# Patient Record
Sex: FEMALE | Race: ASIAN | NOT HISPANIC OR LATINO | Employment: OTHER | URBAN - METROPOLITAN AREA
[De-identification: names, ages, dates, MRNs, and addresses within clinical notes are randomized per-mention and may not be internally consistent; named-entity substitution may affect disease eponyms.]

---

## 2023-05-23 ENCOUNTER — REFERRAL (OUTPATIENT)
Dept: TRANSPLANT | Facility: CLINIC | Age: 53
End: 2023-05-23

## 2023-05-23 DIAGNOSIS — N18.4 CHRONIC KIDNEY DISEASE, STAGE 4, SEVERELY DECREASED GFR (H): ICD-10-CM

## 2023-05-23 DIAGNOSIS — E11.21: Primary | ICD-10-CM

## 2023-05-23 NOTE — LETTER
June 8, 2023    Wendy Kapoor  [address] [place] [address]          Dear Wendy,    Thank you for your interest in the Transplant Center at [facility]. We look forward to being a part of your care team and assisting you through the transplant process.    As we discussed, your transplant coordinator is Liv Borges (Kidney). You may call your coordinator at any time with questions or concerns. Your first scheduled call will be on 06/19/2023 between 1:00 pm and 3:00 pm. The tentative evaluation is scheduled on 08/02/2023. If you need to reschedule, please call [phone].    Please complete the following.    Fill out and return the enclosed forms  Authorization for Electronic Communication  Authorization to Discuss Protected Health Information  Authorization for Release of Protected Health Information    Sign up for:  [employer], access to your electronic medical record (see enclosed pamphlet)  [email or web address], a transplant education website    You can use these tools to learn more about your transplant, communicate with your care team, and track your medical details      Sincerely,      Solid Organ Transplant  [facility]    cc: Referring Physician PCP

## 2023-06-08 VITALS — WEIGHT: 138 LBS | HEIGHT: 60 IN | BODY MASS INDEX: 27.09 KG/M2

## 2023-06-08 NOTE — TELEPHONE ENCOUNTER
PCP: Hafsa Harrington MD  Referring Provider: Roberth Brand MD   Referring Diagnosis:CKD Stage 4 and Diabetic Nodular Glomerulosclerosis     GFR/Date: 16 (05/18/2023)    Is patient under the age of 65? Yes  Is patient diabetic? Yes  Is patient on insulin? Yes  Was patient offered a pancreas transplant referral? Yes    Is patient in a group home/assisted living? No  Does patient have a guardian? No    Referral intake process completed.  Patient is aware that after financial approval is received, medical records will be requested.   Patient confirmed for a callback from transplant coordinator on 06/19/2023. (within 2 weeks)  Tentative evaluation date 08/02/2023 slot #3 (within 4 weeks) if appointment is virtual, does patient have capabilities of setting this up? Did not ask    Confirmed coordinator will discuss evaluation process in more detail at the time of their call.   Patient is aware of the need to arrange age appropriate cancer screening, vaccinations, and dental care.     Reminded patient to complete questionnaire, complete medical records release, and review packet prior to evaluation visit.    Assessed patient for special needs (ie-wheelchair, assistance, guardian, and ): None  Patient instructed to call [phone] with questions.     Patient gave verbal consent during intake call to obtain medical records and documents outside of [facility]: Yes

## 2023-07-10 ENCOUNTER — TELEPHONE (OUTPATIENT)
Dept: TRANSPLANT | Facility: CLINIC | Age: 53
End: 2023-07-10
Payer: COMMERCIAL

## 2023-07-10 DIAGNOSIS — Z01.818 ENCOUNTER FOR PRE-TRANSPLANT EVALUATION FOR KIDNEY AND PANCREAS TRANSPLANT: ICD-10-CM

## 2023-07-10 DIAGNOSIS — I10 ESSENTIAL HYPERTENSION: ICD-10-CM

## 2023-07-10 DIAGNOSIS — N18.9 CHRONIC RENAL FAILURE: ICD-10-CM

## 2023-07-10 DIAGNOSIS — Z76.82 ORGAN TRANSPLANT CANDIDATE: ICD-10-CM

## 2023-07-10 DIAGNOSIS — E11.9 DIABETES MELLITUS, TYPE 2 (H): ICD-10-CM

## 2023-07-10 DIAGNOSIS — N18.5 CHRONIC KIDNEY DISEASE, STAGE V (H): ICD-10-CM

## 2023-07-10 NOTE — TELEPHONE ENCOUNTER
[facility] Call Center    Phone Message    May a detailed message be left on voicemail: yes     Reason for Call: Other: Spoke with patient and gave PKE date/times and helped activate Mychart.     Action Taken: Other: SOT    Travel Screening: Not Applicable

## 2023-07-10 NOTE — TELEPHONE ENCOUNTER
Wendy called to see what time her appointment is on 8/2/23 this writer didn't see appointment scheduled for patient. Wendy is wanting to follow up with her Coordinator that she is needing to schedule her Eval appointment for 8/2/23

## 2023-07-26 NOTE — TELEPHONE ENCOUNTER
I called Wendy HGunjan Kapoor and gave her a reminder call for next week evaluation. I/Zuly Marina Spoke directly with Wendy.    She reports understanding about the evaluation on 8/2/2023 from her discussion with Liv Borges.      I asked Wendy to watch the [facility] videos on UTube hyperlinks.  She asked me to send then to her. I will send to her email [email or web address].   [email or web address]     Pt will bring support person,  She knows to come to [address] [place] [address].   She can wear mask and or ask staff to wear mask in her presence if she wants further protection.       Zuly sent the following hyperlinks to Wendy to watch all of them prior to next week evaluation.   ANA Peck.     Domenico Nguyen,    Per our telephone conversation, on Wednesday 7/26/2023 would you please watch the videos prior to coming to the evaluation next week; August 2, 2023?     Thank you,  If you have questions about them you may call your kidney coordinator/ RN Liv Borges RN at [phone] option 5.    Wendy, each blue hyperlink below will give your information about the donor process; the kidney alone transplant and the combined Kidney /Pancreas and the medication training post transplant and the complications post kidney and kidney pancreas transplant.     Thank you so much for taking time to watch the videos.  Bring questions to the providers next week or ask Liv.    to view the MTP modules via [employer] here are the links in English    Living Donor (English) : [email or web address]://www.[employer]Pl[employer].com/playlist?list=[email or web address]     Pre-Kidney Transplant (I) (English): [email or web address]://www.[employer].[place]/playlist?list=[email or web address]-[email or web address]   Pre-Kidney Transplant (II) (English): https://www.[email or web address]/playlist?[email or web address]     Pre-Pancreas Transplant (I) (English): https://www.[email or web address].[place]/playlist?list=[email or web address]   Pre-Pancreas Transplant (II) (English):  https://www.[employer].com/playlist?list=[email or web address]_   Transplant Medication Training (English): https://www.[employer].com/playlist?list=[email or web address]-[email or web address]   Transplant Admission Training (English): https://www.[employer].com/playlist?list=[email or web address]  Post-Transplant Complications (All Organs) (English): https://www.[employer].com/playlist?list=[email or web address]    Sincerely, Zuly Marina RN BSN on behalf of the kidney program.

## 2023-07-28 NOTE — PROGRESS NOTES
TRANSPLANT NEPHROLOGY RECIPIENT EVALUATION NOTE    Assessment and Plan:  # Kidney/Pancreas Transplant Evaluation: Patient is a good candidate overall. Benefits of a living donor transplant were discussed.    # CKD from Biopsy Proven DM/HTN: biopsy Jan 2023 also with findings thought 2/2 Sjogren's. She's had a rather precipitous decline, not yet on dialysis, but eGFR 12-13 ml/min. Feeling well overall. She would likely benefit from a kidney transplant. ABO-B, cPRA pending, no potential living donor.    # Type 2 Diabetes: currently using 14 units insulin daily. Given evidence of end organ damage, she may benefit from a pancreas transplant. Hemoglobin A1c 6.6%.    # Cardiac Risk: history of pericardial effusion 2019 requiring admission but no surgical intervention. ECHO not since repeated. Will get updated ECHO now and will need risk assessment as well as coronary angiogram.    # PAD Screening: CT and iliac US.    # Sjogren's: new diagnosis. Has upcoming rheumatology consult.     # IgG Lambda MGUS: with monoclonal peak of 1.7 in Jan 2023, but normal serum free light chain ratio. Identified a few years ago. Repeat SPEP with monoclonal peak down to 0.3 and serum free light chain ratio was normal today. Will repeat labs in 3-6 months.     # Elevated LFTs: ALT 82 and AST 93. Platelets OK, no known liver disease or liver abnormalities on previous imaging. Repeat in a few weeks.    # Positive Lupus Anticoagulant: repeat in 12 weeks and add beta 2 glycoprotein IgM and IgM.    # Health Maintenance: Colonoscopy: due, Mammogram: Up to date, PAP: Up to date and Dental: edentulous, full dentures    Recommendations:  - Cardiac risk assessment and likely coronary angiogram given diabetes duration   - CT and iliac US  - Follow up with already scheduled rheumatology consult  - Repeat LFTs  - Update health maintenance as above    Discussed the risks and benefits of a transplant, including the risk of surgery and immunosuppression  medications.  Patient's overall evaluation will be discussed in the Transplant Program's regular meeting with a final recommendation on the patients suitability for transplant to be made at that time.    Evaluation:  Wendy Kapoor was seen in consultation at the request of Dr. Dayton Forman for evaluation as a potential kidney/pancreas transplant recipient.    Reason for Visit:  Wendy Kapoor is a 52 year old female with CKD from DM/HTN, who presents for kidney/pancreas transplant evaluation.    History of Present Illness:         Kidney Disease Hx: Referred to nephrology earlier this year for elevated serum creatinine and nephrotic-range proteinuria. Longstanding albuminuria dating back to at least 2013. Baseline creatinine had been 1.0-1.3 mg/dl, 1.3-1.6 mg/dl 2022, but increased to 2.9 mg/dl, hence the referral to nephrology. Native kidney biopsy Jan 2023 showing DM/HTN and interstitial nephritis thought 2/2 Sjogren's. Positive ARANZA with SSA and SSB antibodies. Has upcoming rheumatology visit.     FINAL DIAGNOSIS  Kidney, needle biopsy: 1.  Nodular diabetic  glomerulosclerosis. 2. Acute tubular injury, on a  background  of chronic tubulointerstitial nephritis. 3. Arteriolar  hyalinosis, severe.    COMMENT  Negative immunofluorescence studies argues against an  immune  complex mediated glomerular disease process.  The  predominant changes in this biopsy are those of nodular  diabetic glomerulosclerosis with focal global and secondary  segmental glomerulosclerosis.  Congo red stains for amyloid  are pending, and will be reported in an addendum.  The  biopsy also shows acute tubular injury, which could be  secondary to ischemic or toxic insult.  These changes are  superimposed on a background of chronic tubulointerstitial  nephritis with a plasma cell rich infiltrate.  Similar  changes are most commonly seen in the setting of an  allergic  or hypersensitivity reaction to medications to which the  patient has been  exposed.  Other causes include but are not  limited to chronic infections, underlying autoimmune  disease.  Of note this patient has tissue ARANZA effect, and  would therefore recommend an autoimmune disease workup.     Not on dialysis. eGFR 12-13 ml/min. Feeling well overall. Of note, mother with h/o ESKD thought 2/2 DM.         Primary Nephrologist: Dr. Brand       H/o Kidney Stones: No       H/o Recurrent/Frequent UTI: No         Diabetic Hx: Type 2        Diagnosis Date: ~25 years ago with first pregnancy       Medication History: oral agents for many years, on insulin for the last ~year. Managed by PCP. Lantus 14 units daily. Checks blood sugars twice daily, avgs 150-160       Diabetic Control: Controlled (HbA1c <7%)   Last HbA1c: 6.5%       Hypoglycemic Unawareness: No       End-Organ Damage due to DM: Nephropathy          Cardiac/Vascular Disease Risk Factors:        - History of pericardial effusion in 2019. No repeat ECHO since. EF was preserved. No recent stress or angio. No known PAD, valvular disease, or pulmonary HTN.         Viral Serology Status       CMV IgG Antibody: Positive       EBV IgG Antibody: Positive         Volume Status/Weight:        Volume status: Mildly hypervolemic       Weight:  Acceptable BMI       BMI: Body mass index is 27.92 kg/m .         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 No chest pain, SOB, or claudication.     Fatigue/Decreased Energy: [x] No [] Yes    Chest Pain or SOB with Exertion: [x] No [] Yes    Significant Weight Change: [x] No [] Yes    Nausea, Vomiting or Diarrhea: [x] No [] Yes    Fever, Sweats or Chills:  [x] No [] Yes    Leg Swelling [x] No [] Yes      Allergy Testing Questions:   Medication that caused a reaction None   Antibiotics used that didn't give an allergic reaction?  Patient doesn't know    COVID Vaccination Up To Date:  Not asked    Potential Living Kidney Donors: Not sure    Review of Systems:  A comprehensive review of systems was obtained and negative, except as noted in the HPI or PMH.    Past Medical History:   Medical record was reviewed and PMH was discussed with patient and noted below.  Past Medical History:   Diagnosis Date    Chronic kidney disease     Diabetes (H)     DM Type 2    History of blood transfusion 2019    Hypertension     MGUS (monoclonal gammopathy of unknown significance)     Pericardial effusion     Sjogren's syndrome (H)     Type 2 diabetes mellitus with diabetic nephropathy (H)        Past Social History:   Past Surgical History:   Procedure Laterality Date    BIOPSY       SECTION       Personal history of bleeding or anesthesia problems: No    Family History:  Family History   Problem Relation Age of Onset    Kidney Disease No family hx of        Personal History:   Social History     Socioeconomic History    Marital status:      Spouse name: Not on file    Number of children: Not on file    Years of education: Not on file    Highest education level: Not on file   Occupational History    Not on file   Tobacco Use    Smoking status: Never    Smokeless tobacco: Never   Substance and Sexual Activity    Alcohol use: Never    Drug use: Never    Sexual activity: Not on file   Other Topics Concern    Not on file   Social History Narrative    Not on file     Social Determinants of Health     Financial Resource Strain: Not on file   Food Insecurity: Not on file   Transportation Needs: Not on file   Physical Activity: Not on file   Stress: Not on file   Social Connections: Not on file   Intimate Partner Violence: Not on file   Housing Stability: Not on file       Allergies:  No Known Allergies    Medications:  Current Outpatient Medications   Medication Sig    allopurinol (ZYLOPRIM) 100 MG tablet Take 100 mg by mouth    blood glucose (FREESTYLE TEST STRIPS) test strip by Misc.(Non-Drug; Combo  "Route) route three times a day.    cholecalciferol 50 MCG (2000 UT) tablet Take 50 mcg by mouth    DARBEPOETIN DALTON IJ Inject 25 mcg Subcutaneous every 28 days    diltiazem ER (DILT-XR) 180 MG 24 hr capsule Take 180 mg by mouth    losartan (COZAAR) 50 MG tablet Take 1 tablet by mouth every morning    metoprolol succinate ER (TOPROL XL) 25 MG 24 hr tablet Take 1 tablet by mouth daily at 2 pm    pioglitazone (ACTOS) 30 MG tablet Take 30 mg by mouth    sodium bicarbonate 650 MG tablet 1,300 mg    torsemide (DEMADEX) 20 MG tablet Take 1 tablet by mouth every morning    glimepiride (AMARYL) 4 MG tablet TAKE 2 TABLETS (8 MG) BY MOUTH EVERY MORNING BEFORE BREAKFAST.    hydrALAZINE (APRESOLINE) 25 MG tablet Take 50 mg by mouth 3 times daily    Insulin Glargine w/ Trans Port 100 UNIT/ML SOPN Inject 14 Units Subcutaneous    LYCOPENE PO Take 1 tablet by mouth daily     No current facility-administered medications for this visit.       Vitals:  BP (!) 177/85   Pulse 71   Ht 1.511 m (4' 11.5\")   Wt 63.8 kg (140 lb 9.6 oz)   SpO2 100%   BMI 27.92 kg/m      Exam:  GENERAL APPEARANCE: alert and no distress  HENT: mouth without ulcers or lesions, good dentition  LYMPHATICS: no cervical or supraclavicular nodes  RESP: lungs clear to auscultation - no rales, rhonchi or wheezes  CV: regular rhythm, normal rate, no rub, no murmur  EDEMA: trace LE edema bilaterally  ABDOMEN: soft, nondistended, nontender, bowel sounds normal  MS: extremities normal - no gross deformities noted, no evidence of inflammation in joints, no muscle tenderness  SKIN: no rash    Results:   Recent Results (from the past 336 hour(s))   Hepatic function panel    Collection Time: 08/21/23  3:46 PM   Result Value Ref Range    Protein Total 8.0 6.4 - 8.3 g/dL    Albumin 3.5 3.5 - 5.2 g/dL    Bilirubin Total 0.2 <=1.2 mg/dL    Alkaline Phosphatase 109 (H) 35 - 104 U/L    AST 45 0 - 45 U/L    ALT 29 0 - 50 U/L    Bilirubin Direct <0.20 0.00 - 0.30 mg/dL         "

## 2023-08-01 ENCOUNTER — TELEPHONE (OUTPATIENT)
Dept: TRANSPLANT | Facility: CLINIC | Age: 53
End: 2023-08-01
Payer: COMMERCIAL

## 2023-08-01 LAB
ABO/RH(D): NORMAL
ANTIBODY SCREEN: NEGATIVE
SPECIMEN EXPIRATION DATE: NORMAL

## 2023-08-01 NOTE — TELEPHONE ENCOUNTER
Pt called to confirm her PKE visit tomorrow, we reviewed the clinic address, check in time 7:30 AM on the third floor.  I instructed her to take her medications as she normally would, no need to fast and that I will call her following the Selection Committee meeting next Weds. Pt had no further questions.

## 2023-08-01 NOTE — PROGRESS NOTES
[facility] Solid Organ Transplant  Outpatient MNT: Kidney Pancreas Transplant Evaluation    Current BMI: 27.9 (HT 59.5 in,  lbs/64 kg)  BMI guideline for kidney transplant up to a BMI of 40 / per surgeon discretion  BMI guideline for pancreas transplant up to a BMI of 35 / per surgeon discretion    Frailty Assessment -- Not Frail (1/5 points)- low activity level      Time Spent: 15 minutes  Visit Type: Initial   Referring Physician: Dickson   Pt accompanied by: her dtr & ex-     History of previous txp: none   Dialysis: no; plan for port placement soon/PD at some point     Nutrition Assessment  H/o DM II- checks BG 2x/day- before work & after work- mid 100s   Last A1c: 6.5 (June 29, 2023)  Lantus & 2 oral agents for DM management  Does not endorse any low BG    - Appetite: good/baseline   - Food allergies/intolerances: no  - Meal prep & grocery shopping: pt's    - Previous RD education: not asked   - Issues chewing or swallowing: no  - N/V/D/C: no  - Food access concerns: not asked     Vitamins, Supplements, Pertinent Meds: MVI, vit D   Herbal Medicines/Supplements: none     Edema: none     Weight hx: gained weight with med (unsure what one)- up to 153 lbs- now losing back and is near baseline ~135    Diet Recall (works nights)  Breakfast 8 am after work- rice, eggs, fish    Lunch    Dinner 10 pm before work- rice, w/ soup (sour soup w/ chicken or pork, veggies)   Snacks 3 am- at work- salad (veggies & ranch) or sandwich- lettuce, butter, ham (brings from home)   Beverages Water    Alcohol None    Dining out 1x/week or less      Physical Activity  Walks all night at work- sanitation  No planned activity outside of work      Labs  7/14 K 3.9   6/29 Phos 5.4     Nutrition Diagnosis  No nutrition diagnosis identified at this time.     Nutrition Intervention  Nutrition education provided:  Discussed sodium intake (low sodium foods and drinks, seasoning food without salt and tips for low sodium  diet).  Reviewed wnl K/Phos levels, which do not warrant further dietary modification at this time. Reviewed possibility of weight gain and BG changes with PD.     Reviewed post txp diet guidelines in brief (will review in further detail post txp):  (1) Review of proper food safety measures d/t immunosuppressant therapy post-op and increased risk for food-borne illness    (2) Avoid the following post txp d/t risk for rejection, unknown effects on the organs, and/or potential interactions with immunosuppressants:  - Herbal, Chinese, holistic, chiropractic, natural, alternative medicines and supplements  - Detoxes and cleanses  - Weight loss pills  - Protein powders or other products with extracts or herbs (ie green tea extract)    (3) Med regimen and possible side effects    Patient Understanding: Pt verbalized understanding of education provided.  Expected Engagement: Good  Follow-Up Plans: PRN     Nutrition Goals  No nutrition goals identified at this time     Irene Abbott, RD, LD, CCTD

## 2023-08-02 ENCOUNTER — ANCILLARY PROCEDURE (OUTPATIENT)
Dept: GENERAL RADIOLOGY | Facility: CLINIC | Age: 53
End: 2023-08-02
Attending: PHYSICIAN ASSISTANT
Payer: COMMERCIAL

## 2023-08-02 ENCOUNTER — OFFICE VISIT (OUTPATIENT)
Dept: TRANSPLANT | Facility: CLINIC | Age: 53
End: 2023-08-02
Attending: PHYSICIAN ASSISTANT
Payer: COMMERCIAL

## 2023-08-02 ENCOUNTER — EVALUATION (OUTPATIENT)
Dept: TRANSPLANT | Facility: CLINIC | Age: 53
End: 2023-08-02

## 2023-08-02 ENCOUNTER — TELEPHONE (OUTPATIENT)
Dept: TRANSPLANT | Facility: CLINIC | Age: 53
End: 2023-08-02

## 2023-08-02 ENCOUNTER — LAB (OUTPATIENT)
Dept: LAB | Facility: CLINIC | Age: 53
End: 2023-08-02
Attending: PHYSICIAN ASSISTANT
Payer: COMMERCIAL

## 2023-08-02 ENCOUNTER — ANCILLARY PROCEDURE (OUTPATIENT)
Dept: CARDIOLOGY | Facility: CLINIC | Age: 53
End: 2023-08-02
Attending: PHYSICIAN ASSISTANT
Payer: COMMERCIAL

## 2023-08-02 VITALS
BODY MASS INDEX: 27.61 KG/M2 | SYSTOLIC BLOOD PRESSURE: 177 MMHG | HEIGHT: 60 IN | DIASTOLIC BLOOD PRESSURE: 85 MMHG | WEIGHT: 140.6 LBS | HEART RATE: 71 BPM | OXYGEN SATURATION: 100 %

## 2023-08-02 DIAGNOSIS — E11.21 TYPE 2 DIABETES MELLITUS WITH DIABETIC NEPHROPATHY, WITH LONG-TERM CURRENT USE OF INSULIN (H): ICD-10-CM

## 2023-08-02 DIAGNOSIS — E11.9 DIABETES MELLITUS, TYPE 2 (H): ICD-10-CM

## 2023-08-02 DIAGNOSIS — Z01.818 ENCOUNTER FOR PRE-TRANSPLANT EVALUATION FOR KIDNEY AND PANCREAS TRANSPLANT: ICD-10-CM

## 2023-08-02 DIAGNOSIS — N18.5 CHRONIC KIDNEY DISEASE, STAGE V (H): ICD-10-CM

## 2023-08-02 DIAGNOSIS — N18.9 CHRONIC RENAL FAILURE: ICD-10-CM

## 2023-08-02 DIAGNOSIS — I10 ESSENTIAL HYPERTENSION: ICD-10-CM

## 2023-08-02 DIAGNOSIS — Z76.82 ORGAN TRANSPLANT CANDIDATE: ICD-10-CM

## 2023-08-02 DIAGNOSIS — D47.2 MGUS (MONOCLONAL GAMMOPATHY OF UNKNOWN SIGNIFICANCE): ICD-10-CM

## 2023-08-02 DIAGNOSIS — R80.9 PROTEINURIA, UNSPECIFIED TYPE: Primary | ICD-10-CM

## 2023-08-02 DIAGNOSIS — Z79.4 TYPE 2 DIABETES MELLITUS WITH DIABETIC NEPHROPATHY, WITH LONG-TERM CURRENT USE OF INSULIN (H): ICD-10-CM

## 2023-08-02 DIAGNOSIS — R80.9 PROTEINURIA, UNSPECIFIED TYPE: ICD-10-CM

## 2023-08-02 LAB
A1 AB TITR SERPL: 4 {TITER}
A1 AB TITR SERPL: 8 {TITER}
A2 AB TITR SERPL: 1 {TITER}
A2 AB TITR SERPL: <1 {TITER}
ABO/RH(D): NORMAL
ALBUMIN SERPL BCG-MCNC: 3.5 G/DL (ref 3.5–5.2)
ALBUMIN UR-MCNC: 300 MG/DL
ALP SERPL-CCNC: 128 U/L (ref 35–104)
ALT SERPL W P-5'-P-CCNC: 82 U/L (ref 0–50)
ANION GAP SERPL CALCULATED.3IONS-SCNC: 12 MMOL/L (ref 7–15)
ANTIBODY TITER IGM SCREEN: NEGATIVE
APPEARANCE UR: CLEAR
APTT PPP: 39 SECONDS (ref 22–38)
AST SERPL W P-5'-P-CCNC: 93 U/L (ref 0–45)
BASOPHILS # BLD AUTO: 0 10E3/UL (ref 0–0.2)
BASOPHILS NFR BLD AUTO: 0 %
BILIRUB SERPL-MCNC: 0.2 MG/DL
BILIRUB UR QL STRIP: NEGATIVE
BUN SERPL-MCNC: 67.8 MG/DL (ref 6–20)
CALCIUM SERPL-MCNC: 8.8 MG/DL (ref 8.6–10)
CHLORIDE SERPL-SCNC: 101 MMOL/L (ref 98–107)
COLOR UR AUTO: ABNORMAL
CREAT SERPL-MCNC: 4.25 MG/DL (ref 0.51–0.95)
DEPRECATED HCO3 PLAS-SCNC: 22 MMOL/L (ref 22–29)
EOSINOPHIL # BLD AUTO: 0.1 10E3/UL (ref 0–0.7)
EOSINOPHIL NFR BLD AUTO: 1 %
ERYTHROCYTE [DISTWIDTH] IN BLOOD BY AUTOMATED COUNT: 13.9 % (ref 10–15)
FACTOR 2 INTERPRETATION: NORMAL
FACTOR V INTERPRETATION: NORMAL
GFR SERPL CREATININE-BSD FRML MDRD: 12 ML/MIN/1.73M2
GLUCOSE SERPL-MCNC: 227 MG/DL (ref 70–99)
GLUCOSE UR STRIP-MCNC: 300 MG/DL
HBA1C MFR BLD: 6.6 %
HBV CORE AB SERPL QL IA: NONREACTIVE
HBV SURFACE AB SERPL IA-ACNC: 0.83 M[IU]/ML
HBV SURFACE AB SERPL IA-ACNC: NONREACTIVE M[IU]/ML
HBV SURFACE AG SERPL QL IA: NONREACTIVE
HCT VFR BLD AUTO: 22.6 % (ref 35–47)
HCV AB SERPL QL IA: NONREACTIVE
HGB BLD-MCNC: 7.3 G/DL (ref 11.7–15.7)
HGB UR QL STRIP: NEGATIVE
HIV 1+2 AB+HIV1 P24 AG SERPL QL IA: NONREACTIVE
IMM GRANULOCYTES # BLD: 0 10E3/UL
IMM GRANULOCYTES NFR BLD: 1 %
INR PPP: 0.98 (ref 0.85–1.15)
KETONES UR STRIP-MCNC: NEGATIVE MG/DL
LAB DIRECTOR COMMENTS: NORMAL
LAB DIRECTOR DISCLAIMER: NORMAL
LAB DIRECTOR INTERPRETATION: NORMAL
LAB DIRECTOR METHODOLOGY: NORMAL
LAB DIRECTOR RESULTS: NORMAL
LEUKOCYTE ESTERASE UR QL STRIP: NEGATIVE
LVEF ECHO: NORMAL
LYMPHOCYTES # BLD AUTO: 0.7 10E3/UL (ref 0.8–5.3)
LYMPHOCYTES NFR BLD AUTO: 13 %
MCH RBC QN AUTO: 29.1 PG (ref 26.5–33)
MCHC RBC AUTO-ENTMCNC: 32.3 G/DL (ref 31.5–36.5)
MCV RBC AUTO: 90 FL (ref 78–100)
MONOCYTES # BLD AUTO: 0.3 10E3/UL (ref 0–1.3)
MONOCYTES NFR BLD AUTO: 5 %
MUCOUS THREADS #/AREA URNS LPF: PRESENT /LPF
NEUTROPHILS # BLD AUTO: 4.4 10E3/UL (ref 1.6–8.3)
NEUTROPHILS NFR BLD AUTO: 80 %
NITRATE UR QL: NEGATIVE
NRBC # BLD AUTO: 0 10E3/UL
NRBC BLD AUTO-RTO: 0 /100
PH UR STRIP: 7 [PH] (ref 5–7)
PLAT MORPH BLD: NORMAL
PLATELET # BLD AUTO: 203 10E3/UL (ref 150–450)
POTASSIUM SERPL-SCNC: 3.7 MMOL/L (ref 3.4–5.3)
PROT SERPL-MCNC: 8.5 G/DL (ref 6.4–8.3)
RBC # BLD AUTO: 2.51 10E6/UL (ref 3.8–5.2)
RBC MORPH BLD: NORMAL
RBC URINE: <1 /HPF
SODIUM SERPL-SCNC: 135 MMOL/L (ref 136–145)
SP GR UR STRIP: 1.01 (ref 1–1.03)
SPECIMEN DESCRIPTION: NORMAL
SPECIMEN EXPIRATION DATE: NORMAL
SPECIMEN EXPIRATION DATE: NORMAL
SQUAMOUS EPITHELIAL: <1 /HPF
T PALLIDUM AB SER QL: NONREACTIVE
TOTAL PROTEIN SERUM FOR ELP: 8 G/DL (ref 6.4–8.3)
UROBILINOGEN UR STRIP-MCNC: NORMAL MG/DL
WBC # BLD AUTO: 5.5 10E3/UL (ref 4–11)
WBC URINE: 1 /HPF

## 2023-08-02 PROCEDURE — 85025 COMPLETE CBC W/AUTO DIFF WBC: CPT | Performed by: PATHOLOGY

## 2023-08-02 PROCEDURE — 84681 ASSAY OF C-PEPTIDE: CPT | Performed by: PHYSICIAN ASSISTANT

## 2023-08-02 PROCEDURE — 85613 RUSSELL VIPER VENOM DILUTED: CPT | Performed by: PHYSICIAN ASSISTANT

## 2023-08-02 PROCEDURE — 83036 HEMOGLOBIN GLYCOSYLATED A1C: CPT | Performed by: PHYSICIAN ASSISTANT

## 2023-08-02 PROCEDURE — 86481 TB AG RESPONSE T-CELL SUSP: CPT | Performed by: PHYSICIAN ASSISTANT

## 2023-08-02 PROCEDURE — 71046 X-RAY EXAM CHEST 2 VIEWS: CPT | Mod: GC | Performed by: RADIOLOGY

## 2023-08-02 PROCEDURE — 93306 TTE W/DOPPLER COMPLETE: CPT | Performed by: INTERNAL MEDICINE

## 2023-08-02 PROCEDURE — 85730 THROMBOPLASTIN TIME PARTIAL: CPT | Performed by: PATHOLOGY

## 2023-08-02 PROCEDURE — 84165 PROTEIN E-PHORESIS SERUM: CPT | Mod: TC | Performed by: PATHOLOGY

## 2023-08-02 PROCEDURE — 86704 HEP B CORE ANTIBODY TOTAL: CPT | Performed by: PHYSICIAN ASSISTANT

## 2023-08-02 PROCEDURE — 86833 HLA CLASS II HIGH DEFIN QUAL: CPT | Performed by: PHYSICIAN ASSISTANT

## 2023-08-02 PROCEDURE — 86850 RBC ANTIBODY SCREEN: CPT | Performed by: PHYSICIAN ASSISTANT

## 2023-08-02 PROCEDURE — 85670 THROMBIN TIME PLASMA: CPT | Performed by: PHYSICIAN ASSISTANT

## 2023-08-02 PROCEDURE — 87340 HEPATITIS B SURFACE AG IA: CPT | Performed by: PHYSICIAN ASSISTANT

## 2023-08-02 PROCEDURE — 86886 COOMBS TEST INDIRECT TITER: CPT | Performed by: PHYSICIAN ASSISTANT

## 2023-08-02 PROCEDURE — 99000 SPECIMEN HANDLING OFFICE-LAB: CPT | Performed by: PATHOLOGY

## 2023-08-02 PROCEDURE — 81001 URINALYSIS AUTO W/SCOPE: CPT | Performed by: PATHOLOGY

## 2023-08-02 PROCEDURE — 93000 ELECTROCARDIOGRAM COMPLETE: CPT | Performed by: INTERNAL MEDICINE

## 2023-08-02 PROCEDURE — 86803 HEPATITIS C AB TEST: CPT | Performed by: PHYSICIAN ASSISTANT

## 2023-08-02 PROCEDURE — 99215 OFFICE O/P EST HI 40 MIN: CPT

## 2023-08-02 PROCEDURE — 86644 CMV ANTIBODY: CPT | Performed by: PHYSICIAN ASSISTANT

## 2023-08-02 PROCEDURE — G0452 MOLECULAR PATHOLOGY INTERPR: HCPCS | Mod: 26 | Performed by: PATHOLOGY

## 2023-08-02 PROCEDURE — 86665 EPSTEIN-BARR CAPSID VCA: CPT | Performed by: PHYSICIAN ASSISTANT

## 2023-08-02 PROCEDURE — 80053 COMPREHEN METABOLIC PANEL: CPT | Performed by: PATHOLOGY

## 2023-08-02 PROCEDURE — 86706 HEP B SURFACE ANTIBODY: CPT | Performed by: PHYSICIAN ASSISTANT

## 2023-08-02 PROCEDURE — 99213 OFFICE O/P EST LOW 20 MIN: CPT

## 2023-08-02 PROCEDURE — 81378 HLA I & II TYPING HR: CPT | Performed by: PHYSICIAN ASSISTANT

## 2023-08-02 PROCEDURE — 86832 HLA CLASS I HIGH DEFIN QUAL: CPT | Performed by: PHYSICIAN ASSISTANT

## 2023-08-02 PROCEDURE — 85610 PROTHROMBIN TIME: CPT | Performed by: PATHOLOGY

## 2023-08-02 PROCEDURE — 83521 IG LIGHT CHAINS FREE EACH: CPT | Performed by: PHYSICIAN ASSISTANT

## 2023-08-02 PROCEDURE — 84155 ASSAY OF PROTEIN SERUM: CPT | Performed by: PHYSICIAN ASSISTANT

## 2023-08-02 PROCEDURE — 81240 F2 GENE: CPT | Performed by: PHYSICIAN ASSISTANT

## 2023-08-02 PROCEDURE — 36415 COLL VENOUS BLD VENIPUNCTURE: CPT | Performed by: PATHOLOGY

## 2023-08-02 PROCEDURE — 85390 FIBRINOLYSINS SCREEN I&R: CPT | Mod: 26 | Performed by: PATHOLOGY

## 2023-08-02 PROCEDURE — 86147 CARDIOLIPIN ANTIBODY EA IG: CPT | Performed by: PHYSICIAN ASSISTANT

## 2023-08-02 PROCEDURE — 86787 VARICELLA-ZOSTER ANTIBODY: CPT | Performed by: PHYSICIAN ASSISTANT

## 2023-08-02 PROCEDURE — 86780 TREPONEMA PALLIDUM: CPT | Performed by: PHYSICIAN ASSISTANT

## 2023-08-02 PROCEDURE — 86901 BLOOD TYPING SEROLOGIC RH(D): CPT | Performed by: PHYSICIAN ASSISTANT

## 2023-08-02 PROCEDURE — 84165 PROTEIN E-PHORESIS SERUM: CPT | Mod: 26

## 2023-08-02 PROCEDURE — 99203 OFFICE O/P NEW LOW 30 MIN: CPT | Performed by: TRANSPLANT SURGERY

## 2023-08-02 PROCEDURE — 85730 THROMBOPLASTIN TIME PARTIAL: CPT | Performed by: PHYSICIAN ASSISTANT

## 2023-08-02 RX ORDER — TORSEMIDE 20 MG/1
1 TABLET ORAL EVERY MORNING
COMMUNITY
Start: 2023-04-17 | End: 2024-04-24

## 2023-08-02 RX ORDER — HYDRALAZINE HYDROCHLORIDE 25 MG/1
50 TABLET, FILM COATED ORAL 3 TIMES DAILY
COMMUNITY

## 2023-08-02 RX ORDER — SODIUM BICARBONATE 650 MG/1
1300 TABLET ORAL
COMMUNITY
Start: 2023-04-17

## 2023-08-02 RX ORDER — DILTIAZEM HYDROCHLORIDE 180 MG/1
180 CAPSULE, EXTENDED RELEASE ORAL
COMMUNITY
Start: 2023-07-26 | End: 2024-04-24

## 2023-08-02 RX ORDER — ALLOPURINOL 100 MG/1
100 TABLET ORAL
COMMUNITY
Start: 2023-02-13 | End: 2024-02-13

## 2023-08-02 RX ORDER — GLIMEPIRIDE 4 MG/1
TABLET ORAL
COMMUNITY

## 2023-08-02 RX ORDER — METOPROLOL SUCCINATE 25 MG/1
1 TABLET, EXTENDED RELEASE ORAL
COMMUNITY
Start: 2022-12-27 | End: 2024-04-24

## 2023-08-02 RX ORDER — LOSARTAN POTASSIUM 50 MG/1
1 TABLET ORAL EVERY MORNING
COMMUNITY
Start: 2023-06-29

## 2023-08-02 RX ORDER — PIOGLITAZONEHYDROCHLORIDE 30 MG/1
30 TABLET ORAL
COMMUNITY
Start: 2023-02-27

## 2023-08-02 RX ORDER — BLOOD SUGAR DIAGNOSTIC
STRIP MISCELLANEOUS
COMMUNITY
Start: 2022-03-06

## 2023-08-02 NOTE — LETTER
8/2/2023         RE: Wendy Kaopor  [address]  [place] [address]        Dear Colleague,    Thank you for referring your patient, Wendy Kapoor, to the [facility] TRANSPL[facility]. Please see a copy of my visit note below.    Kidney, Pancreas Transplant Referral - 5/23/2023  Wendy Kapoor attended the pre-transplant patient EVALUATION today August 2, 2023 with Celia and Benny. The  Transplant Place website pre-transplant modules were NOT  viewed; Zuly to send Blue hyperlinks to Celia and to to Wendy email addresses   Content reviewed:  Living Donation and how to access that program; [email or web address]  Paired exchange  Kidney Donor Profile Index (KDPI)Pt signed NOT willing to accept offers >85% at this time.   Waiting list issues (right to decline without penalty, high PHS risk donors, what to expect when called with an offer)  Hospital experience,  length of stay , need to stay locally post-discharge (2-4 weeks)  Surgical options (with pictures)                           Post-surgery lifting and driving restrictions  Post-transplant routines, frequency of lab work and clinic visits  Need to stay locally post-discharge (2-4 weeks)  Role of Transplant Coordinator    Participants were informed of the benefits of transplant as well as potential risks such as infection, cancer, and death.  The need for total adherence with immunosuppression medications and following transplant regimens was stressed.  The overall evaluation/approval/listing process was reviewed.        The patient was provided with the following documents:  What You Need to Know About a Kidney Transplant  Adult Kidney Transplant - A Guide for Patients  SRTR Data Sheet - Kidney  Brochure - Kidney Allocation  What You Need to Know About a Pancreas Transplant  Adult Pancreas Transplant-A Guide for Patients  SRTR Data Sheet - Pancreas  Brochure - Pancreas  SRTR Data Sheet - Kidney/Pancreas  Brochure - SPK  Brochure -  "Multiple Listing and Waiting Time Transfer  What Every Patient Needs to Know (UNOS)  UNOS Facts and Figures  Finding a Donor  My Transplant Place - Quick Start Guide  KDPI Consent  Receipt of Information form    Wendy Kapoor signed the  Receipt of Information for Organ Transplant Recipient.\" She was provided [employer]'s business card and instructed to call with additional questions.      Summary  See previous note.     Transplant Surgery Consult Note     Medical record number: [id number]  YOB: 1970,   Consult requested by Dr. Brand for evaluation of kidney transplant candidacy.    Assessment and Recommendations:Ms. Kapoor appears to be a good candidate for kidney transplantation and has a good understanding of the risks and benefits of this approach to the management of renal failure. The following issues should be addressed prior to finalizing her transplant candidacy:     Ms. Kapoor has Chronic renal failure due to diabetes whose condition is not expected to resolve, is expected to progress, and is expected to continue to develop related comorbid conditions.    Dietician consult ordered: Yes  Social work consult ordered: Yes  Transplant listing labs ordered to include HLA, ABOx2, Cr, etc.  Cardiology consult for cardiac risk stratification to be ordered: No  Obtain past medical records  Up-to-date cancer screening    The majority of our visit was spent in counselling, discussing the medical and surgical risks of kidney transplantation.We talked about the pros and cons of transplantation vs. dialysis.  We discussed the fact that it was important to think about the pros and cons of each treatment option and make an active decision.  We also discussed the fact that the two were interconnected and that if the transplant failed, it is possible that dialysis might be necessary before another transplant.  We discussed operative risks. We discussed immunosuppression side effects.  In addition we " "discussed the critical need for adherence to the immunosuppressive regimen as well as to maintain schedule of blood draws and follow-up clinic visits.         We also discussed the fact that if choosing to have a transplant, a second important decision was a living versus a  donor transplant.  We talked about the pros and cons of each option - the advantage of a  donor transplant being not asking someone to go through the living donor operation, the disadvantage, 5-6 years waiting; the advantage of a living donor transplant, much shorter time to transplant and significantly better long-term outcomes, the disadvantage being the risk to the donor.  Although I didn't express an opinion regarding transplantation or dialysis, I suggested that if opting for a transplant, we would strongly recommend a living donor transplant.      We discussed the benefit of a preemptive transplant.     We discussed the fact that a living donor does not have to be a direct match and that if there was a donor who met the criteria but was not a match, there was an option of participating in the national paired exchange system.  I described how the system would work.    I also discussed the new ( donor) kidney (KDPI) scoring system. We discussed the advantages and disadvantages of accepting an \"expanded criteria\" donor kidney, and the latest data as to who potentially benefits - those >45 and/or having diabetes a cause for ESKD - by receiving an expanded criteria donor kidney versus waiting longer for a standard criteria donor. Based on her age, i recommended she say \"yes\" to a high KDPI kidney if she was interested in a kidney alone.    We also discussed kidney/pancreas transplant and the advantages and disadvantages compared with kidney alone vs combined kidney pancreas transplant.  We discussed the shorter waiting time for a combined kidney pancreas and that the  organs would not come from a high KDPI donor.    She " seemed overwhelmed about all of the information , and had given little thought to a pancreas transplant.  I suggested that she take her time thinking about this and then return to clinic for additional discussion and education about pancreas transplantation.  Ms. Kapoor asked good questions and her candidacy will be reviewed at our Multidisciplinary Selection Committee. Thank you for the opportunity to participate in Ms. Kapoor's care.    40 min spent on the date of the encounter in chart review, patient visit,  documentation and/or discussion with other providers about the issues documented above.          Dayton Forman MD  Surgical Professor, Kidney Transplantation                                                                                                      ---------------------------------------------------------------------------------------------------    Past medical history includes:  CKD, stage 5  Diabetes  Sjogrens syndrome  Hypertension  Gout  Pericardial effucion     Past Medical History:   Diagnosis Date    Diabetes (H)     DM Type 2    History of blood transfusion 2019    Hypertension      Past Surgical History:   Procedure Laterality Date    BIOPSY       SECTION       No family history on file.  Social History     Socioeconomic History    Marital status:      Spouse name: Not on file    Number of children: Not on file    Years of education: Not on file    Highest education level: Not on file   Occupational History    Not on file   Tobacco Use    Smoking status: Never    Smokeless tobacco: Never   Substance and Sexual Activity    Alcohol use: Never    Drug use: Never    Sexual activity: Not on file   Other Topics Concern    Not on file   Social History Narrative    Not on file     Social Determinants of Health     Financial Resource Strain: Not on file   Food Insecurity: Not on file   Transportation Needs: Not on file   Physical Activity: Not on file   Stress: Not on file    Social Connections: Not on file   Intimate Partner Violence: Not on file   Housing Stability: Not on file       ROS:   CONSTITUTIONAL:  No fevers or chills  EYES: negative for icterus  ENT:  negative for hearing loss, tinnitus and sore throat  RESPIRATORY:  negative for cough, sputum, dyspnea  CARDIOVASCULAR:  negative for chest pain  GASTROINTESTINAL:  negative for nausea, vomiting, diarrhea or constipation  GENITOURINARY:  negative for incontinence, dysuria, bladder emptying problems  HEME:  No easy bruising  INTEGUMENT:  negative for rash and pruritus  NEURO:  Negative for headache, seizure disorder  Allergies:   No Known Allergies  Medications:  Prescription Medications as of 8/7/2023         Rx Number Disp Refills Start End Last Dispensed Date Next Fill Date Owning Pharmacy    allopurinol (ZYLOPRIM) 100 MG tablet    2/13/2023 2/13/2024       Sig: Take 100 mg by mouth    Class: Historical    Route: Oral    blood glucose (FREESTYLE TEST STRIPS) test strip    3/6/2022        Sig: by Misc.(Non-Drug; Combo Route) route three times a day.    Class: Historical    Route: Other    cholecalciferol 50 MCG (2000 UT) tablet    5/18/2023        Sig: Take 50 mcg by mouth    Class: Historical    Route: Oral    DARBEPOETIN DALTON IJ    5/19/2023        Sig: Inject 25 mcg Subcutaneous every 28 days    Class: Historical    Route: Subcutaneous    diltiazem ER (DILT-XR) 180 MG 24 hr capsule    7/26/2023        Sig: Take 180 mg by mouth    Class: Historical    Route: Oral    glimepiride (AMARYL) 4 MG tablet            Sig: TAKE 2 TABLETS (8 MG) BY MOUTH EVERY MORNING BEFORE BREAKFAST.    Class: Historical    hydrALAZINE (APRESOLINE) 25 MG tablet            Sig: Take 50 mg by mouth    Class: Historical    Route: Oral    Insulin Glargine w/ Trans Port 100 UNIT/ML SOPN            Sig: Inject 14 Units Subcutaneous    Class: Historical    Route: Subcutaneous    losartan (COZAAR) 50 MG tablet    6/29/2023        Sig: Take 1 tablet by  mouth every morning    Class: Historical    Route: Oral    LYCOPENE PO            Sig: Take 1 tablet by mouth daily    Class: Historical    Route: Oral    metoprolol succinate ER (TOPROL XL) 25 MG 24 hr tablet    2022        Sig: Take 1 tablet by mouth daily at 2 pm    Class: Historical    Route: Oral    pioglitazone (ACTOS) 30 MG tablet    2023        Sig: Take 30 mg by mouth    Class: Historical    Route: Oral    sodium bicarbonate 650 MG tablet    2023        Si,300 mg    Class: Historical    torsemide (DEMADEX) 20 MG tablet    2023        Sig: Take 1 tablet by mouth every morning    Class: Historical    Route: Oral          Exam:   Constitutional - A&O in NAD.   Eyes - no redness or discharge.  Sclera anicteric  Respiratory - no cough, no labored breathing  Musculoskeletal - range of motion normal  Skin - no discoloration, no jaundice  Neurological - no tremors.  No facial droop or dysarthria  Psychiatric - normal mood and affect  The rest of a comprehensive physical examination is deferred due to PHE (public health emergency) video visit restrictions     Diagnostics:   Recent Results (from the past 672 hour(s))   EKG 12-lead, tracing only [EKG1]    Collection Time: 23  1:14 PM   Result Value Ref Range    Systolic Blood Pressure  mmHg    Diastolic Blood Pressure  mmHg    Ventricular Rate 70 BPM    Atrial Rate 70 BPM    CT Interval 164 ms    QRS Duration 82 ms     ms    QTc 479 ms    P Axis 19 degrees    R AXIS 43 degrees    T Axis 37 degrees    Interpretation ECG       Sinus rhythm  Normal ECG  No previous ECGs available  Confirmed by MD STACY, REILLY ([id number]) on 2023 6:42:56 AM     ABO and Rh    Collection Time: 23  1:46 PM   Result Value Ref Range    ABO/RH(D) B POS     SPECIMEN EXPIRATION DATE [id number]    UA with Microscopic reflex to Culture    Collection Time: 23  1:50 PM    Specimen: Urine, Midstream   Result Value Ref Range    Color Urine Straw  Colorless, Straw, Light Yellow, Yellow    Appearance Urine Clear Clear    Glucose Urine 300 (A) Negative mg/dL    Bilirubin Urine Negative Negative    Ketones Urine Negative Negative mg/dL    Specific Gravity Urine 1.012 1.003 - 1.035    Blood Urine Negative Negative    pH Urine 7.0 5.0 - 7.0    Protein Albumin Urine 300 (A) Negative mg/dL    Urobilinogen Urine Normal Normal, 2.0 mg/dL    Nitrite Urine Negative Negative    Leukocyte Esterase Urine Negative Negative    Mucus Urine Present (A) None Seen /LPF    RBC Urine <1 <=2 /HPF    WBC Urine 1 <=5 /HPF    Squamous Epithelials Urine <1 <=1 /HPF   Antibody titer red cell [CQI8525]    Collection Time: 08/02/23  1:52 PM   Result Value Ref Range    Anti-A1 IgG Titer 4     Anti-A1 IgM Titer 8     Anti-A2 IgG Titer <1     Anti-A2 IgM Titer 1     SPECIMEN EXPIRATION DATE [id number]     ANTIBODY TITER IGM SCREEN Negative    Comprehensive metabolic panel [LAB17]    Collection Time: 08/02/23  1:52 PM   Result Value Ref Range    Sodium 135 (L) 136 - 145 mmol/L    Potassium 3.7 3.4 - 5.3 mmol/L    Chloride 101 98 - 107 mmol/L    Carbon Dioxide (CO2) 22 22 - 29 mmol/L    Anion Gap 12 7 - 15 mmol/L    Urea Nitrogen 67.8 (H) 6.0 - 20.0 mg/dL    Creatinine 4.25 (H) 0.51 - 0.95 mg/dL    Calcium 8.8 8.6 - 10.0 mg/dL    Glucose 227 (H) 70 - 99 mg/dL    Alkaline Phosphatase 128 (H) 35 - 104 U/L    AST 93 (H) 0 - 45 U/L    ALT 82 (H) 0 - 50 U/L    Protein Total 8.5 (H) 6.4 - 8.3 g/dL    Albumin 3.5 3.5 - 5.2 g/dL    Bilirubin Total 0.2 <=1.2 mg/dL    GFR Estimate 12 (L) >60 mL/min/1.73m2   Cardiolipin Lisa IgG and IgM [LAB 6836]    Collection Time: 08/02/23  1:52 PM   Result Value Ref Range    Cardiolipin Lisa IgG Instrument Value 2.4 <10.0 GPL-U/mL    Cardiolipin Antibody IgG Negative Negative    Cardiolipin Lisa IgM Instrument Value 2.5 <10.0 MPL-U/mL    Cardiolipin Antibody IgM Negative Negative   INR [IBP2600]    Collection Time: 08/02/23  1:52 PM   Result Value Ref Range    INR  0.98 0.85 - 1.15   Partial thromboplastin time [LAB56]    Collection Time: 08/02/23  1:52 PM   Result Value Ref Range    aPTT 39 (H) 22 - 38 Seconds   Thrombin time [HOL790]    Collection Time: 08/02/23  1:52 PM   Result Value Ref Range    Thrombin Time 19.9 (H) 13.0 - 19.0 Seconds   CMV Antibody IgG [AGI4573]    Collection Time: 08/02/23  1:52 PM   Result Value Ref Range    CMV Lsia IgG Instrument Value >10.00 (H) <0.60 U/mL    CMV Antibody IgG Positive, suggests recent or past exposure. (A) No detectable antibody.    EBV Capsid Antibody IgG [[id number]]    Collection Time: 08/02/23  1:52 PM   Result Value Ref Range    EBV Capsid Lisa IgG Instrument Value 214.0 (H) <18.0 U/mL    EBV Capsid Antibody IgG Positive (A) No detectable antibody.   Hepatitis B core antibody [[id number]]    Collection Time: 08/02/23  1:52 PM   Result Value Ref Range    Hepatitis B Core Antibody Total Nonreactive Nonreactive   Hepatitis B Surface Antibody [[id number]]    Collection Time: 08/02/23  1:52 PM   Result Value Ref Range    Hepatitis B Surface Antibody Instrument Value 0.83 <8.00 m[IU]/mL    Hepatitis B Surface Antibody Nonreactive    Hepatitis B surface antigen [CEP264]    Collection Time: 08/02/23  1:52 PM   Result Value Ref Range    Hepatitis B Surface Antigen Nonreactive Nonreactive   Hepatitis C antibody [JRA140]    Collection Time: 08/02/23  1:52 PM   Result Value Ref Range    Hepatitis C Antibody Nonreactive Nonreactive   HIV Antigen Antibody Combo Pretransplant    Collection Time: 08/02/23  1:52 PM   Result Value Ref Range    HIV Antigen Antibody Combo Pretransplant Nonreactive Nonreactive   Treponema Abs w Reflex to RPR and Titer [[id number]]    Collection Time: 08/02/23  1:52 PM   Result Value Ref Range    Treponema Antibody Total Nonreactive Nonreactive   Varicella Zoster Virus Antibody IgG [[id number]]    Collection Time: 08/02/23  1:52 PM   Result Value Ref Range    VZV Lisa IgG Instrument Value 3,084.0 <135.0 Index    Varicella Zoster  Antibody IgG Positive    C-peptide [[id number]]    Collection Time: 08/02/23  1:52 PM   Result Value Ref Range    C Peptide 10.1 (H) 0.9 - 6.9 ng/mL   Hemoglobin A1c [LAB90]    Collection Time: 08/02/23  1:52 PM   Result Value Ref Range    Hemoglobin A1C 6.6 (H) <5.7 %   Lupus Anticoagulant Panel [[id number]]    Collection Time: 08/02/23  1:52 PM   Result Value Ref Range    Thrombin Time 17.5 13.0 - 19.0 Seconds    PTT Ratio 1.67 (H) <1.21    Platelet Neutralization 3 (H) <=0 Seconds    DRVVT Screen Ratio 1.27 (H) <1.08    DRVVT Confirm Normalized Ratio 1.22 (H) <1.21    Lupus Result Positive (A) Negative    Lupus Interpretation       INR is normal..  APTT ratio is elevated.  Platelet Neutralization is positive.  DRVVT Screen ratio is elevated.  DRVVT Confirm Normalized ratio is elevated.  Thrombin time is normal.  POSITIVE TEST; THIS PATIENT HAS A  LUPUS ANTICOAGULANT.  Recommend repeat testing in 12 weeks to determine if the Lupus Anticoagulant is persistent or transient, since a transient one does not confer an increased thrombotic risk.  If the patient is on an anticoagulant, recommend repeat testing without anticoagulation interference to rule out a false positive lupus anticoagulant.  Patients with a lupus anticoagulant on warfarin therapy should be considered for monitoring with a factor 2 (factor II) level or chromogenic factor 10 (factor X) assay.    Opal Hairston MD, PhD  [facility]       Kappa and lambda light chain    Collection Time: 08/02/23  1:52 PM   Result Value Ref Range    Kappa Free Light Chains 30.38 (H) 0.33 - 1.94 mg/dL    Lambda Free Light Chains 39.03 (H) 0.57 - 2.63 mg/dL    Kappa /Lambda Ratio 0.78 0.26 - 1.65   Adult Type and Screen    Collection Time: 08/02/23  1:52 PM   Result Value Ref Range    ABO/RH(D) B POS     Antibody Screen Negative Negative    SPECIMEN EXPIRATION DATE [id number]    CBC with platelets and differential    Collection Time: 08/02/23  1:52 PM   Result Value Ref  Range    WBC Count 5.5 4.0 - 11.0 10e3/uL    RBC Count 2.51 (L) 3.80 - 5.20 10e6/uL    Hemoglobin 7.3 (L) 11.7 - 15.7 g/dL    Hematocrit 22.6 (L) 35.0 - 47.0 %    MCV 90 78 - 100 fL    MCH 29.1 26.5 - 33.0 pg    MCHC 32.3 31.5 - 36.5 g/dL    RDW 13.9 10.0 - 15.0 %    Platelet Count 203 150 - 450 10e3/uL    % Neutrophils 80 %    % Lymphocytes 13 %    % Monocytes 5 %    % Eosinophils 1 %    % Basophils 0 %    % Immature Granulocytes 1 %    NRBCs per 100 WBC 0 <1 /100    Absolute Neutrophils 4.4 1.6 - 8.3 10e3/uL    Absolute Lymphocytes 0.7 (L) 0.8 - 5.3 10e3/uL    Absolute Monocytes 0.3 0.0 - 1.3 10e3/uL    Absolute Eosinophils 0.1 0.0 - 0.7 10e3/uL    Absolute Basophils 0.0 0.0 - 0.2 10e3/uL    Absolute Immature Granulocytes 0.0 <=0.4 10e3/uL    Absolute NRBCs 0.0 10e3/uL   Quantiferon TB Gold Plus Grey Tube    Collection Time: 08/02/23  1:52 PM    Specimen: Arm, Right; Blood   Result Value Ref Range    Quantiferon Nil Tube 0.04 IU/mL   Quantiferon TB Gold Plus Green Tube    Collection Time: 08/02/23  1:52 PM    Specimen: Arm, Right; Blood   Result Value Ref Range    Quantiferon TB1 Tube 0.04 IU/mL   Quantiferon TB Gold Plus Yellow Tube    Collection Time: 08/02/23  1:52 PM    Specimen: Peripheral Blood   Result Value Ref Range    Quantiferon TB2 Tube 0.03    Quantiferon TB Gold Plus Purple Tube    Collection Time: 08/02/23  1:52 PM    Specimen: Arm, Right; Blood   Result Value Ref Range    Quantiferon Mitogen 7.15 IU/mL   Total Protein, Serum for ELP    Collection Time: 08/02/23  1:52 PM   Result Value Ref Range    Total Protein Serum for ELP 8.0 6.4 - 8.3 g/dL   Protein Electrophoresis, Serum    Collection Time: 08/02/23  1:52 PM   Result Value Ref Range    Albumin 3.2 (L) 3.7 - 5.1 g/dL    Alpha 1 0.4 0.2 - 0.4 g/dL    Alpha 2 0.9 0.5 - 0.9 g/dL    Beta Globulin 0.8 0.6 - 1.0 g/dL    Gamma Globulin 2.7 (H) 0.7 - 1.6 g/dL    Monoclonal Peak 0.3 (H) <=0.0 g/dL    ELP Interpretation       Significant hypoalbuminemia  and a polyclonal increase in the gamma fraction along with a small monoclonal protein (abput 0.3 g/dL) seen in the gamma fraction, not previously characterized in our laboratory. Consider a serum and urine immunofixation for confirmation and further characterization if clinically indicated and not previously performed elsewhere. Pathologic significance requires clinical correlation. ESTRELLA Khan M.D., Ph.D., Pathologist ().   RBC and Platelet Morphology    Collection Time: 08/02/23  1:52 PM   Result Value Ref Range    Platelet Assessment  Automated Count Confirmed. Platelet morphology is normal.     Automated Count Confirmed. Platelet morphology is normal.    RBC Morphology Confirmed RBC Indices    Quantiferon TB Gold Plus    Collection Time: 08/02/23  1:52 PM    Specimen: Arm, Right; Blood   Result Value Ref Range    Quantiferon-TB Gold Plus Negative Negative    TB1 Ag minus Nil Value 0.00 IU/mL    TB2 Ag minus Nil Value -0.01 IU/mL    Mitogen minus Nil Result 7.11 IU/mL    Nil Result 0.04 IU/mL   Echocardiogram Complete    Collection Time: 08/02/23  2:37 PM   Result Value Ref Range    LVEF  60-65%      No results found for: CPRA      Again, thank you for allowing me to participate in the care of your patient.        Sincerely,        SHAKIRA

## 2023-08-02 NOTE — PROGRESS NOTES
Kidney, Pancreas Transplant Referral - 5/23/2023  Wendy Kapoor attended the pre-transplant patient EVALUATION today August 2, 2023 with Celia and Benny. The [facility]plant Place website pre-transplant modules were NOT  viewed; Zuly to send Blue hyperlinks to Celia and to to Wendy email addresses   Content reviewed:  Living Donation and how to access that program; [email or web address]  Paired exchange  Kidney Donor Profile Index (KDPI)Pt signed NOT willing to accept offers >85% at this time.   Waiting list issues (right to decline without penalty, high PHS risk donors, what to expect when called with an offer)  Hospital experience,  length of stay , need to stay locally post-discharge (2-4 weeks)  Surgical options (with pictures)                           Post-surgery lifting and driving restrictions  Post-transplant routines, frequency of lab work and clinic visits  Need to stay locally post-discharge (2-4 weeks)  Role of Transplant Coordinator    Participants were informed of the benefits of transplant as well as potential risks such as infection, cancer, and death.  The need for total adherence with immunosuppression medications and following transplant regimens was stressed.  The overall evaluation/approval/listing process was reviewed.        The patient was provided with the following documents:  What You Need to Know About a Kidney Transplant  Adult Kidney Transplant - A Guide for Patients  SRTR Data Sheet - Kidney  Brochure - Kidney Allocation  What You Need to Know About a Pancreas Transplant  Adult Pancreas Transplant-A Guide for Patients  SRTR Data Sheet - Pancreas  Brochure - Pancreas  SRTR Data Sheet - Kidney/Pancreas  Brochure - SPK  Brochure - Multiple Listing and Waiting Time Transfer  What Every Patient Needs to Know (UNOS)  UNOS Facts and Figures  Finding a Donor  [facility]plant Place - Quick Start Guide  KDPI Consent  Receipt of Information form    Wendy Kapoor signed the  Receipt of  "Information for Organ Transplant Recipient.\" She was provided [employer]'s business card and instructed to call with additional questions.      Summary  See previous note.   "

## 2023-08-02 NOTE — TELEPHONE ENCOUNTER
Called Primary Nephrologist at [facility] as patient's hemoglobin in PKE today was 7.3. Talked with Bebo on the nurse line to make sure Dr. Brand is aware and can manage appropriately. Spoke again with Malia BEARD from clinic and they will have Wendy follow up tomorrow with a recheck hemoglobin.     CHIRAG Tolentino

## 2023-08-02 NOTE — LETTER
8/2/2023         RE: Wendy Kapoor  [address]  [place] [address]        Dear Colleague,    Thank you for referring your patient, Wendy Kapoor, to the [facility]PL[facility]. Please see a copy of my visit note below.    TRANSPLANT NEPHROLOGY RECIPIENT EVALUATION NOTE    Assessment and Plan:  # Kidney/Pancreas Transplant Evaluation: Patient is a good candidate overall. Benefits of a living donor transplant were discussed.    # CKD from Biopsy Proven DM/HTN: biopsy Jan 2023 also with findings thought 2/2 Sjogren's. She's had a rather precipitous decline, not yet on dialysis, but eGFR 12-13 ml/min. Feeling well overall. She would likely benefit from a kidney transplant. ABO-B, cPRA pending, no potential living donor.    # Type 2 Diabetes: currently using 14 units insulin daily. Given evidence of end organ damage, she may benefit from a pancreas transplant. Hemoglobin A1c 6.6%.    # Cardiac Risk: history of pericardial effusion 2019 requiring admission but no surgical intervention. ECHO not since repeated. Will get updated ECHO now and will need risk assessment as well as coronary angiogram.    # PAD Screening: CT and iliac US.    # Sjogren's: new diagnosis. Has upcoming rheumatology consult.     # IgG Lambda MGUS: with monoclonal peak of 1.7 in Jan 2023, but normal serum free light chain ratio. Identified a few years ago. Repeat SPEP with monoclonal peak down to 0.3 and serum free light chain ratio was normal today. Will repeat labs in 3-6 months.     # Elevated LFTs: ALT 82 and AST 93. Platelets OK, no known liver disease or liver abnormalities on previous imaging. Repeat in a few weeks.    # Positive Lupus Anticoagulant: repeat in 12 weeks and add beta 2 glycoprotein IgM and IgM.    # Health Maintenance: Colonoscopy: due, Mammogram: Up to date, PAP: Up to date and Dental: edentulous, full dentures    Recommendations:  - Cardiac risk assessment and likely coronary angiogram given diabetes  duration   - CT and iliac US  - Follow up with already scheduled rheumatology consult  - Repeat LFTs  - Update health maintenance as above    Discussed the risks and benefits of a transplant, including the risk of surgery and immunosuppression medications.  Patient's overall evaluation will be discussed in the Transplant Program's regular meeting with a final recommendation on the patients suitability for transplant to be made at that time.    Evaluation:  Wendy Kapoor was seen in consultation at the request of Dr. Dayton Forman for evaluation as a potential kidney/pancreas transplant recipient.    Reason for Visit:  Wendy Kapoor is a 52 year old female with CKD from DM/HTN, who presents for kidney/pancreas transplant evaluation.    History of Present Illness:         Kidney Disease Hx: Referred to nephrology earlier this year for elevated serum creatinine and nephrotic-range proteinuria. Longstanding albuminuria dating back to at least 2013. Baseline creatinine had been 1.0-1.3 mg/dl, 1.3-1.6 mg/dl 2022, but increased to 2.9 mg/dl, hence the referral to nephrology. Native kidney biopsy Jan 2023 showing DM/HTN and interstitial nephritis thought 2/2 Sjogren's. Positive ARANZA with SSA and SSB antibodies. Has upcoming rheumatology visit.     FINAL DIAGNOSIS  Kidney, needle biopsy: 1.  Nodular diabetic  glomerulosclerosis. 2. Acute tubular injury, on a  background  of chronic tubulointerstitial nephritis. 3. Arteriolar  hyalinosis, severe.    COMMENT  Negative immunofluorescence studies argues against an  immune  complex mediated glomerular disease process.  The  predominant changes in this biopsy are those of nodular  diabetic glomerulosclerosis with focal global and secondary  segmental glomerulosclerosis.  Congo red stains for amyloid  are pending, and will be reported in an addendum.  The  biopsy also shows acute tubular injury, which could be  secondary to ischemic or toxic insult.  These changes  are  superimposed on a background of chronic tubulointerstitial  nephritis with a plasma cell rich infiltrate.  Similar  changes are most commonly seen in the setting of an  allergic  or hypersensitivity reaction to medications to which the  patient has been exposed.  Other causes include but are not  limited to chronic infections, underlying autoimmune  disease.  Of note this patient has tissue ARANZA effect, and  would therefore recommend an autoimmune disease workup.     Not on dialysis. eGFR 12-13 ml/min. Feeling well overall. Of note, mother with h/o ESKD thought 2/2 DM.         Primary Nephrologist: Dr. Brand       H/o Kidney Stones: No       H/o Recurrent/Frequent UTI: No         Diabetic Hx: Type 2        Diagnosis Date: ~25 years ago with first pregnancy       Medication History: oral agents for many years, on insulin for the last ~year. Managed by PCP. Lantus 14 units daily. Checks blood sugars twice daily, avgs 150-160       Diabetic Control: Controlled (HbA1c <7%)   Last HbA1c: 6.5%       Hypoglycemic Unawareness: No       End-Organ Damage due to DM: Nephropathy          Cardiac/Vascular Disease Risk Factors:        - History of pericardial effusion in 2019. No repeat ECHO since. EF was preserved. No recent stress or angio. No known PAD, valvular disease, or pulmonary HTN.         Viral Serology Status       CMV IgG Antibody: Positive       EBV IgG Antibody: Positive         Volume Status/Weight:        Volume status: Mildly hypervolemic       Weight:  Acceptable BMI       BMI: Body mass index is 27.92 kg/m .         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 No chest pain, SOB, or claudication.     Fatigue/Decreased Energy: [x] No [] Yes    Chest Pain or SOB with Exertion: [x] No [] Yes    Significant Weight Change: [x] No [] Yes    Nausea, Vomiting or Diarrhea: [x] No [] Yes     Fever, Sweats or Chills:  [x] No [] Yes    Leg Swelling [x] No [] Yes      Allergy Testing Questions:   Medication that caused a reaction None   Antibiotics used that didn't give an allergic reaction?  Patient doesn't know    COVID Vaccination Up To Date: Not asked    Potential Living Kidney Donors: Not sure    Review of Systems:  A comprehensive review of systems was obtained and negative, except as noted in the HPI or PMH.    Past Medical History:   Medical record was reviewed and PMH was discussed with patient and noted below.  Past Medical History:   Diagnosis Date     Chronic kidney disease      Diabetes (H)     DM Type 2     History of blood transfusion 2019     Hypertension      MGUS (monoclonal gammopathy of unknown significance)      Pericardial effusion      Sjogren's syndrome (H)      Type 2 diabetes mellitus with diabetic nephropathy (H)        Past Social History:   Past Surgical History:   Procedure Laterality Date     BIOPSY        SECTION       Personal history of bleeding or anesthesia problems: No    Family History:  Family History   Problem Relation Age of Onset     Kidney Disease No family hx of        Personal History:   Social History     Socioeconomic History     Marital status:      Spouse name: Not on file     Number of children: Not on file     Years of education: Not on file     Highest education level: Not on file   Occupational History     Not on file   Tobacco Use     Smoking status: Never     Smokeless tobacco: Never   Substance and Sexual Activity     Alcohol use: Never     Drug use: Never     Sexual activity: Not on file   Other Topics Concern     Not on file   Social History Narrative     Not on file     Social Determinants of Health     Financial Resource Strain: Not on file   Food Insecurity: Not on file   Transportation Needs: Not on file   Physical Activity: Not on file   Stress: Not on file   Social Connections: Not on file   Intimate Partner Violence: Not on  "file   Housing Stability: Not on file       Allergies:  No Known Allergies    Medications:  Current Outpatient Medications   Medication Sig     allopurinol (ZYLOPRIM) 100 MG tablet Take 100 mg by mouth     blood glucose (FREESTYLE TEST STRIPS) test strip by Misc.(Non-Drug; Combo Route) route three times a day.     cholecalciferol 50 MCG (2000 UT) tablet Take 50 mcg by mouth     DARBEPOETIN DALTON IJ Inject 25 mcg Subcutaneous every 28 days     diltiazem ER (DILT-XR) 180 MG 24 hr capsule Take 180 mg by mouth     losartan (COZAAR) 50 MG tablet Take 1 tablet by mouth every morning     metoprolol succinate ER (TOPROL XL) 25 MG 24 hr tablet Take 1 tablet by mouth daily at 2 pm     pioglitazone (ACTOS) 30 MG tablet Take 30 mg by mouth     sodium bicarbonate 650 MG tablet 1,300 mg     torsemide (DEMADEX) 20 MG tablet Take 1 tablet by mouth every morning     glimepiride (AMARYL) 4 MG tablet TAKE 2 TABLETS (8 MG) BY MOUTH EVERY MORNING BEFORE BREAKFAST.     hydrALAZINE (APRESOLINE) 25 MG tablet Take 50 mg by mouth 3 times daily     Insulin Glargine w/ Trans Port 100 UNIT/ML SOPN Inject 14 Units Subcutaneous     LYCOPENE PO Take 1 tablet by mouth daily     No current facility-administered medications for this visit.       Vitals:  BP (!) 177/85   Pulse 71   Ht 1.511 m (4' 11.5\")   Wt 63.8 kg (140 lb 9.6 oz)   SpO2 100%   BMI 27.92 kg/m      Exam:  GENERAL APPEARANCE: alert and no distress  HENT: mouth without ulcers or lesions, good dentition  LYMPHATICS: no cervical or supraclavicular nodes  RESP: lungs clear to auscultation - no rales, rhonchi or wheezes  CV: regular rhythm, normal rate, no rub, no murmur  EDEMA: trace LE edema bilaterally  ABDOMEN: soft, nondistended, nontender, bowel sounds normal  MS: extremities normal - no gross deformities noted, no evidence of inflammation in joints, no muscle tenderness  SKIN: no rash    Results:   Recent Results (from the past 336 hour(s))   Hepatic function panel    Collection " Time: 08/21/23  3:46 PM   Result Value Ref Range    Protein Total 8.0 6.4 - 8.3 g/dL    Albumin 3.5 3.5 - 5.2 g/dL    Bilirubin Total 0.2 <=1.2 mg/dL    Alkaline Phosphatase 109 (H) 35 - 104 U/L    AST 45 0 - 45 U/L    ALT 29 0 - 50 U/L    Bilirubin Direct <0.20 0.00 - 0.30 mg/dL           Again, thank you for allowing me to participate in the care of your patient.        Sincerely,        SHAKIRA

## 2023-08-02 NOTE — PROGRESS NOTES
Psychosocial Assessment  Patient Name/ Age: Wendy Kapoor 52 year old   Medical Record #: [id number]  Duration of Interview:     50   min  Process:   Face-to-Face Interview                (counseling < 50%)   Present at Appointment: Wendy, her ex- Benny and daughter Celia        :SURI Leiva, [facility] Date:  August 2, 2023        Type of transplant: Kidney/Pancreas    Donor type:   Wendy indicated she does not know of any potential kidney donors at this time.   [facility]   Prior Transplants:    No Status of Transplant:       Current Living Situation    Location:   [address] [place] [id number]  With Whom:   Killian, daughters Jaimee (18) and Mike (15) and Benny.       Family/ Social Support:    Wendy has two additional children Frida (38) lives in [place] and Kota (28) [place], [place].  She has two grandchildren.  Wendy has three brothers ([place], [place] and [facility]) and two sisters ([place] and [facility]).    Available, helpful   Committed relationship:  Wendy and Killian have been  for eight years.   Stable/supportive   Other supports:   Friends Available, helpful       Activities/ Functional Ability    Current level: Ambulatory and independent with ADL's     Transportation Drives self       Vocational/Employment/Financial     Employment  [employer]   Full time   Job Description  Sanitation      Income  Killian is employed full time.   Salary/wages   Insurance  BCBS through Wendy's employer.    At this time, patient can afford medication costs:  Yes  Private Insurance       Medical Status    Current mode of treatment for ESRD None   Complications - Diabetes controlled with insulin and oral medications. None       Behavioral    Tobacco Use No Chemical Dependency No       Psychiatric Impairment No    Reading ability Good  Education Level: High School Recent Legal History No    Coping Style/Strategies: Wendy indicated when stress she will  think about her kids.     Ability to Adhere to Complex Medical Regime: Yes     Adherence History: Wendy indicated she will usually follow her physician's recommendations.        Education  _X_ Medicare  _X_ Rehabilitation  _X_ Donor issues  _X_ Community resources  _X_ Post discharge housing  _X_ Financial resources  _X_ Medical insurance options  _X_ Psych adjustment  _X_ Family adjustment  _X_ Health Care Directive - Provided Education and Provided a HCD Psychosocial Risks of Transplant Reviewed and Discussed:  _X_ Increased stress related to emotional,            family, social, employment or financial           situation  _X_ Affect on work and/or disability benefits  _X_ Affect on future life insurance  _X_ Transplant outcome expectations may           not be met  _X_ Mental Health Risks: anxiety,           depression, PTSD, guilt, grief and           chronic fatigue     Notable Items:   None noted.       Final Evaluation/Assessment   Patient seemed to process information well. Appeared well informed, motivated and able to follow post transplant requirements. Behavior was appropriate during interview. Has adequate income and insurance coverage. Adequate social support. No major contraindications noted for transplant.  At this time patient appears to understand the risks and benefits of transplant.      Recommendation  Acceptable    Selection Criteria Met:  Plan for support Yes   Chemical Dependence Yes   Smoking Yes   Mental Health Yes   Adequate Finances Yes    Signature: SURI Leiva, [facility]   Title: Clinical

## 2023-08-02 NOTE — PROGRESS NOTES
"Kidney, Pancreas Transplant Referral - 5/23/2023  Wendy Kapoor attended the pre-transplant patient EVALUATION today. The [facility] website pre-transplant modules were NOT viewed.  Content reviewed:  Living Donation and how to access that program  Paired exchange  Kidney Donor Profile Index (KDPI)  Waiting list issues (right to decline without penalty, high PHS risk donors, what to expect when called with an offer)  Hospital experience,  length of stay , need to stay locally post-discharge (2-4 weeks)  Surgical options (with pictures)                           Post-surgery lifting and driving restrictions  Post-transplant routines, frequency of lab work and clinic visits  Need to stay locally post-discharge (2-4 weeks)  Role of Transplant Coordinator    Participants were informed of the benefits of transplant as well as potential risks such as infection, cancer, and death.  The need for total adherence with immunosuppression medications and following transplant regimens was stressed.  The overall evaluation/approval/listing process was reviewed.        The patient was provided with the following documents:  What You Need to Know About a Kidney Transplant  Adult Kidney Transplant - A Guide for Patients  SRTR Data Sheet - Kidney  Brochure - Kidney Allocation  What You Need to Know About a Pancreas Transplant  Adult Pancreas Transplant-A Guide for Patients  SRTR Data Sheet - Pancreas  Brochure - Pancreas  SRTR Data Sheet - Kidney/Pancreas  Brochure - SPK  Brochure - Multiple Listing and Waiting Time Transfer  What Every Patient Needs to Know (UNOS)  UNOS Facts and Figures  Finding a Donor  [facility] - Quick Start Guide  KDPI Consent  Receipt of Information form    Wendy Kapoor signed the  Receipt of Information for Organ Transplant Recipient.\" She was provided Liv Amanda Huff [employer]'s business card and instructed to call with additional questions.      Summary    Team s concerns/comments:  " pre-kidney/pancreas transplant Pt has CKD d/t diabetic nephropathy and tubular interstitial nephritis likely related to Sjogren's syndrome,  GFR 13 on 6/29/23.   Other hx includes HTN, Gout, Posssible Sjogren's Syndrome    BMI 27.  Candidacy category: Yellow    Action/Plan:   - Labs, CXR, EKG, Echo  -Hyperlinks to [facility]plant [facility] videos for both kidney and Pancreas  -Colonoscopy scheduled on 7/26/23.     Mammogram: obtain report   - Wendy thought she was having evaluation for Kidney only, wasn't sure about pancreas transplant. I reviewed surgical procedures for both.     Expected Selection Meeting Discussion: August 9, 2023    Links sent to [email or web address]  Celia's email [email or web address]    Living Donor (English) : [email or web address]   Pre-Kidney Transplant (I) (English): [email or web address]   Pre-Kidney Transplant (II) (English): [email or web address]   Pre-Pancreas Transplant (I) (English): [email or web address]   Pre-Pancreas Transplant (II) (English): [email or web address]

## 2023-08-03 LAB
ALBUMIN SERPL ELPH-MCNC: 3.2 G/DL (ref 3.7–5.1)
ALPHA1 GLOB SERPL ELPH-MCNC: 0.4 G/DL (ref 0.2–0.4)
ALPHA2 GLOB SERPL ELPH-MCNC: 0.9 G/DL (ref 0.5–0.9)
B-GLOBULIN SERPL ELPH-MCNC: 0.8 G/DL (ref 0.6–1)
C PEPTIDE SERPL-MCNC: 10.1 NG/ML (ref 0.9–6.9)
CARDIOLIPIN IGG SER IA-ACNC: 2.4 GPL-U/ML
CARDIOLIPIN IGG SER IA-ACNC: NEGATIVE
CARDIOLIPIN IGM SER IA-ACNC: 2.5 MPL-U/ML
CARDIOLIPIN IGM SER IA-ACNC: NEGATIVE
CMV IGG SERPL IA-ACNC: >10 U/ML
CMV IGG SERPL IA-ACNC: ABNORMAL
EBV VCA IGG SER IA-ACNC: 214 U/ML
EBV VCA IGG SER IA-ACNC: POSITIVE
GAMMA GLOB SERPL ELPH-MCNC: 2.7 G/DL (ref 0.7–1.6)
KAPPA LC FREE SER-MCNC: 30.38 MG/DL (ref 0.33–1.94)
KAPPA LC FREE/LAMBDA FREE SER NEPH: 0.78 {RATIO} (ref 0.26–1.65)
LAMBDA LC FREE SERPL-MCNC: 39.03 MG/DL (ref 0.57–2.63)
M PROTEIN SERPL ELPH-MCNC: 0.3 G/DL
PROT PATTERN SERPL ELPH-IMP: ABNORMAL
THROMBIN TIME: 19.9 SECONDS (ref 13–19)
VZV IGG SER QL IA: 3084 INDEX
VZV IGG SER QL IA: POSITIVE

## 2023-08-04 LAB
ATRIAL RATE - MUSE: 70 BPM
DIASTOLIC BLOOD PRESSURE - MUSE: NORMAL MMHG
DRVVT CONFIRM NORMALIZED RATIO: 1.22
DRVVT SCREEN RATIO: 1.27
GAMMA INTERFERON BACKGROUND BLD IA-ACNC: 0.04 IU/ML
INTERPRETATION ECG - MUSE: NORMAL
LA PPP-IMP: POSITIVE
LUPUS INTERPRETATION: ABNORMAL
M TB IFN-G BLD-IMP: NEGATIVE
M TB IFN-G CD4+ BCKGRND COR BLD-ACNC: 7.11 IU/ML
MITOGEN IGNF BCKGRD COR BLD-ACNC: -0.01 IU/ML
MITOGEN IGNF BCKGRD COR BLD-ACNC: 0 IU/ML
P AXIS - MUSE: 19 DEGREES
PLATELET NEUTRALIZATION: 3 SECONDS
PR INTERVAL - MUSE: 164 MS
PTT RATIO: 1.67
QRS DURATION - MUSE: 82 MS
QT - MUSE: 444 MS
QTC - MUSE: 479 MS
QUANTIFERON MITOGEN: 7.15 IU/ML
QUANTIFERON NIL TUBE: 0.04 IU/ML
QUANTIFERON TB1 TUBE: 0.04 IU/ML
QUANTIFERON TB2 TUBE: 0.03
R AXIS - MUSE: 43 DEGREES
SYSTOLIC BLOOD PRESSURE - MUSE: NORMAL MMHG
T AXIS - MUSE: 37 DEGREES
THROMBIN TIME: 17.5 SECONDS (ref 13–19)
VENTRICULAR RATE- MUSE: 70 BPM

## 2023-08-07 NOTE — PROGRESS NOTES
Transplant Surgery Consult Note     Medical record number: [id number]  YOB: 1970,   Consult requested by Dr. Brand for evaluation of kidney transplant candidacy.    Assessment and Recommendations:Ms. Kapoor appears to be a good candidate for kidney transplantation and has a good understanding of the risks and benefits of this approach to the management of renal failure. The following issues should be addressed prior to finalizing her transplant candidacy:     Ms. Kapoor has Chronic renal failure due to diabetes whose condition is not expected to resolve, is expected to progress, and is expected to continue to develop related comorbid conditions.    Dietician consult ordered: Yes  Social work consult ordered: Yes  Transplant listing labs ordered to include HLA, ABOx2, Cr, etc.  Cardiology consult for cardiac risk stratification to be ordered: No  Obtain past medical records  Up-to-date cancer screening    The majority of our visit was spent in counselling, discussing the medical and surgical risks of kidney transplantation.We talked about the pros and cons of transplantation vs. dialysis.  We discussed the fact that it was important to think about the pros and cons of each treatment option and make an active decision.  We also discussed the fact that the two were interconnected and that if the transplant failed, it is possible that dialysis might be necessary before another transplant.  We discussed operative risks. We discussed immunosuppression side effects.  In addition we discussed the critical need for adherence to the immunosuppressive regimen as well as to maintain schedule of blood draws and follow-up clinic visits.         We also discussed the fact that if choosing to have a transplant, a second important decision was a living versus a  donor transplant.  We talked about the pros and cons of each option - the advantage of a  donor transplant being not asking someone to go  "through the living donor operation, the disadvantage, 5-6 years waiting; the advantage of a living donor transplant, much shorter time to transplant and significantly better long-term outcomes, the disadvantage being the risk to the donor.  Although I didn't express an opinion regarding transplantation or dialysis, I suggested that if opting for a transplant, we would strongly recommend a living donor transplant.      We discussed the benefit of a preemptive transplant.     We discussed the fact that a living donor does not have to be a direct match and that if there was a donor who met the criteria but was not a match, there was an option of participating in the national paired exchange system.  I described how the system would work.    I also discussed the new ( donor) kidney (KDPI) scoring system. We discussed the advantages and disadvantages of accepting an \"expanded criteria\" donor kidney, and the latest data as to who potentially benefits - those >45 and/or having diabetes a cause for ESKD - by receiving an expanded criteria donor kidney versus waiting longer for a standard criteria donor. Based on her age, i recommended she say \"yes\" to a high KDPI kidney if she was interested in a kidney alone.    We also discussed kidney/pancreas transplant and the advantages and disadvantages compared with kidney alone vs combined kidney pancreas transplant.  We discussed the shorter waiting time for a combined kidney pancreas and that the  organs would not come from a high KDPI donor.    She seemed overwhelmed about all of the information , and had given little thought to a pancreas transplant.  I suggested that she take her time thinking about this and then return to clinic for additional discussion and education about pancreas transplantation.  Ms. Kapoor asked good questions and her candidacy will be reviewed at our Multidisciplinary Selection Committee. Thank you for the opportunity to participate in Ms. " [facility].    40 min spent on the date of the encounter in chart review, patient visit,  documentation and/or discussion with other providers about the issues documented above.          Dayton Forman MD  Surgical Professor, Kidney Transplantation                                                                                                      ---------------------------------------------------------------------------------------------------    Past medical history includes:  CKD, stage 5  Diabetes  Sjogrens syndrome  Hypertension  Gout  Pericardial effucion     Past Medical History:   Diagnosis Date     Diabetes (H)     DM Type 2     History of blood transfusion 2019     Hypertension      Past Surgical History:   Procedure Laterality Date     BIOPSY        SECTION       No family history on file.  Social History     Socioeconomic History     Marital status:      Spouse name: Not on file     Number of children: Not on file     Years of education: Not on file     Highest education level: Not on file   Occupational History     Not on file   Tobacco Use     Smoking status: Never     Smokeless tobacco: Never   Substance and Sexual Activity     Alcohol use: Never     Drug use: Never     Sexual activity: Not on file   Other Topics Concern     Not on file   Social History Narrative     Not on file     Social Determinants of Health     Financial Resource Strain: Not on file   Food Insecurity: Not on file   Transportation Needs: Not on file   Physical Activity: Not on file   Stress: Not on file   Social Connections: Not on file   Intimate Partner Violence: Not on file   Housing Stability: Not on file       ROS:   CONSTITUTIONAL:  No fevers or chills  EYES: negative for icterus  ENT:  negative for hearing loss, tinnitus and sore throat  RESPIRATORY:  negative for cough, sputum, dyspnea  CARDIOVASCULAR:  negative for chest pain  GASTROINTESTINAL:  negative for nausea, vomiting, diarrhea or  constipation  GENITOURINARY:  negative for incontinence, dysuria, bladder emptying problems  HEME:  No easy bruising  INTEGUMENT:  negative for rash and pruritus  NEURO:  Negative for headache, seizure disorder  Allergies:   No Known Allergies  Medications:  Prescription Medications as of 8/7/2023       Rx Number Disp Refills Start End Last Dispensed Date Next Fill Date Owning Pharmacy    allopurinol (ZYLOPRIM) 100 MG tablet    2/13/2023 2/13/2024       Sig: Take 100 mg by mouth    Class: Historical    Route: Oral    blood glucose (FREESTYLE TEST STRIPS) test strip    3/6/2022        Sig: by Misc.(Non-Drug; Combo Route) route three times a day.    Class: Historical    Route: Other    cholecalciferol 50 MCG (2000 UT) tablet    5/18/2023        Sig: Take 50 mcg by mouth    Class: Historical    Route: Oral    DARBEPOETIN DALTON IJ    5/19/2023        Sig: Inject 25 mcg Subcutaneous every 28 days    Class: Historical    Route: Subcutaneous    diltiazem ER (DILT-XR) 180 MG 24 hr capsule    7/26/2023        Sig: Take 180 mg by mouth    Class: Historical    Route: Oral    glimepiride (AMARYL) 4 MG tablet            Sig: TAKE 2 TABLETS (8 MG) BY MOUTH EVERY MORNING BEFORE BREAKFAST.    Class: Historical    hydrALAZINE (APRESOLINE) 25 MG tablet            Sig: Take 50 mg by mouth    Class: Historical    Route: Oral    Insulin Glargine w/ Trans Port 100 UNIT/ML SOPN            Sig: Inject 14 Units Subcutaneous    Class: Historical    Route: Subcutaneous    losartan (COZAAR) 50 MG tablet    6/29/2023        Sig: Take 1 tablet by mouth every morning    Class: Historical    Route: Oral    LYCOPENE PO            Sig: Take 1 tablet by mouth daily    Class: Historical    Route: Oral    metoprolol succinate ER (TOPROL XL) 25 MG 24 hr tablet    12/27/2022        Sig: Take 1 tablet by mouth daily at 2 pm    Class: Historical    Route: Oral    pioglitazone (ACTOS) 30 MG tablet    2/27/2023        Sig: Take 30 mg by mouth    Class:  Historical    Route: Oral    sodium bicarbonate 650 MG tablet    2023        Si,300 mg    Class: Historical    torsemide (DEMADEX) 20 MG tablet    2023        Sig: Take 1 tablet by mouth every morning    Class: Historical    Route: Oral        Exam:   Constitutional - A&O in NAD.   Eyes - no redness or discharge.  Sclera anicteric  Respiratory - no cough, no labored breathing  Musculoskeletal - range of motion normal  Skin - no discoloration, no jaundice  Neurological - no tremors.  No facial droop or dysarthria  Psychiatric - normal mood and affect  The rest of a comprehensive physical examination is deferred due to PHE (public health emergency) video visit restrictions     Diagnostics:   Recent Results (from the past 672 hour(s))   EKG 12-lead, tracing only [EKG1]    Collection Time: 23  1:14 PM   Result Value Ref Range    Systolic Blood Pressure  mmHg    Diastolic Blood Pressure  mmHg    Ventricular Rate 70 BPM    Atrial Rate 70 BPM    SC Interval 164 ms    QRS Duration 82 ms     ms    QTc 479 ms    P Axis 19 degrees    R AXIS 43 degrees    T Axis 37 degrees    Interpretation ECG       Sinus rhythm  Normal ECG  No previous ECGs available  Confirmed by MD STACY, REILLY ([id number]) on 2023 6:42:56 AM     ABO and Rh    Collection Time: 23  1:46 PM   Result Value Ref Range    ABO/RH(D) B POS     SPECIMEN EXPIRATION DATE [id number]    UA with Microscopic reflex to Culture    Collection Time: 23  1:50 PM    Specimen: Urine, Midstream   Result Value Ref Range    Color Urine Straw Colorless, Straw, Light Yellow, Yellow    Appearance Urine Clear Clear    Glucose Urine 300 (A) Negative mg/dL    Bilirubin Urine Negative Negative    Ketones Urine Negative Negative mg/dL    Specific Gravity Urine 1.012 1.003 - 1.035    Blood Urine Negative Negative    pH Urine 7.0 5.0 - 7.0    Protein Albumin Urine 300 (A) Negative mg/dL    Urobilinogen Urine Normal Normal, 2.0 mg/dL    Nitrite Urine  Negative Negative    Leukocyte Esterase Urine Negative Negative    Mucus Urine Present (A) None Seen /LPF    RBC Urine <1 <=2 /HPF    WBC Urine 1 <=5 /HPF    Squamous Epithelials Urine <1 <=1 /HPF   Antibody titer red cell [BTM5256]    Collection Time: 08/02/23  1:52 PM   Result Value Ref Range    Anti-A1 IgG Titer 4     Anti-A1 IgM Titer 8     Anti-A2 IgG Titer <1     Anti-A2 IgM Titer 1     SPECIMEN EXPIRATION DATE [id number]     ANTIBODY TITER IGM SCREEN Negative    Comprehensive metabolic panel [LAB17]    Collection Time: 08/02/23  1:52 PM   Result Value Ref Range    Sodium 135 (L) 136 - 145 mmol/L    Potassium 3.7 3.4 - 5.3 mmol/L    Chloride 101 98 - 107 mmol/L    Carbon Dioxide (CO2) 22 22 - 29 mmol/L    Anion Gap 12 7 - 15 mmol/L    Urea Nitrogen 67.8 (H) 6.0 - 20.0 mg/dL    Creatinine 4.25 (H) 0.51 - 0.95 mg/dL    Calcium 8.8 8.6 - 10.0 mg/dL    Glucose 227 (H) 70 - 99 mg/dL    Alkaline Phosphatase 128 (H) 35 - 104 U/L    AST 93 (H) 0 - 45 U/L    ALT 82 (H) 0 - 50 U/L    Protein Total 8.5 (H) 6.4 - 8.3 g/dL    Albumin 3.5 3.5 - 5.2 g/dL    Bilirubin Total 0.2 <=1.2 mg/dL    GFR Estimate 12 (L) >60 mL/min/1.73m2   Cardiolipin Lisa IgG and IgM [LAB 6836]    Collection Time: 08/02/23  1:52 PM   Result Value Ref Range    Cardiolipin Lisa IgG Instrument Value 2.4 <10.0 GPL-U/mL    Cardiolipin Antibody IgG Negative Negative    Cardiolipin Lisa IgM Instrument Value 2.5 <10.0 MPL-U/mL    Cardiolipin Antibody IgM Negative Negative   INR [[id number]]    Collection Time: 08/02/23  1:52 PM   Result Value Ref Range    INR 0.98 0.85 - 1.15   Partial thromboplastin time [LAB56]    Collection Time: 08/02/23  1:52 PM   Result Value Ref Range    aPTT 39 (H) 22 - 38 Seconds   Thrombin time [LHP702]    Collection Time: 08/02/23  1:52 PM   Result Value Ref Range    Thrombin Time 19.9 (H) 13.0 - 19.0 Seconds   CMV Antibody IgG [[id number]]    Collection Time: 08/02/23  1:52 PM   Result Value Ref Range    CMV Lisa IgG Instrument Value  >10.00 (H) <0.60 U/mL    CMV Antibody IgG Positive, suggests recent or past exposure. (A) No detectable antibody.    EBV Capsid Antibody IgG [[id number]]    Collection Time: 08/02/23  1:52 PM   Result Value Ref Range    EBV Capsid Lisa IgG Instrument Value 214.0 (H) <18.0 U/mL    EBV Capsid Antibody IgG Positive (A) No detectable antibody.   Hepatitis B core antibody [[id number]]    Collection Time: 08/02/23  1:52 PM   Result Value Ref Range    Hepatitis B Core Antibody Total Nonreactive Nonreactive   Hepatitis B Surface Antibody [[id number]]    Collection Time: 08/02/23  1:52 PM   Result Value Ref Range    Hepatitis B Surface Antibody Instrument Value 0.83 <8.00 m[IU]/mL    Hepatitis B Surface Antibody Nonreactive    Hepatitis B surface antigen [EFB694]    Collection Time: 08/02/23  1:52 PM   Result Value Ref Range    Hepatitis B Surface Antigen Nonreactive Nonreactive   Hepatitis C antibody [[id number]]    Collection Time: 08/02/23  1:52 PM   Result Value Ref Range    Hepatitis C Antibody Nonreactive Nonreactive   HIV Antigen Antibody Combo Pretransplant    Collection Time: 08/02/23  1:52 PM   Result Value Ref Range    HIV Antigen Antibody Combo Pretransplant Nonreactive Nonreactive   Treponema Abs w Reflex to RPR and Titer [[id number]]    Collection Time: 08/02/23  1:52 PM   Result Value Ref Range    Treponema Antibody Total Nonreactive Nonreactive   Varicella Zoster Virus Antibody IgG [[id number]]    Collection Time: 08/02/23  1:52 PM   Result Value Ref Range    VZV Lisa IgG Instrument Value 3,084.0 <135.0 Index    Varicella Zoster Antibody IgG Positive    C-peptide [[id number]]    Collection Time: 08/02/23  1:52 PM   Result Value Ref Range    C Peptide 10.1 (H) 0.9 - 6.9 ng/mL   Hemoglobin A1c [LAB90]    Collection Time: 08/02/23  1:52 PM   Result Value Ref Range    Hemoglobin A1C 6.6 (H) <5.7 %   Lupus Anticoagulant Panel [[id number]]    Collection Time: 08/02/23  1:52 PM   Result Value Ref Range    Thrombin Time 17.5 13.0 - 19.0 Seconds     PTT Ratio 1.67 (H) <1.21    Platelet Neutralization 3 (H) <=0 Seconds    DRVVT Screen Ratio 1.27 (H) <1.08    DRVVT Confirm Normalized Ratio 1.22 (H) <1.21    Lupus Result Positive (A) Negative    Lupus Interpretation       INR is normal..  APTT ratio is elevated.  Platelet Neutralization is positive.  DRVVT Screen ratio is elevated.  DRVVT Confirm Normalized ratio is elevated.  Thrombin time is normal.  POSITIVE TEST; THIS PATIENT HAS A  LUPUS ANTICOAGULANT.  Recommend repeat testing in 12 weeks to determine if the Lupus Anticoagulant is persistent or transient, since a transient one does not confer an increased thrombotic risk.  If the patient is on an anticoagulant, recommend repeat testing without anticoagulation interference to rule out a false positive lupus anticoagulant.  Patients with a lupus anticoagulant on warfarin therapy should be considered for monitoring with a factor 2 (factor II) level or chromogenic factor 10 (factor X) assay.    Opal Hairston MD, PhD  [facility]       Kappa and lambda light chain    Collection Time: 08/02/23  1:52 PM   Result Value Ref Range    Kappa Free Light Chains 30.38 (H) 0.33 - 1.94 mg/dL    Lambda Free Light Chains 39.03 (H) 0.57 - 2.63 mg/dL    Kappa /Lambda Ratio 0.78 0.26 - 1.65   Adult Type and Screen    Collection Time: 08/02/23  1:52 PM   Result Value Ref Range    ABO/RH(D) B POS     Antibody Screen Negative Negative    SPECIMEN EXPIRATION DATE [id number]    CBC with platelets and differential    Collection Time: 08/02/23  1:52 PM   Result Value Ref Range    WBC Count 5.5 4.0 - 11.0 10e3/uL    RBC Count 2.51 (L) 3.80 - 5.20 10e6/uL    Hemoglobin 7.3 (L) 11.7 - 15.7 g/dL    Hematocrit 22.6 (L) 35.0 - 47.0 %    MCV 90 78 - 100 fL    MCH 29.1 26.5 - 33.0 pg    MCHC 32.3 31.5 - 36.5 g/dL    RDW 13.9 10.0 - 15.0 %    Platelet Count 203 150 - 450 10e3/uL    % Neutrophils 80 %    % Lymphocytes 13 %    % Monocytes 5 %    % Eosinophils 1 %    % Basophils 0 %     % Immature Granulocytes 1 %    NRBCs per 100 WBC 0 <1 /100    Absolute Neutrophils 4.4 1.6 - 8.3 10e3/uL    Absolute Lymphocytes 0.7 (L) 0.8 - 5.3 10e3/uL    Absolute Monocytes 0.3 0.0 - 1.3 10e3/uL    Absolute Eosinophils 0.1 0.0 - 0.7 10e3/uL    Absolute Basophils 0.0 0.0 - 0.2 10e3/uL    Absolute Immature Granulocytes 0.0 <=0.4 10e3/uL    Absolute NRBCs 0.0 10e3/uL   Quantiferon TB Gold Plus Grey Tube    Collection Time: 08/02/23  1:52 PM    Specimen: Arm, Right; Blood   Result Value Ref Range    Quantiferon Nil Tube 0.04 IU/mL   Quantiferon TB Gold Plus Green Tube    Collection Time: 08/02/23  1:52 PM    Specimen: Arm, Right; Blood   Result Value Ref Range    Quantiferon TB1 Tube 0.04 IU/mL   Quantiferon TB Gold Plus Yellow Tube    Collection Time: 08/02/23  1:52 PM    Specimen: Peripheral Blood   Result Value Ref Range    Quantiferon TB2 Tube 0.03    Quantiferon TB Gold Plus Purple Tube    Collection Time: 08/02/23  1:52 PM    Specimen: Arm, Right; Blood   Result Value Ref Range    Quantiferon Mitogen 7.15 IU/mL   Total Protein, Serum for ELP    Collection Time: 08/02/23  1:52 PM   Result Value Ref Range    Total Protein Serum for ELP 8.0 6.4 - 8.3 g/dL   Protein Electrophoresis, Serum    Collection Time: 08/02/23  1:52 PM   Result Value Ref Range    Albumin 3.2 (L) 3.7 - 5.1 g/dL    Alpha 1 0.4 0.2 - 0.4 g/dL    Alpha 2 0.9 0.5 - 0.9 g/dL    Beta Globulin 0.8 0.6 - 1.0 g/dL    Gamma Globulin 2.7 (H) 0.7 - 1.6 g/dL    Monoclonal Peak 0.3 (H) <=0.0 g/dL    ELP Interpretation       Significant hypoalbuminemia and a polyclonal increase in the gamma fraction along with a small monoclonal protein (abput 0.3 g/dL) seen in the gamma fraction, not previously characterized in our laboratory. Consider a serum and urine immunofixation for confirmation and further characterization if clinically indicated and not previously performed elsewhere. Pathologic significance requires clinical correlation. ESTRELLA Khan M.D.,  Ph.D., Pathologist ().   RBC and Platelet Morphology    Collection Time: 08/02/23  1:52 PM   Result Value Ref Range    Platelet Assessment  Automated Count Confirmed. Platelet morphology is normal.     Automated Count Confirmed. Platelet morphology is normal.    RBC Morphology Confirmed RBC Indices    Quantiferon TB Gold Plus    Collection Time: 08/02/23  1:52 PM    Specimen: Arm, Right; Blood   Result Value Ref Range    Quantiferon-TB Gold Plus Negative Negative    TB1 Ag minus Nil Value 0.00 IU/mL    TB2 Ag minus Nil Value -0.01 IU/mL    Mitogen minus Nil Result 7.11 IU/mL    Nil Result 0.04 IU/mL   Echocardiogram Complete    Collection Time: 08/02/23  2:37 PM   Result Value Ref Range    LVEF  60-65%      No results found for: CPRA

## 2023-08-09 ENCOUNTER — COMMITTEE REVIEW (OUTPATIENT)
Dept: TRANSPLANT | Facility: CLINIC | Age: 53
End: 2023-08-09
Payer: COMMERCIAL

## 2023-08-09 NOTE — COMMITTEE REVIEW
Abdominal Committee Review Note     Evaluation Date: 8/2/2023  Committee Review Date: 8/9/2023    Organ being evaluated for: Kidney/Pancreas    Transplant Phase: Evaluation  Transplant Status: Active    Transplant Coordinator: Liv Borges  Transplant Surgeon: Dr. Dayton Forman     Referring Physician: Roberth Brand    Primary Diagnosis: DM2  Secondary Diagnosis: CKD    Committee Review Members:  Nephrology Addis Ribera PA-C, Christopher Little MD   Nutrition Irene Abbott, DUC   Pharmacist Frida Miguel, [facility]    - Clinical Saida Ribera, [facility]   Transplant FREDI POOLE, RN, Danelle Schwab, RN, Liv Borges, RN, Darlyn Hill, CHIRAG, Kimberlyn Colin, CHIRAG, Joyce Scott RN   Transplant Surgery Lorraine Mckeon MD, MD       Transplant Eligibility: Insulin-dependent diabetes mellitus, Irreversible chronic kidney disease treated w/dialysis or expected need for dialysis    Committee Review Decision: Approved    Relative Contraindications: None    Absolute Contraindications: None    Committee Chair Lorraine Mckeon MD verbally attested to the committee's decision.    Committee Discussion Details: Reviewed pt's medical status and evaluation results to date with multidisciplinary committee.    Recommended the following evaluation items:    Cardiology: Pt will need cardiac risk assessment to include coronary angiogram  Hematology: discussed that pt will not require formal consult w/ Hem/Onc, but will need repeat SPEP in 3-6 months, if abnormal, may need referral  Imaging Recommended:  Ct Ab/Pelv and iliac US  Health Maintenance:  due for Colonoscopy  Labs: LFTs were elevated - need to repeat, pts' Lupus anticoagulant was positive, will repeat in 12 weeks w/ Beta 2 glycoproteins  Pancreas Surgeon: will need in-person consult  Rheumatology: pt to get established d/t diagnosis of Sjogren's    Patient should have live donors register now to initiate donor evaluation:  Yes    Committee determined that patient is a Good candidate for Kidney Pancreas    Listing plan: List Inactive     A2B Candidate: No    Patient will be called and summary letter will be sent.

## 2023-08-10 ENCOUNTER — TELEPHONE (OUTPATIENT)
Dept: TRANSPLANT | Facility: CLINIC | Age: 53
End: 2023-08-10
Payer: COMMERCIAL

## 2023-08-10 DIAGNOSIS — N18.9 CHRONIC RENAL FAILURE: ICD-10-CM

## 2023-08-10 DIAGNOSIS — N18.4 CHRONIC KIDNEY DISEASE, STAGE IV (SEVERE) (H): Primary | ICD-10-CM

## 2023-08-10 DIAGNOSIS — I10 ESSENTIAL HYPERTENSION: ICD-10-CM

## 2023-08-10 DIAGNOSIS — Z76.82 ORGAN TRANSPLANT CANDIDATE: ICD-10-CM

## 2023-08-10 DIAGNOSIS — E11.9 DIABETES MELLITUS, TYPE 2 (H): ICD-10-CM

## 2023-08-10 LAB
A*: NORMAL
A*LOCUS SEROLOGIC EQUIVALENT: 24
A*LOCUS: NORMAL
A*SEROLOGIC EQUIVALENT: 33
ABTEST METHOD: NORMAL
B*: NORMAL
B*LOCUS SEROLOGIC EQUIVALENT: 44
B*LOCUS: NORMAL
B*SEROLOGIC EQUIVALENT: 46
BW-1: NORMAL
BW-2: NORMAL
C*: NORMAL
C*LOCUS SEROLOGIC EQUIVALENT: 1
C*LOCUS: NORMAL
C*SEROLOGIC EQUIVALENT: 7
DPA1*: NORMAL
DPA1*LOCUS: NORMAL
DPB1*: NORMAL
DPB1*LOCUS NMDP: NORMAL
DPB1*LOCUS: NORMAL
DPB1*NMDP: NORMAL
DQA1*: NORMAL
DQA1*LOCUS: NORMAL
DQB1*: NORMAL
DQB1*LOCUS SEROLOGIC EQUIVALENT: 2
DQB1*LOCUS: NORMAL
DQB1*SEROLOGIC EQUIVALENT: 6
DRB1*: NORMAL
DRB1*LOCUS SEROLOGIC EQUIVALENT: 7
DRB1*LOCUS: NORMAL
DRB1*SEROLOGIC EQUIVALENT: 15
DRB4*LOCUS SEROLOGIC EQUIVALENT: 53
DRB4*LOCUS: NORMAL
DRB5*: NORMAL
DRB5*SEROLOGIC EQUIVALENT: 51
DRSSO TEST METHOD: NORMAL

## 2023-08-10 NOTE — LETTER
August 10, 2023    Wendy Kapoor  [address]  [place] [address]      Dear Ms. Kapoor,    This letter is sent to confirm that you are a candidate in the kidney and pancreas transplant program at the [facility].  You were placed on the kidney and pancreas inactive waitlist on 8/10/23.  This means you will accumulate waiting time but not receive  donor calls.       Items we will need from you:    We have received approval from you insurance company for the transplant procedure.  It is critical that you notify us if there is any change in your insurance.  It is also important that you familiarize yourself with the details of your specific insurance policy.  Our patient  is available to assist you if you should have any questions regarding your coverage.    Once you are listed active status, an ALA or PRA blood sample will need to be sent here every 3 months to match you with  donors or any potential living donors.  At that time, special mailing boxes (called mailers) will be sent to you directly from the Outreach Department.  You should take the physician order form and the  to your home laboratory when it is time to for this testing to be done.  Additional mailers can be obtained by calling the Transplant Office and asking to speak to a kidney and pancreas .    During this waiting period, we may request additional periodic laboratory tests with your primary physician.  It will be your responsibility to remind your physician to forward your results to the Transplant Office.    We need to be kept informed of any changes in your medical condition such as:    changes in your medications,   significant changes in your health  significant infections (such as pneumonia or abscesses)  blood transfusions  any condition which requires hospitalization  any surgery  initiation of dialysis    Remember to  complete any routine cancer screening tests required before your transplant.  This includes colonoscopy; prostrate screening for men, and mammogram and gynecologic testing for women, as well as dental work.  Your primary care clinic can assist you with arranging for these exams.  Remind your caregivers to forward copies of the records and final reports.    We want you to know that our program has physician and surgeon coverage 24 hours a day, 365 days a year. In addition, our transplant surgeons and physicians will not be on call for two or more transplant programs more than 30 miles apart unless the circumstances have been reviewed and approved by the [employer] Organ Sharing (UNOS) Membership and Professional Standards Committee (MPSC). Finally, our primary physician and primary surgeons are not designated as the primary surgeon or primary physician at more than 1 transplant hospital. If this coverage changes or there are substantial program changes, you will be notified in writing by letter.     Attached is a letter from UN that describes the services and information offered to patients by UN and the Organ Procurement and Transplantation Network (OPTN).    We appreciate having had the opportunity to participate in your care.  If you have questions, please feel free to call the Transplant Office at [phone] or [phone].      Sincerely,   Liv Borges RN, Pre-Kidney/Pancreas Transplant Coordinator     Kidney and Pancreas Transplant Program    Enclosures: [facility] Letter  CC:   Care Team                                 The Organ Procurement and Transplantation Network  Toll-free patient services line:     Your resource for organ transplant information    If you have a question regarding your own medical care, you always should call your transplant hospital first. However, for general organ transplant-related information, you can call the Organ Procurement and Transplantation Network (OPTN)  toll-free patient services line at [phone]. Anyone, including potential transplant candidates, candidates, recipients, family members, friends, living donors, and donor family members, can call this number to:      Talk about organ donation, living donation, the transplant process, the donation process, and transplant policies.  Get a free patient information kit with helpful booklets, waiting list and transplant information, and a list of all transplant hospitals.  Ask questions about the OPTN website (https://optn.transplant.[facility].gov/), the [employer] Network for Organ Sharing s (UNOS) website (https://unos.org/), or the UNOS website for living donors and transplant recipients. (https://www.transplantliving.org/).  Learn how the OPTN can help you.  Talk about any concerns that you may have with a transplant hospital.    The [facility] transplant system, the OPTN, is managed under federal contract by the [employer] Network for Organ Sharing (UNOS), which is a non-profit charitable organization. The OPTN helps create and define organ sharing policies that make the best use of donated organs. This process continuously evaluating new advances and discoveries so policies can be adapted to best serve patients waiting for transplants. To do so, the OPTN works closely with transplant professionals, transplant patients, transplant candidates, donor families, living donors, and the public. All transplant programs and organ procurement organizations throughout the country are OPTN members and are obligated to follow the policies the OPTN creates for allocating organs.    The OPTN also is responsible for:    Providing educational material for patients, the public, and professionals.  Raising awareness of the need for donated organs and tissue.  Coordinating organ procurement, matching, and placement.  Collecting information about every organ transplant and donation that occurs in the [place].    Remember, you should  contact your transplant hospital directly if you have questions or concerns about your own medical care including medical records, work-up progress, and test results.    We are not your transplant hospital, and our staff will not be able to answer questions about your case, so please keep your transplant hospital s phone number handy.    However, while you research your transplant needs and learn as much as you can about transplantation and donation, we welcome your call to our toll-free patient services line at [phone].          Updated 4/1/2019

## 2023-08-10 NOTE — LETTER
08/10/23        Wendy Kapoor  [address] [place] [address]        Dear Wendy,    It was a pleasure to see you recently for consideration of kidney and pancreas transplantation. Your pre-transplant evaluation results were reviewed at our Multidisciplinary Selection Committee on 8/10/23. The Committee has approved you to be listed inactive status on the kidney/pancreas transplant wait list.  The Committee is requesting the following items are completed as part of your evaluation:    Cardiac risk assessment to include coronary angiogram - our schedulers will call you to arrange this visit    Abdominal/Pelvic CT scan - our schedulers will call you to arrange this test    Iliac ultrasound - our schedulers will call you to arrange this test    Please follow up with Rheumatology for diagnosis of Sjogrens    We will repeat the following labs in 12 weeks: SPEP, Lupus Anticoagulant, and Beta 2 Glycoproteins - our schedulers will call you to arrange a lab draw at that time    We will repeat your liver enzyme labs at your next visit    Please work with your Primary Care Provider to arrange for a Screening Colonoscopy    Consult with Pancreas Transplant Surgeon - our schedulers will call you to arrange this visit    Please complete the online pre-transplant education videos and call me when done so that we may review this information    Once the above items are completed, your case will be reviewed for active listing status. For any questions, please contact the Transplant Office at [phone] or you may reach me directly at [phone].       Sincerely,  Liv Borgse RN, Pre-Kidney/Pancreas Transplant Coordinator   Solid Organ Transplant  [facility], [facility]    CC: Care Team

## 2023-08-10 NOTE — TELEPHONE ENCOUNTER
Called pt to review results of Selection Committee. Left VM requesting call back to direct line.  Inactive listing letter and evaluation letter sent. Follow up orders placed.

## 2023-08-11 LAB
PROTOCOL CUTOFF: NORMAL
SA 1 CELL: NORMAL
SA 1 TEST METHOD: NORMAL
SA 2 CELL: NORMAL
SA 2 TEST METHOD: NORMAL
SA1 HI RISK ABY: NORMAL
SA1 MOD RISK ABY: NORMAL
SA2 HI RISK ABY: NORMAL
SA2 MOD RISK ABY: NORMAL
UNACCEPTABLE ANTIGENS: NORMAL
UNOS CPRA: 97
ZZZSA 1  COMMENTS: NORMAL
ZZZSA 2 COMMENTS: NORMAL

## 2023-08-21 ENCOUNTER — ANCILLARY PROCEDURE (OUTPATIENT)
Dept: CT IMAGING | Facility: CLINIC | Age: 53
End: 2023-08-21
Attending: PHYSICIAN ASSISTANT
Payer: COMMERCIAL

## 2023-08-21 ENCOUNTER — OFFICE VISIT (OUTPATIENT)
Dept: TRANSPLANT | Facility: CLINIC | Age: 53
End: 2023-08-21
Attending: PHYSICIAN ASSISTANT
Payer: COMMERCIAL

## 2023-08-21 ENCOUNTER — ANCILLARY PROCEDURE (OUTPATIENT)
Dept: ULTRASOUND IMAGING | Facility: CLINIC | Age: 53
End: 2023-08-21
Attending: PHYSICIAN ASSISTANT
Payer: COMMERCIAL

## 2023-08-21 ENCOUNTER — LAB (OUTPATIENT)
Dept: LAB | Facility: CLINIC | Age: 53
End: 2023-08-21
Attending: PHYSICIAN ASSISTANT
Payer: COMMERCIAL

## 2023-08-21 VITALS
SYSTOLIC BLOOD PRESSURE: 175 MMHG | HEIGHT: 59 IN | OXYGEN SATURATION: 99 % | HEART RATE: 79 BPM | TEMPERATURE: 98 F | WEIGHT: 143 LBS | RESPIRATION RATE: 18 BRPM | DIASTOLIC BLOOD PRESSURE: 83 MMHG | BODY MASS INDEX: 28.83 KG/M2

## 2023-08-21 DIAGNOSIS — E11.9 DIABETES MELLITUS, TYPE 2 (H): ICD-10-CM

## 2023-08-21 DIAGNOSIS — N18.4 CHRONIC KIDNEY DISEASE, STAGE IV (SEVERE) (H): ICD-10-CM

## 2023-08-21 DIAGNOSIS — I10 ESSENTIAL HYPERTENSION: ICD-10-CM

## 2023-08-21 DIAGNOSIS — N18.9 CHRONIC RENAL FAILURE: ICD-10-CM

## 2023-08-21 DIAGNOSIS — Z79.4 TYPE 2 DIABETES MELLITUS WITH STAGE 4 CHRONIC KIDNEY DISEASE, WITH LONG-TERM CURRENT USE OF INSULIN (H): ICD-10-CM

## 2023-08-21 DIAGNOSIS — Z76.82 ORGAN TRANSPLANT CANDIDATE: ICD-10-CM

## 2023-08-21 DIAGNOSIS — N18.4 CHRONIC RENAL FAILURE, STAGE 4 (SEVERE) (H): ICD-10-CM

## 2023-08-21 DIAGNOSIS — N18.4 TYPE 2 DIABETES MELLITUS WITH STAGE 4 CHRONIC KIDNEY DISEASE, WITH LONG-TERM CURRENT USE OF INSULIN (H): ICD-10-CM

## 2023-08-21 DIAGNOSIS — E11.22 TYPE 2 DIABETES MELLITUS WITH STAGE 4 CHRONIC KIDNEY DISEASE, WITH LONG-TERM CURRENT USE OF INSULIN (H): ICD-10-CM

## 2023-08-21 LAB
ALBUMIN SERPL BCG-MCNC: 3.5 G/DL (ref 3.5–5.2)
ALP SERPL-CCNC: 109 U/L (ref 35–104)
ALT SERPL W P-5'-P-CCNC: 29 U/L (ref 0–50)
AST SERPL W P-5'-P-CCNC: 45 U/L (ref 0–45)
BILIRUB DIRECT SERPL-MCNC: <0.2 MG/DL (ref 0–0.3)
BILIRUB SERPL-MCNC: 0.2 MG/DL
PROT SERPL-MCNC: 8 G/DL (ref 6.4–8.3)

## 2023-08-21 PROCEDURE — 99214 OFFICE O/P EST MOD 30 MIN: CPT | Performed by: SURGERY

## 2023-08-21 PROCEDURE — 80076 HEPATIC FUNCTION PANEL: CPT | Performed by: PATHOLOGY

## 2023-08-21 PROCEDURE — 36415 COLL VENOUS BLD VENIPUNCTURE: CPT | Performed by: PATHOLOGY

## 2023-08-21 PROCEDURE — 93978 VASCULAR STUDY: CPT | Performed by: RADIOLOGY

## 2023-08-21 PROCEDURE — 74176 CT ABD & PELVIS W/O CONTRAST: CPT | Performed by: RADIOLOGY

## 2023-08-21 ASSESSMENT — PAIN SCALES - GENERAL: PAINLEVEL: NO PAIN (0)

## 2023-08-21 NOTE — LETTER
8/21/2023         RE: Wendy Kapoor  [address]  [place] [address]        Dear Colleague,    Thank you for referring your patient, Wendy Kapoor, to the [facility] TRANSPL[facility]. Please see a copy of my visit note below.    Transplant Surgery Consult Note    Medical record number: [id number]  YOB: 1970,   Consult requested by Dr. Forman for evaluation of kidney and pancreas transplant candidacy.    Assessment and Recommendations: Ms. Kapoor is a excellent candidate for transplantation and has a good understanding of the risks and benefits of this approach to management of renal failure and diabetes. The following issues should be addressed prior to transplant:     52 F CKD, not yet on HD. Saw Dr. Forman recently for kidney listing. ABO B, PRA 97. Qualifies for A2 to B on first titers (will need repeat).    Abd soft, rounded, nontender. No overhanging pannus. Appropriate for SPK at current size but should not gain weight    Seen today to discuss pancreas/KP candidacy. 14 unit(s) insulin daily, no unawareness. Minimal triopathy. Sugars 120-160, A1c 6.6.     Discussed different strategies for getting kidney including LDKT, DDKT, and SPK. She is interested in SPK if she qualifies. Would anticipate longer wait time for kidney alone but has PRA 97 which won't give national status but will increase place on list. Also may qualify for A2 to B, which would help shorten times. Should also strongly consider HCV+ and high KDPI if she does not meet A2 to B or SPK criteria. SPK will also help increase access. She and family are interested in moving forward with this also.     Ms. Kapoor has Chronic renal failure due to diabetes mellitus type 2 whose condition is not expected to resolve, is expected to progress, and is expected to continue to develop related comorbid conditions.  Cardiology consult for cardiac risk stratification to be ordered: No  CT abdomen and pelvis without contrast  to be ordered for assessment of vascular targets: Yes and iliac US  Transplant listing labs ordered to include HLA, ABOx2, Cr, etc.  Dietician consult ordered: Yes  Social work consult ordered: Yes  Imaging reports reviewed:  n/a  Radiology images reviewed:n/a  Recipient suitable to move forward with work up of living donors:  Yes      The majority of our visit was spent in counselling, discussing the medical and surgical risks of living or  donor kidney and pancreas transplantation, either in a simultaneous or sequential fashion. I contrasted approximate wait time for SPK vs living vs  donor kidneys from normal (0-85%) or higher (%) kidney donor profile index (KDPI) donors and their associated outcomes. I would not recommend this individual to consider kidneys from high KDPI donors. The reason for this decision is best summarized as: multi-organ transplant candidate.  Access to transplant will be impacted by living donor availability and overall candidacy for SPK, as well as the influence of blood type and degree of sensitization. We discussed advantages of preemptive transplant as well as living donor kidney transplant, and graft and patient survival outcomes associated with these options. Potential surgical complications of kidney and pancreas transplantation include bleeding, clotting, infection, wound complications, anastomotic failure and other issues such as cardiac complications, pneumonia, deep venous thrombosis, pulmonary embolism, post transplant diabetes and death. We discussed the need for protocol biopsy of the kidney and the possible need for a ureteral stent (and subsequent removal). We discussed benefits and risks associated with different approaches to exocrine drainage of pancreatic secretions. We also discussed differences in the average length of stay, recovery process, and posttransplant lab and monitoring protocol. We discussed the risk of graft rejection and recurrent  diabetic nephropathy in the setting of poor glycemic control. I emphasized the need for strict immunosuppression adherence and the potential for complications of immunosuppression such as skin cancer or lymphoma, as well as a very low but not zero risk of donor-derived disease transmission risks (infection, cancer). Ms. Kapoor asked good questions and the patient's candidacy will be reviewed at our Multidisciplinary Selection Committee. Thank you for the opportunity to participate in Ms. Kapoor's care.    Total time: 45 minutes  Counselling time: 40 minutes      Jean-Pierre Gleason MD  ---------------------------------------------------------------------------------------------------    HPI: Ms. Kapoor has Chronic renal failure due to diabetes mellitus type 2. The patient has had diabetes for 15 years. Management is by lantus 14 unit(s) daily. The patient usually checks her blood sugar 2 times/day.  Daily blood glucoses range typically from 120 to 160.  Hypoglyemic unawareness is not an issue.  The diabetes is controlled.    Complications of diabetes include:    Retinopathy:  mild  Neuropathy: No  Gastroparesis:  No    The patient is not on dialysis.    Has potential kidney donors:  Doesn't know .  Interested in participation in paired exchange if a donor is willing: Doesn't know     The patient has the following pertinent history:       No    Yes  Dialysis:    []      [] via:       Blood Transfusion                  []      []  Number of units:   Most recently:  Pregnancy:    []      [] Number:       Previous Transplant:  []      [] Details:    Cancer    []      [] Comment:   Kidney stones   []      [] Comment:      Recurrent infections  []      []  Type:                  Bladder dysfunction  []      [] Cause:    Claudication   []      [] Distance:    Previous Amputation  []      [] Cause:     Chronic anticoagulation  []      [] Indication:  Nondenominational  []      []     Past Medical History:   Diagnosis  Date     Chronic kidney disease      Diabetes (H)     DM Type 2     History of blood transfusion 2019     Hypertension      MGUS (monoclonal gammopathy of unknown significance)      Pericardial effusion      Sjogren's syndrome (H24)      Type 2 diabetes mellitus with diabetic nephropathy (H)      Past Surgical History:   Procedure Laterality Date     BIOPSY        SECTION       Family History   Problem Relation Age of Onset     Kidney Disease No family hx of      Social History     Socioeconomic History     Marital status:      Spouse name: Not on file     Number of children: Not on file     Years of education: Not on file     Highest education level: Not on file   Occupational History     Not on file   Tobacco Use     Smoking status: Never     Smokeless tobacco: Never   Substance and Sexual Activity     Alcohol use: Never     Drug use: Never     Sexual activity: Not on file   Other Topics Concern     Not on file   Social History Narrative     Not on file     Social Determinants of Health     Financial Resource Strain: Not on file   Food Insecurity: Not on file   Transportation Needs: Not on file   Physical Activity: Not on file   Stress: Not on file   Social Connections: Not on file   Interpersonal Safety: Not on file   Housing Stability: Not on file       ROS:   CONSTITUTIONAL:  No fevers or chills  EYES: negative for icterus  ENT:  negative for hearing loss, tinnitus and sore throat  RESPIRATORY:  negative for cough, sputum, dyspnea  CARDIOVASCULAR:  negative for chest pain  negative for lower extremity wounds/ulcers  GASTROINTESTINAL:  negative for nausea, vomiting, diarrhea or constipation  GENITOURINARY:  negative for incontinence, dysuria, bladder emptying problems  HEME:  No easy bruising  INTEGUMENT:  negative for rash and pruritus  NEURO:  Negative for headache, seizure disorder    Allergies:   No Known Allergies    Medications:  Prescription Medications as of 2023         Rx Number  Disp Refills Start End Last Dispensed Date Next Fill Date Owning Pharmacy    allopurinol (ZYLOPRIM) 100 MG tablet    2023       Sig: Take 100 mg by mouth    Class: Historical    Route: Oral    blood glucose (FREESTYLE TEST STRIPS) test strip    3/6/2022        Sig: by Misc.(Non-Drug; Combo Route) route three times a day.    Class: Historical    Route: Other    cholecalciferol 50 MCG ( UT) tablet    2023        Sig: Take 50 mcg by mouth    Class: Historical    Route: Oral    DARBEPOETIN DALTON IJ    2023        Sig: Inject 25 mcg Subcutaneous every 28 days    Class: Historical    Route: Subcutaneous    diltiazem ER (DILT-XR) 180 MG 24 hr capsule    2023        Sig: Take 180 mg by mouth    Class: Historical    Route: Oral    glimepiride (AMARYL) 4 MG tablet            Sig: TAKE 2 TABLETS (8 MG) BY MOUTH EVERY MORNING BEFORE BREAKFAST.    Class: Historical    hydrALAZINE (APRESOLINE) 25 MG tablet            Sig: Take 50 mg by mouth 3 times daily    Class: Historical    Route: Oral    Insulin Glargine w/ Trans Port 100 UNIT/ML SOPN            Sig: Inject 14 Units Subcutaneous    Class: Historical    Route: Subcutaneous    losartan (COZAAR) 50 MG tablet    2023        Sig: Take 1 tablet by mouth every morning    Class: Historical    Route: Oral    LYCOPENE PO            Sig: Take 1 tablet by mouth daily    Class: Historical    Route: Oral    metoprolol succinate ER (TOPROL XL) 25 MG 24 hr tablet    2022        Sig: Take 1 tablet by mouth daily at 2 pm    Class: Historical    Route: Oral    pioglitazone (ACTOS) 30 MG tablet    2023        Sig: Take 30 mg by mouth    Class: Historical    Route: Oral    sodium bicarbonate 650 MG tablet    2023        Si,300 mg    Class: Historical    torsemide (DEMADEX) 20 MG tablet    2023        Sig: Take 1 tablet by mouth every morning    Class: Historical    Route: Oral            Exam:      Appearance: in no apparent  distress.   Skin: normal, warm, dry  Head and Neck: Normal, no rashes or jaundice  Respiratory: easy respirations, no audible wheezing.  Cardiovascular: RRR  Abdomen: abd soft, round, nontender, no overhanging pannus  Extremities: femoral 2+/2+, Edema, none  Neuro: without deficit, cranial nerves intact     Diagnostics:   No results found for this or any previous visit (from the past 672 hour(s)).  UNOS CPRA   Date Value Ref Range Status   08/02/2023 97  Final   Again, thank you for allowing me to participate in the care of your patient.        Sincerely,        Jean-Pierre Gleason MD

## 2023-08-21 NOTE — NURSING NOTE
"Chief Complaint   Patient presents with    Consult     Pre Kidney/Pancreas transplant      Vital signs:  Temp: 98  F (36.7  C) Temp src: Oral BP: (!) 175/83 Pulse: 79   Resp: 18 SpO2: 99 %     Height: 151.1 cm (4' 11.49\") Weight: 64.9 kg (143 lb)  Estimated body mass index is 28.41 kg/m  as calculated from the following:    Height as of this encounter: 1.511 m (4' 11.49\").    Weight as of this encounter: 64.9 kg (143 lb).      Kayleen Child, [facility]  8/21/2023 1:53 PM    "

## 2023-08-21 NOTE — PROGRESS NOTES
Transplant Surgery Consult Note    Medical record number: [id number]  YOB: 1970,   Consult requested by Dr. Forman for evaluation of kidney and pancreas transplant candidacy.    Assessment and Recommendations: Ms. Kapoor is a excellent candidate for transplantation and has a good understanding of the risks and benefits of this approach to management of renal failure and diabetes. The following issues should be addressed prior to transplant:     52 F CKD, not yet on HD. Saw Dr. Forman recently for kidney listing. ABO B, PRA 97. Qualifies for A2 to B on first titers (will need repeat).    Abd soft, rounded, nontender. No overhanging pannus. Appropriate for SPK at current size but should not gain weight    Seen today to discuss pancreas/KP candidacy. 14 unit(s) insulin daily, no unawareness. Minimal triopathy. Sugars 120-160, A1c 6.6.     Discussed different strategies for getting kidney including LDKT, DDKT, and SPK. She is interested in SPK if she qualifies. Would anticipate longer wait time for kidney alone but has PRA 97 which won't give national status but will increase place on list. Also may qualify for A2 to B, which would help shorten times. Should also strongly consider HCV+ and high KDPI if she does not meet A2 to B or SPK criteria. SPK will also help increase access. She and family are interested in moving forward with this also.     Ms. Kapoor has Chronic renal failure due to diabetes mellitus type 2 whose condition is not expected to resolve, is expected to progress, and is expected to continue to develop related comorbid conditions.  Cardiology consult for cardiac risk stratification to be ordered: No  CT abdomen and pelvis without contrast to be ordered for assessment of vascular targets: Yes and iliac US  Transplant listing labs ordered to include HLA, ABOx2, Cr, etc.  Dietician consult ordered: Yes  Social work consult ordered: Yes  Imaging reports reviewed:  n/a  Radiology images  reviewed:n/a  Recipient suitable to move forward with work up of living donors:  Yes      The majority of our visit was spent in counselling, discussing the medical and surgical risks of living or  donor kidney and pancreas transplantation, either in a simultaneous or sequential fashion. I contrasted approximate wait time for SPK vs living vs  donor kidneys from normal (0-85%) or higher (%) kidney donor profile index (KDPI) donors and their associated outcomes. I would not recommend this individual to consider kidneys from high KDPI donors. The reason for this decision is best summarized as: multi-organ transplant candidate.  Access to transplant will be impacted by living donor availability and overall candidacy for SPK, as well as the influence of blood type and degree of sensitization. We discussed advantages of preemptive transplant as well as living donor kidney transplant, and graft and patient survival outcomes associated with these options. Potential surgical complications of kidney and pancreas transplantation include bleeding, clotting, infection, wound complications, anastomotic failure and other issues such as cardiac complications, pneumonia, deep venous thrombosis, pulmonary embolism, post transplant diabetes and death. We discussed the need for protocol biopsy of the kidney and the possible need for a ureteral stent (and subsequent removal). We discussed benefits and risks associated with different approaches to exocrine drainage of pancreatic secretions. We also discussed differences in the average length of stay, recovery process, and posttransplant lab and monitoring protocol. We discussed the risk of graft rejection and recurrent diabetic nephropathy in the setting of poor glycemic control. I emphasized the need for strict immunosuppression adherence and the potential for complications of immunosuppression such as skin cancer or lymphoma, as well as a very low but not zero  risk of donor-derived disease transmission risks (infection, cancer). Ms. Kapoor asked good questions and the patient's candidacy will be reviewed at our Multidisciplinary Selection Committee. Thank you for the opportunity to participate in Ms. Kapoor's care.    Total time: 45 minutes  Counselling time: 40 minutes      Jean-Pierre Gleason MD  ---------------------------------------------------------------------------------------------------    HPI: Ms. Kapoor has Chronic renal failure due to diabetes mellitus type 2. The patient has had diabetes for 15 years. Management is by lantus 14 unit(s) daily. The patient usually checks her blood sugar 2 times/day.  Daily blood glucoses range typically from 120 to 160.  Hypoglyemic unawareness is not an issue.  The diabetes is controlled.    Complications of diabetes include:    Retinopathy:  mild  Neuropathy: No  Gastroparesis:  No    The patient is not on dialysis.    Has potential kidney donors:  Doesn't know .  Interested in participation in paired exchange if a donor is willing: Doesn't know     The patient has the following pertinent history:       No    Yes  Dialysis:    []      [] via:       Blood Transfusion                  []      []  Number of units:   Most recently:  Pregnancy:    []      [] Number:       Previous Transplant:  []      [] Details:    Cancer    []      [] Comment:   Kidney stones   []      [] Comment:      Recurrent infections  []      []  Type:                  Bladder dysfunction  []      [] Cause:    Claudication   []      [] Distance:    Previous Amputation  []      [] Cause:     Chronic anticoagulation  []      [] Indication:  Restoration  []      []     Past Medical History:   Diagnosis Date    Chronic kidney disease     Diabetes (H)     DM Type 2    History of blood transfusion 2019    Hypertension     MGUS (monoclonal gammopathy of unknown significance)     Pericardial effusion     Sjogren's syndrome (H24)     Type 2 diabetes  mellitus with diabetic nephropathy (H)      Past Surgical History:   Procedure Laterality Date    BIOPSY       SECTION       Family History   Problem Relation Age of Onset    Kidney Disease No family hx of      Social History     Socioeconomic History    Marital status:      Spouse name: Not on file    Number of children: Not on file    Years of education: Not on file    Highest education level: Not on file   Occupational History    Not on file   Tobacco Use    Smoking status: Never    Smokeless tobacco: Never   Substance and Sexual Activity    Alcohol use: Never    Drug use: Never    Sexual activity: Not on file   Other Topics Concern    Not on file   Social History Narrative    Not on file     Social Determinants of Health     Financial Resource Strain: Not on file   Food Insecurity: Not on file   Transportation Needs: Not on file   Physical Activity: Not on file   Stress: Not on file   Social Connections: Not on file   Interpersonal Safety: Not on file   Housing Stability: Not on file       ROS:   CONSTITUTIONAL:  No fevers or chills  EYES: negative for icterus  ENT:  negative for hearing loss, tinnitus and sore throat  RESPIRATORY:  negative for cough, sputum, dyspnea  CARDIOVASCULAR:  negative for chest pain  negative for lower extremity wounds/ulcers  GASTROINTESTINAL:  negative for nausea, vomiting, diarrhea or constipation  GENITOURINARY:  negative for incontinence, dysuria, bladder emptying problems  HEME:  No easy bruising  INTEGUMENT:  negative for rash and pruritus  NEURO:  Negative for headache, seizure disorder    Allergies:   No Known Allergies    Medications:  Prescription Medications as of 2023         Rx Number Disp Refills Start End Last Dispensed Date Next Fill Date Owning Pharmacy    allopurinol (ZYLOPRIM) 100 MG tablet    2023       Sig: Take 100 mg by mouth    Class: Historical    Route: Oral    blood glucose (FREESTYLE TEST STRIPS) test strip    3/6/2022         Sig: by Misc.(Non-Drug; Combo Route) route three times a day.    Class: Historical    Route: Other    cholecalciferol 50 MCG ( UT) tablet    2023        Sig: Take 50 mcg by mouth    Class: Historical    Route: Oral    DARBEPOETIN DALTON IJ    2023        Sig: Inject 25 mcg Subcutaneous every 28 days    Class: Historical    Route: Subcutaneous    diltiazem ER (DILT-XR) 180 MG 24 hr capsule    2023        Sig: Take 180 mg by mouth    Class: Historical    Route: Oral    glimepiride (AMARYL) 4 MG tablet            Sig: TAKE 2 TABLETS (8 MG) BY MOUTH EVERY MORNING BEFORE BREAKFAST.    Class: Historical    hydrALAZINE (APRESOLINE) 25 MG tablet            Sig: Take 50 mg by mouth 3 times daily    Class: Historical    Route: Oral    Insulin Glargine w/ Trans Port 100 UNIT/ML SOPN            Sig: Inject 14 Units Subcutaneous    Class: Historical    Route: Subcutaneous    losartan (COZAAR) 50 MG tablet    2023        Sig: Take 1 tablet by mouth every morning    Class: Historical    Route: Oral    LYCOPENE PO            Sig: Take 1 tablet by mouth daily    Class: Historical    Route: Oral    metoprolol succinate ER (TOPROL XL) 25 MG 24 hr tablet    2022        Sig: Take 1 tablet by mouth daily at 2 pm    Class: Historical    Route: Oral    pioglitazone (ACTOS) 30 MG tablet    2023        Sig: Take 30 mg by mouth    Class: Historical    Route: Oral    sodium bicarbonate 650 MG tablet    2023        Si,300 mg    Class: Historical    torsemide (DEMADEX) 20 MG tablet    2023        Sig: Take 1 tablet by mouth every morning    Class: Historical    Route: Oral            Exam:      Appearance: in no apparent distress.   Skin: normal, warm, dry  Head and Neck: Normal, no rashes or jaundice  Respiratory: easy respirations, no audible wheezing.  Cardiovascular: RRR  Abdomen: abd soft, round, nontender, no overhanging pannus  Extremities: femoral 2+/2+, Edema, none  Neuro: without  deficit, cranial nerves intact     Diagnostics:   No results found for this or any previous visit (from the past 672 hour(s)).  UNOS CPRA   Date Value Ref Range Status   08/02/2023 97  Final

## 2023-08-26 PROBLEM — D47.2 MGUS (MONOCLONAL GAMMOPATHY OF UNKNOWN SIGNIFICANCE): Status: ACTIVE | Noted: 2023-08-26

## 2023-08-26 PROBLEM — E11.21 TYPE 2 DIABETES MELLITUS WITH DIABETIC NEPHROPATHY (H): Status: ACTIVE | Noted: 2023-08-26

## 2023-08-26 PROBLEM — M35.00 SJOGREN'S SYNDROME (H): Status: ACTIVE | Noted: 2023-08-26

## 2023-08-30 ENCOUNTER — TELEPHONE (OUTPATIENT)
Dept: TRANSPLANT | Facility: CLINIC | Age: 53
End: 2023-08-30
Payer: COMMERCIAL

## 2023-09-12 ENCOUNTER — TELEPHONE (OUTPATIENT)
Dept: TRANSPLANT | Facility: CLINIC | Age: 53
End: 2023-09-12
Payer: COMMERCIAL

## 2023-09-12 ENCOUNTER — TEAM CONFERENCE (OUTPATIENT)
Dept: TRANSPLANT | Facility: CLINIC | Age: 53
End: 2023-09-12
Payer: COMMERCIAL

## 2023-09-12 DIAGNOSIS — E11.9 DIABETES MELLITUS, TYPE 2 (H): ICD-10-CM

## 2023-09-12 DIAGNOSIS — I10 ESSENTIAL HYPERTENSION: ICD-10-CM

## 2023-09-12 DIAGNOSIS — Z01.818 ENCOUNTER FOR PRE-TRANSPLANT EVALUATION FOR KIDNEY AND PANCREAS TRANSPLANT: ICD-10-CM

## 2023-09-12 DIAGNOSIS — N18.4 CHRONIC KIDNEY DISEASE, STAGE IV (SEVERE) (H): Primary | ICD-10-CM

## 2023-09-12 DIAGNOSIS — Z76.82 ORGAN TRANSPLANT CANDIDATE: ICD-10-CM

## 2023-09-12 NOTE — TELEPHONE ENCOUNTER
Image Review Meeting    ATTENDEES: Dr. Gleason    IMAGES REVIEWED: Ab/Pelv CT 8/21/23 and iliac US 8/21/23    DECISION: Vessels suitable for SPK     INCIDENTALS: Yes: Splenomegaly and R renal cysts will get Abdominal US

## 2023-10-03 ENCOUNTER — TELEPHONE (OUTPATIENT)
Dept: TRANSPLANT | Facility: CLINIC | Age: 53
End: 2023-10-03
Payer: COMMERCIAL

## 2023-10-03 NOTE — TELEPHONE ENCOUNTER
Called pt to touch base on pre-SPK evaluation. She was driving and asked that I call her tomorrow AM.  She has started dialysis  - will get that info tomorrow.

## 2023-10-04 ENCOUNTER — TELEPHONE (OUTPATIENT)
Dept: TRANSPLANT | Facility: CLINIC | Age: 53
End: 2023-10-04
Payer: COMMERCIAL

## 2023-10-04 NOTE — TELEPHONE ENCOUNTER
Called pt to touch base on pre-SPK evaluation status. Left VM requesting return call to direct line.

## 2023-10-10 ENCOUNTER — PRE VISIT (OUTPATIENT)
Dept: CARDIOLOGY | Facility: CLINIC | Age: 53
End: 2023-10-10
Payer: COMMERCIAL

## 2023-10-10 ENCOUNTER — OFFICE VISIT (OUTPATIENT)
Dept: CARDIOLOGY | Facility: CLINIC | Age: 53
End: 2023-10-10
Attending: PHYSICIAN ASSISTANT
Payer: COMMERCIAL

## 2023-10-10 ENCOUNTER — TEAM CONFERENCE (OUTPATIENT)
Dept: TRANSPLANT | Facility: CLINIC | Age: 53
End: 2023-10-10

## 2023-10-10 ENCOUNTER — ANCILLARY PROCEDURE (OUTPATIENT)
Dept: ULTRASOUND IMAGING | Facility: CLINIC | Age: 53
End: 2023-10-10
Attending: PHYSICIAN ASSISTANT
Payer: COMMERCIAL

## 2023-10-10 VITALS
SYSTOLIC BLOOD PRESSURE: 148 MMHG | OXYGEN SATURATION: 98 % | WEIGHT: 138.3 LBS | HEART RATE: 83 BPM | DIASTOLIC BLOOD PRESSURE: 71 MMHG | BODY MASS INDEX: 27.48 KG/M2

## 2023-10-10 DIAGNOSIS — Z76.82 ORGAN TRANSPLANT CANDIDATE: ICD-10-CM

## 2023-10-10 DIAGNOSIS — I10 ESSENTIAL HYPERTENSION: ICD-10-CM

## 2023-10-10 DIAGNOSIS — N18.4 CHRONIC KIDNEY DISEASE, STAGE IV (SEVERE) (H): ICD-10-CM

## 2023-10-10 DIAGNOSIS — Z01.818 ENCOUNTER FOR PRE-TRANSPLANT EVALUATION FOR KIDNEY AND PANCREAS TRANSPLANT: ICD-10-CM

## 2023-10-10 DIAGNOSIS — E11.9 DIABETES MELLITUS, TYPE 2 (H): ICD-10-CM

## 2023-10-10 DIAGNOSIS — N18.4 CHRONIC KIDNEY DISEASE, STAGE IV (SEVERE) (H): Primary | ICD-10-CM

## 2023-10-10 DIAGNOSIS — I25.10 CARDIOVASCULAR DISEASE: ICD-10-CM

## 2023-10-10 DIAGNOSIS — Z01.810 PRE-OPERATIVE CARDIOVASCULAR EXAMINATION: Primary | ICD-10-CM

## 2023-10-10 PROCEDURE — 76700 US EXAM ABDOM COMPLETE: CPT | Performed by: RADIOLOGY

## 2023-10-10 PROCEDURE — 99204 OFFICE O/P NEW MOD 45 MIN: CPT | Performed by: INTERNAL MEDICINE

## 2023-10-10 PROCEDURE — 99213 OFFICE O/P EST LOW 20 MIN: CPT | Performed by: INTERNAL MEDICINE

## 2023-10-10 ASSESSMENT — PAIN SCALES - GENERAL: PAINLEVEL: NO PAIN (0)

## 2023-10-10 NOTE — PATIENT INSTRUCTIONS
Complete a Lexiscan  As soon as results are compiled and reviewed, you will be notified.    Lexiscan (nuclear stress test)     Why do I need this test?  Your doctor has ordered a nuclear stress test to check how well blood is flowing through your heart. You will either exercise or take a medicine that mimics exercise; we will watch your heart. Please allow 2 to 4 hours for this test.      What happens during this test?      1. We will place an IV (small needle) in the vein of your arm or hand.  2. We will inject a liquid through the IV. The liquid contains radioactivity. This helps your heart show up better on the pictures we will take.  3. About 30 to 60 minutes later, you will lie down on a table. A special camera will rotate around your chest taking pictures of your heart. This lasts less than 30 minutes.  4. To prepare for the stress test, we will check your blood pressure. We will also attach small pads to your chest. The pads are hooked to an EKG (electrocardiogram) machine. The machine shows how your heart is working during the test.  5. You will begin the stress test.    If you can exercise, you will walk on a treadmill for 5 to 15 minutes. You will start at a slow speed. We will slowly increase the speed and level of incline.    If you cannot exercise, we will give you medicine to mimic the effects of exercise. The medicine goes through the IV slowly.  6. During the stress test, we will again inject liquid through your IV. (See Step 2.)  7. After the stress test, you will wait 30 to 60 minutes. We will then take more pictures of your heart.    Preparation :    NO FOOD 3 hours prior, Water is ok and encouraged  NO alcohol, smoking, caffeine, or decaf products 12 hours prior    TAKE ALL medications as prescribed, hold the following medications if they pertain to you:  If you take Aggrenox or dipyridamole (Persantine, Permole), stop taking it 48 hours before your test.  If you take Viagra, Cialis or Levitra,  stop taking it 48 hours before your test.  If you take theophylline or aminophylline, stop taking it 12 hours before your test.  For patients with diabetes:If you take insulin, call your diabetes care team. Ask if you should take a 1/2 dose the morning of your test  If you take diabetes medicine by mouth, don't take it on the morning of your test. Bring it with you to take after the test. (If you have questions, call your diabetes care team.)  Do not take nitrates on the day of your test. Do not wear a Nitro-Patch.

## 2023-10-10 NOTE — TELEPHONE ENCOUNTER
Image Review Meeting    ATTENDEES: Dr. Mckeon     IMAGES REVIEWED: Abdominal US 10/10/23    DECISION: pt will need follow up of renal cysts w/ MRI     Called pt and left VM explaining we have ordered MRI in follow up to cysts. Asked her to return my call to discuss evaluation.

## 2023-10-10 NOTE — NURSING NOTE
Chief Complaint   Patient presents with    New Patient     new - KIDNEY/PANCREASE transplant eval     Vitals were taken and medications reconciled.    Marco A Cuevas, EMT  7:58 AM

## 2023-10-10 NOTE — LETTER
10/10/2023      RE: Wendy Kapoor  [address]  [place] [address]       Dear Colleague,    Thank you for the opportunity to participate in the care of your patient, Wendy Kapoor, at the [facility] [place] at [facility]. Please see a copy of my visit note below.       SUBJECTIVE:  Wendy Kapoor is a 52 year old female who presents for kidney pancreas transplant evaluation.  CKD stage V due to biopsy-proven type 2 diabetes and hypertension.  Biopsy also showed changes related to Sjogren's syndrome.  Patient was initiated on peritoneal dialysis 3 weeks ago.  Currently patient is fairly active and denied cardiac symptoms.  No prior cardiac history.  Her cardiac risk factors 10-year history of type 2 diabetes, hypertension.  Non-smoker.  Mother who was a diabetic had an MI before the age of 65.  She is a non-smoker.  She has a prior history of pericardial effusion of unclear etiology.  Patient Active Problem List    Diagnosis Date Noted    Type 2 diabetes mellitus with diabetic nephropathy (H) 08/26/2023     Priority: Medium    Sjogren's syndrome (H24) 08/26/2023     Priority: Medium    MGUS (monoclonal gammopathy of unknown significance) 08/26/2023     Priority: Medium    Chronic kidney disease, stage V (H) 08/26/2023     Priority: Medium    Essential hypertension 08/26/2023     Priority: Medium    .  Current Outpatient Medications   Medication Sig    allopurinol (ZYLOPRIM) 100 MG tablet Take 100 mg by mouth    blood glucose (FREESTYLE TEST STRIPS) test strip by Misc.(Non-Drug; Combo Route) route three times a day.    cholecalciferol 50 MCG (2000 UT) tablet Take 50 mcg by mouth    DARBEPOETIN DALTON IJ Inject 25 mcg Subcutaneous every 28 days    diltiazem ER (DILT-XR) 180 MG 24 hr capsule Take 180 mg by mouth    glimepiride (AMARYL) 4 MG tablet TAKE 2 TABLETS (8 MG) BY MOUTH EVERY MORNING BEFORE BREAKFAST.    hydrALAZINE (APRESOLINE) 25 MG  tablet Take 50 mg by mouth 3 times daily    Insulin Glargine w/ Trans Port 100 UNIT/ML SOPN Inject 14 Units Subcutaneous    losartan (COZAAR) 50 MG tablet Take 1 tablet by mouth every morning    LYCOPENE PO Take 1 tablet by mouth daily    metoprolol succinate ER (TOPROL XL) 25 MG 24 hr tablet Take 1 tablet by mouth daily at 2 pm    pioglitazone (ACTOS) 30 MG tablet Take 30 mg by mouth    sodium bicarbonate 650 MG tablet 1,300 mg    torsemide (DEMADEX) 20 MG tablet Take 1 tablet by mouth every morning     No current facility-administered medications for this visit.     Past Medical History:   Diagnosis Date    Chronic kidney disease     Diabetes (H)     DM Type 2    History of blood transfusion 2019    Hypertension     MGUS (monoclonal gammopathy of unknown significance)     Pericardial effusion     Sjogren's syndrome (H24)     Type 2 diabetes mellitus with diabetic nephropathy (H)      Past Surgical History:   Procedure Laterality Date    BIOPSY       SECTION       No Known Allergies  Social History     Socioeconomic History    Marital status:      Spouse name: Not on file    Number of children: Not on file    Years of education: Not on file    Highest education level: Not on file   Occupational History    Not on file   Tobacco Use    Smoking status: Never    Smokeless tobacco: Never   Substance and Sexual Activity    Alcohol use: Never    Drug use: Never    Sexual activity: Not on file   Other Topics Concern    Not on file   Social History Narrative    Not on file     Social Determinants of Health     Financial Resource Strain: Not on file   Food Insecurity: Not on file   Transportation Needs: Not on file   Physical Activity: Not on file   Stress: Not on file   Social Connections: Not on file   Interpersonal Safety: Not on file   Housing Stability: Not on file     Family History   Problem Relation Age of Onset    Kidney Disease No family hx of           REVIEW OF SYSTEMS:  General: negative, fever,  chills, night sweats  Skin: negative, acne, rash, and scaling  Eyes: negative, double vision, eye pain, and photophobia  Ears/Nose/Throat: negative, nasal congestion, and purulent rhinorrhea  Respiratory: No dyspnea on exertion, No cough, No hemoptysis, and negative  Cardiovascular: negative, palpitations, tachycardia, irregular heart beat, chest pain, exertional chest pain or pressure, paroxysmal nocturnal dyspnea, dyspnea on exertion, and orthopnea       OBJECTIVE:  Blood pressure (!) 148/71, pulse 83, weight 62.7 kg (138 lb 4.8 oz), SpO2 98 %.  General Appearance: alert, active, and no distress  Head: Normocephalic. No masses, lesions, tenderness or abnormalities  Eyes: conjuctiva clear, PERRL, EOM intact  Ears: External ears normal. Canals clear. TM's normal.  Nose: Nares normal  Mouth: normal  Neck: Supple, no cervical adenopathy, no thyromegaly  Lungs: clear to auscultation  Cardiac: regular rate and rhythm, normal S1 and S2, SM.     ASSESSMENT/PLAN:  Patient here for kidney pancreas transplant evaluation.  CKD stage V due to biopsy-proven type 2 diabetes and hypertension.  Biopsy also showed changes of Sjogren's syndrome.  Patient was initiated on peritoneal dialysis 3 weeks ago.  Patient is fairly active with no cardiac symptoms or prior cardiac history.  Cardiac risk factors include hypertension and type 2 diabetes for 10 years.  Mother had an MI in her 60s but she was a diabetic.  Patient is a non-smoker.  She has a prior history of pericardial effusion.  Reviewed recent EKG.  Normal sinus rhythm normal EKG.  Patient's echocardiogram reviewed this shows normal biventricular function and no significant valvular abnormalities.  No pericardial effusion.  Currently patient is on diltiazem and beta-blocker.  Because of this we will plan for a Lexiscan.  If this is normal patient will be able to proceed with the transplant.  Total visit duration 45 minutes.  This included face-to-face interview, physical exam,  chart review, review of EKG echocardiogram and documentation.      Please do not hesitate to contact me if you have any questions/concerns.     Sincerely,     EDUARD Mcqueen MD

## 2023-10-10 NOTE — PROGRESS NOTES
SUBJECTIVE:  Wendy Kapoor is a 52 year old female who presents for kidney pancreas transplant evaluation.  CKD stage V due to biopsy-proven type 2 diabetes and hypertension.  Biopsy also showed changes related to Sjogren's syndrome.  Patient was initiated on peritoneal dialysis 3 weeks ago.  Currently patient is fairly active and denied cardiac symptoms.  No prior cardiac history.  Her cardiac risk factors 10-year history of type 2 diabetes, hypertension.  Non-smoker.  Mother who was a diabetic had an MI before the age of 65.  She is a non-smoker.  She has a prior history of pericardial effusion of unclear etiology.  Patient Active Problem List    Diagnosis Date Noted    Type 2 diabetes mellitus with diabetic nephropathy (H) 08/26/2023     Priority: Medium    Sjogren's syndrome (H24) 08/26/2023     Priority: Medium    MGUS (monoclonal gammopathy of unknown significance) 08/26/2023     Priority: Medium    Chronic kidney disease, stage V (H) 08/26/2023     Priority: Medium    Essential hypertension 08/26/2023     Priority: Medium    .  Current Outpatient Medications   Medication Sig    allopurinol (ZYLOPRIM) 100 MG tablet Take 100 mg by mouth    blood glucose (FREESTYLE TEST STRIPS) test strip by Misc.(Non-Drug; Combo Route) route three times a day.    cholecalciferol 50 MCG (2000 UT) tablet Take 50 mcg by mouth    DARBEPOETIN DALTON IJ Inject 25 mcg Subcutaneous every 28 days    diltiazem ER (DILT-XR) 180 MG 24 hr capsule Take 180 mg by mouth    glimepiride (AMARYL) 4 MG tablet TAKE 2 TABLETS (8 MG) BY MOUTH EVERY MORNING BEFORE BREAKFAST.    hydrALAZINE (APRESOLINE) 25 MG tablet Take 50 mg by mouth 3 times daily    Insulin Glargine w/ Trans Port 100 UNIT/ML SOPN Inject 14 Units Subcutaneous    losartan (COZAAR) 50 MG tablet Take 1 tablet by mouth every morning    LYCOPENE PO Take 1 tablet by mouth daily    metoprolol succinate ER (TOPROL XL) 25 MG 24 hr tablet Take 1 tablet by mouth daily at 2 pm    pioglitazone  (ACTOS) 30 MG tablet Take 30 mg by mouth    sodium bicarbonate 650 MG tablet 1,300 mg    torsemide (DEMADEX) 20 MG tablet Take 1 tablet by mouth every morning     No current facility-administered medications for this visit.     Past Medical History:   Diagnosis Date    Chronic kidney disease     Diabetes (H)     DM Type 2    History of blood transfusion 2019    Hypertension     MGUS (monoclonal gammopathy of unknown significance)     Pericardial effusion     Sjogren's syndrome (H24)     Type 2 diabetes mellitus with diabetic nephropathy (H)      Past Surgical History:   Procedure Laterality Date    BIOPSY       SECTION       No Known Allergies  Social History     Socioeconomic History    Marital status:      Spouse name: Not on file    Number of children: Not on file    Years of education: Not on file    Highest education level: Not on file   Occupational History    Not on file   Tobacco Use    Smoking status: Never    Smokeless tobacco: Never   Substance and Sexual Activity    Alcohol use: Never    Drug use: Never    Sexual activity: Not on file   Other Topics Concern    Not on file   Social History Narrative    Not on file     Social Determinants of Health     Financial Resource Strain: Not on file   Food Insecurity: Not on file   Transportation Needs: Not on file   Physical Activity: Not on file   Stress: Not on file   Social Connections: Not on file   Interpersonal Safety: Not on file   Housing Stability: Not on file     Family History   Problem Relation Age of Onset    Kidney Disease No family hx of           REVIEW OF SYSTEMS:  General: negative, fever, chills, night sweats  Skin: negative, acne, rash, and scaling  Eyes: negative, double vision, eye pain, and photophobia  Ears/Nose/Throat: negative, nasal congestion, and purulent rhinorrhea  Respiratory: No dyspnea on exertion, No cough, No hemoptysis, and negative  Cardiovascular: negative, palpitations, tachycardia, irregular heart beat, chest  pain, exertional chest pain or pressure, paroxysmal nocturnal dyspnea, dyspnea on exertion, and orthopnea       OBJECTIVE:  Blood pressure (!) 148/71, pulse 83, weight 62.7 kg (138 lb 4.8 oz), SpO2 98 %.  General Appearance: alert, active, and no distress  Head: Normocephalic. No masses, lesions, tenderness or abnormalities  Eyes: conjuctiva clear, PERRL, EOM intact  Ears: External ears normal. Canals clear. TM's normal.  Nose: Nares normal  Mouth: normal  Neck: Supple, no cervical adenopathy, no thyromegaly  Lungs: clear to auscultation  Cardiac: regular rate and rhythm, normal S1 and S2, SM.     ASSESSMENT/PLAN:  Patient here for kidney pancreas transplant evaluation.  CKD stage V due to biopsy-proven type 2 diabetes and hypertension.  Biopsy also showed changes of Sjogren's syndrome.  Patient was initiated on peritoneal dialysis 3 weeks ago.  Patient is fairly active with no cardiac symptoms or prior cardiac history.  Cardiac risk factors include hypertension and type 2 diabetes for 10 years.  Mother had an MI in her 60s but she was a diabetic.  Patient is a non-smoker.  She has a prior history of pericardial effusion.  Reviewed recent EKG.  Normal sinus rhythm normal EKG.  Patient's echocardiogram reviewed this shows normal biventricular function and no significant valvular abnormalities.  No pericardial effusion.  Currently patient is on diltiazem and beta-blocker.  Because of this we will plan for a Lexiscan.  If this is normal patient will be able to proceed with the transplant.  Total visit duration 45 minutes.  This included face-to-face interview, physical exam, chart review, review of EKG echocardiogram and documentation.  Stress test reviewed.  No perfusion defect.  Normal study.  Patient cleared for transplant.

## 2023-11-01 ENCOUNTER — HOSPITAL ENCOUNTER (OUTPATIENT)
Dept: NUCLEAR MEDICINE | Facility: CLINIC | Age: 53
Setting detail: NUCLEAR MEDICINE
Discharge: HOME | End: 2023-11-01
Attending: INTERNAL MEDICINE
Payer: COMMERCIAL

## 2023-11-01 ENCOUNTER — HOSPITAL ENCOUNTER (OUTPATIENT)
Dept: CARDIOLOGY | Facility: CLINIC | Age: 53
Discharge: HOME | End: 2023-11-01
Attending: INTERNAL MEDICINE
Payer: COMMERCIAL

## 2023-11-01 DIAGNOSIS — Z76.82 ORGAN TRANSPLANT CANDIDATE: ICD-10-CM

## 2023-11-01 DIAGNOSIS — N18.4 CHRONIC KIDNEY DISEASE, STAGE IV (SEVERE) (H): ICD-10-CM

## 2023-11-01 DIAGNOSIS — Z01.810 PRE-OPERATIVE CARDIOVASCULAR EXAMINATION: ICD-10-CM

## 2023-11-01 DIAGNOSIS — I10 ESSENTIAL HYPERTENSION: ICD-10-CM

## 2023-11-01 LAB
CV STRESS MAX HR HE: 114
RATE PRESSURE PRODUCT: NORMAL
STRESS ECHO BASELINE DIASTOLIC HE: 73
STRESS ECHO BASELINE HR: 92 BPM
STRESS ECHO BASELINE SYSTOLIC BP: 167
STRESS ECHO CALCULATED PERCENT HR: 69 %
STRESS ECHO LAST STRESS DIASTOLIC BP: 56
STRESS ECHO LAST STRESS SYSTOLIC BP: 142
STRESS ECHO TARGET HR: 166

## 2023-11-01 PROCEDURE — 93017 CV STRESS TEST TRACING ONLY: CPT

## 2023-11-01 PROCEDURE — 93018 CV STRESS TEST I&R ONLY: CPT | Performed by: INTERNAL MEDICINE

## 2023-11-01 PROCEDURE — A9502 TC99M TETROFOSMIN: HCPCS | Performed by: INTERNAL MEDICINE

## 2023-11-01 PROCEDURE — 250N000011 HC RX IP 250 OP 636: Performed by: INTERNAL MEDICINE

## 2023-11-01 PROCEDURE — 78452 HT MUSCLE IMAGE SPECT MULT: CPT | Mod: 26 | Performed by: INTERNAL MEDICINE

## 2023-11-01 PROCEDURE — 78452 HT MUSCLE IMAGE SPECT MULT: CPT

## 2023-11-01 PROCEDURE — 343N000001 HC RX 343: Performed by: INTERNAL MEDICINE

## 2023-11-01 PROCEDURE — 93016 CV STRESS TEST SUPVJ ONLY: CPT | Performed by: INTERNAL MEDICINE

## 2023-11-01 RX ORDER — CAFFEINE CITRATE 20 MG/ML
60 SOLUTION INTRAVENOUS
Status: DISCONTINUED | OUTPATIENT
Start: 2023-11-01 | End: 2023-11-02 | Stop reason: HOSPADM

## 2023-11-01 RX ORDER — ACYCLOVIR 200 MG/1
0-1 CAPSULE ORAL
Status: DISCONTINUED | OUTPATIENT
Start: 2023-11-01 | End: 2023-11-02 | Stop reason: HOSPADM

## 2023-11-01 RX ORDER — ALBUTEROL SULFATE 90 UG/1
2 AEROSOL, METERED RESPIRATORY (INHALATION) EVERY 5 MIN PRN
Status: DISCONTINUED | OUTPATIENT
Start: 2023-11-01 | End: 2023-11-02 | Stop reason: HOSPADM

## 2023-11-01 RX ORDER — AMINOPHYLLINE 25 MG/ML
50-100 INJECTION, SOLUTION INTRAVENOUS
Status: DISCONTINUED | OUTPATIENT
Start: 2023-11-01 | End: 2023-11-02 | Stop reason: HOSPADM

## 2023-11-01 RX ORDER — REGADENOSON 0.08 MG/ML
0.4 INJECTION, SOLUTION INTRAVENOUS ONCE
Status: COMPLETED | OUTPATIENT
Start: 2023-11-01 | End: 2023-11-01

## 2023-11-01 RX ADMIN — TETROFOSMIN 10 MILLICURIE: 1.38 INJECTION, POWDER, LYOPHILIZED, FOR SOLUTION INTRAVENOUS at 08:08

## 2023-11-01 RX ADMIN — REGADENOSON 0.4 MG: 0.08 INJECTION, SOLUTION INTRAVENOUS at 09:09

## 2023-11-01 RX ADMIN — TETROFOSMIN 39.3 MILLICURIE: 1.38 INJECTION, POWDER, LYOPHILIZED, FOR SOLUTION INTRAVENOUS at 09:10

## 2023-11-10 ENCOUNTER — TELEPHONE (OUTPATIENT)
Dept: TRANSPLANT | Facility: CLINIC | Age: 53
End: 2023-11-10
Payer: COMMERCIAL

## 2023-11-10 NOTE — TELEPHONE ENCOUNTER
Called pt to touch base regarding pre-transplant evaluation. Left VM explaining that she can call [phone] to schedule Abdominal MRI. Asked that she call my direct line to discuss status.

## 2023-12-05 ENCOUNTER — TELEPHONE (OUTPATIENT)
Dept: TRANSPLANT | Facility: CLINIC | Age: 53
End: 2023-12-05
Payer: COMMERCIAL

## 2023-12-05 DIAGNOSIS — Z01.818 ENCOUNTER FOR PRE-TRANSPLANT EVALUATION FOR KIDNEY AND PANCREAS TRANSPLANT: Primary | ICD-10-CM

## 2023-12-05 DIAGNOSIS — Z76.82 ORGAN TRANSPLANT CANDIDATE: ICD-10-CM

## 2023-12-05 DIAGNOSIS — I10 ESSENTIAL HYPERTENSION: ICD-10-CM

## 2023-12-05 DIAGNOSIS — Z76.82 AWAITING ORGAN TRANSPLANT: ICD-10-CM

## 2023-12-05 DIAGNOSIS — N18.6 END STAGE RENAL DISEASE (H): ICD-10-CM

## 2023-12-05 DIAGNOSIS — E11.9 DIABETES MELLITUS, TYPE 2 (H): ICD-10-CM

## 2023-12-05 NOTE — TELEPHONE ENCOUNTER
Pt called to let me know her MRI is scheduled on 12/26/23, we have added labs to her visit that date. She agreed to come in at 4:15 PM.

## 2023-12-05 NOTE — TELEPHONE ENCOUNTER
Called pt to touch base on pre-transplant evaluation status.  She is now on PD dialysis, she is not sure which company it is through, but gave me the phone number: [phone].  Her colonoscopy is scheduled on 1/22/24. I provided her the number to call for her MRI, she would like to see Rheumatology the same day. I will also get her a lab slot once those appointments are set.  She states she has watched the education - we will review at 10 AM on Friday. She had no further questions.    Called pt's dialysis center, requested [phone] form get faxed to the office.

## 2023-12-08 ENCOUNTER — DOCUMENTATION ONLY (OUTPATIENT)
Dept: TRANSPLANT | Facility: CLINIC | Age: 53
End: 2023-12-08
Payer: COMMERCIAL

## 2023-12-08 ENCOUNTER — TELEPHONE (OUTPATIENT)
Dept: TRANSPLANT | Facility: CLINIC | Age: 53
End: 2023-12-08
Payer: COMMERCIAL

## 2023-12-08 DIAGNOSIS — Z76.82 AWAITING ORGAN TRANSPLANT: Primary | ICD-10-CM

## 2023-12-08 NOTE — PROGRESS NOTES
Kidney Transplant Waitlist - Qualified: 8/10/2023  Wendy Kapoor attended the pre-transplant patient education class today. The [facility] website pre-transplant modules were viewed; class participants were educated on using the site.     Content reviewed:  Living Donation and how to access that program  Paired exchange  Kidney Donor Profile Index (KDPI)  Waiting list issues (right to decline without penalty, high PHS risk donors, what to expect when called with an offer)  Hospital experience, length of stay, need to stay locally post-discharge (2-4 weeks)                          Post-surgery lifting and driving restrictions  Post-transplant routines, frequency of lab work and clinic visits  Need to stay locally post-discharge (2-4 weeks)  Role of Transplant Coordinator    Participants were informed of the benefits of transplant as well as potential risks such as infection, cancer, and death.  The need for total adherence with immunosuppression medications and following transplant regimens was stressed.  The overall evaluation/approval/listing process was reviewed.  Pt expressed good understanding of the content.

## 2023-12-08 NOTE — TELEPHONE ENCOUNTER
Called pt to review education (see separate encounter). We discussed that she was originally unsure if she wanted to pursue SPK transplant, she confirmed she is interested in SPK vs. Just K alone. Pt has upcoming MRI on 12/26/23 and then will see Rheumatology on 1/10/24.  She had no further questions regarding her evaluation status.

## 2023-12-12 NOTE — CONFIDENTIAL NOTE
NOTES Status Details   OFFICE NOTE from referring provider Internal 12.05.203 Travis Guzman, MIA CNP    OFFICE NOTE from other specialist     DISCHARGE SUMMARY from hospital     DISCHARGE REPORT from the ER     MEDICATION LIST Internal    LABS (Any and all labs)      Internal    Biopsy/pathology (Anything related to diagnoses I.e. fluid aspirations, lip biopsy, muscle biopsy)               Imaging (All imaging related to diagnoses)     Echo Internal 08.02.2023   HRCT     CXR     EMG                    Scleroderma/Dermatomyositis diagnoses     Previous Cardiology notes      Previous Pulmonary notes     Previous Dermatology notes     Previous GI notes     Lupus diagnoses     Previous Nephrology notes     Previous Dermatology notes     Previous Cardiology notes

## 2023-12-26 ENCOUNTER — LAB (OUTPATIENT)
Dept: LAB | Facility: CLINIC | Age: 53
End: 2023-12-26
Payer: COMMERCIAL

## 2023-12-26 ENCOUNTER — ANCILLARY PROCEDURE (OUTPATIENT)
Dept: MRI IMAGING | Facility: CLINIC | Age: 53
End: 2023-12-26
Attending: SURGERY
Payer: COMMERCIAL

## 2023-12-26 DIAGNOSIS — Z01.818 ENCOUNTER FOR PRE-TRANSPLANT EVALUATION FOR KIDNEY AND PANCREAS TRANSPLANT: ICD-10-CM

## 2023-12-26 DIAGNOSIS — I10 ESSENTIAL HYPERTENSION: ICD-10-CM

## 2023-12-26 DIAGNOSIS — E11.9 DIABETES MELLITUS, TYPE 2 (H): ICD-10-CM

## 2023-12-26 DIAGNOSIS — N18.4 CHRONIC KIDNEY DISEASE, STAGE IV (SEVERE) (H): ICD-10-CM

## 2023-12-26 DIAGNOSIS — I25.10 CARDIOVASCULAR DISEASE: ICD-10-CM

## 2023-12-26 DIAGNOSIS — Z76.82 AWAITING ORGAN TRANSPLANT: ICD-10-CM

## 2023-12-26 DIAGNOSIS — Z76.82 ORGAN TRANSPLANT CANDIDATE: ICD-10-CM

## 2023-12-26 DIAGNOSIS — N18.6 END STAGE RENAL DISEASE (H): ICD-10-CM

## 2023-12-26 LAB
A1 AB TITR SERPL: 2 {TITER}
A1 AB TITR SERPL: 2 {TITER}
A2 AB TITR SERPL: <1 {TITER}
A2 AB TITR SERPL: <1 {TITER}
ANTIBODY TITER IGM SCREEN: NEGATIVE
SPECIMEN EXPIRATION DATE: NORMAL
TOTAL PROTEIN SERUM FOR ELP: 7 G/DL (ref 6.4–8.3)

## 2023-12-26 PROCEDURE — 99000 SPECIMEN HANDLING OFFICE-LAB: CPT | Performed by: PATHOLOGY

## 2023-12-26 PROCEDURE — 74181 MRI ABDOMEN W/O CONTRAST: CPT | Performed by: RADIOLOGY

## 2023-12-26 PROCEDURE — 85730 THROMBOPLASTIN TIME PARTIAL: CPT | Performed by: NURSE PRACTITIONER

## 2023-12-26 PROCEDURE — 84165 PROTEIN E-PHORESIS SERUM: CPT | Mod: TC | Performed by: PATHOLOGY

## 2023-12-26 PROCEDURE — 86850 RBC ANTIBODY SCREEN: CPT | Performed by: NURSE PRACTITIONER

## 2023-12-26 PROCEDURE — 86886 COOMBS TEST INDIRECT TITER: CPT | Performed by: NURSE PRACTITIONER

## 2023-12-26 PROCEDURE — 36415 COLL VENOUS BLD VENIPUNCTURE: CPT | Performed by: PATHOLOGY

## 2023-12-26 PROCEDURE — 86146 BETA-2 GLYCOPROTEIN ANTIBODY: CPT | Performed by: NURSE PRACTITIONER

## 2023-12-26 PROCEDURE — 84155 ASSAY OF PROTEIN SERUM: CPT | Performed by: NURSE PRACTITIONER

## 2023-12-26 PROCEDURE — 85390 FIBRINOLYSINS SCREEN I&R: CPT | Mod: 26 | Performed by: PATHOLOGY

## 2023-12-26 PROCEDURE — 85597 PHOSPHOLIPID PLTLT NEUTRALIZ: CPT | Performed by: NURSE PRACTITIONER

## 2023-12-26 PROCEDURE — 84165 PROTEIN E-PHORESIS SERUM: CPT | Mod: 26 | Performed by: PATHOLOGY

## 2023-12-27 LAB
ALBUMIN SERPL ELPH-MCNC: 3.2 G/DL (ref 3.7–5.1)
ALPHA1 GLOB SERPL ELPH-MCNC: 0.6 G/DL (ref 0.2–0.4)
ALPHA2 GLOB SERPL ELPH-MCNC: 1 G/DL (ref 0.5–0.9)
ANTIBODY SCREEN: NEGATIVE
B-GLOBULIN SERPL ELPH-MCNC: 0.8 G/DL (ref 0.6–1)
B2 GLYCOPROT1 IGG SERPL IA-ACNC: 2.5 U/ML
B2 GLYCOPROT1 IGM SERPL IA-ACNC: 4 U/ML
DRVVT CONFIRM NORMALIZED RATIO: 1.68
DRVVT SCREEN RATIO: 1.97
GAMMA GLOB SERPL ELPH-MCNC: 1.4 G/DL (ref 0.7–1.6)
HEPZYMED PTT RATIO: 1.75
HEPZYMED PTT-LA: 63 SECONDS (ref 31–45)
HEPZYMED THROMBIN TIME: 23.5 SECONDS (ref 15.7–21.7)
INR PPP: 1 (ref 0.85–1.15)
LA PPP-IMP: POSITIVE
LOCATION OF TASK: ABNORMAL
LUPUS INTERPRETATION: ABNORMAL
M PROTEIN SERPL ELPH-MCNC: 0.4 G/DL
PATIENT PTT-LA: 71 SECONDS (ref 31–45)
PLATELET NEUTRALIZATION: 2 SECONDS
PROT PATTERN SERPL ELPH-IMP: ABNORMAL
SPECIMEN EXPIRATION DATE: NORMAL
THROMBIN TIME: 20.1 SECONDS (ref 13–19)

## 2024-01-04 ENCOUNTER — TELEPHONE (OUTPATIENT)
Dept: TRANSPLANT | Facility: CLINIC | Age: 54
End: 2024-01-04
Payer: COMMERCIAL

## 2024-01-04 ENCOUNTER — TEAM CONFERENCE (OUTPATIENT)
Dept: TRANSPLANT | Facility: CLINIC | Age: 54
End: 2024-01-04
Payer: COMMERCIAL

## 2024-01-04 DIAGNOSIS — Z76.82 ORGAN TRANSPLANT CANDIDATE: ICD-10-CM

## 2024-01-04 DIAGNOSIS — I10 ESSENTIAL HYPERTENSION: ICD-10-CM

## 2024-01-04 DIAGNOSIS — N18.4 CHRONIC KIDNEY DISEASE, STAGE IV (SEVERE) (H): Primary | ICD-10-CM

## 2024-01-04 DIAGNOSIS — E11.9 DIABETES MELLITUS, TYPE 2 (H): ICD-10-CM

## 2024-01-04 DIAGNOSIS — Z01.818 ENCOUNTER FOR PRE-TRANSPLANT EVALUATION FOR KIDNEY AND PANCREAS TRANSPLANT: ICD-10-CM

## 2024-01-04 NOTE — TELEPHONE ENCOUNTER
Image Review Meeting    ATTENDEES: Dr. Wyatt    IMAGES REVIEWED: Abdominal MRI 12/26/23      DECISION: pt will need follow up MRI due in June 2024    INCIDENTALS: mildly complex cyst in the midpole of R kidney with thin and mildly thickened internal septations. Given the lack of intravenous contrast, it is difficult to fully characterize this cystic lesion. At a minimum, continued imaging follow-up is recommended in 6-12 months to assess for change in size - will get follow up MRI in 6 months    mild splenomegaly and mild iron deposition in liver - no further work up  needed: plts and LFTs normal

## 2024-01-04 NOTE — TELEPHONE ENCOUNTER
Called pt and let her know that we will be getting a follow up MRI in 6 months to reassess cyst size.  She had no further questions. Order placed.

## 2024-01-06 ENCOUNTER — HEALTH MAINTENANCE LETTER (OUTPATIENT)
Age: 54
End: 2024-01-06

## 2024-01-10 ENCOUNTER — PRE VISIT (OUTPATIENT)
Dept: RHEUMATOLOGY | Facility: CLINIC | Age: 54
End: 2024-01-10
Payer: COMMERCIAL

## 2024-01-10 ENCOUNTER — OFFICE VISIT (OUTPATIENT)
Dept: RHEUMATOLOGY | Facility: CLINIC | Age: 54
End: 2024-01-10
Attending: NURSE PRACTITIONER
Payer: COMMERCIAL

## 2024-01-10 ENCOUNTER — TELEPHONE (OUTPATIENT)
Dept: RHEUMATOLOGY | Facility: CLINIC | Age: 54
End: 2024-01-10
Payer: COMMERCIAL

## 2024-01-10 VITALS
SYSTOLIC BLOOD PRESSURE: 125 MMHG | WEIGHT: 135.6 LBS | HEART RATE: 82 BPM | DIASTOLIC BLOOD PRESSURE: 72 MMHG | HEIGHT: 60 IN | BODY MASS INDEX: 26.62 KG/M2

## 2024-01-10 DIAGNOSIS — N18.6 END STAGE RENAL DISEASE (H): ICD-10-CM

## 2024-01-10 DIAGNOSIS — D47.2 MGUS (MONOCLONAL GAMMOPATHY OF UNKNOWN SIGNIFICANCE): ICD-10-CM

## 2024-01-10 DIAGNOSIS — I10 ESSENTIAL HYPERTENSION: ICD-10-CM

## 2024-01-10 DIAGNOSIS — R76.8 SS-A ANTIBODY POSITIVE: Primary | ICD-10-CM

## 2024-01-10 DIAGNOSIS — E11.21 TYPE 2 DIABETES MELLITUS WITH DIABETIC NEPHROPATHY, WITH LONG-TERM CURRENT USE OF INSULIN (H): ICD-10-CM

## 2024-01-10 DIAGNOSIS — Z01.818 ENCOUNTER FOR PRE-TRANSPLANT EVALUATION FOR KIDNEY AND PANCREAS TRANSPLANT: ICD-10-CM

## 2024-01-10 DIAGNOSIS — Z79.4 TYPE 2 DIABETES MELLITUS WITH DIABETIC NEPHROPATHY, WITH LONG-TERM CURRENT USE OF INSULIN (H): ICD-10-CM

## 2024-01-10 DIAGNOSIS — Z76.82 ORGAN TRANSPLANT CANDIDATE: ICD-10-CM

## 2024-01-10 PROCEDURE — 99213 OFFICE O/P EST LOW 20 MIN: CPT | Performed by: STUDENT IN AN ORGANIZED HEALTH CARE EDUCATION/TRAINING PROGRAM

## 2024-01-10 PROCEDURE — 99245 OFF/OP CONSLTJ NEW/EST HI 55: CPT | Performed by: STUDENT IN AN ORGANIZED HEALTH CARE EDUCATION/TRAINING PROGRAM

## 2024-01-10 ASSESSMENT — PAIN SCALES - GENERAL: PAINLEVEL: NO PAIN (0)

## 2024-01-10 NOTE — TELEPHONE ENCOUNTER
Message left for patient that there is the possibility of moving up her appt today due to a cancellation.  Asked patient to call this RN back directly if interested.    DONNA HillN, RN  RN Care Coordinator Rheumatology

## 2024-01-10 NOTE — PATIENT INSTRUCTIONS
Your lab markers are likely due to Sjogren's syndrome but we cannot confirm this without additional testing (such as biopsy) though right now this wouldn't change your management.  - Please obtain lab work at your convenience to look for other associated conditions with Sjogren's.  - Please call and let us know if you start to develop long term dry eyes or dry mouth.  - Please follow up in 1 year    Please review [employer] handout about Sjogren's Syndrome: [email or web address]

## 2024-01-10 NOTE — LETTER
1/10/2024       RE: Wendy Kapoor  [address]  [place] [address]     Dear Colleague,    Thank you for referring your patient, Wendy Kapoor, to the [facility] [place] at [facility]. Please see a copy of my visit note below.    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    She has been managed for her renal disease by nephrology and back in 03/2023 obtained labs that were significant for elevated ARANZA 9.9 via KEVIN, negative dsDNA, elevated anti-centromere 2.3, highly elevated SSA and SSB both > 8.0, negative anti-nolasco, RNP, scl-70, Jo1 and anti-ribosome. She was also found to have hypergammaglobulinemia and elevated M spike. With concern for Sjogrens the past was given prednisone but that resulted in untoward effects on her DM and HTN. Patient will be undergoing kidney and pancrease transplant. Was to get lexiscan stress test and if negative then planning to proceed to organ transplantation.    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 Energy is not the same as it was before. She's on short term disability. Her fatigue and generalized weakness only really started after peritoneal dialysis which has been difficult to adjust to. She is eager to get her transplantation done which is expected to be in June this year.     The patient otherwise denies any history of dry eyes, gritty sensation, eye redness, irritation, double vision or vision changes. Still can produce  tears. She denies any gland swelling of her face or neck. No problems with making saliva, no difficulty swallowing foods without liquids. She has upper and lower dentures in place for over 15 years now.    Rheumatology ROS: No new or persistent headache. No new hair loss. No change in vision or hearing. No inflammatory eye disease history. No history of miscarriage or pregnancy issues. No dry eyes or dry mouth. Currently has some residual redness of face and arms with itching thought to be from antibiotic that is improving. No history of malar rashes. No photosensitivity. No nasal or oral ulcers. No chest pain or shortness of breath. No abdominal pain, constipation, diarrhea. No blood in stool or black tarry stools. No nausea or vomiting. No bowel or bladder incontinence. No change in strength or sensation in the arms or legs. No triphasic color change consistent with Raynaud's. No skin thickening or tightening consistent with sclerodactyly. No symptoms consistent with dactylitis. No red hot or swollen joints. No joint pain. No joint stiffness.  No muscle pain. No fevers, chills, weight loss or night sweats.    Past Medical History:  Past Medical History:   Diagnosis Date    Chronic kidney disease     Diabetes (H)     DM Type 2    History of blood transfusion 2019    Hypertension     MGUS (monoclonal gammopathy of unknown significance)     Pericardial effusion     Sjogren's syndrome (H24)     Type 2 diabetes mellitus with diabetic nephropathy (H)        Past Surgical History:  Past Surgical History:   Procedure Laterality Date    BIOPSY       SECTION         Medications:  Current Outpatient Medications   Medication    allopurinol (ZYLOPRIM) 100 MG tablet    blood glucose (FREESTYLE TEST STRIPS) test strip    cholecalciferol 50 MCG (2000) tablet    DARBEPOETIN DALTON IJ    diltiazem ER (DILT-XR) 180 MG 24 hr capsule    glimepiride (AMARYL) 4 MG tablet    hydrALAZINE (APRESOLINE) 25 MG tablet    Insulin  Glargine w/ Trans Port 100 UNIT/ML SOPN    losartan (COZAAR) 50 MG tablet    LYCOPENE PO    metoprolol succinate ER (TOPROL XL) 25 MG 24 hr tablet    pioglitazone (ACTOS) 30 MG tablet    sodium bicarbonate 650 MG tablet    torsemide (DEMADEX) 20 MG tablet     No current facility-administered medications for this visit.     Allergies:  No Known Allergies    Family history:  No family history of autoimmune disease that she is aware of.    Social History:  ETOH: Denies  Smoking: Denies  Drug use: Denies  Occupation: Worked in [employer] but currently on short term     OBJECTIVE:  There were no vitals taken for this visit.    GEN: NAD, well-appearing  HEENT: Lacy erythema over face involving cheeks and jaw involving nasolabial folds sclera clear, no oral or nasal ulcers, no inflammatory nasal bridge/external ear changes, good saliva pool, upper and lower dentures in place  CV: RRR, normal S1 and S2, no m/r/g  Pulm: CTAB, no crackles, wheezing or rhonchi  Extremities: Full active and passive ROM of b/l shoulders, elbows, wrists, MCPs, PIPs, DIPs, hips, knees, ankles. Makes full fists b/l with good strength. No synovitis of any joints. No dactylitis. No digital pitting. No nail changes. No sclerodactyly.  Skin: Light rash over face and forearms  Neuro: Gait normal. Strength of bilateral proximal and distal upper and lower extremities is 5/5.     Labs:  WBC Count   Date Value Ref Range Status   08/02/2023 5.5 4.0 - 11.0 10e3/uL Final     Hemoglobin   Date Value Ref Range Status   08/02/2023 7.3 (L) 11.7 - 15.7 g/dL Final     Platelet Count   Date Value Ref Range Status   08/02/2023 203 150 - 450 10e3/uL Final     Creatinine   Date Value Ref Range Status   08/02/2023 4.25 (H) 0.51 - 0.95 mg/dL Final     Lab Results   Component Value Date    ALKPHOS 109 08/21/2023     AST   Date Value Ref Range Status   08/21/2023 45 0 - 45 U/L Final     Comment:     Reference intervals for this test were updated on 6/12/2023 to more  "accurately reflect our healthy population. There may be differences in the flagging of prior results with similar values performed with this method. Interpretation of those prior results can be made in the context of the updated reference intervals.     Lab Results   Component Value Date    ALT 29 08/21/2023     No results found for: \"SED\"  No results found for: \"CRP\"  UA RESULTS:  Recent Labs   Lab Test 08/02/23  1350   COLOR Straw   APPEARANCE Clear   URINEGLC 300*   URINEBILI Negative   URINEKETONE Negative   SG 1.012   UBLD Negative   URINEPH 7.0   PROTEIN 300*   NITRITE Negative   LEUKEST Negative   RBCU <1   WBCU 1      No results found for: \"ANAIGG\", \"ANAP1\", \"ARELI\", \"DNA\", \"ENASMI\", \"RNPIGG\", \"ENASSA\", \"ENASSB\", \"C3COM\", \"C4COM\", \"CKTOTAL\", \"ALDOLASE\", \"RHF\", \"CCPIGG\", \"ANCA\", \"MPOIGG\", \"PR3IGG\"    ASSESSMENT & PLAN    Wendy Kapoor is a 52yo F w/ PMHx of HTN, DM, ESRD on PD who is presenting to rheumatology clinic for evaluation of Sjogrens syndrome.    Elevated ARANZA, SSA, SSB without keratoconjunctivitis sicca - Overall this is likely Sjogrens syndrome but cannot confirm without additional testing such as OSS, salivary flow or salivary gland biopsy. However, subjecting her to biopsy is not necessary given lack of symptoms. Renal findings on biopsy c/w hypertensive and diabetic disease but also chronic tubulointerstitial nephritis which is seen in extraglandular manifestations of Sjogrens syndrome. No RTA. She does have MGUS which can also be associated with Sjogrens and extraglandular features. She will be undergoing SPK transplant in June 2024.    Plan (discussed with patient):  - Labs: cryoglobulins, C3/C4, RF, repeat SPEP (to monitor for associated illnesses with Sjogrens)  - For transplant team would recommend considering CXR for baseline as ILD can be associated with Sjogrens though she does not need as aggressive imaging/studies given no pulmonary symptoms  - Discussed monitoring symptoms. Handouts " provided. No medications to offer today.    Follow-up: 12 months with repeat labs.    Patient seen and staffed with Dr. Ling Redman DO  Rheumatology Fellow  [id number]

## 2024-01-10 NOTE — PROGRESS NOTES
[facility] Outpatient Rheumatology Consultation  Date of service: January 10, 2024    Patient name: Wendy Kapoor  YOB: 1970  MRN: [id number]    Reason for consult: Evaluation for Sjogrens syndrome    HISTORY OF PRESENT ILLNESS:  Wendy is a 53yoF w/ PMHx of DM, HTN, nodular diabetic glomerulosclerosis, severe arteriolar hyalinosis with ESRD on PD (started in 09/2023) and component of chronic tubulointerstitial nephritis who presents for evaluation of Sjogrens syndrome.    She has been managed for her renal disease by nephrology and back in 03/2023 obtained labs that were significant for elevated ARANZA 9.9 via KEVIN, negative dsDNA, elevated anti-centromere 2.3, highly elevated SSA and SSB both > 8.0, negative anti-nolasco, RNP, scl-70, Jo1 and anti-ribosome. She was also found to have hypergammaglobulinemia and elevated M spike. With concern for Sjogrens the past was given prednisone but that resulted in untoward effects on her DM and HTN. Patient will be undergoing kidney and pancrease transplant. Was to get lexiscan stress test and if negative then planning to proceed to organ transplantation.    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 Energy is not the same as it was before. She's on short term disability. Her fatigue and generalized weakness only really started after peritoneal dialysis which has been difficult to adjust to. She is eager to get her transplantation done which is expected to be in June this year.     The patient otherwise denies any history of dry eyes, gritty sensation, eye redness, irritation, double vision or vision changes. Still can produce tears. She denies any gland swelling of her face or neck. No problems with making saliva, no difficulty swallowing foods without liquids. She has upper and lower dentures in place for over 15 years now.    Rheumatology ROS: No new or persistent headache. No new hair loss. No change in vision or hearing. No  inflammatory eye disease history. No history of miscarriage or pregnancy issues. No dry eyes or dry mouth. Currently has some residual redness of face and arms with itching thought to be from antibiotic that is improving. No history of malar rashes. No photosensitivity. No nasal or oral ulcers. No chest pain or shortness of breath. No abdominal pain, constipation, diarrhea. No blood in stool or black tarry stools. No nausea or vomiting. No bowel or bladder incontinence. No change in strength or sensation in the arms or legs. No triphasic color change consistent with Raynaud's. No skin thickening or tightening consistent with sclerodactyly. No symptoms consistent with dactylitis. No red hot or swollen joints. No joint pain. No joint stiffness.  No muscle pain. No fevers, chills, weight loss or night sweats.    Past Medical History:  Past Medical History:   Diagnosis Date     Chronic kidney disease      Diabetes (H)     DM Type 2     History of blood transfusion 2019     Hypertension      MGUS (monoclonal gammopathy of unknown significance)      Pericardial effusion      Sjogren's syndrome (H24)      Type 2 diabetes mellitus with diabetic nephropathy (H)        Past Surgical History:  Past Surgical History:   Procedure Laterality Date     BIOPSY        SECTION         Medications:  Current Outpatient Medications   Medication     allopurinol (ZYLOPRIM) 100 MG tablet     blood glucose (FREESTYLE TEST STRIPS) test strip     cholecalciferol 50 MCG (2000) tablet     DARBEPOETIN DALTON IJ     diltiazem ER (DILT-XR) 180 MG 24 hr capsule     glimepiride (AMARYL) 4 MG tablet     hydrALAZINE (APRESOLINE) 25 MG tablet     Insulin Glargine w/ Trans Port 100 UNIT/ML SOPN     losartan (COZAAR) 50 MG tablet     LYCOPENE PO     metoprolol succinate ER (TOPROL XL) 25 MG 24 hr tablet     pioglitazone (ACTOS) 30 MG tablet     sodium bicarbonate 650 MG tablet     torsemide (DEMADEX) 20 MG tablet     No current  facility-administered medications for this visit.     Allergies:  No Known Allergies    Family history:  No family history of autoimmune disease that she is aware of.    Social History:  ETOH: Denies  Smoking: Denies  Drug use: Denies  Occupation: Worked in sanitation but currently on short term     OBJECTIVE:  /72   Pulse 82   Ht 1.524 m (5')   Wt 61.5 kg (135 lb 9.6 oz)   BMI 26.48 kg/m      GEN: NAD, well-appearing  HEENT: Lacy erythema over face involving cheeks and jaw involving nasolabial folds sclera clear, no oral or nasal ulcers, no inflammatory nasal bridge/external ear changes, good saliva pool, upper and lower dentures in place  CV: RRR, normal S1 and S2, no m/r/g  Pulm: CTAB, no crackles, wheezing or rhonchi  Extremities: Full active and passive ROM of b/l shoulders, elbows, wrists, MCPs, PIPs, DIPs, hips, knees, ankles. Makes full fists b/l with good strength. No synovitis of any joints. No dactylitis. No digital pitting. No nail changes. No sclerodactyly.  Skin: Light rash over face and forearms  Neuro: Gait normal. Strength of bilateral proximal and distal upper and lower extremities is 5/5.     Labs:  WBC Count   Date Value Ref Range Status   08/02/2023 5.5 4.0 - 11.0 10e3/uL Final     Hemoglobin   Date Value Ref Range Status   08/02/2023 7.3 (L) 11.7 - 15.7 g/dL Final     Platelet Count   Date Value Ref Range Status   08/02/2023 203 150 - 450 10e3/uL Final     Creatinine   Date Value Ref Range Status   08/02/2023 4.25 (H) 0.51 - 0.95 mg/dL Final     Lab Results   Component Value Date    ALKPHOS 109 08/21/2023     AST   Date Value Ref Range Status   08/21/2023 45 0 - 45 U/L Final     Comment:     Reference intervals for this test were updated on 6/12/2023 to more accurately reflect our healthy population. There may be differences in the flagging of prior results with similar values performed with this method. Interpretation of those prior results can be made in the context of the updated  "reference intervals.     Lab Results   Component Value Date    ALT 29 08/21/2023     No results found for: \"SED\"  No results found for: \"CRP\"  UA RESULTS:  Recent Labs   Lab Test 08/02/23  1350   COLOR Straw   APPEARANCE Clear   URINEGLC 300*   URINEBILI Negative   URINEKETONE Negative   SG 1.012   UBLD Negative   URINEPH 7.0   PROTEIN 300*   NITRITE Negative   LEUKEST Negative   RBCU <1   WBCU 1      No results found for: \"ANAIGG\", \"ANAP1\", \"ARELI\", \"DNA\", \"ENASMI\", \"RNPIGG\", \"ENASSA\", \"ENASSB\", \"C3COM\", \"C4COM\", \"CKTOTAL\", \"ALDOLASE\", \"RHF\", \"CCPIGG\", \"ANCA\", \"MPOIGG\", \"PR3IGG\"    ASSESSMENT & PLAN    Wendy Kapoor is a 52yo F w/ PMHx of HTN, DM, ESRD on PD who is presenting to rheumatology clinic for evaluation of Sjogrens syndrome.    Elevated ARANZA, SSA, SSB without keratoconjunctivitis sicca - Overall this is likely Sjogrens syndrome but cannot confirm without additional testing such as OSS, salivary flow or salivary gland biopsy. However, subjecting her to biopsy is not necessary given lack of symptoms. Renal findings on biopsy c/w hypertensive and diabetic disease but also chronic tubulointerstitial nephritis which is seen in extraglandular manifestations of Sjogrens syndrome. No RTA. She does have MGUS which can also be associated with Sjogrens and extraglandular features. She will be undergoing SPK transplant in June 2024.    Plan (discussed with patient):  - Labs: cryoglobulins, C3/C4, RF, repeat SPEP (to monitor for associated illnesses with Sjogrens)  - For transplant team would recommend considering CXR for baseline as ILD can be associated with Sjogrens though she does not need as aggressive imaging/studies given no pulmonary symptoms  - Discussed monitoring symptoms. Handouts provided. No medications to offer today.    Follow-up: 12 months with repeat labs.    Patient seen and staffed with Dr. Ling Redman, DO  Rheumatology Fellow  PGY4    Attending Note: I saw and evaluated the " patient with Dr. Redman. I agree with the assessment and plan.    Patricia Dubon MD

## 2024-02-06 ENCOUNTER — TELEPHONE (OUTPATIENT)
Dept: TRANSPLANT | Facility: CLINIC | Age: 54
End: 2024-02-06
Payer: COMMERCIAL

## 2024-02-07 ENCOUNTER — COMMITTEE REVIEW (OUTPATIENT)
Dept: TRANSPLANT | Facility: CLINIC | Age: 54
End: 2024-02-07
Payer: COMMERCIAL

## 2024-02-07 DIAGNOSIS — I10 ESSENTIAL HYPERTENSION: Primary | ICD-10-CM

## 2024-02-07 DIAGNOSIS — I13.0 BENIGN HYPERTENSIVE HEART AND RENAL DISEASE WITH HEART FAILURE (H): Primary | ICD-10-CM

## 2024-02-07 DIAGNOSIS — Z01.810 PRE-OPERATIVE CARDIOVASCULAR EXAMINATION: ICD-10-CM

## 2024-02-07 DIAGNOSIS — N18.5 CHRONIC KIDNEY DISEASE, STAGE V (H): ICD-10-CM

## 2024-02-07 DIAGNOSIS — Z01.818 PRE-TRANSPLANT EVALUATION FOR KIDNEY TRANSPLANT: ICD-10-CM

## 2024-02-07 RX ORDER — SODIUM CHLORIDE 9 MG/ML
INJECTION, SOLUTION INTRAVENOUS CONTINUOUS
Status: CANCELLED | OUTPATIENT
Start: 2024-02-07

## 2024-02-07 RX ORDER — POTASSIUM CHLORIDE 1500 MG/1
20 TABLET, EXTENDED RELEASE ORAL
Status: CANCELLED | OUTPATIENT
Start: 2024-02-07

## 2024-02-07 RX ORDER — LIDOCAINE 40 MG/G
CREAM TOPICAL
Status: CANCELLED | OUTPATIENT
Start: 2024-02-07

## 2024-02-07 RX ORDER — POTASSIUM CHLORIDE 1500 MG/1
40 TABLET, EXTENDED RELEASE ORAL
Status: CANCELLED | OUTPATIENT
Start: 2024-02-07

## 2024-02-07 RX ORDER — ASPIRIN 325 MG
325 TABLET ORAL ONCE
Status: CANCELLED | OUTPATIENT
Start: 2024-02-07 | End: 2024-02-07

## 2024-02-07 RX ORDER — ASPIRIN 81 MG/1
243 TABLET, CHEWABLE ORAL ONCE
Status: CANCELLED | OUTPATIENT
Start: 2024-02-07

## 2024-02-07 NOTE — TELEPHONE ENCOUNTER
Called pt to discuss plan for Coronary Angiogram as part of pre-SPK evaluation. Left VM requesting return call.

## 2024-02-07 NOTE — COMMITTEE REVIEW
Abdominal Patient Discussion Note Transplant Coordinator: Liv Borges  Transplant Surgeon: Dr. Jean-Pierre Gleason        Referring Physician: Dr. Roberth Brand    Committee Review Members:  Nephrology Travis Guzman, APRN CNP, Christopher Little MD, Lj Vizcarra MD   Nutrition Irene Abbott, DUC   Pharmacist Chavo Whitlock, [facility]    - Clinical Diana Salinas, [facility]   Transplant FREDI POOLE, RN, Danelle Schwab, RN, Gaye Shore, CHIRAG, Jose Wyatt MD, Liv Borges RN, Chanelle Hdez, NP, Chanelle Freeman, RN, Joyce Scott, RN   Transplant Surgery Lorraine Mckeon MD, MD       Additional Discussion Notes and Findings: The pt was cleared by Dr. Dotson from Cardiology w/ a normal  Lexiscan 10/10/23. Confirmed that the Committee would like her to undergo a Coronary Angiogram d/t having diabetes over 10 years. The pt will be called with this update.

## 2024-02-08 ENCOUNTER — TELEPHONE (OUTPATIENT)
Dept: TRANSPLANT | Facility: CLINIC | Age: 54
End: 2024-02-08
Payer: COMMERCIAL

## 2024-02-08 DIAGNOSIS — Z76.82 AWAITING ORGAN TRANSPLANT: Primary | ICD-10-CM

## 2024-02-08 NOTE — LETTER
02/08/24        Wendy Kapoor  [address] [place] [address]        Dear Wendy,    It is a pleasure to work with you toward the goal of kidney transplantation. Your pre-transplant cardiac work up results were reviewed at our Multidisciplinary Selection Committee on 2/7/24. The Committee is requesting a Coronary Angiogram as part of your cardiac work up. We have placed orders for this test and the Cardiology group will contact you to arrange.     For any questions, please contact the Transplant Office at [phone] or you may reach me directly at [phone].      Sincerely,  Liv Borges RN, Pre-Kidney/Pancreas Transplant Coordinator  Solid Organ Transplant  [facility]    CC: Care Team

## 2024-02-08 NOTE — TELEPHONE ENCOUNTER
Called pt and discussed that the transplant team has requested a coronary angiogram as part of her pre-SPK evaluation. She stated that she is already working w/ Cardiology to schedule this test. We discussed that we will have her get her Rheumatology labs drawn at time of Angiogram. She had no further questions.

## 2024-02-23 ENCOUNTER — TELEPHONE (OUTPATIENT)
Dept: TRANSPLANT | Facility: CLINIC | Age: 54
End: 2024-02-23
Payer: COMMERCIAL

## 2024-02-23 DIAGNOSIS — N18.6 END STAGE RENAL DISEASE (H): ICD-10-CM

## 2024-02-23 DIAGNOSIS — E78.5 HYPERLIPIDEMIA: ICD-10-CM

## 2024-02-23 DIAGNOSIS — Z01.818 ENCOUNTER FOR PRE-TRANSPLANT EVALUATION FOR KIDNEY AND PANCREAS TRANSPLANT: Primary | ICD-10-CM

## 2024-02-23 DIAGNOSIS — E11.9 DIABETES MELLITUS, TYPE 2 (H): ICD-10-CM

## 2024-02-23 DIAGNOSIS — Z76.82 ORGAN TRANSPLANT CANDIDATE: ICD-10-CM

## 2024-02-23 DIAGNOSIS — I10 ESSENTIAL HYPERTENSION: ICD-10-CM

## 2024-02-23 NOTE — TELEPHONE ENCOUNTER
Called pt's PCP office, left VM inquiring about Colonoscopy records. Provided direct line for return call.

## 2024-03-19 ENCOUNTER — TELEPHONE (OUTPATIENT)
Dept: CARDIOLOGY | Facility: CLINIC | Age: 54
End: 2024-03-19
Payer: COMMERCIAL

## 2024-03-19 NOTE — TELEPHONE ENCOUNTER
Called Wendy to go over pre-procedure instructions. She was not sure what an angiogram was. Went over procedure and instructions. No further questions at this time. Also sent on magali, pt will review.       Pre-procedure instructions - Coronary Angiogram  Patient Education    Your arrival time is 03/22 at 9am .  Location is [address] Waiting Room  Please plan on being at the hospital all day.  At any time, emergencies and/or urgent cases may come up which could delay the start of your procedure.    Pre-procedure instructions - Coronary Angiogram  Shower in the evening before or the morning of the procedure  No solid food for 8 hours prior and nothing to drink 2 hours prior to arrival time  You can take your morning medications (except for diabetic and blood thinners) with sips of water.  Take 325 mg of Asprin 24 hours prior to the procedure and the morning of procedure.   You will need to arrange a ride to drop you off and pick you up, as you will be unable to drive home.  Prior to discharge you may be required to lay flat for approximately 2-4 hours in the recovery unit to ensure proper clotting of the artery. You will need a responsible adult to stay with you for 24 hours post-procedure.              Diabetic Medication Instructions  Hold oral diabetic medication in morning of your procedure and for 48 hours after IV contrast is given  Typical instructions for insulin diabetic medication holding are below. However, please reach out to your Primary Care Provider or Endocrinologist for specific instructions  DO NOT take any oral diabetic medication, short-acting diabetes medications/insulin, humalog or regular insulin the morning of your test  Take   dose of long-acting insulin (Lantus, Levemir) the day of your test  Remember to bring your glucometer and insulin with you to take after your test if needed  GLP-1 Agonists  Instructions  DO NOT take injectable GLP-1 agonists semaglutide (Ozempic, Wegovy), dulaglutide (Trulicity), exenatide ER (Bydureon), tirzepatide (Mounjaro), or oral semaglutide (Rybelsus) for 7 days prior your procedure  Hold once daily injectable GLP-1 agonists exenatide (Byetta), liraglutide (Saxenda, Victoza), lixisenatide (Soligua) the day before and day of your procedure                  Anticoagulation Medication Instructions   NA    Follow-up with Anette on 04/03/24

## 2024-03-21 ENCOUNTER — MYC MEDICAL ADVICE (OUTPATIENT)
Dept: CARDIOLOGY | Facility: CLINIC | Age: 54
End: 2024-03-21
Payer: COMMERCIAL

## 2024-03-28 NOTE — PROGRESS NOTES
[facility]   Cardiology Service  History and Physical      Wendy Kapoor MRN# [id number]   YOB: 1970 Age: 53 year old       HPI:   Wendy Kapoor is a 53 year old year old female with a medical history significant for CKD from biopsy proven DM & HTN, Sjogren's, DM2, and HTN. She presents today for cardiovascular risk assessment as part of pre-kidney transplant evaluation and 2 week post angiogram follow up.     She is a never/non-smoker. She  does not use alcohol or drugs. Her activity level is sedentary. She denies symptoms of chest pain, dizziness, lightheadedness, palpitations, shortness of breath, orthopnea, PND, or edema.    She most recently underwent coronary angiogram on 24 with Dr. Lindo which revealed non-obstructive CAD with 30% mLAD and dLAD lesions.     Cardiovascular Risk Factor Profile:  hypertension, diabetes, and sedentary lifestyle     Current Cardiovascular Symptoms:  - Denies    ACC HF Stage:  Stage B    NYHA Class:  Class I    Functional Capacity:  Performs 4 METS exercise without symptoms (e.g., light housework, stairs, 4 mph walk, 7 mph bike, slow step dance)        Past Medical History:   Diagnosis Date    Chronic kidney disease     Diabetes (H)     DM Type 2    History of blood transfusion 2019    Hypertension     MGUS (monoclonal gammopathy of unknown significance)     Pericardial effusion     Sjogren's syndrome (H24)     Type 2 diabetes mellitus with diabetic nephropathy (H)        Past Surgical History:   Procedure Laterality Date    BIOPSY       SECTION         blood glucose (FREESTYLE TEST STRIPS) test strip, by Misc.(Non-Drug; Combo Route) route three times a day.  cholecalciferol 50 MCG (2000) tablet, Take 50 mcg by mouth  DARBEPOETIN DALTON IJ, Inject 25 mcg Subcutaneous every 28 days  diltiazem ER (DILT-XR) 180 MG 24 hr capsule, Take 180 mg by mouth  glimepiride (AMARYL) 4 MG tablet, TAKE 2 TABLETS (8 MG) BY  MOUTH EVERY MORNING BEFORE BREAKFAST.  hydrALAZINE (APRESOLINE) 25 MG tablet, Take 50 mg by mouth 3 times daily  Insulin Glargine w/ Trans Port 100 UNIT/ML SOPN, Inject 14 Units Subcutaneous  losartan (COZAAR) 50 MG tablet, Take 1 tablet by mouth every morning  LYCOPENE PO, Take 1 tablet by mouth daily  metoprolol succinate ER (TOPROL XL) 25 MG 24 hr tablet, Take 1 tablet by mouth daily at 2 pm  pioglitazone (ACTOS) 30 MG tablet, Take 30 mg by mouth  sodium bicarbonate 650 MG tablet, 1,300 mg  torsemide (DEMADEX) 20 MG tablet, Take 1 tablet by mouth every morning    No current facility-administered medications on file prior to visit.      Family History   Problem Relation Age of Onset    Kidney Disease No family hx of        Social History     Tobacco Use    Smoking status: Never    Smokeless tobacco: Never   Substance Use Topics    Alcohol use: Never       No Known Allergies      Review Of Systems:   CONSTITUTIONAL: No report of fevers or chills  RESPIRATORY: No cough, wheezing, SOB, or hemoptysis  CARDIOVASCULAR: see HPI  MUSCULO-SKELETAL: No joint pain or swelling, no muscle pain  NEURO: No paresthesias, syncope, pre-syncope, lightheadness, dizziness or vertigo  ENDOCRINE: No temperature intolerance, no skin/hair changes  PSYCHIATRIC: No change in mood, feeling down/anxious, no change in sleep or appetite  GI: No melena or hematochezia, no change in bowel habits  : On PD   HEME: No easy bruising or bleeding, no history of anemia, no history of blood clots  SKIN: No rashes or sores, no unusual itching      Physical Examination:  Vitals: BP (!) 152/77   Pulse 106   Wt 61.6 kg (135 lb 11.2 oz)   SpO2 96%   BMI 26.50 kg/m    BMI= Body mass index is 26.5 kg/m .    GENERAL APPEARANCE: healthy, alert and no distress  HEENT: no icterus, no xanthelasmas, normal pupil size and reaction, normal palate, mucosa moist  NECK: JVP not elevated , brisk carotid upstroke bilaterally  CHEST: lungs clear to auscultation without  rales, rhonchi or wheezes, no use of accessory muscles, no retractions  CARDIOVASCULAR: regular rhythm, normal S1 and S2, no S3 or S4 and no murmur, click or rub.  ABDOMEN: soft, non tender, without hepatosplenomegaly, no masses palpable, bowel sounds normal  EXTREMITIES: warm, trace LE edema, DP/PT pulses 2+ bilaterally, no clubbing or cyanosis   NEURO: alert and oriented to person/place/time, normal speech, gait and affect  SKIN: no ecchymoses, no rashes      Laboratory:  Last Comprehensive Metabolic Panel:  Lab Results   Component Value Date     (L) 2023    POTASSIUM 3.7 2023    CHLORIDE 101 2023    CO2 22 2023    ANIONGAP 12 2023     (H) 2023    BUN 67.8 (H) 2023    CR 4.25 (H) 2023    GFRESTIMATED 12 (L) 2023    VIJAY 8.8 2023       Last CBC:  CBC RESULTS:   Recent Labs   Lab Test 23  1352   WBC 5.5   RBC 2.51*   HGB 7.3*   HCT 22.6*   MCV 90   MCH 29.1   MCHC 32.3   RDW 13.9          24 EK/2/23 Echocardiogram:  Global and regional left ventricular function is normal with an EF of 60-65%.  Right ventricular function, chamber size, wall motion, and thickness are  normal.  Pulmonary artery systolic pressure is normal.  Ascending aorta 3.7 cm.  Mild dilatation of the aorta is present.  IVC diameter <2.1 cm collapsing >50% with sniff suggests a normal RA pressure  of 3 mmHg.  No pericardial effusion is present.  There is no prior study for direct comparison.    24 Coronary Angiogram:    Mid LAD lesion is 30% stenosed.    Dist LAD lesion is 30% stenosed.     No significant obstructive coronary artery disease.   Uncomplicated right radial access.         Assessment and Plan:   # Coronary Artery Disease   Underwent coronary angiogram on 4/3/24 as part of her transplant evaluation. This revealed 30% lesions in her mLAD and dLAD. Her most recent echocardiogram revealed EF 60-65%, no WMA's. Lipid panel done 24  with LDL 25, total 115, HDL 92; she is not on statin therapy. Today she denies symptoms of chest pain, dizziness, lightheadedness, shortness of breath, edema, orthopnea, or PND. She is able to use the stairs and complete ADL's without aforementioned symptoms. Her right radial access site is soft, flat, non-tender.   - Continue Aspirin 81mg daily  - No additional cardiac testing needed prior to transplant  - Follow-up with cardiology in 18-24 months; sooner with symptoms     # Hypertension  Blood pressure in office today 152/77. Her home blood pressure readings average 110-130's. She notes they are always higher in the doctors office.   - Continue PTA Hydralazine 50mg TID  - Continue PTA Toprol XL 100mg daily  - Continue PTA Losartan 50mg daily   - Continue PTA Torsemide 100mg daily   - Monitor BP's at home; if consistently > 140/90 she will notify our office or her nephrologist.     # Diabetes Mellitus Type 2  Most recent A1c 6.6% on 8/2/23.   - Continue PTA Pioglitazone 30mg daily   - Continue PTA Glimepiride 8mg daily BID   - Continue Lantus 14 units daily   - Repeat A1C today     # Overweight (BMI 26)  - Educated patient on the importance of healthy diet and exercise in reducing risk for heart disease   - Encouraged 150 minutes/week of moderate intensity exercise   - Encouraged healthy weight loss; discussed Mediterranean diet   - Follow-up with cardiology in one year     # ESRD on PD   She is currently on peritoneal dialysis; she does this every night. She admits to being compliant with all medications and dialysis regimen.    - Additional follow-up / testing per SOT team      CAD Risk Level:  High Risk: > 3 risk factors, OR diabetic kidney disease, OR DM > 10 years, known CAD or PAD, prior PCI or CABG, on dialysis 5 years or more     RCRI Score:   - High risk surgery (>5% cardiac complication risk) = 1 point   - Diabetes Mellitus (on Insulin) = 1 point   - Serum Creatinine >2.0 mg/dl = 1 point        I have  reviewed and updated the patient's Past Medical History, Social History, Family History and Medication List.         MIA Chaves, CNP  [facility] Cardiology      Review of prior external note(s) from - Care everywhere, rheumatology, SOT  Review of the result(s) of each unique test - Echocardiogram, EKG, angiogram  Prescription drug management - medication reconciliation    30 minutes spent by me on the date of the encounter doing chart review, review of outside records, review of test results, patient visit, and documentation     MIA Jose CNP on 4/24/2024 at 10:35 AM

## 2024-04-02 ENCOUNTER — TELEPHONE (OUTPATIENT)
Dept: RHEUMATOLOGY | Facility: CLINIC | Age: 54
End: 2024-04-02
Payer: COMMERCIAL

## 2024-04-02 NOTE — TELEPHONE ENCOUNTER
Left Voicemail (1st Attempt) and Sent Mychart (1st Attempt) for the patient to call back and schedule the following:    Appointment type: Return Visit  Provider: Dr. Redman  Return date: January 2025  Specialty phone number: [phone]  Additional appointment(s) needed: NA  Additonal Notes: NA

## 2024-04-05 ENCOUNTER — TELEPHONE (OUTPATIENT)
Dept: RHEUMATOLOGY | Facility: CLINIC | Age: 54
End: 2024-04-05
Payer: COMMERCIAL

## 2024-04-05 ENCOUNTER — MYC MEDICAL ADVICE (OUTPATIENT)
Dept: RHEUMATOLOGY | Facility: CLINIC | Age: 54
End: 2024-04-05
Payer: COMMERCIAL

## 2024-04-05 NOTE — TELEPHONE ENCOUNTER
Left Voicemail (1st Attempt) and Sent Mychart (1st Attempt) for the patient to call back and schedule the following:    Appointment type: Return Visit  Provider: Dr. Redman  Return date: January 2025  Specialty phone number: [phone]  Additional appointment(s) needed: NA  Additonal Notes: NA

## 2024-04-08 NOTE — TELEPHONE ENCOUNTER
Called and reviewed pre coronary angiogram instructions with patient.    Pre-procedure instructions - Coronary Angiogram  Patient Education    Your arrival time is 11:30 am on Friday April 12.   Location is [address] Waiting Room  Please plan on being at the hospital all day.  At any time, emergencies and/or urgent cases may come up which could delay the start of your procedure.    Pre-procedure instructions - Coronary Angiogram  Shower in the evening before or the morning of the procedure  No solid food for 8 hours prior and nothing to drink 2 hours prior to arrival time  You can take your morning medications (except for diabetic and blood thinners) with sips of water.  Take 325 mg of Asprin 24 hours prior to the procedure and the morning of procedure. Should take Thursday and Friday.  You will need to arrange a ride to drop you off and pick you up, as you will be unable to drive home.  Prior to discharge you may be required to lay flat for approximately 2-4 hours in the recovery unit to ensure proper clotting of the artery. You will need a responsible adult to stay with you for 24 hours post-procedure.              Diabetic Medication Instructions  Hold oral diabetic medication in morning of your procedure and for 48 hours after IV contrast is given  Typical instructions for insulin diabetic medication holding are below. However, please reach out to your Primary Care Provider or Endocrinologist for specific instructions  DO NOT take any oral diabetic medication, short-acting diabetes medications/insulin, humalog or regular insulin the morning of your test  Take   dose of long-acting insulin (Lantus, Levemir) the day of your test  Remember to bring your glucometer and insulin with you to take after your test if needed  GLP-1 Agonists Instructions  DO NOT take injectable GLP-1 agonists semaglutide (Ozempic, Wegovy),  dulaglutide (Trulicity), exenatide ER (Bydureon), tirzepatide (Mounjaro), or oral semaglutide (Rybelsus) for 7 days prior your procedure  Hold once daily injectable GLP-1 agonists exenatide (Byetta), liraglutide (Saxenda, Victoza), lixisenatide (Soligua) the day before and day of your procedure                  Anticoagulation Medication Instructions   NA    You will need to follow up with one of our cardiology APPs 1-2 weeks after your procedure. If you need help scheduling or rescheduling your appointment, please call [phone]     Wendy states she understands and agrees to the plan.

## 2024-04-09 ENCOUNTER — TELEPHONE (OUTPATIENT)
Dept: CARDIOLOGY | Facility: CLINIC | Age: 54
End: 2024-04-09
Payer: COMMERCIAL

## 2024-04-09 NOTE — TELEPHONE ENCOUNTER
Called Wendy with instructions. No further questions at this time.         Pre-procedure instructions - Coronary Angiogram  Patient Education    Your arrival time is 11:30 on 04/12  Location is [address] Waiting Room  Please plan on being at the hospital all day.  At any time, emergencies and/or urgent cases may come up which could delay the start of your procedure.    Pre-procedure instructions - Coronary Angiogram  Shower in the evening before or the morning of the procedure  No solid food for 8 hours prior and nothing to drink 2 hours prior to arrival time  You can take your morning medications (except for diabetic and blood thinners) with sips of water.  Take 325 mg of Asprin 24 hours prior to the procedure and the morning of procedure.   You will need to arrange a ride to drop you off and pick you up, as you will be unable to drive home.  Prior to discharge you may be required to lay flat for approximately 2-4 hours in the recovery unit to ensure proper clotting of the artery. You will need a responsible adult to stay with you for 24 hours post-procedure.              Diabetic Medication Instructions  Hold oral diabetic medication in morning of your procedure and for 48 hours after IV contrast is given  Typical instructions for insulin diabetic medication holding are below. However, please reach out to your Primary Care Provider or Endocrinologist for specific instructions  DO NOT take any oral diabetic medication, short-acting diabetes medications/insulin, humalog or regular insulin the morning of your test  Take   dose of long-acting insulin (Lantus, Levemir) the day of your test  Remember to bring your glucometer and insulin with you to take after your test if needed  GLP-1 Agonists Instructions  DO NOT take injectable GLP-1 agonists semaglutide (Ozempic, Wegovy), dulaglutide (Trulicity), exenatide ER (Bydureon),  tirzepatide (Mounjaro), or oral semaglutide (Rybelsus) for 7 days prior your procedure  Hold once daily injectable GLP-1 agonists exenatide (Byetta), liraglutide (Saxenda, Victoza), lixisenatide (Soligua) the day before and day of your procedure                  Anticoagulation Medication Instructions   NA    Follow-up with Anette

## 2024-04-12 ENCOUNTER — APPOINTMENT (OUTPATIENT)
Dept: LAB | Facility: CLINIC | Age: 54
End: 2024-04-12
Attending: INTERNAL MEDICINE
Payer: COMMERCIAL

## 2024-04-12 ENCOUNTER — APPOINTMENT (OUTPATIENT)
Dept: MEDSURG UNIT | Facility: CLINIC | Age: 54
End: 2024-04-12
Attending: INTERNAL MEDICINE
Payer: COMMERCIAL

## 2024-04-12 ENCOUNTER — HOSPITAL ENCOUNTER (OUTPATIENT)
Facility: CLINIC | Age: 54
Discharge: HOME | End: 2024-04-12
Attending: INTERNAL MEDICINE | Admitting: INTERNAL MEDICINE
Payer: COMMERCIAL

## 2024-04-12 VITALS
WEIGHT: 134.92 LBS | DIASTOLIC BLOOD PRESSURE: 67 MMHG | HEART RATE: 85 BPM | TEMPERATURE: 98.5 F | SYSTOLIC BLOOD PRESSURE: 132 MMHG | BODY MASS INDEX: 26.35 KG/M2 | OXYGEN SATURATION: 97 % | RESPIRATION RATE: 29 BRPM

## 2024-04-12 DIAGNOSIS — Z01.810 PRE-OPERATIVE CARDIOVASCULAR EXAMINATION: ICD-10-CM

## 2024-04-12 DIAGNOSIS — I10 ESSENTIAL HYPERTENSION: ICD-10-CM

## 2024-04-12 DIAGNOSIS — N18.5 CHRONIC KIDNEY DISEASE, STAGE V (H): ICD-10-CM

## 2024-04-12 DIAGNOSIS — I13.0 BENIGN HYPERTENSIVE HEART AND RENAL DISEASE WITH HEART FAILURE (H): ICD-10-CM

## 2024-04-12 DIAGNOSIS — Z01.818 PRE-TRANSPLANT EVALUATION FOR KIDNEY TRANSPLANT: ICD-10-CM

## 2024-04-12 PROBLEM — Z98.890 STATUS POST CORONARY ANGIOGRAM: Status: ACTIVE | Noted: 2024-04-12

## 2024-04-12 LAB
ANION GAP SERPL CALCULATED.3IONS-SCNC: 16 MMOL/L (ref 7–15)
BUN SERPL-MCNC: 50.4 MG/DL (ref 6–20)
CALCIUM SERPL-MCNC: 9.4 MG/DL (ref 8.6–10)
CHLORIDE SERPL-SCNC: 96 MMOL/L (ref 98–107)
CHOLEST SERPL-MCNC: 115 MG/DL
CREAT SERPL-MCNC: 4.53 MG/DL (ref 0.51–0.95)
DEPRECATED HCO3 PLAS-SCNC: 22 MMOL/L (ref 22–29)
EGFRCR SERPLBLD CKD-EPI 2021: 11 ML/MIN/1.73M2
ERYTHROCYTE [DISTWIDTH] IN BLOOD BY AUTOMATED COUNT: 13.4 % (ref 10–15)
FASTING STATUS PATIENT QL REPORTED: YES
GLUCOSE BLDC GLUCOMTR-MCNC: 152 MG/DL (ref 70–99)
GLUCOSE SERPL-MCNC: 216 MG/DL (ref 70–99)
HCT VFR BLD AUTO: 25.8 % (ref 35–47)
HDLC SERPL-MCNC: 23 MG/DL
HGB BLD-MCNC: 8.6 G/DL (ref 11.7–15.7)
INR PPP: 0.96 (ref 0.85–1.15)
LDLC SERPL CALC-MCNC: 25 MG/DL
MCH RBC QN AUTO: 31.5 PG (ref 26.5–33)
MCHC RBC AUTO-ENTMCNC: 33.3 G/DL (ref 31.5–36.5)
MCV RBC AUTO: 95 FL (ref 78–100)
NONHDLC SERPL-MCNC: 92 MG/DL
PLATELET # BLD AUTO: 201 10E3/UL (ref 150–450)
POTASSIUM SERPL-SCNC: 3.8 MMOL/L (ref 3.4–5.3)
RBC # BLD AUTO: 2.73 10E6/UL (ref 3.8–5.2)
SODIUM SERPL-SCNC: 134 MMOL/L (ref 135–145)
TRIGL SERPL-MCNC: 334 MG/DL
WBC # BLD AUTO: 11.3 10E3/UL (ref 4–11)

## 2024-04-12 PROCEDURE — 85027 COMPLETE CBC AUTOMATED: CPT

## 2024-04-12 PROCEDURE — 250N000009 HC RX 250: Performed by: INTERNAL MEDICINE

## 2024-04-12 PROCEDURE — 93458 L HRT ARTERY/VENTRICLE ANGIO: CPT | Performed by: INTERNAL MEDICINE

## 2024-04-12 PROCEDURE — 80061 LIPID PANEL: CPT

## 2024-04-12 PROCEDURE — 250N000013 HC RX MED GY IP 250 OP 250 PS 637

## 2024-04-12 PROCEDURE — 250N000011 HC RX IP 250 OP 636: Performed by: INTERNAL MEDICINE

## 2024-04-12 PROCEDURE — 93005 ELECTROCARDIOGRAM TRACING: CPT

## 2024-04-12 PROCEDURE — 272N000001 HC OR GENERAL SUPPLY STERILE: Performed by: INTERNAL MEDICINE

## 2024-04-12 PROCEDURE — 999N000248 HC STATISTIC IV INSERT WITH US BY RN

## 2024-04-12 PROCEDURE — 999N000054 HC STATISTIC EKG NON-CHARGEABLE

## 2024-04-12 PROCEDURE — 93458 L HRT ARTERY/VENTRICLE ANGIO: CPT | Mod: 26 | Performed by: INTERNAL MEDICINE

## 2024-04-12 PROCEDURE — C1887 CATHETER, GUIDING: HCPCS | Performed by: INTERNAL MEDICINE

## 2024-04-12 PROCEDURE — 999N000134 HC STATISTIC PP CARE STAGE 3

## 2024-04-12 PROCEDURE — 99214 OFFICE O/P EST MOD 30 MIN: CPT | Mod: 25

## 2024-04-12 PROCEDURE — C1894 INTRO/SHEATH, NON-LASER: HCPCS | Performed by: INTERNAL MEDICINE

## 2024-04-12 PROCEDURE — 99152 MOD SED SAME PHYS/QHP 5/>YRS: CPT | Mod: GC | Performed by: INTERNAL MEDICINE

## 2024-04-12 PROCEDURE — 82565 ASSAY OF CREATININE: CPT

## 2024-04-12 PROCEDURE — 85610 PROTHROMBIN TIME: CPT

## 2024-04-12 PROCEDURE — 36415 COLL VENOUS BLD VENIPUNCTURE: CPT

## 2024-04-12 PROCEDURE — 999N000142 HC STATISTIC PROCEDURE PREP ONLY

## 2024-04-12 PROCEDURE — 258N000003 HC RX IP 258 OP 636

## 2024-04-12 PROCEDURE — 99152 MOD SED SAME PHYS/QHP 5/>YRS: CPT | Performed by: INTERNAL MEDICINE

## 2024-04-12 PROCEDURE — 82374 ASSAY BLOOD CARBON DIOXIDE: CPT

## 2024-04-12 PROCEDURE — 82962 GLUCOSE BLOOD TEST: CPT

## 2024-04-12 RX ORDER — NALOXONE HYDROCHLORIDE 0.4 MG/ML
0.2 INJECTION, SOLUTION INTRAMUSCULAR; INTRAVENOUS; SUBCUTANEOUS
Status: DISCONTINUED | OUTPATIENT
Start: 2024-04-12 | End: 2024-04-12 | Stop reason: HOSPADM

## 2024-04-12 RX ORDER — LIDOCAINE 40 MG/G
CREAM TOPICAL
Status: DISCONTINUED | OUTPATIENT
Start: 2024-04-12 | End: 2024-04-12 | Stop reason: HOSPADM

## 2024-04-12 RX ORDER — ATROPINE SULFATE 0.1 MG/ML
0.5 INJECTION INTRAVENOUS
Status: DISCONTINUED | OUTPATIENT
Start: 2024-04-12 | End: 2024-04-12 | Stop reason: HOSPADM

## 2024-04-12 RX ORDER — OXYCODONE HYDROCHLORIDE 10 MG/1
10 TABLET ORAL EVERY 4 HOURS PRN
Status: DISCONTINUED | OUTPATIENT
Start: 2024-04-12 | End: 2024-04-12 | Stop reason: HOSPADM

## 2024-04-12 RX ORDER — POTASSIUM CHLORIDE 750 MG/1
40 TABLET, EXTENDED RELEASE ORAL
Status: DISCONTINUED | OUTPATIENT
Start: 2024-04-12 | End: 2024-04-12 | Stop reason: HOSPADM

## 2024-04-12 RX ORDER — POTASSIUM CHLORIDE 750 MG/1
20 TABLET, EXTENDED RELEASE ORAL
Status: COMPLETED | OUTPATIENT
Start: 2024-04-12 | End: 2024-04-12

## 2024-04-12 RX ORDER — NALOXONE HYDROCHLORIDE 0.4 MG/ML
0.4 INJECTION, SOLUTION INTRAMUSCULAR; INTRAVENOUS; SUBCUTANEOUS
Status: DISCONTINUED | OUTPATIENT
Start: 2024-04-12 | End: 2024-04-12 | Stop reason: HOSPADM

## 2024-04-12 RX ORDER — SODIUM CHLORIDE 9 MG/ML
INJECTION, SOLUTION INTRAVENOUS CONTINUOUS
Status: DISCONTINUED | OUTPATIENT
Start: 2024-04-12 | End: 2024-04-12 | Stop reason: HOSPADM

## 2024-04-12 RX ORDER — FLUMAZENIL 0.1 MG/ML
0.2 INJECTION, SOLUTION INTRAVENOUS
Status: DISCONTINUED | OUTPATIENT
Start: 2024-04-12 | End: 2024-04-12 | Stop reason: HOSPADM

## 2024-04-12 RX ORDER — OXYCODONE HYDROCHLORIDE 5 MG/1
5 TABLET ORAL EVERY 4 HOURS PRN
Status: DISCONTINUED | OUTPATIENT
Start: 2024-04-12 | End: 2024-04-12 | Stop reason: HOSPADM

## 2024-04-12 RX ORDER — HEPARIN SODIUM 1000 [USP'U]/ML
INJECTION, SOLUTION INTRAVENOUS; SUBCUTANEOUS
Status: DISCONTINUED | OUTPATIENT
Start: 2024-04-12 | End: 2024-04-12 | Stop reason: HOSPADM

## 2024-04-12 RX ORDER — FENTANYL CITRATE 50 UG/ML
25 INJECTION, SOLUTION INTRAMUSCULAR; INTRAVENOUS
Status: DISCONTINUED | OUTPATIENT
Start: 2024-04-12 | End: 2024-04-12 | Stop reason: HOSPADM

## 2024-04-12 RX ORDER — FENTANYL CITRATE 50 UG/ML
INJECTION, SOLUTION INTRAMUSCULAR; INTRAVENOUS
Status: DISCONTINUED | OUTPATIENT
Start: 2024-04-12 | End: 2024-04-12 | Stop reason: HOSPADM

## 2024-04-12 RX ORDER — ASPIRIN 325 MG
325 TABLET ORAL ONCE
Status: COMPLETED | OUTPATIENT
Start: 2024-04-12 | End: 2024-04-12

## 2024-04-12 RX ORDER — IOPAMIDOL 755 MG/ML
INJECTION, SOLUTION INTRAVASCULAR
Status: DISCONTINUED | OUTPATIENT
Start: 2024-04-12 | End: 2024-04-12 | Stop reason: HOSPADM

## 2024-04-12 RX ORDER — NITROGLYCERIN 5 MG/ML
VIAL (ML) INTRAVENOUS
Status: DISCONTINUED | OUTPATIENT
Start: 2024-04-12 | End: 2024-04-12 | Stop reason: HOSPADM

## 2024-04-12 RX ORDER — ASPIRIN 81 MG/1
243 TABLET, CHEWABLE ORAL ONCE
Status: COMPLETED | OUTPATIENT
Start: 2024-04-12 | End: 2024-04-12

## 2024-04-12 RX ORDER — NICARDIPINE HYDROCHLORIDE 2.5 MG/ML
INJECTION INTRAVENOUS
Status: DISCONTINUED | OUTPATIENT
Start: 2024-04-12 | End: 2024-04-12 | Stop reason: HOSPADM

## 2024-04-12 RX ADMIN — SODIUM CHLORIDE: 900 INJECTION, SOLUTION INTRAVENOUS at 12:38

## 2024-04-12 RX ADMIN — POTASSIUM CHLORIDE 20 MEQ: 750 TABLET, EXTENDED RELEASE ORAL at 12:42

## 2024-04-12 ASSESSMENT — ACTIVITIES OF DAILY LIVING (ADL)
ADLS_ACUITY_SCORE: 35

## 2024-04-12 NOTE — DISCHARGE INSTRUCTIONS
Going Home after an Angiogram with TR Band Placement        After you go home:  Drink plenty of fluids.  You may eat your normal diet, unless your doctor tells you otherwise.  For 24 hours:  - Have an adult stay with you for 24 hours.  --Relax and take it easy.  - Do NOT smoke.  - Do NOT make any important or legal decisions.  - Do NOT drive or operate machines at home or at work.  - Do NOT drink alcohol.    Keep the current dressing on your wrist for 24 hours. After 24 hours, remove the dressing. You may shower at that time. Do not scrub the procedure site vigorously for 5 days. Re apply a band if there is minor oozing. You can remove that after 12 hours.  For 2 days, do NOT have sex or do any heavy exercise.  Do NOT take a bath, or use a hot tub or pool for at least 3 days  No lotion or powder to the procedure site for 5 days.     Care of wrist or arm site  It is normal to have soreness at the puncture site and mild tingling in your hand for up to 3 days.  For 2 days, do not use your hand or arm to support your weight (such as rising from a chair) or bend your wrist (such as lifting a garage door).  For 2 days, do not lift more than 5 pounds or exercise your arm (tennis, golf or bowling).                If you start bleeding from the site in your arm:  Sit down and press firmly on the site with your fingers for 10 minutes. Call your doctor as soon as you can.  If the bleeding stops, sit still and keep your wrist straight for 2 hours.    Call your doctor if:  You have a large or growing hard lump around the site.    The site is red, swollen, hot or tender.  Blood or fluid is draining from the site.  You have chills or a fever greater than 101 F (38 C).  Your leg or arm turns bluish, feels numb or cool.  You have hives, a rash or unusual itching.    Call 911 right away if you have:   Bleeding that does not stop.   Heavy bleeding.    [facility] [place]:  [phone] (7 days a week)  Ask for Cardiology Fellow on call.  They are available 24 hours a day.                                                       Telephone Numbers [phone] Monday-Friday 8:00 am to 4:30 pm    [phone] After 4:30 pm Monday-Friday, Weekends & Holidays  Ask for Interventional Cardiologist on call. Someone is on call 24 hours/day   [facility] toll free number [phone] Monday-Friday 8:00 am to 4:30 pm   [facility] Emergency Dept [phone]

## 2024-04-12 NOTE — PROGRESS NOTES
TR band successfully decompressed & removed without any complications. Drsg applied to procedure site. Drsg C/D/I; no bleeding/hematoma noted. Pt tolerated ambulation around unit; able to void without complications. Discharge instructions reviewed with pt & pt's friend. Pt stable. Continuing to monitor pt while arm band in place for another 30 minutes.

## 2024-04-12 NOTE — H&P
I saw and evaluated the patient as part of a shared APRN/PA visit. I have seen and examined the patient with the [facility] team and reviewed the pertinent laboratory results. I agree with the assessment and plan of the progress note above. I spent 40 minutes face-to-face and/or discussing and coordinating care.      Dejan Lindo MD  Interventional Cardiology  Pager: [phone]  History and Physical: Cardiology Cath Lab Service    Wendy Kapoor MRN# [id number]   YOB: 1970 Age: 53 year old         Assessment and plan:   52 y/o pleasant lady with end stage kidney disease secondary to biopsy proven diabetic nephropathy, h/O Sjogren syndrome some interstitial changes on kidney biopsy, initiated PD 9/2023. Transplant candidate presenting to cath lab today as part of cardiovascular pre-operative assessment.    # Cardiovascular pre-operative evaluation  Transplant candidate presenting to cath lab today as part of cardiovascular pre-operative assessment.  - No contraindications noted, will move forward with procedure  - Patient will be admitted as OP to unit 3C post procedure, with plans to discharge home after post procedure orders have been met    Patient discussed with Dr. Lindo.    Clinically Significant Risk Factors Present on Admission                  # Hypertension: Noted on problem list            CKD POA List: Stage 5 (GFR < 15)      Alia Barajas, MIA, FNP-C  [facility] General Cardiology  Securely message with [employer]   Text page via [employer] Paging/Directory          HPI: 52 y/o pleasant lady with end stage kidney disease secondary to biopsy proven diabetic nephropathy, h/O Sjogren syndrome some interstitial changes on kidney biopsy, initiated PD 9/2023. Transplant candidate presenting to cath lab today as part of cardiovascular pre-operative assessment.    Patient reports feeling well today, denies recent illness, chest pain, shortness of breath, abdominal pain, headache, vision change, or weakness.  No major changes in health history since previous visit.     Past Medical History:   Diagnosis Date    Chronic kidney disease     Diabetes (H)     DM Type 2    History of blood transfusion 2019    Hypertension     MGUS (monoclonal gammopathy of unknown significance)     Pericardial effusion     Sjogren's syndrome (H24)     Type 2 diabetes mellitus with diabetic nephropathy (H)        Past Surgical History:   Procedure Laterality Date    BIOPSY       SECTION         Current Facility-Administered Medications   Medication Dose Route Frequency Provider Last Rate Last Admin    Give 1/2 (50%) dose of long acting insulin. Insulin glargine (LANTUS, TOUJEO) or insulin detemir (LEVEMIR) the morning of the cardiac procedure   Does not apply Continuous PRN Anette Powell APRN CNP        Give 1/2 (50%) of the usual morning dose of intermediate acting insulin NPH (N) the morning of the cardiac procedure. PHARMACIST to enter an order for 1/2 (50%) of the usual morning dose of intermediate acting insulin NPH (N).   Does not apply Continuous PRN Anette Powell APRN CNP        lidocaine (LMX4) cream   Topical Q1H PRN Anette Powell APRN CNP        lidocaine 1 % 0.1-1 mL  0.1-1 mL Other Q1H PRN Anette Powell APRN CNP        Patient is NOT allergic to contrast dye   Does not apply DOES NOT GO TO Anette Kincaid APRN CNP        potassium chloride heber ER (KLOR-CON M10) CR tablet 40 mEq  40 mEq Oral Once PRN Anette Powell APRN CNP        sodium chloride (PF) 0.9% PF flush 3 mL  3 mL Intracatheter Q8H Anette Powell APRN CNP   3 mL at 24 1243    sodium chloride (PF) 0.9% PF flush 3 mL  3 mL Intracatheter Q1H PRN Anette Powell APRN CNP        sodium chloride 0.9 % infusion   Intravenous Continuous Anette Powell APRN CNP 10 mL/hr at 24 1238 New Bag at 24 1238       Family History   Problem Relation Age of Onset    Kidney Disease No family hx of         Social History     Tobacco Use    Smoking status: Never    Smokeless tobacco: Never   Substance Use Topics    Alcohol use: Never       No Known Allergies      ROS:   Skin: bruising  Respiratory: No shortness of breath, dyspnea on exertion, cough, or hemoptysis  Cardiovascular: negative  Gastrointestinal: negative  Genitourinary: negative  Musculoskeletal: negative  Neurologic: negative  Hematologic/Lymphatic/Immunologic: negative  Endocrine: negative    Physical Examination:  Vitals: BP (!) 150/73 (BP Location: Right arm)   Pulse 97   Temp 98.5  F (36.9  C) (Oral)   Resp 16   Wt 61.2 kg (134 lb 14.7 oz)   SpO2 97%   BMI 26.35 kg/m    BMI= Body mass index is 26.35 kg/m .    GENERAL APPEARANCE: healthy, alert and no distress  HEENT: no icterus, no xanthelasmas, normal pupil size and reaction, normal palate, mucosa moist  CHEST: lungs clear to auscultation without rales, rhonchi or wheezes, no use of accessory muscles, no retractions  CARDIOVASCULAR: regular rhythm, normal S1 and S2, no S3 or S4 and no murmur, click or rub.  ABDOMEN: soft, non tender, without hepatosplenomegaly, no masses palpable, bowel sounds normal  EXTREMITIES: warm, no edema, DP/PT pulses 2+ bilaterally, no clubbing or cyanosis   NEURO: alert and oriented to person/place/time, normal speech, gait and affect  SKIN: no ecchymoses, no rashes      Laboratory:  CMP  Recent Labs   Lab 04/12/24  1110   *   POTASSIUM 3.8   CHLORIDE 96*   CO2 22   ANIONGAP 16*   *   BUN 50.4*   CR 4.53*   GFRESTIMATED 11*   VIJAY 9.4     CBC  Recent Labs   Lab 04/12/24  1110   WBC 11.3*   RBC 2.73*   HGB 8.6*   HCT 25.8*   MCV 95   MCH 31.5   MCHC 33.3   RDW 13.4            EKG 4/12/24:       TTE 8/2/23:  Global and regional left ventricular function is normal with an EF of 60-65%.  Right ventricular function, chamber size, wall motion, and thickness are  normal.  Pulmonary artery systolic pressure is normal.  Ascending aorta 3.7 cm.  Mild  dilatation of the aorta is present.  IVC diameter <2.1 cm collapsing >50% with sniff suggests a normal RA pressure  of 3 mmHg.  No pericardial effusion is present.  There is no prior study for direct comparison.    Lexiscan 11/1/23:    The nuclear stress test is negative for inducible myocardial ischemia or infarction.    Hyperdynamic LV function with LV EF over 75%.    There is no prior study for comparison.        ECG Summary    ECG Baseline electrocardiogram demonstrates sinus rhythm.   The stress electrocardiogram is negative for inducible ischemic EKG changes.  There were no arrhythmias during stress.  There were no arrhythmias during recovery.   Technical Comments    Cardiac Protocol A pharmacologic stress test was performed following a supine Lexiscan protocol using 0.4 mg of intravenous regadenoson administered over 10 seconds under the supervision of Dr. Brown. The patient reported dyspnea during the stress test.   Isotope Administration Nuclear imaging was accomplished using a one day protocol with 39.3 mCi of technetium tetrofosmin injected at the completion of Lexiscan infusion on 11/1/2023 and 10 mCi of technetium tetrofosmin at rest on 11/1/2023.   Nuclear Study Quality Final image quality is satisfactory.   Stress Measurements    Baseline Vitals   Baseline HR 92 bpm         Baseline Systolic          Baseline Diastolic BP 73         Peak Stress Vitals   Max          Last Stress Systolic          Last Stress Diastolic BP 56         Exercise Data   Target          Max Predicted HR 69 %            Nuclear Perfusion    Stress Summed Score: 0 Percent Abnormal: 0.00%        The left ventricular perfusion is normal.         Resting Summed Score: 0 Percent Abnormal: 0.00%        The left ventricular perfusion is normal.               Wall Score    Wall Motion    Score Index: 1.00         The left ventricular wall motion is globally hyperkinetic.

## 2024-04-12 NOTE — Clinical Note
Report called to 2A Briana BEARD. Procedure indication, procedure, outcome, and post-status reviewed. The pt is returned to 2A per stretcher in awake and stable condition.

## 2024-04-12 NOTE — PRE-PROCEDURE
GENERAL PRE-PROCEDURE:   Procedure:  Coronary angriogram with possible percutaneous coronary intervention    Verbal consent obtained?: Yes    Written consent obtained?: Yes    Risks and benefits: Risks, benefits and alternatives were discussed    Consent given by:  Patient  Patient states understanding of procedure being performed: Yes    Patient's understanding of procedure matches consent: Yes    Procedure consent matches procedure scheduled: Yes    Expected level of sedation:  Moderate  Appropriately NPO:  Yes  ASA Class:  3  Mallampati  :  Grade 2- soft palate, base of uvula, tonsillar pillars, and portion of posterior pharyngeal wall visible  Lungs:  Lungs clear with good breath sounds bilaterally  Heart:  Normal heart sounds and rate  History & Physical reviewed:  History and physical reviewed and no updates needed  Statement of review:  I have reviewed the lab findings, diagnostic data, medications, and the plan for sedation

## 2024-04-12 NOTE — PROGRESS NOTES
Pt arrived to Unit 2a for CORS procedure in CCL. AFVSS. Denies pain. Labs resulted. K 3.8, 20mEq of potassium PO given, per order.  mg/dl. ECG done. Contrast reviewed. Bilat pp & pt palpable/marked. [facility] by [facility]. Pt states she does daily PD; last dialysis run was last night, no complications. Pt's friend, Benny, at bedside, to drive pt home post procedure. Pt stable, ready for consent.

## 2024-04-12 NOTE — PROGRESS NOTES
Pt arrived back to Unit 2a post CORS procedure in CCL. VSS. Denies pain.  mg/dl. Cardiac rhythm sinus rhythm. R radial TR band intact; no bleeding/hematoma noted. RUE CMS intact. Pt tolerating PO. Pt stable. Friend at bedside.

## 2024-04-15 ENCOUNTER — TELEPHONE (OUTPATIENT)
Dept: CARDIOLOGY | Facility: CLINIC | Age: 54
End: 2024-04-15
Payer: COMMERCIAL

## 2024-04-15 NOTE — TELEPHONE ENCOUNTER
S-(situation): patient had coronary angiogram on 4/12 for cardiac evaluation for transplant.       Mid LAD lesion is 30% stenosed.    Dist LAD lesion is 30% stenosed.     No significant obstructive coronary artery disease.   Uncomplicated right radial access.   Right radial artery used for access site. She states if is flat and dry, no complaints. Reviewed activity restrictions with patient.  No new meds at discharge.    B-(background):   The patient is a 53 year old female with a history of ESRD due Type II DM currently on PD and hypertension that presents to the cath lab for coronary angiogram with possible PCI in the setting of pre-operative assessment for kidney and pancreas transplant.       A-(assessment): minimal non obstructive coronary artery disease    R-(recommendations): follow with cardiology YNES

## 2024-04-16 LAB
ATRIAL RATE - MUSE: 90 BPM
DIASTOLIC BLOOD PRESSURE - MUSE: NORMAL MMHG
INTERPRETATION ECG - MUSE: NORMAL
P AXIS - MUSE: 41 DEGREES
PR INTERVAL - MUSE: 150 MS
QRS DURATION - MUSE: 88 MS
QT - MUSE: 386 MS
QTC - MUSE: 472 MS
R AXIS - MUSE: 26 DEGREES
SYSTOLIC BLOOD PRESSURE - MUSE: NORMAL MMHG
T AXIS - MUSE: 47 DEGREES
VENTRICULAR RATE- MUSE: 90 BPM

## 2024-04-17 ENCOUNTER — TELEPHONE (OUTPATIENT)
Dept: TRANSPLANT | Facility: CLINIC | Age: 54
End: 2024-04-17
Payer: COMMERCIAL

## 2024-04-17 DIAGNOSIS — Z76.82 AWAITING ORGAN TRANSPLANT: Primary | ICD-10-CM

## 2024-04-17 NOTE — TELEPHONE ENCOUNTER
Called pt to touch base on pre-SPK evaluation. She has a follow up w/ Cardiology next week after her angiogram, I have added a lab draw at 11:15 AM for her to update her PRA, red cell titer, and Rheumatology labs. She needs to reschedule her colonoscopy. She had no further questions.

## 2024-04-24 ENCOUNTER — LAB (OUTPATIENT)
Dept: LAB | Facility: CLINIC | Age: 54
End: 2024-04-24
Payer: COMMERCIAL

## 2024-04-24 ENCOUNTER — OFFICE VISIT (OUTPATIENT)
Dept: TRANSPLANT | Facility: CLINIC | Age: 54
End: 2024-04-24
Attending: NURSE PRACTITIONER
Payer: COMMERCIAL

## 2024-04-24 VITALS
BODY MASS INDEX: 26.5 KG/M2 | HEART RATE: 106 BPM | WEIGHT: 135.7 LBS | SYSTOLIC BLOOD PRESSURE: 152 MMHG | DIASTOLIC BLOOD PRESSURE: 77 MMHG | OXYGEN SATURATION: 96 %

## 2024-04-24 DIAGNOSIS — R76.8 SS-A ANTIBODY POSITIVE: ICD-10-CM

## 2024-04-24 DIAGNOSIS — I10 ESSENTIAL HYPERTENSION: ICD-10-CM

## 2024-04-24 DIAGNOSIS — N18.6 END STAGE RENAL DISEASE (H): ICD-10-CM

## 2024-04-24 DIAGNOSIS — Z79.4 TYPE 2 DIABETES MELLITUS WITH DIABETIC NEPHROPATHY, WITH LONG-TERM CURRENT USE OF INSULIN (H): Primary | ICD-10-CM

## 2024-04-24 DIAGNOSIS — Z01.818 ENCOUNTER FOR PRE-TRANSPLANT EVALUATION FOR KIDNEY AND PANCREAS TRANSPLANT: ICD-10-CM

## 2024-04-24 DIAGNOSIS — E11.21 TYPE 2 DIABETES MELLITUS WITH DIABETIC NEPHROPATHY, WITH LONG-TERM CURRENT USE OF INSULIN (H): Primary | ICD-10-CM

## 2024-04-24 DIAGNOSIS — Z76.82 AWAITING ORGAN TRANSPLANT: ICD-10-CM

## 2024-04-24 DIAGNOSIS — Z76.82 ORGAN TRANSPLANT CANDIDATE: ICD-10-CM

## 2024-04-24 LAB
A1 AB TITR SERPL: 1 {TITER}
A1 AB TITR SERPL: 1 {TITER}
A2 AB TITR SERPL: <1 {TITER}
A2 AB TITR SERPL: <1 {TITER}
ANTIBODY SCREEN: NEGATIVE
ANTIBODY TITER IGM SCREEN: NEGATIVE
HBA1C MFR BLD: 7.8 %
RHEUMATOID FACT SERPL-ACNC: <10 IU/ML
SPECIMEN EXPIRATION DATE: NORMAL
SPECIMEN EXPIRATION DATE: NORMAL
TOTAL PROTEIN SERUM FOR ELP: 7.5 G/DL (ref 6.4–8.3)

## 2024-04-24 PROCEDURE — 86160 COMPLEMENT ANTIGEN: CPT | Performed by: STUDENT IN AN ORGANIZED HEALTH CARE EDUCATION/TRAINING PROGRAM

## 2024-04-24 PROCEDURE — 83036 HEMOGLOBIN GLYCOSYLATED A1C: CPT | Performed by: NURSE PRACTITIONER

## 2024-04-24 PROCEDURE — 82595 ASSAY OF CRYOGLOBULIN: CPT | Performed by: STUDENT IN AN ORGANIZED HEALTH CARE EDUCATION/TRAINING PROGRAM

## 2024-04-24 PROCEDURE — 84165 PROTEIN E-PHORESIS SERUM: CPT | Mod: TC | Performed by: PATHOLOGY

## 2024-04-24 PROCEDURE — 86886 COOMBS TEST INDIRECT TITER: CPT | Mod: XU | Performed by: NURSE PRACTITIONER

## 2024-04-24 PROCEDURE — 99212 OFFICE O/P EST SF 10 MIN: CPT

## 2024-04-24 PROCEDURE — 86832 HLA CLASS I HIGH DEFIN QUAL: CPT | Performed by: NURSE PRACTITIONER

## 2024-04-24 PROCEDURE — 86431 RHEUMATOID FACTOR QUANT: CPT | Performed by: STUDENT IN AN ORGANIZED HEALTH CARE EDUCATION/TRAINING PROGRAM

## 2024-04-24 PROCEDURE — 86850 RBC ANTIBODY SCREEN: CPT | Performed by: NURSE PRACTITIONER

## 2024-04-24 PROCEDURE — 86833 HLA CLASS II HIGH DEFIN QUAL: CPT | Performed by: NURSE PRACTITIONER

## 2024-04-24 PROCEDURE — 84165 PROTEIN E-PHORESIS SERUM: CPT | Mod: 26 | Performed by: PATHOLOGY

## 2024-04-24 PROCEDURE — 99000 SPECIMEN HANDLING OFFICE-LAB: CPT | Performed by: PATHOLOGY

## 2024-04-24 PROCEDURE — 36415 COLL VENOUS BLD VENIPUNCTURE: CPT | Performed by: PATHOLOGY

## 2024-04-24 PROCEDURE — 99214 OFFICE O/P EST MOD 30 MIN: CPT

## 2024-04-24 PROCEDURE — 84155 ASSAY OF PROTEIN SERUM: CPT | Performed by: STUDENT IN AN ORGANIZED HEALTH CARE EDUCATION/TRAINING PROGRAM

## 2024-04-24 RX ORDER — METOPROLOL SUCCINATE 100 MG/1
200 TABLET, EXTENDED RELEASE ORAL
COMMUNITY
Start: 2024-04-24

## 2024-04-24 RX ORDER — ASPIRIN 81 MG/1
81 TABLET ORAL DAILY
COMMUNITY
Start: 2024-04-24

## 2024-04-24 RX ORDER — TORSEMIDE 100 MG/1
100 TABLET ORAL EVERY MORNING
COMMUNITY
Start: 2024-04-24

## 2024-04-24 ASSESSMENT — PAIN SCALES - GENERAL: PAINLEVEL: NO PAIN (0)

## 2024-04-24 NOTE — LETTER
2024         RE: Wendy Kapoor  [address]  [place] [address]        Dear Colleague,    Thank you for referring your patient, Wendy Kapoor, to the [facility]ANSPL[facility]. Please see a copy of my visit note below.          [facility]   Cardiology Service  History and Physical      Wendy Kapoor MRN# [id number]   YOB: 1970 Age: 53 year old       HPI:   Wendy Kapoor is a 53 year old year old female with a medical history significant for CKD from biopsy proven DM & HTN, Sjogren's, DM2, and HTN. She presents today for cardiovascular risk assessment as part of pre-kidney transplant evaluation and 2 week post angiogram follow up.     She is a never/non-smoker. She  does not use alcohol or drugs. Her activity level is sedentary. She denies symptoms of chest pain, dizziness, lightheadedness, palpitations, shortness of breath, orthopnea, PND, or edema.    She most recently underwent coronary angiogram on 24 with Dr. Lindo which revealed non-obstructive CAD with 30% mLAD and dLAD lesions.     Cardiovascular Risk Factor Profile:  hypertension, diabetes, and sedentary lifestyle     Current Cardiovascular Symptoms:  - Denies    ACC HF Stage:  Stage B    NYHA Class:  Class I    Functional Capacity:  Performs 4 METS exercise without symptoms (e.g., light housework, stairs, 4 mph walk, 7 mph bike, slow step dance)        Past Medical History:   Diagnosis Date     Chronic kidney disease      Diabetes (H)     DM Type 2     History of blood transfusion 2019     Hypertension      MGUS (monoclonal gammopathy of unknown significance)      Pericardial effusion      Sjogren's syndrome (H24)      Type 2 diabetes mellitus with diabetic nephropathy (H)        Past Surgical History:   Procedure Laterality Date     BIOPSY        SECTION         blood glucose (FREESTYLE TEST STRIPS) test strip, by Misc.(Non-Drug; Combo Route) route three times a  day.  cholecalciferol 50 MCG (2000 UT) tablet, Take 50 mcg by mouth  DARBEPOETIN DALTON IJ, Inject 25 mcg Subcutaneous every 28 days  diltiazem ER (DILT-XR) 180 MG 24 hr capsule, Take 180 mg by mouth  glimepiride (AMARYL) 4 MG tablet, TAKE 2 TABLETS (8 MG) BY MOUTH EVERY MORNING BEFORE BREAKFAST.  hydrALAZINE (APRESOLINE) 25 MG tablet, Take 50 mg by mouth 3 times daily  Insulin Glargine w/ Trans Port 100 UNIT/ML SOPN, Inject 14 Units Subcutaneous  losartan (COZAAR) 50 MG tablet, Take 1 tablet by mouth every morning  LYCOPENE PO, Take 1 tablet by mouth daily  metoprolol succinate ER (TOPROL XL) 25 MG 24 hr tablet, Take 1 tablet by mouth daily at 2 pm  pioglitazone (ACTOS) 30 MG tablet, Take 30 mg by mouth  sodium bicarbonate 650 MG tablet, 1,300 mg  torsemide (DEMADEX) 20 MG tablet, Take 1 tablet by mouth every morning    No current facility-administered medications on file prior to visit.      Family History   Problem Relation Age of Onset     Kidney Disease No family hx of        Social History     Tobacco Use     Smoking status: Never     Smokeless tobacco: Never   Substance Use Topics     Alcohol use: Never       No Known Allergies      Review Of Systems:   CONSTITUTIONAL: No report of fevers or chills  RESPIRATORY: No cough, wheezing, SOB, or hemoptysis  CARDIOVASCULAR: see HPI  MUSCULO-SKELETAL: No joint pain or swelling, no muscle pain  NEURO: No paresthesias, syncope, pre-syncope, lightheadness, dizziness or vertigo  ENDOCRINE: No temperature intolerance, no skin/hair changes  PSYCHIATRIC: No change in mood, feeling down/anxious, no change in sleep or appetite  GI: No melena or hematochezia, no change in bowel habits  : On PD   HEME: No easy bruising or bleeding, no history of anemia, no history of blood clots  SKIN: No rashes or sores, no unusual itching      Physical Examination:  Vitals: BP (!) 152/77   Pulse 106   Wt 61.6 kg (135 lb 11.2 oz)   SpO2 96%   BMI 26.50 kg/m    BMI= Body mass index is 26.5  kg/m .    GENERAL APPEARANCE: healthy, alert and no distress  HEENT: no icterus, no xanthelasmas, normal pupil size and reaction, normal palate, mucosa moist  NECK: JVP not elevated , brisk carotid upstroke bilaterally  CHEST: lungs clear to auscultation without rales, rhonchi or wheezes, no use of accessory muscles, no retractions  CARDIOVASCULAR: regular rhythm, normal S1 and S2, no S3 or S4 and no murmur, click or rub.  ABDOMEN: soft, non tender, without hepatosplenomegaly, no masses palpable, bowel sounds normal  EXTREMITIES: warm, trace LE edema, DP/PT pulses 2+ bilaterally, no clubbing or cyanosis   NEURO: alert and oriented to person/place/time, normal speech, gait and affect  SKIN: no ecchymoses, no rashes      Laboratory:  Last Comprehensive Metabolic Panel:  Lab Results   Component Value Date     (L) 2023    POTASSIUM 3.7 2023    CHLORIDE 101 2023    CO2 22 2023    ANIONGAP 12 2023     (H) 2023    BUN 67.8 (H) 2023    CR 4.25 (H) 2023    GFRESTIMATED 12 (L) 2023    VIJAY 8.8 2023       Last CBC:  CBC RESULTS:   Recent Labs   Lab Test 23  1352   WBC 5.5   RBC 2.51*   HGB 7.3*   HCT 22.6*   MCV 90   MCH 29.1   MCHC 32.3   RDW 13.9          24 EK/2/23 Echocardiogram:  Global and regional left ventricular function is normal with an EF of 60-65%.  Right ventricular function, chamber size, wall motion, and thickness are  normal.  Pulmonary artery systolic pressure is normal.  Ascending aorta 3.7 cm.  Mild dilatation of the aorta is present.  IVC diameter <2.1 cm collapsing >50% with sniff suggests a normal RA pressure  of 3 mmHg.  No pericardial effusion is present.  There is no prior study for direct comparison.    24 Coronary Angiogram:     Mid LAD lesion is 30% stenosed.     Dist LAD lesion is 30% stenosed.     No significant obstructive coronary artery disease.   Uncomplicated right radial access.          Assessment and Plan:   # Coronary Artery Disease   Underwent coronary angiogram on 4/3/24 as part of her transplant evaluation. This revealed 30% lesions in her mLAD and dLAD. Her most recent echocardiogram revealed EF 60-65%, no WMA's. Lipid panel done 4/12/24 with LDL 25, total 115, HDL 92; she is not on statin therapy. Today she denies symptoms of chest pain, dizziness, lightheadedness, shortness of breath, edema, orthopnea, or PND. She is able to use the stairs and complete ADL's without aforementioned symptoms. Her right radial access site is soft, flat, non-tender.   - Continue Aspirin 81mg daily  - No additional cardiac testing needed prior to transplant  - Follow-up with cardiology in 18-24 months; sooner with symptoms     # Hypertension  Blood pressure in office today 152/77. Her home blood pressure readings average 110-130's. She notes they are always higher in the doctors office.   - Continue PTA Hydralazine 50mg TID  - Continue PTA Toprol XL 100mg daily  - Continue PTA Losartan 50mg daily   - Continue PTA Torsemide 100mg daily   - Monitor BP's at home; if consistently > 140/90 she will notify our office or her nephrologist.     # Diabetes Mellitus Type 2  Most recent A1c 6.6% on 8/2/23.   - Continue PTA Pioglitazone 30mg daily   - Continue PTA Glimepiride 8mg daily BID   - Continue Lantus 14 units daily   - Repeat A1C today     # Overweight (BMI 26)  - Educated patient on the importance of healthy diet and exercise in reducing risk for heart disease   - Encouraged 150 minutes/week of moderate intensity exercise   - Encouraged healthy weight loss; discussed Mediterranean diet   - Follow-up with cardiology in one year     # ESRD on PD   She is currently on peritoneal dialysis; she does this every night. She admits to being compliant with all medications and dialysis regimen.    - Additional follow-up / testing per SOT team      CAD Risk Level:  High Risk: > 3 risk factors, OR diabetic kidney  disease, OR DM > 10 years, known CAD or PAD, prior PCI or CABG, on dialysis 5 years or more     RCRI Score:   - High risk surgery (>5% cardiac complication risk) = 1 point   - Diabetes Mellitus (on Insulin) = 1 point   - Serum Creatinine >2.0 mg/dl = 1 point        I have reviewed and updated the patient's Past Medical History, Social History, Family History and Medication List.         MIA Chaves, CNP  [facility]      Review of prior external note(s) from - Care everywhere, rheumatology, SOT  Review of the result(s) of each unique test - Echocardiogram, EKG, angiogram  Prescription drug management - medication reconciliation    30 minutes spent by me on the date of the encounter doing chart review, review of outside records, review of test results, patient visit, and documentation     MIA Jose CNP on 4/24/2024 at 10:35 AM          Again, thank you for allowing me to participate in the care of your patient.        Sincerely,        MIA Jose CNP

## 2024-04-24 NOTE — PATIENT INSTRUCTIONS
You were seen in the cardiology clinic by: Anette Powell CNP    Plan:     Medication Changes:   - None    Labs/Tests Needed:   - None    Follow Up Visit:   - 18-24 months; sooner with symptoms    Additional Instructions:   - Monitor your blood pressures at home. Please keep a log of the readings and bring to your future nephrology or cardiology appointments. Your bleed pressure goal is < 140/90. It is important to achieve adequate blood pressure control before transplant.   - Focus on a low sodium diet. You should aim to consume <2,000mg of sodium daily.   - I encourage you to achieve 150 minutes/week of moderate intensity exercise if able. If this is not achievable right away, you can gradually work yourself up to this starting with low intensity exercises (i.e. walking or swimming) a couple days a week.      Important Numbers:     Cardiology   Telephone Number     To schedule an appointment or leave a message for your care team:   [phone]      Press #1   To reach the billing department: [phone]      Press # 3     To obtain copies of your medical records: [phone]      Press # 4   To reach the on-call cardiologist for after hours or on weekends: [phone]     Option #4, and ask to speak to the [facility]     Cardiovascular Clinic:   [address] [place] [address]  [phone]      Thank you for trusting us with your health care needs!

## 2024-04-25 LAB
ALBUMIN SERPL ELPH-MCNC: 3.4 G/DL (ref 3.7–5.1)
ALPHA1 GLOB SERPL ELPH-MCNC: 0.5 G/DL (ref 0.2–0.4)
ALPHA2 GLOB SERPL ELPH-MCNC: 1 G/DL (ref 0.5–0.9)
B-GLOBULIN SERPL ELPH-MCNC: 0.8 G/DL (ref 0.6–1)
C3 SERPL-MCNC: 134 MG/DL (ref 81–157)
C4 SERPL-MCNC: 44 MG/DL (ref 13–39)
GAMMA GLOB SERPL ELPH-MCNC: 1.8 G/DL (ref 0.7–1.6)
M PROTEIN SERPL ELPH-MCNC: 0.4 G/DL
PATH REPORT.COMMENTS IMP SPEC: ABNORMAL
PROT PATTERN SERPL ELPH-IMP: ABNORMAL

## 2024-04-29 LAB — CRYOGLOB SER QL: ABNORMAL

## 2024-04-30 NOTE — TELEPHONE ENCOUNTER
RECORDS RECEIVED FROM:    DATE RECEIVED:    NOTES STATUS DETAILS   OFFICE NOTE from referring provider  Internal [facility]OT   OFFICE NOTE from other cardiologists  N/A    RECORDS from hospital/ED N/A    MEDICATION LIST Internal    GENERAL CARDIO RECORDS   (ALL APPOINTMENT TYPES)     LABS (CBC,BMP,CMP, TSH) Internal    EKG (STRIPS & REPORTS) Internal 8-2-23   MONITORS (STRIPS & REPORTS) N/A    ECHOS (IMAGES AND REPORTS) Internal 8-2-23   STRESS TESTS (IMAGES AND REPORTS) N/A    cMRI (IMAGES AND REPORTS) N/A    CT/CTA (IMAGES AND REPORTS) N/A          
ambulatory

## 2024-05-03 LAB
PROTOCOL CUTOFF: NORMAL
SA 1  COMMENTS: NORMAL
SA 1 CELL: NORMAL
SA 1 TEST METHOD: NORMAL
SA 2 CELL: NORMAL
SA 2 COMMENTS: NORMAL
SA 2 TEST METHOD: NORMAL
SA1 HI RISK ABY: NORMAL
SA1 MOD RISK ABY: NORMAL
SA2 HI RISK ABY: NORMAL
SA2 MOD RISK ABY: NORMAL
UNACCEPTABLE ANTIGENS: NORMAL
UNOS CPRA: 98

## 2024-05-08 ENCOUNTER — COMMITTEE REVIEW (OUTPATIENT)
Dept: TRANSPLANT | Facility: CLINIC | Age: 54
End: 2024-05-08
Payer: COMMERCIAL

## 2024-05-08 NOTE — COMMITTEE REVIEW
Abdominal Committee Review Note     Evaluation Date: 8/2/2023  Committee Review Date: 5/8/2024    Organ being evaluated for: Kidney/Pancreas    Transplant Phase: Waitlist  Transplant Status: Inactive    Transplant Coordinator: Liv Borges  Transplant Surgeon: Dr. Jean-Pierre Gleason      Referring Physician: Dr. Roberth Brand    Primary Diagnosis: ESKD   Secondary Diagnosis:     Committee Review Members:  Nephrology Carson Quezada MD, Travis Guzman, APRN CNP   Nutrition Irene Abbott, RD   Pharmacist Frida Miguel, [facility]    - Clinical Lexy Broderick, [facility], Saida Ribera, [facility], Lory Case, [facility]   Transplant FREDI POOLE, RN, Randa Bernal, RN, Danelle Schwab, RN, Liv Borges, RN, Diana Wolf, RN, Chanelle Hdez, NP, Chanelle Freeman, RN, Darlyn Hill, RN, Joyce Scott, CHIRAG, Leona Park, RN   Transplant Surgery Lorraine Mckeon MD, MD       Transplant Eligibility: Insulin-dependent diabetes mellitus, Irreversible chronic kidney disease treated w/dialysis or expected need for dialysis    Committee Review Decision: Remain Inactive    Relative Contraindications: None    Absolute Contraindications: None    Committee Chair Lorraine Mckeon MD verbally attested to the committee's decision.    Committee Discussion Details: Reviewed pt's medical status and evaluation results to date.      Resolved Issues:  Pt has hx of type 2 diabetes on 14 units insulin/day, most recent Hgb A1c 7.8 on 4/24/24.      Cardiac work up: last echo 8/2/23 shows EF of 60-65%, pt saw Dr. Dotson 10/10/23 for a risk assessment and was cleared w/ a negative Lexiscan 11/1/23, transplant team requested coronary angiogram, performed on on 4/12/24 revealing mid LAD lesion is 30% stenosed and dist LAD lesion is 30% stenosed w/ no significant obstructive CAD.     Imaging: Ab/Pelv CT 8/21/23 and iliac US 8/21/23 were reviewed by Dr. Kothari, and ok'd vessel wise for transplant.  The CT showed  splenomegaly and renal cysts, pt underwent follow up Abdominal US on 10/10/23 which did not show splenomegaly, pt underwent MRI on 12/26/23 to further characterize renal cysts.  The MRI revealed a mildly complex cyst in the midpole of R kidney with thin and mildly thickened internal septations. Pt will have follow up MRI 6/17/24 to reassess. The MRI also showed mild splenomegaly and mild iron deposition in liver Dr. Wyatt reviewed those findings and did not request further work up as Plts and LFTs were normal     Abnormal Labs:   LFTs elevated at PKE: ALT 82, AST 93 on 8/2/23.  LFTs repeated a few weeks later on 8/21/23 and normal.  Pt has normal LFTs in CE from years prior as well.   Positive Lupus Anticoagulant 8/2/23, repeated 12/26/23 and remained positive, beta 2 glycoproteins negative.    Hx of newly diagnosed Sjogren's: Pt has established w/ Rheumatology on 1/10/24 and will have annual follow up. Rheumatology finds no contraindications in w/ proceeding w/ kidney/pancreas transplant, noting there are subtle abnormalities with the complement C4 level, cryoglobulins are trace which is not specific and unlikely to have clinical significance. RF and C3 levels are normal. The protein electrophoresis test continues to show a small amount of protein as it did 4 months ago.     Hx of MGUS: with monoclonal peak of 1.7 in Jan 2023, but normal serum free light chain ratio. Identified a few years ago. She had a Repeat SPEP with monoclonal peak down to 0.3 and serum free light chain ratio that was normal 8/2/23, now w/ monoclonal peak at 0.4 on 4/24/24.  Committee does not feel she needs a referral to Hem/Onc     BMI: Pt saw Dr. Gleason 8/21/24 and was okd' at BMI 28.4 (143 lb), but advised not to gain weight, most recent BMI was 26 (135 lbs) on 4/24/24    Unresolved Issues:  Due for Colonoscopy    Follow up Abdominal MRI for surveillance of complex cyst in the midpole of R kidney with thin and mildly thickened  internal septations - scheduled 6/17/24    Pt will be ready for active listing status on the kidney alone and kidney/pancreas wait list pending completion and results of Colonoscopy and Abdominal MRI. Pt will now transition to Wait List Coordinator. Evaluation Summary letter will be sent, and pt will be called with this update.

## 2024-05-09 ENCOUNTER — TELEPHONE (OUTPATIENT)
Dept: TRANSPLANT | Facility: CLINIC | Age: 54
End: 2024-05-09
Payer: COMMERCIAL

## 2024-05-09 NOTE — TELEPHONE ENCOUNTER
Called pt to discuss status of pre-SPK evaluation. We discussed that at  this time, we will want to see the results of her upcoming MRI and she will need to arrange for her colonoscopy - she plans to get that set up very soon. I let her know that at this time, she will transition over to WL coordinator, CHIRAG Mcclendon and I provided her with Danelle's contact info.  The pt had no further questions at this time. Hand-off to WL.

## 2024-05-09 NOTE — LETTER
05/09/24        Wendy Kapoor  [address] [place] [address]        Dear Wendy,    It is a pleasure to work with you toward the goal of kidney and pancreas transplantation. Your pre-transplant evaluation results to date were reviewed at our Multidisciplinary Selection Committee on 5/8/24. The Committee is requesting the following items are completed as part of your evaluation:    Colonoscopy - please work with your Primary Care Provider to arrange this    MRI - we will review the results and facilitate any follow up as appropriate     For any questions, please contact the Transplant Office at [phone].  Your new wait list coordinator will be CHIRAG Mcclendon.  She can be reached at [phone].      Sincerely,  Liv Borges RN, Pre-Kidney/Pancreas Transplant Coordinator   Solid Organ Transplant  [facility], [facility]    CC: Care Team

## 2024-05-10 ENCOUNTER — DOCUMENTATION ONLY (OUTPATIENT)
Dept: TRANSPLANT | Facility: CLINIC | Age: 54
End: 2024-05-10
Payer: COMMERCIAL

## 2024-05-13 ENCOUNTER — TELEPHONE (OUTPATIENT)
Dept: TRANSPLANT | Facility: CLINIC | Age: 54
End: 2024-05-13
Payer: COMMERCIAL

## 2024-05-20 ENCOUNTER — TELEPHONE (OUTPATIENT)
Dept: TRANSPLANT | Facility: CLINIC | Age: 54
End: 2024-05-20
Payer: COMMERCIAL

## 2024-05-20 NOTE — TELEPHONE ENCOUNTER
Called pt to introduce myself as WL coordinator and encouraged pt to stay up on health maintenance and to let us know if insurance changes, contact info updates. Explained WL protocol for pt's status and when follow up is needed. Patient asks whether the MRI could be scheduled on a Wednesday.  Provided patient with the imaging department's contact information and encouraged patient to call.  Patient reports she will call this week to schedule the colonoscopy. Gave pt direct information and encouraged to reach out with any questions/concerns.

## 2024-06-17 ENCOUNTER — ANCILLARY PROCEDURE (OUTPATIENT)
Dept: MRI IMAGING | Facility: CLINIC | Age: 54
End: 2024-06-17
Attending: TRANSPLANT SURGERY
Payer: COMMERCIAL

## 2024-06-17 DIAGNOSIS — Z01.818 ENCOUNTER FOR PRE-TRANSPLANT EVALUATION FOR KIDNEY AND PANCREAS TRANSPLANT: ICD-10-CM

## 2024-06-17 DIAGNOSIS — N18.4 CHRONIC KIDNEY DISEASE, STAGE IV (SEVERE) (H): ICD-10-CM

## 2024-06-17 DIAGNOSIS — I10 ESSENTIAL HYPERTENSION: ICD-10-CM

## 2024-06-17 DIAGNOSIS — Z76.82 ORGAN TRANSPLANT CANDIDATE: ICD-10-CM

## 2024-06-17 DIAGNOSIS — E11.9 DIABETES MELLITUS, TYPE 2 (H): ICD-10-CM

## 2024-06-17 PROCEDURE — 74181 MRI ABDOMEN W/O CONTRAST: CPT | Performed by: RADIOLOGY

## 2024-07-02 ENCOUNTER — TEAM CONFERENCE (OUTPATIENT)
Dept: TRANSPLANT | Facility: CLINIC | Age: 54
End: 2024-07-02
Payer: COMMERCIAL

## 2024-07-02 NOTE — TELEPHONE ENCOUNTER
Image Review Meeting    ATTENDEES: LEONARD Hernandez M. Siers, W. Nasvik, S. Dreis    IMAGES REVIEWED: 06/17/2024 MR abdomen    DECISION: Recommend urology consult to confirm how often to image renal cyst.    INCIDENTALS: No

## 2024-07-08 ENCOUNTER — TELEPHONE (OUTPATIENT)
Dept: TRANSPLANT | Facility: CLINIC | Age: 54
End: 2024-07-08
Payer: COMMERCIAL

## 2024-07-08 DIAGNOSIS — N18.6 ESRD (END STAGE RENAL DISEASE) (H): ICD-10-CM

## 2024-07-08 DIAGNOSIS — E11.9 DIABETES MELLITUS, TYPE 2 (H): Primary | ICD-10-CM

## 2024-07-08 DIAGNOSIS — Z76.82 ORGAN TRANSPLANT CANDIDATE: ICD-10-CM

## 2024-07-08 NOTE — TELEPHONE ENCOUNTER
Updated patient that transplant surgery recommends urology appointment to review images related to identified mild complex kidney cyst and make further recommendations of when to re-image the cyst.  Noted placing scheduling order for urology appointment and will plan to review patient's chart with the transplant committee next week after patient completes the 07/10/2024 colonoscopy.  Patient verbalizes agreement with this plan and reports no questions at this time.

## 2024-07-12 ENCOUNTER — PRE VISIT (OUTPATIENT)
Dept: UROLOGY | Facility: CLINIC | Age: 54
End: 2024-07-12
Payer: COMMERCIAL

## 2024-07-12 NOTE — TELEPHONE ENCOUNTER
Reason for visit: transplant clearance consult     Relevant information: N/A    Records/imaging/labs/orders: all records available    Pt called: No need for a call    At Rooming: standard procedure    Dulce Gamboa  7/12/2024  2:43 PM

## 2024-07-12 NOTE — TELEPHONE ENCOUNTER
MEDICAL RECORDS REQUEST   [facility]  Urology Clinic  [address] [place] [address]  PHONE: [phone]  Fax: [phone]        FUTURE VISIT INFORMATION                                                   Wendy CASPER Antwon, : 1970 scheduled for future visit at [facility]    APPOINTMENT INFORMATION:  Date: 2024  Provider:  Frida Cosme PA-C  Reason for Visit/Diagnosis: Transplant Clearance Consult    REFERRAL INFORMATION:  Referring provider:  Jean-Pierre Gleason MD in  SOT      RECORDS REQUESTED FOR VISIT                                                     NOTES  STATUS/DETAILS   OFFICE NOTE from referring provider  yes, 2023 -- Jean-Pierre Gleason MD in  [facility]   OFFICE NOTE from other specialist  yes   DISCHARGE SUMMARY from hospital  YES, 2024 -- [facility]   MEDICATION LIST  yes   LABS     URINALYSIS (UA)  yes   IMAGES  yes, 2024 -- MR ABD  2023 -- MR RENAL  10/10/2023 -- US ABD  2023 -- CT ABD PELVIS  2023 -- XR CHEST     PRE-VISIT CHECKLIST      Joint diagnostic appointment coordinated correctly          (ensure right order & amount of time) Yes   RECORD COLLECTION COMPLETE Yes

## 2024-07-16 ENCOUNTER — VIRTUAL VISIT (OUTPATIENT)
Dept: UROLOGY | Facility: CLINIC | Age: 54
End: 2024-07-16
Attending: PHYSICIAN ASSISTANT
Payer: COMMERCIAL

## 2024-07-16 ENCOUNTER — PRE VISIT (OUTPATIENT)
Dept: UROLOGY | Facility: CLINIC | Age: 54
End: 2024-07-16

## 2024-07-16 DIAGNOSIS — N18.6 ESRD (END STAGE RENAL DISEASE) (H): ICD-10-CM

## 2024-07-16 DIAGNOSIS — N28.1 RENAL CYST: Primary | ICD-10-CM

## 2024-07-16 DIAGNOSIS — Z76.82 ORGAN TRANSPLANT CANDIDATE: ICD-10-CM

## 2024-07-16 PROCEDURE — 99243 OFF/OP CNSLTJ NEW/EST LOW 30: CPT | Mod: 95 | Performed by: PHYSICIAN ASSISTANT

## 2024-07-16 RX ORDER — PREDNISONE 20 MG/1
TABLET ORAL
COMMUNITY
Start: 2024-07-02

## 2024-07-16 ASSESSMENT — PAIN SCALES - GENERAL: PAINLEVEL: NO PAIN (0)

## 2024-07-16 NOTE — PATIENT INSTRUCTIONS
UROLOGY CLINIC VISIT PATIENT INSTRUCTIONS    Virtual visit with Yves Mcgraw PA-C in 6 months to monitor renal cysts.    If you have any issues, questions or concerns in the meantime, do not hesitate to contact us at [phone] or via [employer].     It was a pleasure meeting with you today.  Thank you for allowing me and my team the privilege of caring for you today.  YOU are the reason we are here, and I truly hope we provided you with the excellent service you deserve.  Please let us know if there is anything else we can do for you so that we can be sure you are leaving completely satisfied with your care experience.

## 2024-07-16 NOTE — PROGRESS NOTES
Virtual Visit Details    Type of service:  Video Visit     Video start time: 9:31 AM    Video end time: 9:36 AM    Originating Location (pt. Location): Home    Distant Location (provider location):  Off-site  Platform used for Video Visit: Well

## 2024-07-16 NOTE — LETTER
7/16/2024       RE: Wendy Kapoor  [address]  [place] [address]     Dear Colleague,    Thank you for referring your patient, Wendy Kapoor, to the [facility] [place] at [facility]. Please see a copy of my visit note below.      Name: Wendy Kapoor    MRN: [id number]   YOB: 1970                 Chief Complaint:   Complex renal cyst         Assessment and Plan:   53 year old female with PMH of ESRD (on PD) from biopsy-proven DM and HTN who is undergoing evaluation for kidney transplant and requires urologic clearance for complex renal cysts. We reviewed her imaging studies including MR abdomen w/o contrast from June 2024, MR renal w/o contrast from Dec 2023, abdominal ultrasound complete from Oct 2023, and CT A/P w/o contrast from Aug 2023. There are bilateral renal cysts including a mildly complex right mid-pole cyst measuring 1.6 cm. This has been stable in size for the last year. She is asymptomatic without flank pain, gross hematuria, or UTIs.  -No specific urologic intervention for renal cysts at this time.  -No urologic contraindication to kidney transplant.  -Plan for 6 month follow up with MR renal (without contrast if still on PD; with contrast if s/p kidney transplant and kidney function allows IV contrast) and visit with Yves Mcgraw PA-C to review. If the mildly complex right renal cyst remains stable at that time, consider annual vs PRN follow up.     Frida Cosme PA-C  July 16, 2024          History of Present Illness:   Wendy Kapoor is a 53 year old female with PMH of ESRD (on PD) from biopsy-proven DM and HTN, Sjogren's, DM2, and HTN who is seen today via virtual visit in consultation from Dr. Gleason for evaluation of renal cysts. She is undergoing evaluation for kidney transplant and requires urology clearance prior. She was found to have renal cysts on abdominal ultrasound on 10/10/2023.  There is a tiny left simple cyst and multiple right-sided cysts with mild complexity. Follow up MR renal w/o contrast on 2023 demonstrated bilateral renal cysts including a mildly complex cyst with multiple thin and mildly thickened internal septations in the midpole of the right kidney measuring 1.6 x 1.6 cm, unchanged from size on prior CT scan on 2023. She had a more recent MR abdomen w/o contrast on 2024 which demonstrated no change in size in the mildly complex right renal cyst.     Wendy denies any flank pain, gross hematuria, UTIs, or other bothersome urinary symptoms.          Past Medical History:     Past Medical History:   Diagnosis Date    Chronic kidney disease     Diabetes (H)     DM Type 2    History of blood transfusion 2019    Hypertension     MGUS (monoclonal gammopathy of unknown significance)     Pericardial effusion     Sjogren's syndrome (H24)     Type 2 diabetes mellitus with diabetic nephropathy (H)             Past Surgical History:     Past Surgical History:   Procedure Laterality Date    BIOPSY       SECTION      CV CORONARY ANGIOGRAM N/A 2024    Procedure: Coronary Angiogram;  Surgeon: Cody Mckenzie MD;  [facility] CARDIAC CATH LAB    CV PCI N/A 2024    Procedure: Percutaneous Coronary Intervention;  Surgeon: Cody Mckenzie MD;  Location:  [facility] CARDIAC CATH LAB            Social History:     Social History     Tobacco Use    Smoking status: Never    Smokeless tobacco: Never   Substance Use Topics    Alcohol use: Never            Family History:     Family History   Problem Relation Age of Onset    Kidney Disease No family hx of             Allergies:   No Known Allergies         Medications:     Current Outpatient Medications   Medication Sig Dispense Refill    aspirin 81 MG EC tablet Take 1 tablet (81 mg) by mouth daily      blood glucose (FREESTYLE TEST STRIPS) test strip by Misc.(Non-Drug; Combo Route) route three times a  day.      cholecalciferol 50 MCG (2000 UT) tablet Take 50 mcg by mouth      DARBEPOETIN DALTON IJ Inject 25 mcg Subcutaneous every 28 days      glimepiride (AMARYL) 4 MG tablet TAKE 2 TABLETS (8 MG) BY MOUTH EVERY MORNING BEFORE BREAKFAST.      hydrALAZINE (APRESOLINE) 25 MG tablet Take 50 mg by mouth 3 times daily      Insulin Glargine w/ Trans Port 100 UNIT/ML SOPN Inject 14 Units Subcutaneous      losartan (COZAAR) 50 MG tablet Take 1 tablet by mouth every morning      LYCOPENE PO Take 1 tablet by mouth daily      metoprolol succinate ER (TOPROL XL) 100 MG 24 hr tablet Take 2 tablets (200 mg) by mouth daily at 2 pm      pioglitazone (ACTOS) 30 MG tablet Take 30 mg by mouth      sodium bicarbonate 650 MG tablet 1,300 mg      torsemide (DEMADEX) 100 MG tablet Take 1 tablet (100 mg) by mouth every morning       No current facility-administered medications for this visit.            Physical Exam:   GENERAL: alert and no distress  EYES: Eyes grossly normal to inspection.  No discharge or erythema, or obvious scleral/conjunctival abnormalities.  RESP: No audible wheeze, cough, or visible cyanosis.    SKIN: Visible skin clear. No significant rash, abnormal pigmentation or lesions.  NEURO: Cranial nerves grossly intact.  Mentation and speech appropriate for age.  PSYCH: Appropriate affect, tone, and pace of words       Labs:      Creatinine   Date Value Ref Range Status   04/12/2024 4.53 (H) 0.51 - 0.95 mg/dL Final       Color Urine (no units)   Date Value   08/02/2023 Straw     Appearance Urine (no units)   Date Value   08/02/2023 Clear     Glucose Urine (mg/dL)   Date Value   08/02/2023 300 (A)     Bilirubin Urine (no units)   Date Value   08/02/2023 Negative     Ketones Urine (mg/dL)   Date Value   08/02/2023 Negative     Specific Gravity Urine (no units)   Date Value   08/02/2023 1.012     pH Urine (no units)   Date Value   08/02/2023 7.0     Protein Albumin Urine (mg/dL)   Date Value   08/02/2023 300 (A)     Nitrite  Urine (no units)   Date Value   08/02/2023 Negative     Leukocyte Esterase Urine (no units)   Date Value   08/02/2023 Negative       Lab Results   Component Value Date    A1C 7.8 04/24/2024    A1C 6.6 08/02/2023         Imaging:    MRI ABDOMEN WITHOUT CONTRAST   6/17/2024    FINDINGS:     Liver: Normal hepatic morphology. Diffuse loss of signal on in phase  imaging, suggestive of iron deposition. No suspicious hepatic lesions.     Gallbladder: Normal.     Spleen: Mild splenomegaly measuring up to 13 cm in craniocaudal span.     Kidneys: No hydronephrosis. Several punctate T2 simple hyperintense  left renal cortical cysts. There are multiple simple T2 hyperintense  right renal cortical cysts measuring up to 1.9 cm in the mid pole and  1.8 cm in the lower pole. There is a mildly complex T2 hyperintense  cyst with multiple thin and mildly thickened internal septations  measuring 1.6 x 1.6 cm in the midpole (5/14), unchanged in size from  prior exam. No associated diffusion restriction     Adrenal glands: Normal.     Pancreas: Normal T1 signal. No pancreatic ductal dilatation.     Bowel: No evidence of bowel obstruction.     Lymph nodes: No adenopathy.     Blood vessels: Normal flow voids.     Lung bases: Patchy bibasilar atelectasis.     Bones and soft tissues: Degenerative changes in the spine.     Mesentery and abdominal wall: Partially visualized peritoneal dialysis  catheter.     Ascites: Not present.                                                                      IMPRESSION:   1. No significant change in size of a mildly complex cyst in the  midpole of the right kidney with thin and mildly thickened internal  septations, which remains incompletely characterized by noncontrast  technique. Recommend continued attention on 6-12 months imaging  follow-up.  2. Mild splenomegaly.  3. Hepatic iron deposition.      MRI ABDOMEN WITHOUT CONTRAST   12/26/2023    FINDINGS:     Liver: Normal hepatic morphology. Diffuse  loss of signal on in phase  imaging, suggestive of iron deposition. No suspicious hepatic lesions.     Gallbladder: No cholelithiasis.     Spleen: The spleen is enlarged to 14.5 cm.     Kidneys: No hydronephrosis. Several punctate T2 simple hyperintense  left renal cortical cysts. There are multiple simple T2 hyperintense  right renal cortical cysts measuring up to 1.9 cm in the mid pole and  1.8 cm in the lower pole. There is a mildly complex T2 hyperintense  cyst with multiple thin and mildly thickened internal septations  measuring 1.6 x 1.6 cm in the midpole (5/15), unchanged in size from  prior exam 8/21/2023. No associated diffusion restriction.     Adrenal glands: Normal     Pancreas: Normal T1 signal. No pancreatic ductal dilatation.     Bowel: No evidence of bowel obstruction.     Lymph nodes: No suspicious lymphadenopathy.     Blood vessels: Normal flow voids.     Lung bases: The lung bases are clear.     Bones and soft tissues: No suspicious or aggressive appearing bone  lesions.     Mesentery and abdominal wall: Partially visualized peritoneal dialysis  catheter.     Ascites: None                                                                      IMPRESSION:   1. No significant change in size (since 8/21/2023) of multiple  bilateral simple renal cortical cysts and a mildly complex cyst in the  midpole of the right kidney with thin and mildly thickened internal  septations. Given the lack of intravenous contrast, it is difficult to  fully characterize this cystic lesion. At a minimum, continued imaging  follow-up is recommended in 6-12 months to assess for change in size.  Consider administering contrast, if possible.  2. Mild splenomegaly.  3. Mild hepatic iron deposition.      US ABDOMEN COMPLETE, 10/10/2023     FINDINGS:  Liver: The liver demonstrates normal homogeneous echotexture. No  evidence of a focal hepatic mass. Liver measures 15.3 cm. The main  portal vein is patent with antegrade flow,  measuring 1.2 cm.     Gallbladder:  There is no wall thickening, pericholecystic fluid,  positive sonographic Bautista's sign or evidence for cholelithiasis.     Bile Ducts: Both the intra- and extrahepatic biliary system are of  normal caliber.  The common bile duct measures 5 mm in diameter.     Pancreas: Visualized portions of the head and body of the pancreas are  unremarkable.      Kidneys: Both kidneys are of normal echotexture, without  hydronephrosis.   The craniocaudal dimensions are: right- 12.2 cm,  left- 11.6 cm. The cyst present in the right mid kidney measures 1.9 x  1.9 x 1.6 cm. There is peripheral echogenicity with marked twinkle  artifact consistent with calcification. No internal vascularity  clearly demonstrated. Additional cyst with internal septations/debris  in the mid right kidney measures 1.1 x 1.3 x 1.4 cm without definite  vascularity. On cine imaging, additional cyst with internal  echogenicity in the mid to superior kidney measures up to 1.5 cm. This  is not evaluated on color imaging. Additional simple appearing cyst  present in the right kidney on cine imaging. A tiny cyst in the  superior left kidney measures 0.9 x 0.7 x 0.5 cm.     Spleen: The spleen is normal in size,  measuring 12.6 cm in sagittal  dimension.     Aorta and IVC: The visualized portions of the aorta and IVC are  unremarkable. The proximal aorta measures 2.3 cm in diameter.     Fluid: No evidence of ascites or pleural effusions.                                                                      IMPRESSION:   1.  No hydronephrosis.  2.  Bilateral renal cysts. Cyst on the left is tiny, likely simple.  Several cysts on the right present, some of which are simple. Largest  cyst in the mid kidney has peripheral calcification. There are 2 cysts  with internal septations/debris in the right kidney, one of which is  only demonstrated on cine imaging and not fully evaluated  sonographically. Limited assessment for any solid or  enhancing tissue  within the cysts on ultrasound. Could consider follow-up CT/MRI renal  mass with contrast depending on patient's GFR and dialysis status. If  the noncontrast study is desired, could consider MRI.  3.  No splenomegaly.      CT ABDOMEN PELVIS W/O CONTRAST, 8/21/2023     FINDINGS:     Liver: Normal parenchymal attenuation without focal mass.  Biliary system: Gallbladder is within normal limits. No intrahepatic  or extrahepatic biliary ductal dilatation.  Pancreas: No focal mass or dilation of the main pancreatic duct.  Stomach: Within normal limits.  Spleen: mild splenomegaly, with splenic index of 1005 and volume of  610 cm3 .  Adrenal glands: Within normal limits.  Kidneys: Cysts in the right kidney. No hydronephrosis, or stone.  Bladder: Within normal limits.  Reproductive organs: Within normal limits.  Colon: Colonic diverticulosis without diverticulitis.  Appendix: Within normal limits.  Small Bowel: Within normal limits.  Lymph nodes: No intra-abdominal or pelvic lymphadenopathy.  Vasculature: no aortic aneurysm. Minimal atherosclerotic  calcifications in the aorta and proximal common iliac arteries. No  calcification in the right external iliac artery.  Peritoneum: No intraabdominal free fluid or air.      Lung bases: Multiple cysts in both lower lobes. No consolidation or  pleural effusion.     Bones and soft tissues: No suspicious soft tissue mass or fluid  collection. No suspicious osseous lesion. Bone island in T11 vertebral  body. Small fat-containing umbilical hernia                                                                      IMPRESSION:   1.  Mild splenomegaly. Recommend ultrasound to evaluate the liver  echotexture.  2.  Colonic diverticulosis.  3.  Minimal atherosclerotic calcification of the aorta and proximal,  iliac arteries.         30 minutes spent on the date of the encounter doing chart review, review of outside records, review of test results, interpretation of tests,  patient visit, and documentation      Virtual Visit Details    Type of service:  Video Visit     Video start time: 9:31 AM    Video end time: 9:36 AM    Originating Location (pt. Location): Home    Distant Location (provider location):  Off-site  Platform used for Video Visit: Well

## 2024-07-16 NOTE — PROGRESS NOTES
Name: Wendy Kapoor    MRN: [id number]   YOB: 1970                 Chief Complaint:   Complex renal cyst         Assessment and Plan:   53 year old female with PMH of ESRD (on PD) from biopsy-proven DM and HTN who is undergoing evaluation for kidney transplant and requires urologic clearance for complex renal cysts. We reviewed her imaging studies including MR abdomen w/o contrast from June 2024, MR renal w/o contrast from Dec 2023, abdominal ultrasound complete from Oct 2023, and CT A/P w/o contrast from Aug 2023. There are bilateral renal cysts including a mildly complex right mid-pole cyst measuring 1.6 cm. This has been stable in size for the last year. She is asymptomatic without flank pain, gross hematuria, or UTIs.  -No specific urologic intervention for renal cysts at this time.  -No urologic contraindication to kidney transplant.  -Plan for 6 month follow up with MR renal (without contrast if still on PD; with contrast if s/p kidney transplant and kidney function allows IV contrast) and visit with Yves Mcgraw PA-C to review. If the mildly complex right renal cyst remains stable at that time, consider annual vs PRN follow up.     Frida Cosme PA-C  July 16, 2024          History of Present Illness:   Wendy Kapoor is a 53 year old female with PMH of ESRD (on PD) from biopsy-proven DM and HTN, Sjogren's, DM2, and HTN who is seen today via virtual visit in consultation from Dr. Gleason for evaluation of renal cysts. She is undergoing evaluation for kidney transplant and requires urology clearance prior. She was found to have renal cysts on abdominal ultrasound on 10/10/2023. There is a tiny left simple cyst and multiple right-sided cysts with mild complexity. Follow up MR renal w/o contrast on 12/26/2023 demonstrated bilateral renal cysts including a mildly complex cyst with multiple thin and mildly thickened internal septations in the midpole of the right kidney measuring 1.6 x  1.6 cm, unchanged from size on prior CT scan on 2023. She had a more recent MR abdomen w/o contrast on 2024 which demonstrated no change in size in the mildly complex right renal cyst.     Wendy denies any flank pain, gross hematuria, UTIs, or other bothersome urinary symptoms.          Past Medical History:     Past Medical History:   Diagnosis Date    Chronic kidney disease     Diabetes (H)     DM Type 2    History of blood transfusion 2019    Hypertension     MGUS (monoclonal gammopathy of unknown significance)     Pericardial effusion     Sjogren's syndrome (H24)     Type 2 diabetes mellitus with diabetic nephropathy (H)             Past Surgical History:     Past Surgical History:   Procedure Laterality Date    BIOPSY       SECTION      CV CORONARY ANGIOGRAM N/A 2024    Procedure: Coronary Angiogram;  Surgeon: Cody Mckenzie MD;  Location: [facility] CARDIAC CATH LAB    CV PCI N/A 2024    Procedure: Percutaneous Coronary Intervention;  Surgeon: Cody Mckenzie MD;  Location: [facility] CARDIAC CATH LAB            Social History:     Social History     Tobacco Use    Smoking status: Never    Smokeless tobacco: Never   Substance Use Topics    Alcohol use: Never            Family History:     Family History   Problem Relation Age of Onset    Kidney Disease No family hx of             Allergies:   No Known Allergies         Medications:     Current Outpatient Medications   Medication Sig Dispense Refill    aspirin 81 MG EC tablet Take 1 tablet (81 mg) by mouth daily      blood glucose (FREESTYLE TEST STRIPS) test strip by Misc.(Non-Drug; Combo Route) route three times a day.      cholecalciferol 50 MCG (2000 UT) tablet Take 50 mcg by mouth      DARBEPOETIN DALTON IJ Inject 25 mcg Subcutaneous every 28 days      glimepiride (AMARYL) 4 MG tablet TAKE 2 TABLETS (8 MG) BY MOUTH EVERY MORNING BEFORE BREAKFAST.      hydrALAZINE (APRESOLINE) 25 MG tablet Take 50 mg by mouth 3 times  daily      Insulin Glargine w/ Trans Port 100 UNIT/ML SOPN Inject 14 Units Subcutaneous      losartan (COZAAR) 50 MG tablet Take 1 tablet by mouth every morning      LYCOPENE PO Take 1 tablet by mouth daily      metoprolol succinate ER (TOPROL XL) 100 MG 24 hr tablet Take 2 tablets (200 mg) by mouth daily at 2 pm      pioglitazone (ACTOS) 30 MG tablet Take 30 mg by mouth      sodium bicarbonate 650 MG tablet 1,300 mg      torsemide (DEMADEX) 100 MG tablet Take 1 tablet (100 mg) by mouth every morning       No current facility-administered medications for this visit.            Physical Exam:   GENERAL: alert and no distress  EYES: Eyes grossly normal to inspection.  No discharge or erythema, or obvious scleral/conjunctival abnormalities.  RESP: No audible wheeze, cough, or visible cyanosis.    SKIN: Visible skin clear. No significant rash, abnormal pigmentation or lesions.  NEURO: Cranial nerves grossly intact.  Mentation and speech appropriate for age.  PSYCH: Appropriate affect, tone, and pace of words       Labs:      Creatinine   Date Value Ref Range Status   04/12/2024 4.53 (H) 0.51 - 0.95 mg/dL Final       Color Urine (no units)   Date Value   08/02/2023 Straw     Appearance Urine (no units)   Date Value   08/02/2023 Clear     Glucose Urine (mg/dL)   Date Value   08/02/2023 300 (A)     Bilirubin Urine (no units)   Date Value   08/02/2023 Negative     Ketones Urine (mg/dL)   Date Value   08/02/2023 Negative     Specific Gravity Urine (no units)   Date Value   08/02/2023 1.012     pH Urine (no units)   Date Value   08/02/2023 7.0     Protein Albumin Urine (mg/dL)   Date Value   08/02/2023 300 (A)     Nitrite Urine (no units)   Date Value   08/02/2023 Negative     Leukocyte Esterase Urine (no units)   Date Value   08/02/2023 Negative       Lab Results   Component Value Date    A1C 7.8 04/24/2024    A1C 6.6 08/02/2023         Imaging:    MRI ABDOMEN WITHOUT CONTRAST   6/17/2024    FINDINGS:     Liver: Normal  hepatic morphology. Diffuse loss of signal on in phase  imaging, suggestive of iron deposition. No suspicious hepatic lesions.     Gallbladder: Normal.     Spleen: Mild splenomegaly measuring up to 13 cm in craniocaudal span.     Kidneys: No hydronephrosis. Several punctate T2 simple hyperintense  left renal cortical cysts. There are multiple simple T2 hyperintense  right renal cortical cysts measuring up to 1.9 cm in the mid pole and  1.8 cm in the lower pole. There is a mildly complex T2 hyperintense  cyst with multiple thin and mildly thickened internal septations  measuring 1.6 x 1.6 cm in the midpole (5/14), unchanged in size from  prior exam. No associated diffusion restriction     Adrenal glands: Normal.     Pancreas: Normal T1 signal. No pancreatic ductal dilatation.     Bowel: No evidence of bowel obstruction.     Lymph nodes: No adenopathy.     Blood vessels: Normal flow voids.     Lung bases: Patchy bibasilar atelectasis.     Bones and soft tissues: Degenerative changes in the spine.     Mesentery and abdominal wall: Partially visualized peritoneal dialysis  catheter.     Ascites: Not present.                                                                      IMPRESSION:   1. No significant change in size of a mildly complex cyst in the  midpole of the right kidney with thin and mildly thickened internal  septations, which remains incompletely characterized by noncontrast  technique. Recommend continued attention on 6-12 months imaging  follow-up.  2. Mild splenomegaly.  3. Hepatic iron deposition.      MRI ABDOMEN WITHOUT CONTRAST   12/26/2023    FINDINGS:     Liver: Normal hepatic morphology. Diffuse loss of signal on in phase  imaging, suggestive of iron deposition. No suspicious hepatic lesions.     Gallbladder: No cholelithiasis.     Spleen: The spleen is enlarged to 14.5 cm.     Kidneys: No hydronephrosis. Several punctate T2 simple hyperintense  left renal cortical cysts. There are multiple  simple T2 hyperintense  right renal cortical cysts measuring up to 1.9 cm in the mid pole and  1.8 cm in the lower pole. There is a mildly complex T2 hyperintense  cyst with multiple thin and mildly thickened internal septations  measuring 1.6 x 1.6 cm in the midpole (5/15), unchanged in size from  prior exam 8/21/2023. No associated diffusion restriction.     Adrenal glands: Normal     Pancreas: Normal T1 signal. No pancreatic ductal dilatation.     Bowel: No evidence of bowel obstruction.     Lymph nodes: No suspicious lymphadenopathy.     Blood vessels: Normal flow voids.     Lung bases: The lung bases are clear.     Bones and soft tissues: No suspicious or aggressive appearing bone  lesions.     Mesentery and abdominal wall: Partially visualized peritoneal dialysis  catheter.     Ascites: None                                                                      IMPRESSION:   1. No significant change in size (since 8/21/2023) of multiple  bilateral simple renal cortical cysts and a mildly complex cyst in the  midpole of the right kidney with thin and mildly thickened internal  septations. Given the lack of intravenous contrast, it is difficult to  fully characterize this cystic lesion. At a minimum, continued imaging  follow-up is recommended in 6-12 months to assess for change in size.  Consider administering contrast, if possible.  2. Mild splenomegaly.  3. Mild hepatic iron deposition.      US ABDOMEN COMPLETE, 10/10/2023     FINDINGS:  Liver: The liver demonstrates normal homogeneous echotexture. No  evidence of a focal hepatic mass. Liver measures 15.3 cm. The main  portal vein is patent with antegrade flow, measuring 1.2 cm.     Gallbladder:  There is no wall thickening, pericholecystic fluid,  positive sonographic Bautista's sign or evidence for cholelithiasis.     Bile Ducts: Both the intra- and extrahepatic biliary system are of  normal caliber.  The common bile duct measures 5 mm in diameter.      Pancreas: Visualized portions of the head and body of the pancreas are  unremarkable.      Kidneys: Both kidneys are of normal echotexture, without  hydronephrosis.   The craniocaudal dimensions are: right- 12.2 cm,  left- 11.6 cm. The cyst present in the right mid kidney measures 1.9 x  1.9 x 1.6 cm. There is peripheral echogenicity with marked twinkle  artifact consistent with calcification. No internal vascularity  clearly demonstrated. Additional cyst with internal septations/debris  in the mid right kidney measures 1.1 x 1.3 x 1.4 cm without definite  vascularity. On cine imaging, additional cyst with internal  echogenicity in the mid to superior kidney measures up to 1.5 cm. This  is not evaluated on color imaging. Additional simple appearing cyst  present in the right kidney on cine imaging. A tiny cyst in the  superior left kidney measures 0.9 x 0.7 x 0.5 cm.     Spleen: The spleen is normal in size,  measuring 12.6 cm in sagittal  dimension.     Aorta and IVC: The visualized portions of the aorta and IVC are  unremarkable. The proximal aorta measures 2.3 cm in diameter.     Fluid: No evidence of ascites or pleural effusions.                                                                      IMPRESSION:   1.  No hydronephrosis.  2.  Bilateral renal cysts. Cyst on the left is tiny, likely simple.  Several cysts on the right present, some of which are simple. Largest  cyst in the mid kidney has peripheral calcification. There are 2 cysts  with internal septations/debris in the right kidney, one of which is  only demonstrated on cine imaging and not fully evaluated  sonographically. Limited assessment for any solid or enhancing tissue  within the cysts on ultrasound. Could consider follow-up CT/MRI renal  mass with contrast depending on patient's GFR and dialysis status. If  the noncontrast study is desired, could consider MRI.  3.  No splenomegaly.      CT ABDOMEN PELVIS W/O CONTRAST, 8/21/2023      FINDINGS:     Liver: Normal parenchymal attenuation without focal mass.  Biliary system: Gallbladder is within normal limits. No intrahepatic  or extrahepatic biliary ductal dilatation.  Pancreas: No focal mass or dilation of the main pancreatic duct.  Stomach: Within normal limits.  Spleen: mild splenomegaly, with splenic index of 1005 and volume of  610 cm3 .  Adrenal glands: Within normal limits.  Kidneys: Cysts in the right kidney. No hydronephrosis, or stone.  Bladder: Within normal limits.  Reproductive organs: Within normal limits.  Colon: Colonic diverticulosis without diverticulitis.  Appendix: Within normal limits.  Small Bowel: Within normal limits.  Lymph nodes: No intra-abdominal or pelvic lymphadenopathy.  Vasculature: no aortic aneurysm. Minimal atherosclerotic  calcifications in the aorta and proximal common iliac arteries. No  calcification in the right external iliac artery.  Peritoneum: No intraabdominal free fluid or air.      Lung bases: Multiple cysts in both lower lobes. No consolidation or  pleural effusion.     Bones and soft tissues: No suspicious soft tissue mass or fluid  collection. No suspicious osseous lesion. Bone island in T11 vertebral  body. Small fat-containing umbilical hernia                                                                      IMPRESSION:   1.  Mild splenomegaly. Recommend ultrasound to evaluate the liver  echotexture.  2.  Colonic diverticulosis.  3.  Minimal atherosclerotic calcification of the aorta and proximal,  iliac arteries.         30 minutes spent on the date of the encounter doing chart review, review of outside records, review of test results, interpretation of tests, patient visit, and documentation

## 2024-07-16 NOTE — NURSING NOTE
Please send [employer] link to pt mobile [phone] . Pt not successful joining through [employer] today and is requesting another option    Current patient location: [address] [place] [address]  [place] [address]    Is the patient currently in the state of [place]? YES    Visit mode:VIDEO    If the visit is dropped, the patient can be reconnected by: VIDEO VISIT: Text to cell phone:   Telephone Information:   Mobile [phone]       Will anyone else be joining the visit? NO  (If patient encounters technical issues they should call [phone])    How would you like to obtain your AVS? MyChart    Are changes needed to the allergy or medication list? No    Are refills needed on medications prescribed by this physician? NO    Reason for visit: Consult ([facility] - Transplant Clearance Consult)    Opal BAUTISTA

## 2024-07-17 ENCOUNTER — COMMITTEE REVIEW (OUTPATIENT)
Dept: TRANSPLANT | Facility: CLINIC | Age: 54
End: 2024-07-17
Payer: COMMERCIAL

## 2024-07-17 ENCOUNTER — TELEPHONE (OUTPATIENT)
Dept: TRANSPLANT | Facility: CLINIC | Age: 54
End: 2024-07-17
Payer: COMMERCIAL

## 2024-07-17 ENCOUNTER — TELEPHONE (OUTPATIENT)
Dept: UROLOGY | Facility: CLINIC | Age: 54
End: 2024-07-17
Payer: COMMERCIAL

## 2024-07-17 DIAGNOSIS — E11.9 DIABETES MELLITUS TYPE 2, UNCOMPLICATED (H): ICD-10-CM

## 2024-07-17 DIAGNOSIS — N18.6 ESRD (END STAGE RENAL DISEASE) (H): Primary | ICD-10-CM

## 2024-07-17 DIAGNOSIS — D47.2 MGUS (MONOCLONAL GAMMOPATHY OF UNKNOWN SIGNIFICANCE): ICD-10-CM

## 2024-07-17 DIAGNOSIS — Z76.82 ORGAN TRANSPLANT CANDIDATE: ICD-10-CM

## 2024-07-17 NOTE — COMMITTEE REVIEW
Abdominal Patient Discussion Note Transplant Coordinator: Danelle Scwhab  Transplant Surgeon:        Referring Physician: Roberth Brand    Committee Review Members:  Nephrology Donna Linder MD, Travis Guzman, APRN CNP, Christopher Little MD   Nurse Misael Landa, RN, Anette Powell, APRN CNP, TIANA PHAM RN   Nutrition Irene Abbott, RD   Pharmacist Chavo Whitlock, [facility]    - Clinical Lexy Broderick, [facility], Loyr Case, [facility]   Transplant FREDI POOLE, RN, Danelle Schwab, CHIRAG, Jose Wyatt MD, Allyssa Rosen, MIA WHYTE, Liv Borges, CHIRAG, Diana Wolf, RN, Chanelle Hdez, [place], Chanelle Freeman, CHIRAG, Kimberlyn Colin, CHIRAG, Joyce Scott, RN       Additional Discussion Notes and Findings: Committee reviewed 07/10/2024 colonoscopy findings and path report; 07/16/2024 urology consult for renal cyst and surveillance recommendation; MGUS history.  Committee recommends repeating MGUS (SPEP and free light chains) labs and if unchanged, okay to activate on the kidney and kidney/pancreas transplant lists.

## 2024-07-17 NOTE — TELEPHONE ENCOUNTER
Reviewed prior to being activated on the kidney and kidney/pancreas lists, patient will need updated testing for MGUS and then reviewed the definition of MGUS as patient reports she is unfamiliar with this term.  Reviewed will want to have an updated PRA sample, so will place this order along with MGUS order.     Also reviewed patient may be eligible to receive a kidney from an A2 donor with our current A2B protocol, in which patients who have blood type B may receive a kidney from an A2 donor.  Explained we need to re-check titers every 90 days. Patient verbalizes she is unfamiliar with the protocol, so will have the education letter and consent sent to her, but should still update testing now.       Discussed the risks of receiving an A2 kidney. All kidney transplant patients have the risk for rejection about two in one hundred patients and receiving an A2 or A2B kidney does not change that risk. Lab levels including creatinine and antibody titers will be watched closely post-transplant.      Pt agrees to draw titers and is aware that consent will need to be signed once candidacy is determined based off titer results.     Patient reports she prefers to have labs drawn closer to her home, so provided contact information for [facility], so she may call to schedule a lab appointment.      Explained I have sent a message to patient's assigned  transplant  to determine whether we need to obtain prior insurance approval before changing patient's status to Active on the transplant waitlists.  Noted will contact patient with an update when available.  Patient verbalizes agreement with this plan.

## 2024-07-17 NOTE — TELEPHONE ENCOUNTER
Patient confirmed scheduled appointment:  Date: 1/30/25  Time: 8:45  Visit type: RTN  Provider: Yves Mcgraw  Location: [facility]  Testing/imaging: NA  Additional notes: NA

## 2024-07-18 ENCOUNTER — LAB (OUTPATIENT)
Dept: LAB | Facility: OTHER | Age: 54
End: 2024-07-18
Payer: COMMERCIAL

## 2024-07-18 DIAGNOSIS — N18.6 ESRD (END STAGE RENAL DISEASE) (H): ICD-10-CM

## 2024-07-18 DIAGNOSIS — E11.9 DIABETES MELLITUS TYPE 2, UNCOMPLICATED (H): ICD-10-CM

## 2024-07-18 DIAGNOSIS — D47.2 MGUS (MONOCLONAL GAMMOPATHY OF UNKNOWN SIGNIFICANCE): ICD-10-CM

## 2024-07-18 DIAGNOSIS — Z76.82 ORGAN TRANSPLANT CANDIDATE: ICD-10-CM

## 2024-07-18 LAB
A1 AB TITR SERPL: 1 {TITER}
A1 AB TITR SERPL: 2 {TITER}
A2 AB TITR SERPL: <1 {TITER}
A2 AB TITR SERPL: <1 {TITER}
ANTIBODY SCREEN: NEGATIVE
ANTIBODY TITER IGM SCREEN: NEGATIVE
SPECIMEN EXPIRATION DATE: NORMAL
SPECIMEN EXPIRATION DATE: NORMAL
TOTAL PROTEIN SERUM FOR ELP: 6.5 G/DL (ref 6.4–8.3)

## 2024-07-18 PROCEDURE — 84165 PROTEIN E-PHORESIS SERUM: CPT | Performed by: PATHOLOGY

## 2024-07-18 PROCEDURE — 83521 IG LIGHT CHAINS FREE EACH: CPT

## 2024-07-18 PROCEDURE — 36415 COLL VENOUS BLD VENIPUNCTURE: CPT

## 2024-07-18 PROCEDURE — 86886 COOMBS TEST INDIRECT TITER: CPT

## 2024-07-18 PROCEDURE — 84155 ASSAY OF PROTEIN SERUM: CPT

## 2024-07-18 PROCEDURE — 86850 RBC ANTIBODY SCREEN: CPT

## 2024-07-18 PROCEDURE — 86832 HLA CLASS I HIGH DEFIN QUAL: CPT

## 2024-07-18 PROCEDURE — 86833 HLA CLASS II HIGH DEFIN QUAL: CPT

## 2024-07-19 LAB
ALBUMIN SERPL ELPH-MCNC: 3.4 G/DL (ref 3.7–5.1)
ALPHA1 GLOB SERPL ELPH-MCNC: 0.3 G/DL (ref 0.2–0.4)
ALPHA2 GLOB SERPL ELPH-MCNC: 0.8 G/DL (ref 0.5–0.9)
B-GLOBULIN SERPL ELPH-MCNC: 0.7 G/DL (ref 0.6–1)
GAMMA GLOB SERPL ELPH-MCNC: 1.4 G/DL (ref 0.7–1.6)
KAPPA LC FREE SER-MCNC: 11.4 MG/DL (ref 0.33–1.94)
KAPPA LC FREE/LAMBDA FREE SER NEPH: 0.64 {RATIO} (ref 0.26–1.65)
LAMBDA LC FREE SERPL-MCNC: 17.71 MG/DL (ref 0.57–2.63)
M PROTEIN SERPL ELPH-MCNC: 0.3 G/DL
PROT PATTERN SERPL ELPH-IMP: ABNORMAL

## 2024-07-24 ENCOUNTER — TEAM CONFERENCE (OUTPATIENT)
Dept: TRANSPLANT | Facility: CLINIC | Age: 54
End: 2024-07-24
Payer: COMMERCIAL

## 2024-07-24 ENCOUNTER — TELEPHONE (OUTPATIENT)
Dept: TRANSPLANT | Facility: CLINIC | Age: 54
End: 2024-07-24
Payer: COMMERCIAL

## 2024-07-24 DIAGNOSIS — E11.9 DIABETES MELLITUS TYPE 2, UNCOMPLICATED (H): ICD-10-CM

## 2024-07-24 DIAGNOSIS — N18.6 ESRD (END STAGE RENAL DISEASE) (H): Primary | ICD-10-CM

## 2024-07-24 DIAGNOSIS — Z76.82 ORGAN TRANSPLANT CANDIDATE: ICD-10-CM

## 2024-07-24 DIAGNOSIS — D47.2 MONOCLONAL GAMMOPATHY OF UNKNOWN SIGNIFICANCE (MGUS): ICD-10-CM

## 2024-07-24 NOTE — TELEPHONE ENCOUNTER
Nephrology Rounds    ATTENDEES: Shu Motta    DISCUSSION: Review 07/18/2023 SPEP and Light chain results.  Should have repeated every 6 months and prior to activation, should have serum and urine immunofixation testing, if this hasn't been performed already

## 2024-07-25 ENCOUNTER — TELEPHONE (OUTPATIENT)
Dept: TRANSPLANT | Facility: CLINIC | Age: 54
End: 2024-07-25
Payer: COMMERCIAL

## 2024-07-25 NOTE — TELEPHONE ENCOUNTER
Updated patient need to complete serum and urine immunofixation testing before activating on the transplant waitlists.   Patient reports she will schedule a lab appointment for 07/26/2024.  Noted will watch for results and then contact patient.

## 2024-07-26 ENCOUNTER — LAB (OUTPATIENT)
Dept: LAB | Facility: OTHER | Age: 54
End: 2024-07-26
Payer: COMMERCIAL

## 2024-07-26 DIAGNOSIS — N18.6 ESRD (END STAGE RENAL DISEASE) (H): ICD-10-CM

## 2024-07-26 DIAGNOSIS — E11.9 DIABETES MELLITUS TYPE 2, UNCOMPLICATED (H): ICD-10-CM

## 2024-07-26 DIAGNOSIS — Z76.82 ORGAN TRANSPLANT CANDIDATE: ICD-10-CM

## 2024-07-26 DIAGNOSIS — D47.2 MONOCLONAL GAMMOPATHY OF UNKNOWN SIGNIFICANCE (MGUS): ICD-10-CM

## 2024-07-26 PROCEDURE — 86334 IMMUNOFIX E-PHORESIS SERUM: CPT | Performed by: PATHOLOGY

## 2024-07-26 PROCEDURE — 86335 IMMUNFIX E-PHORSIS/URINE/CSF: CPT | Performed by: PATHOLOGY

## 2024-07-26 PROCEDURE — 36415 COLL VENOUS BLD VENIPUNCTURE: CPT

## 2024-07-29 ENCOUNTER — DOCUMENTATION ONLY (OUTPATIENT)
Dept: TRANSPLANT | Facility: CLINIC | Age: 54
End: 2024-07-29

## 2024-07-29 LAB
LOCATION OF TASK: NORMAL
LOCATION OF TASK: NORMAL
PROT ELPH PNL UR ELPH: NORMAL
PROT PATTERN SERPL IFE-IMP: NORMAL

## 2024-07-31 ENCOUNTER — TELEPHONE (OUTPATIENT)
Dept: TRANSPLANT | Facility: CLINIC | Age: 54
End: 2024-07-31
Payer: COMMERCIAL

## 2024-08-01 ENCOUNTER — PATIENT OUTREACH (OUTPATIENT)
Dept: ONCOLOGY | Facility: CLINIC | Age: 54
End: 2024-08-01
Payer: COMMERCIAL

## 2024-08-01 ENCOUNTER — TELEPHONE (OUTPATIENT)
Dept: TRANSPLANT | Facility: CLINIC | Age: 54
End: 2024-08-01
Payer: COMMERCIAL

## 2024-08-01 DIAGNOSIS — Z76.82 ORGAN TRANSPLANT CANDIDATE: ICD-10-CM

## 2024-08-01 DIAGNOSIS — E11.9 DIABETES MELLITUS, TYPE 2 (H): ICD-10-CM

## 2024-08-01 DIAGNOSIS — D47.2 MGUS (MONOCLONAL GAMMOPATHY OF UNKNOWN SIGNIFICANCE): Primary | ICD-10-CM

## 2024-08-01 DIAGNOSIS — N18.6 ESRD (END STAGE RENAL DISEASE) (H): ICD-10-CM

## 2024-08-01 NOTE — TELEPHONE ENCOUNTER
"Patient reports she is driving to get her anemia \"shot\" and will be home after 1:30 PM.  Reviewed she will need a hematology consult, but this will not hold up her change to active status on the waitlists.  Explained I will be out of the office this afternoon and tomorrow, but can have my colleague contact patient regarding activating on the transplant waitlists.  Patient reports she would like to wait until next week as she will want her family member on the call to hear the information regarding being active status on the transplant waitlists.   "

## 2024-08-01 NOTE — PROGRESS NOTES
New Patient Oncology Nurse Navigator Note     Referring provider    Lj Vizcarra MD        Referring Clinic/Organization:   [facility]      Referred to (specialty:) Hematology     Requested provider (if applicable): NA     Date Referral Received: August 1, 2024     Evaluation for:    D47.2 (ICD-10-CM) - MGUS (monoclonal gammopathy of unknown significance)   E11.9 (ICD-10-CM) - Diabetes mellitus, type 2 (H)   N18.6 (ICD-10-CM) - ESRD (end stage renal disease) (H)   Z76.82 (ICD-10-CM) - Organ transplant candidate        Clinical History (per Nurse review of records provided):       Latest Reference Range & Units 07/18/24 15:09 07/26/24 14:08 07/26/24 14:10   Albumin Fraction 3.7 - 5.1 g/dL 3.4 (L)     Alpha 1 Fraction 0.2 - 0.4 g/dL 0.3     Alpha 2 Fraction 0.5 - 0.9 g/dL 0.8     Beta Fraction 0.6 - 1.0 g/dL 0.7     ELP Interpretation:  Small monoclonal protein (0.1 g/dL) seen in the gamma fraction, not previously characterized in our laboratory. Recommend serum and urine immunofixation for confirmation and further characterization if not previously performed elsewhere. Pathologic significance requires clinical correlation. DIA Farah M.D., Ph.D., Pathologist.     Gamma Fraction 0.7 - 1.6 g/dL 1.4     Immunofix ELP Urine    Monoclonal IgG immunoglobulin of lambda light chain type.  Pathologic significance requires clinical correlation.  ESTRELLA Khan M.D., Ph.D., Pathologist ()   Immunofixation ELP   Monoclonal IgG immunoglobulin of lambda light chain type.  Pathologic significance requires clinical correlation.  ESTRELLA Khan M.D., Ph.D., Pathologist ()    Kappa Free Lt Chain 0.33 - 1.94 mg/dL 11.40 (H)     Kappa Lambda Ratio 0.26 - 1.65  0.64     Lambda Free Lt Chain 0.57 - 2.63 mg/dL 17.71 (H)     Monoclonal Peak <=0.0 g/dL 0.3 (H)     Total Protein Serum for ELP 6.4 - 8.3 g/dL 6.5     (L): Data is abnormally low  (H): Data is  abnormally high     Records Location: See Bookmarked material     Records Needed: NA     Additional testing needed prior to consult: NA    Payor: BCBS / Plan: [place]BS [place] / Product Type: Indemnity /     August 1, 2024  Referral received and reviewed.  Sent to NPS to process.     Hollie THOMPSONN, RN   Oncology Nurse Navigator   [facility]   [phone] / [phone]

## 2024-08-03 ENCOUNTER — HEALTH MAINTENANCE LETTER (OUTPATIENT)
Age: 54
End: 2024-08-03

## 2024-08-05 ENCOUNTER — TELEPHONE (OUTPATIENT)
Dept: TRANSPLANT | Facility: CLINIC | Age: 54
End: 2024-08-05
Payer: COMMERCIAL

## 2024-08-05 NOTE — TELEPHONE ENCOUNTER
Called patient to inform them of status change to ACTIVE on the Kidney alone and Kidney-Pancreas list today 2025. Status change letter and PRA orders to be mailed. Patient is in agreement with listing ACTIVE status.      Discussed:   - Pt is eligible for  donor offers and pt can receive calls 24 hours a day, 7 days a week, and on call staff need to be able to reach pt or one of emergency contacts within 30 minutes or they might skip over to next recipient on list.   -Please make sure your phone is charged at all times and your voicemail is not full   -Your transplant on call coordinator will give you directions about when and where you will need to come to the hospital for transplant  -The transplant coordinator will call you to discuss your current health status, the offer, and plans to move forward.  Some organ offer calls will require you to come to the hospital immediately; some may not be as time sensitive.  It may be possible for you to come to the hospital and, for various reasons, the transplant could be cancelled.  This is often called a  dry run .  - Reviewed the right to accept or decline offer, without penalty  - Expected length of surgical procedure is about 4-6 hours, 6-12 hours (kidney/pancreas) expected inpatient length of stay post transplant is 3-4 days, kidney/pancreas is 7-10 days, and potential to stay locally post transplant.   - Verified contact information as documented in Epic. Confirmed emergency contact information  -Instructed patient about importance of having PRAs drawn every 3 months via: (Mailers/[facility]). Encouraged them to set reminder in their preferred calendar to stay on top of their quarterly labs  -Reminded pt to stay up on health maintenance and to call with any updates on their health status, insurance or contact information.   -Reminded pt to inform RNCC as soon as possible if they test positive for COVID.  -Donor website was provided     -Last PRA was  07/18/2024    Reviewed the A2 or A2B kidney transplant consent. (Revision Date: 10/28/22) Reviewed pt's titers from 04/24/2024 and 07/18/2024 and they have two consecutive titers that are qualifying for our program and pt is deemed a candidate for A2 to B. Consent was reviewed with patient, signed and scanned into chart. [facility] listing has been updated. Pt aware of all changes. Pt verbalized understanding of information and has no further questions. Encouraged to reach out if questions arise.      -Provided name and contact information for additional questions or concerns.  Pt expressed excellent understanding of all and was in good agreement with the plan.

## 2024-08-06 ENCOUNTER — DOCUMENTATION ONLY (OUTPATIENT)
Dept: TRANSPLANT | Facility: CLINIC | Age: 54
End: 2024-08-06
Payer: COMMERCIAL

## 2024-08-06 DIAGNOSIS — N18.6 ESRD (END STAGE RENAL DISEASE) (H): Primary | ICD-10-CM

## 2024-08-06 DIAGNOSIS — Z76.82 ORGAN TRANSPLANT CANDIDATE: ICD-10-CM

## 2024-08-13 DIAGNOSIS — E11.9 DIABETES MELLITUS, TYPE 2 (H): Primary | ICD-10-CM

## 2024-08-13 DIAGNOSIS — Z76.82 ORGAN TRANSPLANT CANDIDATE: ICD-10-CM

## 2024-08-13 DIAGNOSIS — N18.6 ESRD (END STAGE RENAL DISEASE) (H): ICD-10-CM

## 2024-08-15 ENCOUNTER — TELEPHONE (OUTPATIENT)
Dept: TRANSPLANT | Facility: CLINIC | Age: 54
End: 2024-08-15
Payer: COMMERCIAL

## 2024-08-15 NOTE — TELEPHONE ENCOUNTER
Noted patient due for mammogram.  Patient reports she will contact her PCP to get scheduled as soon as possible.  Requested patient contact me after she has completed th test.   Patient verbalizes agreement with this plan.

## 2024-09-09 NOTE — PROGRESS NOTES
TRANSPLANT NEPHROLOGY WAITLIST VISIT    Assessment and Plan:  # Kidney/Pancreas Transplant Wait List Evaluation: Patient is a good candidate overall. Patient should be active on the wait list pending updated mammogram     # CKD from Biopsy Proven DM/HTN: biopsy 2023 also with findings thought 2/2 Sjogren's. Doing OK on PD since 2023, but may benefit from a kidney transplant.     #  Mildly Complex Renal Cysts: in the midpole of the right kidney with thin and mildly thickened internal septations on 2024 MRI.  Recommend repeat imaging in 6-12 months imaging     # Type 2 Diabetes: currently using Glimepiride  and Lantus 14 units HS Given evidence of end organ damage, she may benefit from a pancreas transplant. Hemoglobin A1c 7.8%.     # Cardiac Risk:     CAD: 2024 coronary angiogram showed   Mid LAD lesion is 30% stenosed.  Dist LAD lesion is 30% stenosed.    Her last ECHO in 2023 showed normal LVEF ~ 60-65%.      # PAD Screening: CT and iliac US completed in 2023.      # Possible Sjogren's: seen by rheumatology in 2024 . Follow up in one year.      # IgG Lambda MGUS: with stable monoclonal peak ~  0.3 and K/L ratio ~ 0.64  in 2024.     # Positive Lupus Anticoagulant: will repeat with beta 2 glycoprotein IgM and IgM.     # Health Maintenance: 2024 Colonoscopy (repeat in 3-4 years): UTD Mammogram: Not Up to date, 2023 PAP: Up to date and Dental: edentulous, full dentures       Discussed the risks and benefits of a transplant, including the risk of surgery and immunosuppression medications.    KDPI: We discussed approximate remaining wait time and how that is influenced by issues such as blood type and sensitization (PRA) and access to a living donor. I contrasted potential waiting time for living vs  donor kidneys from  normal (0-85%) or higher (%) kidney donor profile index (KDPI) donors and their associated outcomes. I would not recommend Ms. Kapoor to consider kidneys from high  KDPI donors. The reason for this decision is best summarized as: multi-organ transplant candidate.    Patient presently appears to be enough of an acceptable kidney transplant recipient candidate to have any potential kidney donors start the evaluation process.  Patient s overall re-evaluation may require further discussion in the Transplant Program s multidisciplinary selection committee for a final recommendation on the patient s suitability for transplant.     Reason for Visit:  Wendy Kapoor is a 53 year old female with ESKD from DM / HTN , who presents for kidney/pancreas transplant wait list evaluation.     Date of Initial Transplant Evaluation:  8/2023        Current Transplant Phase: Waitlist: Active  Official UNOS Listing Date: 8/10/2023  Blood Type: B        cPRA: 98%       Date of cPRA: 7/2024  Transplant Coordinator: Danelle Schwab Transplant Office phone number [phone]        History of Present Illness:            Interim Events:        Started PD 9/2023,           Kidney Disease:        Kidney Disease Dx: DM II/ HTN         On Dialysis: Yes, Date initiated: 9/2023 and Dialysis Type: PD;, dialysis is going well,  no episodes of peritonitis        Primary Nephrologist: Dr. Giles          Diabetes: using Glimepiride , Lantus 14 units HS        Diabetic Control: Borderline control (HbA1c 7-9%)      Last HbA1c: 7.8%        Hypoglycemic Unawareness: No, BG  ~ 110-140       New Issues: No       Cardiac/Vascular Disease History:       Known CAD: Yes    4/2024 coronary angiogram showed   Mid LAD lesion is 30% stenosed.  Dist LAD lesion is 30% stenosed.       Known PAD/Claudication Symptoms: Yes; minimal disease        Known Heart Failure: No       Arrhythmia:  No       Pulmonary Hypertension: No        Valvular Disease: No       Other: None       New Cardiac/Vascular Events: No         Functional Capacity/Frailty:        Does not do much for activity, c/o swelling/pain in her arms and legs.  Can walk 1-2  miles OK and can do stairs without chest pain or shortness of breath.     #Identified Care Partner Post Transplant Surgery: spouse and children       Allergy Testing Questions:   Medication that caused a reaction None   Antibiotics used that didn't give an allergic reaction?  Patient doesn't know    COVID Vaccination Up To Date: No      Other Pertinent Transplant Surgical Issues:  Recent Blood Transfusion: No  Previous Abdominal Transplant: No  Bladder Dysfunction: No  Chronic/Recurrent Infections: No  Chronic Anticoagulation: No  Jehovah s Witness: No       Active Problem List:   Patient Active Problem List   Diagnosis    Type 2 diabetes mellitus with diabetic nephropathy (H)    Sjogren's syndrome (H24)    MGUS (monoclonal gammopathy of unknown significance)    Chronic kidney disease, stage V (H)    Essential hypertension    Pre-operative cardiovascular examination    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)    Status post coronary angiogram    Pre-transplant evaluation for kidney transplant       Personal History:  Nonsmoker      Allergies:  No Known Allergies     Medications:  Current Outpatient Medications   Medication Sig Dispense Refill    aspirin 81 MG EC tablet Take 1 tablet (81 mg) by mouth daily      blood glucose (FREESTYLE TEST STRIPS) test strip by Misc.(Non-Drug; Combo Route) route three times a day.      cholecalciferol 50 MCG (2000 UT) tablet Take 50 mcg by mouth      DARBEPOETIN DALTON IJ Inject 25 mcg Subcutaneous every 28 days      glimepiride (AMARYL) 4 MG tablet TAKE 2 TABLETS (8 MG) BY MOUTH EVERY MORNING BEFORE BREAKFAST.      hydrALAZINE (APRESOLINE) 25 MG tablet Take 50 mg by mouth 3 times daily      Insulin Glargine w/ Trans Port 100 UNIT/ML SOPN Inject 14 Units Subcutaneous      losartan (COZAAR) 50 MG tablet Take 1 tablet by mouth every morning      LYCOPENE PO Take 1 tablet by mouth daily      metoprolol succinate ER (TOPROL XL) 100 MG 24 hr tablet Take 2 tablets  (200 mg) by mouth daily at 2 pm      pioglitazone (ACTOS) 30 MG tablet Take 30 mg by mouth      predniSONE (DELTASONE) 20 MG tablet TAKE 3 TABLETS BY MOUTH DAILY FOR 5 DAYS, THEN 2 TABLETS FOR 5 DAYS, THEN 1 TABLET FOR 5 DAYS.      sodium bicarbonate 650 MG tablet 1,300 mg      torsemide (DEMADEX) 100 MG tablet Take 1 tablet (100 mg) by mouth every morning       No current facility-administered medications for this visit.        Vitals:  There were no vitals taken for this visit.     Exam:  GENERAL APPEARANCE: alert and no distress  HENT: mouth without ulcers or lesions  LYMPHATICS: no cervical or supraclavicular nodes  RESP: lungs clear to auscultation - no rales, rhonchi or wheezes  CV: regular rhythm, normal rate, no rub, no murmur  EDEMA: no LE edema bilaterally  ABDOMEN: soft, nondistended, nontender, bowel sounds normal  MS: extremities normal - no gross deformities noted, no evidence of inflammation in joints, no muscle tenderness  SKIN: no rash

## 2024-09-10 ENCOUNTER — VIRTUAL VISIT (OUTPATIENT)
Dept: TRANSPLANT | Facility: CLINIC | Age: 54
End: 2024-09-10
Attending: NURSE PRACTITIONER
Payer: COMMERCIAL

## 2024-09-10 DIAGNOSIS — N18.6 ESRD (END STAGE RENAL DISEASE) (H): ICD-10-CM

## 2024-09-10 DIAGNOSIS — E11.21 TYPE 2 DIABETES MELLITUS WITH DIABETIC NEPHROPATHY, WITH LONG-TERM CURRENT USE OF INSULIN (H): ICD-10-CM

## 2024-09-10 DIAGNOSIS — Z76.82 ORGAN TRANSPLANT CANDIDATE: ICD-10-CM

## 2024-09-10 DIAGNOSIS — Z79.4 TYPE 2 DIABETES MELLITUS WITH DIABETIC NEPHROPATHY, WITH LONG-TERM CURRENT USE OF INSULIN (H): ICD-10-CM

## 2024-09-10 PROCEDURE — 99214 OFFICE O/P EST MOD 30 MIN: CPT | Mod: 95 | Performed by: NURSE PRACTITIONER

## 2024-09-10 NOTE — LETTER
9/10/2024      Wendy Kapoor  [address]  [place] [address]      Dear Colleague,    Thank you for referring your patient, Wendy Kapoor, to the [facility]. Please see a copy of my visit note below.    TRANSPLANT NEPHROLOGY WAITLIST VISIT    Assessment and Plan:  # Kidney/Pancreas Transplant Wait List Evaluation: Patient is a good candidate overall. Patient should be active on the wait list pending updated mammogram     # CKD from Biopsy Proven DM/HTN: biopsy Jan 2023 also with findings thought 2/2 Sjogren's. Doing OK on PD since 9/2023, but may benefit from a kidney transplant.     #  Mildly Complex Renal Cysts: in the midpole of the right kidney with thin and mildly thickened internal septations on 6/2024 MRI.  Recommend repeat imaging in 6-12 months imaging     # Type 2 Diabetes: currently using Glimepiride  and Lantus 14 units HS Given evidence of end organ damage, she may benefit from a pancreas transplant. Hemoglobin A1c 7.8%.     # Cardiac Risk:     CAD: 4/2024 coronary angiogram showed    Mid LAD lesion is 30% stenosed.   Dist LAD lesion is 30% stenosed.    Her last ECHO in 8/2023 showed normal LVEF ~ 60-65%.      # PAD Screening: CT and iliac US completed in 8/2023.      # Possible Sjogren's: seen by rheumatology in 1/2024 . Follow up in one year.      # IgG Lambda MGUS: with stable monoclonal peak ~  0.3 and K/L ratio ~ 0.64  in July 2024.     # Positive Lupus Anticoagulant: will repeat with beta 2 glycoprotein IgM and IgM.     # Health Maintenance: 7/2024 Colonoscopy (repeat in 3-4 years): UTD Mammogram: Not Up to date, 7/2023 PAP: Up to date and Dental: edentulous, full dentures       Discussed the risks and benefits of a transplant, including the risk of surgery and immunosuppression medications.    KDPI: We discussed approximate remaining wait time and how that is influenced by issues such as blood type and sensitization (PRA) and access to a living donor. I  contrasted potential waiting time for living vs  donor kidneys from  normal (0-85%) or higher (%) kidney donor profile index (KDPI) donors and their associated outcomes. I would not recommend Ms. Kapoor to consider kidneys from high KDPI donors. The reason for this decision is best summarized as: multi-organ transplant candidate.    Patient presently appears to be enough of an acceptable kidney transplant recipient candidate to have any potential kidney donors start the evaluation process.  Patient s overall re-evaluation may require further discussion in the Transplant Program s multidisciplinary selection committee for a final recommendation on the patient s suitability for transplant.     Reason for Visit:  Wendy Kapoor is a 53 year old female with ESKD from DM / HTN , who presents for kidney/pancreas transplant wait list evaluation.     Date of Initial Transplant Evaluation:  2023        Current Transplant Phase: Waitlist: Active  Official UNOS Listing Date: 8/10/2023  Blood Type: B        cPRA: 98%       Date of cPRA: 2024  Transplant Coordinator: Danelle Schwab Transplant Office phone number [phone]        History of Present Illness:            Interim Events:        Started PD 2023,           Kidney Disease:        Kidney Disease Dx: DM II/ HTN         On Dialysis: Yes, Date initiated: 2023 and Dialysis Type: PD;, dialysis is going well,  no episodes of peritonitis        Primary Nephrologist: Dr. Giles          Diabetes: using Glimepiride , Lantus 14 units HS        Diabetic Control: Borderline control (HbA1c 7-9%)      Last HbA1c: 7.8%        Hypoglycemic Unawareness: No, BG  ~ 110-140       New Issues: No       Cardiac/Vascular Disease History:       Known CAD: Yes    2024 coronary angiogram showed    Mid LAD lesion is 30% stenosed.   Dist LAD lesion is 30% stenosed.       Known PAD/Claudication Symptoms: Yes; minimal disease        Known Heart Failure: No       Arrhythmia:   No       Pulmonary Hypertension: No        Valvular Disease: No       Other: None       New Cardiac/Vascular Events: No         Functional Capacity/Frailty:        Does not do much for activity, c/o swelling/pain in her arms and legs.  Can walk 1-2 miles OK and can do stairs without chest pain or shortness of breath.     #Identified Care Partner Post Transplant Surgery: spouse and children       Allergy Testing Questions:   Medication that caused a reaction None   Antibiotics used that didn't give an allergic reaction?  Patient doesn't know    COVID Vaccination Up To Date: No      Other Pertinent Transplant Surgical Issues:  Recent Blood Transfusion: No  Previous Abdominal Transplant: No  Bladder Dysfunction: No  Chronic/Recurrent Infections: No  Chronic Anticoagulation: No  Jehovah s Witness: No       Active Problem List:   Patient Active Problem List   Diagnosis     Type 2 diabetes mellitus with diabetic nephropathy (H)     Sjogren's syndrome (H24)     MGUS (monoclonal gammopathy of unknown significance)     Chronic kidney disease, stage V (H)     Essential hypertension     Pre-operative cardiovascular examination     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)     Status post coronary angiogram     Pre-transplant evaluation for kidney transplant       Personal History:  Nonsmoker      Allergies:  No Known Allergies     Medications:  Current Outpatient Medications   Medication Sig Dispense Refill     aspirin 81 MG EC tablet Take 1 tablet (81 mg) by mouth daily       blood glucose (FREESTYLE TEST STRIPS) test strip by Misc.(Non-Drug; Combo Route) route three times a day.       cholecalciferol 50 MCG (2000 UT) tablet Take 50 mcg by mouth       DARBEPOETIN DALTON IJ Inject 25 mcg Subcutaneous every 28 days       glimepiride (AMARYL) 4 MG tablet TAKE 2 TABLETS (8 MG) BY MOUTH EVERY MORNING BEFORE BREAKFAST.       hydrALAZINE (APRESOLINE) 25 MG tablet Take 50 mg by mouth 3 times daily       Insulin  Glargine w/ Trans Port 100 UNIT/ML SOPN Inject 14 Units Subcutaneous       losartan (COZAAR) 50 MG tablet Take 1 tablet by mouth every morning       LYCOPENE PO Take 1 tablet by mouth daily       metoprolol succinate ER (TOPROL XL) 100 MG 24 hr tablet Take 2 tablets (200 mg) by mouth daily at 2 pm       pioglitazone (ACTOS) 30 MG tablet Take 30 mg by mouth       predniSONE (DELTASONE) 20 MG tablet TAKE 3 TABLETS BY MOUTH DAILY FOR 5 DAYS, THEN 2 TABLETS FOR 5 DAYS, THEN 1 TABLET FOR 5 DAYS.       sodium bicarbonate 650 MG tablet 1,300 mg       torsemide (DEMADEX) 100 MG tablet Take 1 tablet (100 mg) by mouth every morning       No current facility-administered medications for this visit.        Vitals:  There were no vitals taken for this visit.     Exam:  GENERAL APPEARANCE: alert and no distress  HENT: mouth without ulcers or lesions  LYMPHATICS: no cervical or supraclavicular nodes  RESP: lungs clear to auscultation - no rales, rhonchi or wheezes  CV: regular rhythm, normal rate, no rub, no murmur  EDEMA: no LE edema bilaterally  ABDOMEN: soft, nondistended, nontender, bowel sounds normal  MS: extremities normal - no gross deformities noted, no evidence of inflammation in joints, no muscle tenderness  SKIN: no rash           Again, thank you for allowing me to participate in the care of your patient.        Sincerely,        Travis Guzman, MIA CNP

## 2024-09-11 NOTE — TELEPHONE ENCOUNTER
RECORDS STATUS - ALL OTHER DIAGNOSIS      RECORDS RECEIVED FROM: [facility] - Internal records   DATE RECEIVED: 9/11

## 2024-09-13 ENCOUNTER — ONCOLOGY VISIT (OUTPATIENT)
Dept: TRANSPLANT | Facility: CLINIC | Age: 54
End: 2024-09-13
Attending: INTERNAL MEDICINE
Payer: COMMERCIAL

## 2024-09-13 ENCOUNTER — LAB (OUTPATIENT)
Dept: LAB | Facility: CLINIC | Age: 54
End: 2024-09-13
Attending: INTERNAL MEDICINE
Payer: COMMERCIAL

## 2024-09-13 ENCOUNTER — PRE VISIT (OUTPATIENT)
Dept: TRANSPLANT | Facility: CLINIC | Age: 54
End: 2024-09-13
Payer: COMMERCIAL

## 2024-09-13 ENCOUNTER — PATIENT OUTREACH (OUTPATIENT)
Dept: ONCOLOGY | Facility: CLINIC | Age: 54
End: 2024-09-13

## 2024-09-13 VITALS
SYSTOLIC BLOOD PRESSURE: 157 MMHG | RESPIRATION RATE: 20 BRPM | HEIGHT: 59 IN | WEIGHT: 140.8 LBS | TEMPERATURE: 98.2 F | BODY MASS INDEX: 28.39 KG/M2 | DIASTOLIC BLOOD PRESSURE: 80 MMHG | OXYGEN SATURATION: 98 % | HEART RATE: 76 BPM

## 2024-09-13 DIAGNOSIS — D47.2 MGUS (MONOCLONAL GAMMOPATHY OF UNKNOWN SIGNIFICANCE): ICD-10-CM

## 2024-09-13 DIAGNOSIS — N18.6 ESRD (END STAGE RENAL DISEASE) (H): ICD-10-CM

## 2024-09-13 DIAGNOSIS — Z76.82 ORGAN TRANSPLANT CANDIDATE: ICD-10-CM

## 2024-09-13 PROCEDURE — 36415 COLL VENOUS BLD VENIPUNCTURE: CPT

## 2024-09-13 PROCEDURE — 86832 HLA CLASS I HIGH DEFIN QUAL: CPT

## 2024-09-13 PROCEDURE — 86833 HLA CLASS II HIGH DEFIN QUAL: CPT

## 2024-09-13 PROCEDURE — 99211 OFF/OP EST MAY X REQ PHY/QHP: CPT | Performed by: INTERNAL MEDICINE

## 2024-09-13 PROCEDURE — 85390 FIBRINOLYSINS SCREEN I&R: CPT | Mod: 26 | Performed by: PATHOLOGY

## 2024-09-13 PROCEDURE — 86146 BETA-2 GLYCOPROTEIN ANTIBODY: CPT

## 2024-09-13 PROCEDURE — 99205 OFFICE O/P NEW HI 60 MIN: CPT | Performed by: INTERNAL MEDICINE

## 2024-09-13 PROCEDURE — 85613 RUSSELL VIPER VENOM DILUTED: CPT

## 2024-09-13 ASSESSMENT — PAIN SCALES - GENERAL: PAINLEVEL: NO PAIN (0)

## 2024-09-13 NOTE — LETTER
"9/13/2024      Wendy Kapoor  [address]  [place] [address]      Dear Colleague,    Thank you for referring your patient, Wendy Kapoor, to the [facility] BLOOD AND MARROW TRANSPLANT PROGRAM [place]. Please see a copy of my visit note below.    [facility] Consultation       Wendy Kapoor is a 53 year old female with DM/HTN/ESRD on PD and IgG Lambda monoclonal protein who presents to the hematology clinic for evaluation of MGUS. Miss Nguyen has ESRD secondary to DM/HTN and is currently on peritoneal dialysis. She underwent a kidney biopsy in jan 2023 that showed nodular diabetic glomerulosclerosis with focal global and secondary segmental glomerulosclerosis.  Congo red stains for amyloid was negative. Ms Nguyen is currently being evaluated for a kidney transplant. She has anemia of chronic disease secondary to ESRD and is on EPO. Patient reports fatigue and some dyspnea on exertion probably related to her anemia; however, it is not interfering with her activities of daily living.  Patient denied numbness of tingling of extremities, nausea, vomiting, diarrhea, weight loss, syncope, macroglossia, jaw or lumbar claudication. She takes ASA daily. Denied periorbital purpura , easy bruising or bleeding.     Date of diagnosis : 8/2/2023    M spike ; 0.3-0.4 g/dL  Free light chains: K  -30.3, L---39.0, K/L FLC ratio -0.78      Physical Exam:     Vital Signs: BP (!) 157/80 (BP Location: Right arm, Patient Position: Right side, Cuff Size: Adult Regular)   Pulse 76   Temp 98.2  F (36.8  C) (Oral)   Resp 20   Ht 1.5 m (4' 11.06\")   Wt 63.9 kg (140 lb 12.8 oz)   SpO2 98%   BMI 28.39 kg/m          ECOG PS 0     General Appearance: healthy, alert and no distress  Eyes: PERRL, conjunctiva and lids normal, sclera nonicteric  Ears/Nose/M/Throat: Oral mucosa and posterior oropharynx normal, moist mucous membranes  Neck supple, non-tender, free range of motion, no adenopathy  Cardio/Vascular:regular rate " and rhythm, normal S1 and S2, no murmur  Resp Effort And Auscultation: Normal respiratory effort without distress  GI: soft, nontender, bowel sounds present in all four quadrants, no hepatosplenomegaly  Lymphatics:no significant enlargement of lymph nodes globally   Musculoskeletal: Musculoskeletal normal  Edema: none  Skin: Skin color, texture, turgor normal. No rashes or lesions.  Neurologic: Gait normal. Reflexes normal and symmetric. Sensation grossly WNL.  Psych/Affect: Mood and affect are appropriate.      ASSESSMENT AND PLAN:  53 year old yr old female , Ms. Wendy Kapoor DM/HTN/ESRD on PD and IgG Lambda monoclonal protein who presents to the hematology clinic for evaluation of MGUS.     Problem list:   1) IgG Lambda MGUS:   2) Diabetes   3) Hypertension  4) ESRD : Secondary to DM/HTN, Currently on PD and being evaluated for a kidney transplant.   5) Anemia of chronic disease secondary to ESRD: on EPO.     1) IgG Lambda MGUS:    Kidney biopsy done on 1/9/23 showed changes consistent with diabetic nephropathy. Congo red staining for amyloid was negative.   I discussed with the patient at length regarding the natural history, risk stratification and outcomes of MGUS.   The prevalence of MGUS in general population is estimated to be 1%-4% and its frequency increases with age with 3% among people above 50 y of age. The prevalence of the MGUS among the ESRD patients is believed to be higher than the general population. I did discuss with Ms Nguyen that MGUS is a risk factor for development of clinically significant plasma cell dyscrasias such as multiple myeloma, immunoglobulin light chain amyloidosis, Waldenstrom Macroglobulinemia, and other non-Hodgkin lymphomas thereby warranting ongoing monitoring for progression. She has a low risk MGUS with a  M spike of 0.3-0.4 g/dL and normal FLC ratio. Her risk of progression is low (around 5% at 20 years).  Specifically for organ transplant evaluation, multiple  retrospective  studies have shown that the risk  of progression of MGUS diagnosed pre transplant to clinically significant plasma cell dyscrasia  or  lymphoproliferative disorders after kidney transplant is low  and hence its  relatively safe to proceed with renal transplant. No additional work up required from a hematology standpoint.   Wendy understood the above assessment and recommendations.  Multiple questions answered.  No barriers to learning identified.     RTC in 6 months with repeat labs.     Total time: 60 minutes  Counseling time: 20 minutes      Dr Prince Dale MD      Division of Hematology, Oncology and Transplant      Patient Care Team         Relationship Specialty Notifications Start End    Hafsa Harrington PCP - General Family Medicine  8/14/23     Phone: [phone] Fax: [phone]         [address] [facility]  [address] [facility] [address]    Jean-Pierre Gleason MD MD Surgery  8/14/23     Referred Pt to [facility]    Phone: [phone] Fax: [phone]         [address] [facility] [address]    EDUARD Mcqueen MD MD Cardiovascular Disease  8/22/23     Phone: [phone] Fax: [phone]         [facility] [address] [facility] [address]    Anette Powell APRN CNP Nurse Practitioner Cardiovascular Disease  2/19/24     Phone: [phone] Fax: [phone]         [address] [facility] [address]    Roberth Brand  Nephrology  5/10/24     Phone: [phone] Fax: [phone]         [address] [place] [address]    Danelle Schwab Transplant Coordinator Transplant  5/10/24     Phone: [phone] Fax: [phone]        Catina Rosen LPN Transplant  5/10/24     Phone: [phone] Fax: [phone]        Anette Powell APRN CNP Assigned Heart and Vascular Provider   5/23/24     Phone: [phone] Fax: [phone] [address] [facility] [address]    Frida Cosme PA-C Physician Assistant Urology  7/9/24      Phone: [phone] Fax: [phone]         [address] [facility] [address]    Frida Cosme PA-C Assigned Surgical Provider   24     Phone: [phone] Fax: [phone]         [address] [facility] [address]    Lj Vizcarra MD MD Nephrology  24     Phone: [phone] Fax: [phone]         4 [facility] [address] [facility] [address] [facility] [address]    Lea Sanots MD MD Hematology & Oncology  24     Phone: [phone] Fax: [phone]         1 [facility] [address]    Travis Guzman APRN CNP Nurse Practitioner Nephrology  8/15/24     Phone: [phone] Fax: [phone]         4 [facility] [address]    Chanelle Hdez NP Nurse Practitioner Surgery  24     Phone: [phone] Fax: [phone]         6 [facility] [address]    Lory Case MSW (Inactive)   - Clinical  24     Lorraine Mckeon MD MD Surgery  24     Phone: [phone] Fax: [phone]         0 [facility] [address]               ROS:    10 point ROS neg other than the symptoms noted above in the HPI.        Past Medical History:   Diagnosis Date     Chronic kidney disease      Diabetes (H)     DM Type 2     History of blood transfusion 2019     Hypertension      MGUS (monoclonal gammopathy of unknown significance)      Pericardial effusion      Sjogren's syndrome (H24)      Type 2 diabetes mellitus with diabetic nephropathy (H)        Past Surgical History:   Procedure Laterality Date     BIOPSY        SECTION       CV CORONARY ANGIOGRAM N/A 2024    Procedure: Coronary Angiogram;  Surgeon: Cody Mckenzie MD;  Location: [facility] CARDIAC CATH LAB     CV PCI N/A 2024    Procedure: Percutaneous Coronary Intervention;  Surgeon: Cody Mckenzie MD;  Location: [facility] CARDIAC CATH LAB       Family History   Problem Relation Age of Onset     Kidney  Disease No family hx of        Social History     Tobacco Use     Smoking status: Never     Smokeless tobacco: Never   Substance Use Topics     Alcohol use: Never     Drug use: Never          No Known Allergies     Current Outpatient Medications   Medication Sig Dispense Refill     aspirin 81 MG EC tablet Take 1 tablet (81 mg) by mouth daily       blood glucose (FREESTYLE TEST STRIPS) test strip by Misc.(Non-Drug; Combo Route) route three times a day.       cholecalciferol 50 MCG (2000 UT) tablet Take 50 mcg by mouth       DARBEPOETIN DALTON IJ Inject 25 mcg Subcutaneous every 28 days       glimepiride (AMARYL) 4 MG tablet TAKE 2 TABLETS (8 MG) BY MOUTH EVERY MORNING BEFORE BREAKFAST.       hydrALAZINE (APRESOLINE) 25 MG tablet Take 50 mg by mouth 3 times daily       Insulin Glargine w/ Trans Port 100 UNIT/ML SOPN Inject 14 Units Subcutaneous       losartan (COZAAR) 50 MG tablet Take 1 tablet by mouth every morning       LYCOPENE PO Take 1 tablet by mouth daily       metoprolol succinate ER (TOPROL XL) 100 MG 24 hr tablet Take 2 tablets (200 mg) by mouth daily at 2 pm       pioglitazone (ACTOS) 30 MG tablet Take 30 mg by mouth       sodium bicarbonate 650 MG tablet 1,300 mg       torsemide (DEMADEX) 100 MG tablet Take 1 tablet (100 mg) by mouth every morning       predniSONE (DELTASONE) 20 MG tablet TAKE 3 TABLETS BY MOUTH DAILY FOR 5 DAYS, THEN 2 TABLETS FOR 5 DAYS, THEN 1 TABLET FOR 5 DAYS. (Patient not taking: Reported on 9/13/2024)           Prince Dale MD      CBC RESULTS:   Recent Labs   Lab Test 04/12/24  1110 08/02/23  1352   WBC 11.3* 5.5   HGB 8.6* 7.3*    203       Last Comprehensive Metabolic Panel:  Sodium   Date Value Ref Range Status   04/12/2024 134 (L) 135 - 145 mmol/L Final     Comment:     Reference intervals for this test were updated on 09/26/2023 to more accurately reflect our healthy population. There may be differences in the flagging of prior results with similar values performed  with this method. Interpretation of those prior results can be made in the context of the updated reference intervals.      Potassium   Date Value Ref Range Status   04/12/2024 3.8 3.4 - 5.3 mmol/L Final     Chloride   Date Value Ref Range Status   04/12/2024 96 (L) 98 - 107 mmol/L Final     Carbon Dioxide (CO2)   Date Value Ref Range Status   04/12/2024 22 22 - 29 mmol/L Final     Urea Nitrogen   Date Value Ref Range Status   04/12/2024 50.4 (H) 6.0 - 20.0 mg/dL Final     Creatinine   Date Value Ref Range Status   04/12/2024 4.53 (H) 0.51 - 0.95 mg/dL Final     GFR Estimate   Date Value Ref Range Status   04/12/2024 11 (L) >60 mL/min/1.73m2 Final     Calcium   Date Value Ref Range Status   04/12/2024 9.4 8.6 - 10.0 mg/dL Final       Liver Function Studies -   Recent Labs   Lab Test 08/21/23  1546   PROTTOTAL 8.0   ALBUMIN 3.5   BILITOTAL 0.2   ALKPHOS 109*   AST 45   ALT 29     ------------------------------------------------------------------------------------------------------------------------------------------------      Again, thank you for allowing me to participate in the care of your patient.        Sincerely,        Prince Dale MD

## 2024-09-13 NOTE — PROGRESS NOTES
[facility]: Cancer Care                                                                                          Writer met with pt during clinic visit to introduce self and role as RNCC.  Contact information provided for writer.  Pt and friend state understanding on POC and to return to clinic in 6 months.  Completed consent to communicate with pt.    TYRA Loo, RN  RN Care Coordinator  [facility]

## 2024-09-13 NOTE — NURSING NOTE
Chief Complaint   Patient presents with    Labs Only     Labs drawn via  by RN in lab       Labs collected from venipuncture by RN.     Caroline Sin RN

## 2024-09-13 NOTE — PROGRESS NOTES
Vascular Access Services Notes:    Ultrasound-guided lab draw done without complication. Attempted x1 in the right forearm with a 22 gauge x 1.75 inch B Robertson PIV needle. Staff was present to assist.    Time spent with pt - 15 minutes      DONNA RalphN, RN VA-BC  Vascular Access Services  [facility]  [phone]

## 2024-09-13 NOTE — PROGRESS NOTES
"[facility] Clinic Consultation       Wendy Kapoor is a 53 year old female with DM/HTN/ESRD on PD and IgG Lambda monoclonal protein who presents to the hematology clinic for evaluation of MGUS. Miss Nguyen has ESRD secondary to DM/HTN and is currently on peritoneal dialysis. She underwent a kidney biopsy in jan 2023 that showed nodular diabetic glomerulosclerosis with focal global and secondary segmental glomerulosclerosis.  Congo red stains for amyloid was negative. Ms Nguyen is currently being evaluated for a kidney transplant. She has anemia of chronic disease secondary to ESRD and is on EPO. Patient reports fatigue and some dyspnea on exertion probably related to her anemia; however, it is not interfering with her activities of daily living.  Patient denied numbness of tingling of extremities, nausea, vomiting, diarrhea, weight loss, syncope, macroglossia, jaw or lumbar claudication. She takes ASA daily. Denied periorbital purpura , easy bruising or bleeding.     Date of diagnosis : 8/2/2023    M spike ; 0.3-0.4 g/dL  Free light chains: K  -30.3, L---39.0, K/L FLC ratio -0.78      Physical Exam:     Vital Signs: BP (!) 157/80 (BP Location: Right arm, Patient Position: Right side, Cuff Size: Adult Regular)   Pulse 76   Temp 98.2  F (36.8  C) (Oral)   Resp 20   Ht 1.5 m (4' 11.06\")   Wt 63.9 kg (140 lb 12.8 oz)   SpO2 98%   BMI 28.39 kg/m          ECOG PS 0     General Appearance: healthy, alert and no distress  Eyes: PERRL, conjunctiva and lids normal, sclera nonicteric  Ears/Nose/M/Throat: Oral mucosa and posterior oropharynx normal, moist mucous membranes  Neck supple, non-tender, free range of motion, no adenopathy  Cardio/Vascular:regular rate and rhythm, normal S1 and S2, no murmur  Resp Effort And Auscultation: Normal respiratory effort without distress  GI: soft, nontender, bowel sounds present in all four quadrants, no hepatosplenomegaly  Lymphatics:no significant enlargement of lymph nodes globally "   Musculoskeletal: Musculoskeletal normal  Edema: none  Skin: Skin color, texture, turgor normal. No rashes or lesions.  Neurologic: Gait normal. Reflexes normal and symmetric. Sensation grossly WNL.  Psych/Affect: Mood and affect are appropriate.      ASSESSMENT AND PLAN:  53 year old yr old female , Ms. Wendy Kapoor DM/HTN/ESRD on PD and IgG Lambda monoclonal protein who presents to the hematology clinic for evaluation of MGUS.     Problem list:   1) IgG Lambda MGUS:   2) Diabetes   3) Hypertension  4) ESRD : Secondary to DM/HTN, Currently on PD and being evaluated for a kidney transplant.   5) Anemia of chronic disease secondary to ESRD: on EPO.     1) IgG Lambda MGUS:    Kidney biopsy done on 1/9/23 showed changes consistent with diabetic nephropathy. Congo red staining for amyloid was negative.   I discussed with the patient at length regarding the natural history, risk stratification and outcomes of MGUS.   The prevalence of MGUS in general population is estimated to be 1%-4% and its frequency increases with age with 3% among people above 50 y of age. The prevalence of the MGUS among the ESRD patients is believed to be higher than the general population. I did discuss with Ms Nguyen that MGUS is a risk factor for development of clinically significant plasma cell dyscrasias such as multiple myeloma, immunoglobulin light chain amyloidosis, Waldenstrom Macroglobulinemia, and other non-Hodgkin lymphomas thereby warranting ongoing monitoring for progression. She has a low risk MGUS with a  M spike of 0.3-0.4 g/dL and normal FLC ratio. Her risk of progression is low (around 5% at 20 years).  Specifically for organ transplant evaluation, multiple retrospective  studies have shown that the risk  of progression of MGUS diagnosed pre transplant to clinically significant plasma cell dyscrasia  or  lymphoproliferative disorders after kidney transplant is low  and hence its  relatively safe to proceed with renal  transplant. No additional work up required from a hematology standpoint.   Wendy understood the above assessment and recommendations.  Multiple questions answered.  No barriers to learning identified.     RTC in 6 months with repeat labs.     Total time: 60 minutes  Counseling time: 20 minutes      Dr Prince Dale MD      Division of Hematology, Oncology and Transplant      Patient Care Team         Relationship Specialty Notifications Start End    Hafsa Harrington PCP - [facility] Medicine  8/14/23     Phone: [phone] Fax: [phone]         [address] [facility] [address] [facility] [address]    Jean-Pierre Gleason MD MD Surgery  8/14/23     Referred Pt to [facility]    Phone: [phone] Fax: [phone]         [address] [place] [address]    EDUARD Mcqueen MD MD Cardiovascular Disease  8/22/23     Phone: [phone] Fax: [phone]         [address] [place] [address] [address] [place] [address]    Anette Powell APRN CNP Nurse Practitioner Cardiovascular Disease  2/19/24     Phone: [phone] Fax: [phone]         [address] [facility] [address]    Roberth Brand  Nephrology  5/10/24     Phone: [phone] Fax: [phone]         [address] [facility] [address] [facility] [address]    Danelle Schwab Transplant Coordinator Transplant  5/10/24     Phone: [phone] Fax: [phone]        Catina Rosen LPN Transplant  5/10/24     Phone: [phone] Fax: [phone]        Anette Powell APRN CNP Assigned Heart and Vascular Provider   5/23/24     Phone: [phone] Fax: [phone]         [address] [facility] [address]    Frida Cosme PA-C Physician Assistant Urology  7/9/24     Phone: [phone] Fax: [phone]         [address] [facility] [address]    Frida Cosme PA-C Assigned Surgical Provider   7/23/24     Phone: [phone] Fax: [phone]         [address] [facility] [address]    Lj Vizcarra MD  MD Nephrology  24     Phone: [phone] Fax: [phone]         [address] [facility] [address] [facility] [address] [facility] [address]    Lea Santos MD MD Hematology & Oncology  24     Phone: [phone] Fax: [phone]         [address] [facility] [address]    Travis Guzman APRN CNP Nurse Practitioner Nephrology  8/15/24     Phone: [phone] Fax: [phone]         [address] [facility] [address]    Chanelle Hdez NP Nurse Practitioner Surgery  24     Phone: [phone] Fax: [phone]         [address] [facility] [address]    Lory Case MSW (Inactive)   - Clinical  24     Lorraine Mckeon MD MD Surgery  24     Phone: [phone] Fax: [phone]         0 [facility] [address]               ROS:    10 point ROS neg other than the symptoms noted above in the HPI.        Past Medical History:   Diagnosis Date    Chronic kidney disease     Diabetes (H)     DM Type 2    History of blood transfusion 2019    Hypertension     MGUS (monoclonal gammopathy of unknown significance)     Pericardial effusion     Sjogren's syndrome (H24)     Type 2 diabetes mellitus with diabetic nephropathy (H)        Past Surgical History:   Procedure Laterality Date    BIOPSY       SECTION      CV CORONARY ANGIOGRAM N/A 2024    Procedure: Coronary Angiogram;  Surgeon: Cody Mckenzie MD;  Location:  HEART CARDIAC CATH LAB    CV PCI N/A 2024    Procedure: Percutaneous Coronary Intervention;  Surgeon: Cody Mckenzie MD;  [facility] CARD[facility]AC [facility]       Family History   Problem Relation Age of Onset    Kidney Disease No family hx of        Social History     Tobacco Use    Smoking status: Never    Smokeless tobacco: Never   Substance Use Topics    Alcohol use: Never    Drug use: Never          No Known Allergies     Current Outpatient Medications   Medication Sig Dispense Refill     aspirin 81 MG EC tablet Take 1 tablet (81 mg) by mouth daily      blood glucose (FREESTYLE TEST STRIPS) test strip by Misc.(Non-Drug; Combo Route) route three times a day.      cholecalciferol 50 MCG (2000 UT) tablet Take 50 mcg by mouth      DARBEPOETIN DALTON IJ Inject 25 mcg Subcutaneous every 28 days      glimepiride (AMARYL) 4 MG tablet TAKE 2 TABLETS (8 MG) BY MOUTH EVERY MORNING BEFORE BREAKFAST.      hydrALAZINE (APRESOLINE) 25 MG tablet Take 50 mg by mouth 3 times daily      Insulin Glargine w/ Trans Port 100 UNIT/ML SOPN Inject 14 Units Subcutaneous      losartan (COZAAR) 50 MG tablet Take 1 tablet by mouth every morning      LYCOPENE PO Take 1 tablet by mouth daily      metoprolol succinate ER (TOPROL XL) 100 MG 24 hr tablet Take 2 tablets (200 mg) by mouth daily at 2 pm      pioglitazone (ACTOS) 30 MG tablet Take 30 mg by mouth      sodium bicarbonate 650 MG tablet 1,300 mg      torsemide (DEMADEX) 100 MG tablet Take 1 tablet (100 mg) by mouth every morning      predniSONE (DELTASONE) 20 MG tablet TAKE 3 TABLETS BY MOUTH DAILY FOR 5 DAYS, THEN 2 TABLETS FOR 5 DAYS, THEN 1 TABLET FOR 5 DAYS. (Patient not taking: Reported on 9/13/2024)           Prince Dale MD      CBC RESULTS:   Recent Labs   Lab Test 04/12/24  1110 08/02/23  1352   WBC 11.3* 5.5   HGB 8.6* 7.3*    203       Last Comprehensive Metabolic Panel:  Sodium   Date Value Ref Range Status   04/12/2024 134 (L) 135 - 145 mmol/L Final     Comment:     Reference intervals for this test were updated on 09/26/2023 to more accurately reflect our healthy population. There may be differences in the flagging of prior results with similar values performed with this method. Interpretation of those prior results can be made in the context of the updated reference intervals.      Potassium   Date Value Ref Range Status   04/12/2024 3.8 3.4 - 5.3 mmol/L Final     Chloride   Date Value Ref Range Status   04/12/2024 96 (L) 98 - 107 mmol/L Final      Carbon Dioxide (CO2)   Date Value Ref Range Status   04/12/2024 22 22 - 29 mmol/L Final     Urea Nitrogen   Date Value Ref Range Status   04/12/2024 50.4 (H) 6.0 - 20.0 mg/dL Final     Creatinine   Date Value Ref Range Status   04/12/2024 4.53 (H) 0.51 - 0.95 mg/dL Final     GFR Estimate   Date Value Ref Range Status   04/12/2024 11 (L) >60 mL/min/1.73m2 Final     Calcium   Date Value Ref Range Status   04/12/2024 9.4 8.6 - 10.0 mg/dL Final       Liver Function Studies -   Recent Labs   Lab Test 08/21/23  1546   PROTTOTAL 8.0   ALBUMIN 3.5   BILITOTAL 0.2   ALKPHOS 109*   AST 45   ALT 29     ------------------------------------------------------------------------------------------------------------------------------------------------

## 2024-09-13 NOTE — NURSING NOTE
"Oncology Rooming Note    September 13, 2024 8:51 AM   Wendy Kapoor is a 53 year old female who presents for:    Chief Complaint   Patient presents with    Oncology Clinic Visit     New hugh WHITE     Initial Vitals: BP (!) 157/80 (BP Location: Right arm, Patient Position: Right side, Cuff Size: Adult Regular)   Pulse 76   Temp 98.2  F (36.8  C) (Oral)   Resp 20   Ht 1.5 m (4' 11.06\")   Wt 63.9 kg (140 lb 12.8 oz)   SpO2 98%   BMI 28.39 kg/m   Estimated body mass index is 28.39 kg/m  as calculated from the following:    Height as of this encounter: 1.5 m (4' 11.06\").    Weight as of this encounter: 63.9 kg (140 lb 12.8 oz). Body surface area is 1.63 meters squared.  No Pain (0) Comment: Data Unavailable   No LMP recorded. Patient is postmenopausal.  Allergies reviewed: Yes  Medications reviewed: Yes    Medications: Medication refills not needed today.  Pharmacy name entered into [facility]: [id number] [place] [address]     Frailty Screening:   Is the patient here for a new oncology consult visit in cancer care? 1. Yes. Over the past month, have you experienced difficulty or required a caregiver to assist with:   1. Balance, walking or general mobility (including any falls)? NO  2. Completion of self-care tasks such as bathing, dressing, toileting, grooming/hygiene?  NO  3. Concentration or memory that affects your daily life?  NO       Clinical concerns: none      Adi Escalera             "

## 2024-09-16 LAB
B2 GLYCOPROT1 IGG SERPL IA-ACNC: 1.9 U/ML
B2 GLYCOPROT1 IGM SERPL IA-ACNC: 4.6 U/ML
DRVVT CONFIRM NORMALIZED RATIO: 1.34
DRVVT SCREEN RATIO: 1.3
INR PPP: 0.98 (ref 0.85–1.15)
LA PPP-IMP: POSITIVE
LUPUS INTERPRETATION: ABNORMAL
PTT RATIO: 1.23
THROMBIN TIME: 19.9 SECONDS (ref 13–19)

## 2024-09-16 RX ORDER — POTASSIUM CHLORIDE 750 MG/1
10 TABLET, EXTENDED RELEASE ORAL DAILY
COMMUNITY

## 2024-09-16 RX ORDER — DILTIAZEM HYDROCHLORIDE 180 MG/1
CAPSULE, COATED, EXTENDED RELEASE ORAL
COMMUNITY

## 2024-09-16 RX ORDER — ACETAMINOPHEN 500 MG
TABLET ORAL
COMMUNITY
Start: 2023-09-07

## 2024-09-16 RX ORDER — GENTAMICIN SULFATE 1 MG/G
CREAM TOPICAL
COMMUNITY

## 2024-09-16 RX ORDER — SEVELAMER CARBONATE 800 MG/1
TABLET, FILM COATED ORAL
COMMUNITY

## 2024-09-16 RX ORDER — POLYETHYLENE GLYCOL-3350 AND ELECTROLYTES 236; 6.74; 5.86; 2.97; 22.74 G/274.31G; G/274.31G; G/274.31G; G/274.31G; G/274.31G
POWDER, FOR SOLUTION ORAL
COMMUNITY
Start: 2024-07-02

## 2024-09-16 RX ORDER — ALLOPURINOL 100 MG/1
1 TABLET ORAL
COMMUNITY
Start: 2024-06-12

## 2024-09-16 RX ORDER — GLIPIZIDE 5 MG/1
TABLET ORAL
COMMUNITY
Start: 2023-03-28

## 2024-09-16 RX ORDER — TRAMADOL HYDROCHLORIDE 50 MG/1
TABLET ORAL
COMMUNITY
Start: 2023-11-22

## 2024-09-16 RX ORDER — INSULIN LISPRO 100 [IU]/ML
INJECTION, SOLUTION INTRAVENOUS; SUBCUTANEOUS
COMMUNITY

## 2024-09-16 RX ORDER — PEN NEEDLE, DIABETIC 32GX 5/32"
NEEDLE, DISPOSABLE MISCELLANEOUS
COMMUNITY
Start: 2024-06-27

## 2024-09-16 RX ORDER — TORSEMIDE 20 MG/1
1 TABLET ORAL
COMMUNITY
Start: 2024-09-06

## 2024-09-25 ENCOUNTER — ALLIED HEALTH/NURSE VISIT (OUTPATIENT)
Dept: TRANSPLANT | Facility: CLINIC | Age: 54
End: 2024-09-25
Attending: NURSE PRACTITIONER
Payer: COMMERCIAL

## 2024-09-25 VITALS
OXYGEN SATURATION: 96 % | SYSTOLIC BLOOD PRESSURE: 178 MMHG | BODY MASS INDEX: 28.67 KG/M2 | HEART RATE: 85 BPM | WEIGHT: 142.2 LBS | DIASTOLIC BLOOD PRESSURE: 89 MMHG

## 2024-09-25 DIAGNOSIS — Z76.82 ORGAN TRANSPLANT CANDIDATE: ICD-10-CM

## 2024-09-25 DIAGNOSIS — Z76.82 AWAITING ORGAN TRANSPLANT: Primary | ICD-10-CM

## 2024-09-25 DIAGNOSIS — E11.9 DIABETES MELLITUS, TYPE 2 (H): ICD-10-CM

## 2024-09-25 DIAGNOSIS — N18.6 ESRD (END STAGE RENAL DISEASE) (H): ICD-10-CM

## 2024-09-25 DIAGNOSIS — E11.69 TYPE 2 DIABETES MELLITUS WITH OTHER SPECIFIED COMPLICATION, UNSPECIFIED WHETHER LONG TERM INSULIN USE (H): ICD-10-CM

## 2024-09-25 PROCEDURE — 99207 PR NO CHARGE COORDINATED CARE PS: CPT

## 2024-09-25 PROCEDURE — 99211 OFF/OP EST MAY X REQ PHY/QHP: CPT | Performed by: NURSE PRACTITIONER

## 2024-09-25 PROCEDURE — 99214 OFFICE O/P EST MOD 30 MIN: CPT | Performed by: NURSE PRACTITIONER

## 2024-09-25 NOTE — PROGRESS NOTES
Transplant Surgery Consult Note    Medical record number: [id number]  YOB: 1970,   Consult requested by Dr. Brand for evaluation of kidney and pancreas transplant candidacy.    Assessment and Recommendations: Ms. Kapoor is a good candidate for transplantation and has a good understanding of the risks and benefits of this approach to management of renal failure and diabetes. The following issues should be addressed prior to transplant:     Ms. Kapoor has End stage renal failure due to diabetes mellitus type 2/HTN whose condition is not expected to resolve, is expected to progress, and is expected to continue to develop related comorbid conditions.  Cardiology consult for cardiac risk stratification to be ordered: Yes  CT abdomen and pelvis without contrast to be ordered for assessment of vascular targets: Yes  Transplant listing labs ordered to include HLA, ABOx2, Cr, etc.  Dietician consult ordered: Yes  Social work consult ordered: Yes  Imaging reports reviewed:  Yes  Radiology images reviewed:YEs  Recipient suitable to move forward with work up of living donors:  Yes    DM II:  hgb A1c is 7.8.    Likely has Sjogrens syndrome, but not biopsy proven.  Elevated ARANZA, SSA, SSB without keratoconjunctivitis sicca.  Scheduled in 1/2025    CT below:- reviewed by surgery.    IMPRESSION:   1.  Mild splenomegaly. Recommend ultrasound to evaluate the liver  echotexture.  2.  Colonic diverticulosis.  3.  Minimal atherosclerotic calcification of the aorta and proximal,  iliac arteries.         Per hem/onc:1) IgG Lambda MGUS:    Kidney biopsy done on 1/9/23 showed changes consistent with diabetic nephropathy. [place] red staining for amyloid was negative. Cleared for txp.          CAD: 4/2024 coronary angiogram showed   Mid LAD lesion is 30% stenosed.  Dist LAD lesion is 30% stenosed.    Her last ECHO in 8/2023 showed normal LVEF ~ 60-65%.     Positive Lupus Anticoagulant: will repeat with beta 2 glycoprotein IgM  and IgM.       The majority of our visit was spent in counselling, discussing the medical and surgical risks of living or  donor kidney and pancreas transplantation, either in a simultaneous or sequential fashion. I contrasted approximate wait time for SPK vs living vs  donor kidneys from normal (0-85%) or higher (%) kidney donor profile index (KDPI) donors and their associated outcomes. I would not recommend this individual to consider kidneys from high KDPI donors. The reason for this decision is best summarized as: multi-organ transplant candidate.  Access to transplant will be impacted by living donor availability and overall candidacy for SPK, as well as the influence of blood type and degree of sensitization. We discussed advantages of preemptive transplant as well as living donor kidney transplant, and graft and patient survival outcomes associated with these options. Potential surgical complications of kidney and pancreas transplantation include bleeding, clotting, infection, wound complications, anastomotic failure and other issues such as cardiac complications, pneumonia, deep venous thrombosis, pulmonary embolism, post transplant diabetes and death. We discussed the need for protocol biopsy of the kidney and the possible need for a ureteral stent (and subsequent removal). We discussed benefits and risks associated with different approaches to exocrine drainage of pancreatic secretions. We also discussed differences in the average length of stay, recovery process, and posttransplant lab and monitoring protocol. We discussed the risk of graft rejection and recurrent diabetic nephropathy in the setting of poor glycemic control. I emphasized the need for strict immunosuppression adherence and the potential for complications of immunosuppression such as skin cancer or lymphoma, as well as a very low but not zero risk of donor-derived disease transmission risks (infection, cancer). Ms.  Antwon asked good questions and the patient's candidacy will be reviewed at our Multidisciplinary Selection Committee. Thank you for the opportunity to participate in Ms. Kapoor's care.    Total time: 40 minutes  Counselling time: 25 minutes      ---------------------------------------------------------------------------------------------------    HPI: Ms. Kapoor has End stage renal failure due to diabetes mellitus type 2/ HTN. The patient has had diabetes for 24 years. Management is by Lantus 14 units and glimeride   . The patient usually checks her blood sugar 2 times/day.  Daily blood glucoses range typically from 125 to 130.  Hypoglyemic unawareness is not an issue.  The diabetes is controlled.    Complications of diabetes include:    Retinopathy:  Yes   Neuropathy: Yes   Gastroparesis:  No    The patient is on dialysis.    Has potential kidney donors:  No.  Interested in participation in paired exchange if a donor is willing: Doesn't know     The patient has the following pertinent history:       No    Yes  Dialysis:    []      [x] via:    on PD   Blood Transfusion                  [x]      []  Number of units:   Most recently:   Pregnancy:    []      [x] Number:   5    Previous Transplant:  [x]      [] Details:    Cancer    []      [x] Comment: IgG Lambda MGUS   Kidney stones   [x]      [] Comment:      Recurrent infections  [x]      []  Type:                  Bladder dysfunction  [x]      [] Cause:    Claudication   [x]      [] Distance:    Previous Amputation  [x]      [] Cause:     Chronic anticoagulation  [x]      [] Indication:  Jain  [x]      []     Past Medical History:   Diagnosis Date    Chronic kidney disease     Diabetes (H)     DM Type 2    History of blood transfusion 2019    Hypertension     MGUS (monoclonal gammopathy of unknown significance)     Pericardial effusion     Sjogren's syndrome (H24)     Type 2 diabetes mellitus with diabetic nephropathy (H)      Past Surgical  History:   Procedure Laterality Date    BIOPSY       SECTION      CV CORONARY ANGIOGRAM N/A 2024    Procedure: Coronary Angiogram;  Surgeon: Cody Mckenzie MD;  [facility]    CV PCI N/A 2024    Procedure: Percutaneous Coronary Intervention;  Surgeon: Cody Mckenzie MD;  Location:  [facility]     Family History   Problem Relation Age of Onset    Kidney Disease No family hx of      Social History     Socioeconomic History    Marital status:      Spouse name: Not on file    Number of children: Not on file    Years of education: Not on file    Highest education level: Not on file   Occupational History    Not on file   Tobacco Use    Smoking status: Never    Smokeless tobacco: Never   Substance and Sexual Activity    Alcohol use: Never    Drug use: Never    Sexual activity: Not on file   Other Topics Concern    Not on file   Social History Narrative    Not on file     Social Determinants of Health     Financial Resource Strain: Not on file   Food Insecurity: Not on file   Transportation Needs: Not on file   Physical Activity: Not on file   Stress: Not on file   Social Connections: Not on file   Interpersonal Safety: Not At Risk (2024)    [facility]    Intimate Partner Violence     Are you in a relationship where you are physically hurt, threatened and/or made to feel afraid?: No   Housing Stability: Not on file       ROS:   CONSTITUTIONAL:  No fevers or chills  EYES: negative for icterus  ENT:  negative for hearing loss, tinnitus and sore throat  RESPIRATORY:  negative for cough, sputum, dyspnea  CARDIOVASCULAR:  negative for chest pain\. positive for Fatigue  Renal Insufficiency  GASTROINTESTINAL:  negative for nausea, vomiting, diarrhea or constipation  GENITOURINARY:  negative for incontinence, dysuria, bladder emptying problems  HEME:  No easy bruising  INTEGUMENT:  negative for rash and  pruritus  NEURO:  Negative for headache, seizure disorder    Allergies:   No Known Allergies    Medications:  Prescription Medications as of 9/25/2024         Rx Number Disp Refills Start End Last Dispensed Date Next Fill Date Owning Pharmacy    acetaminophen (TYLENOL) 500 MG tablet  -- -- 9/7/2023 --       Sig: Take by mouth.    Class: Historical    Route: Oral    allopurinol (ZYLOPRIM) 100 MG tablet  -- -- 6/12/2024 --       Sig: Take 1 tablet by mouth.    Class: Historical    Route: Oral    aspirin 81 MG EC tablet  -- -- 4/24/2024 --       Sig: Take 1 tablet (81 mg) by mouth daily    Class: Historical    Route: Oral    BD PEN NEEDLE HUBER 2ND GEN 32G X 4 MM miscellaneous  -- -- 6/27/2024 --       Sig: USE 1 NEEDLE ONCE DAILY.    Class: Historical    blood glucose (FREESTYLE TEST STRIPS) test strip  -- -- 3/6/2022 --       Sig: by Misc.(Non-Drug; Combo Route) route three times a day.    Class: Historical    Route: Other    cholecalciferol 50 MCG (2000 UT) tablet  -- -- 5/18/2023 --       Sig: Take 50 mcg by mouth    Class: Historical    Route: Oral    DARBEPOETIN DALTON IJ  -- -- 5/19/2023 --       Sig: Inject 25 mcg Subcutaneous every 28 days    Class: Historical    Route: Subcutaneous    diltiazem ER COATED BEADS (CARDIZEM CD/CARTIA XT) 180 MG 24 hr capsule  -- --  --       Sig: TAKE 1 CAPSULE (180 MG) BY MOUTH ONCE DAILY.    Class: Historical    GAVILYTE-G 236 g suspension  -- -- 7/2/2024 --       Sig: TAKE 4,000 ML BY MOUTH AS DIRECTED    Class: Historical    gentamicin (GARAMYCIN) 0.1 % external cream  -- --  --       Sig: APPLY TO EXIT SITE AS DIRECTED    Class: Historical    glimepiride (AMARYL) 4 MG tablet  -- --  --       Sig: TAKE 2 TABLETS (8 MG) BY MOUTH EVERY MORNING BEFORE BREAKFAST.    Class: Historical    glipiZIDE (GLUCOTROL) 5 MG tablet  -- -- 3/28/2023 --       Sig: TAKE 1 TABLET (5MG) BY MOUTH IN THE MORNING AND 1 TABLET (5MG) IN THE EVENING.    Class: Historical    hydrALAZINE (APRESOLINE) 25 MG  tablet  -- --  --       Sig: Take 50 mg by mouth 3 times daily    Class: Historical    Route: Oral    Insulin Glargine w/ Trans Port 100 UNIT/ML SOPN  -- --  --       Sig: Inject 14 Units Subcutaneous    Class: Historical    Route: Subcutaneous    insulin lispro (HUMALOG KWIKPEN) 100 UNIT/ML (1 unit dial) KWIKPEN  -- --  --       Sig: Inject subcutaneously.    Class: Historical    Route: Subcutaneous    losartan (COZAAR) 50 MG tablet  -- -- 2023 --       Sig: Take 1 tablet by mouth every morning    Class: Historical    Route: Oral    LYCOPENE PO  -- --  --       Sig: Take 1 tablet by mouth daily    Class: Historical    Route: Oral    Methoxy PEG-Epoetin Beta (MIRCERA IJ)  -- -- 2024 --       Sig: Inject 200 mcg subcutaneously.    Class: Historical    Route: Subcutaneous    metoprolol succinate ER (TOPROL XL) 100 MG 24 hr tablet  -- -- 2024 --       Sig: Take 2 tablets (200 mg) by mouth daily at 2 pm    Class: Historical    Route: Oral    pioglitazone (ACTOS) 30 MG tablet  -- -- 2023 --       Sig: Take 30 mg by mouth    Class: Historical    Route: Oral    potassium chloride ER (K-TAB/KLOR-CON) 10 MEQ CR tablet  -- --  --       Sig: Take 10 mEq by mouth daily.    Class: Historical    Route: Oral    predniSONE (DELTASONE) 20 MG tablet  -- -- 2024 --       Sig: TAKE 3 TABLETS BY MOUTH DAILY FOR 5 DAYS, THEN 2 TABLETS FOR 5 DAYS, THEN 1 TABLET FOR 5 DAYS.    Class: Historical    sevelamer carbonate (RENVELA) 800 MG tablet  -- --  --       Sig: TAKE 2 TABLETS BY MOUTH THREE TIMES DAILY WITH MEALS AND 1 TABLET WITH A SNACK    Class: Historical    sodium bicarbonate 650 MG tablet  -- -- 2023 --       Si,300 mg    Class: Historical    torsemide (DEMADEX) 100 MG tablet  -- -- 2024 --       Sig: Take 1 tablet (100 mg) by mouth every morning    Class: Historical    Route: Oral    torsemide (DEMADEX) 20 MG tablet  -- -- 2024 --       Sig: Take 1 tablet by mouth daily at 2 pm.    Class:  Historical    Route: Oral    traMADol (ULTRAM) 50 MG tablet  -- -- 11/22/2023 --       Sig: TAKE 1-2 TABLETS ( MG) BY MOUTH EVERY 6 (SIX) HOURS AS NEEDED FOR PAIN.    Class: Historical    Tuberculin PPD (TUBERSOL ID)  -- -- 9/20/2023 --       Sig: Inject 0.1 mLs into the skin.    Class: Historical    Route: Intradermal            Exam:   Pulse:  [85] 85  BP: (178)/(89) 178/89  SpO2:  [96 %] 96 %  Appearance: in no apparent distress.   Skin: normal  Head and Neck: Normal, no rashes or jaundice  Respiratory: easy respirations, no audible wheezing.  Cardiovascular: RRR  Abdomen: rounded, No distention   Extremities: femoral 2+/2+, Edema, none  Neuro: without deficit     Diagnostics:   Recent Results (from the past 672 hour(s))   Beta 2 Glycoprotein Antibodies IGG IGM    Collection Time: 09/13/24 10:01 AM   Result Value Ref Range    Beta 2 Glycoprotein 1 Antibody IgG 1.9 <7.0 U/mL    Beta 2 Glycoprotein 1 Antibody IgM 4.6 <7.0 U/mL   Lupus Anticoagulant Panel    Collection Time: 09/13/24 10:01 AM   Result Value Ref Range    INR 0.98 0.85 - 1.15    Thrombin Time 19.9 (H) 13.0 - 19.0 Seconds    PTT Ratio 1.23 <1.30    DRVVT Screen Ratio 1.30 (H) <1.08    DRVVT Confirm Normalized Ratio 1.34 (H) <1.21    Lupus Result Positive (A) Negative    Lupus Interpretation       INR is normal.  APTT ratio is normal.    DRVVT Screen ratio is elevated.  DRVVT Confirm Normalized ratio is elevated.  Thrombin time is elevated.  POSITIVE TEST; THIS PATIENT HAS A  LUPUS ANTICOAGULANT.  Recommend repeat testing in 12 weeks to determine if the Lupus Anticoagulant is persistent or transient, since a transient one does not confer an increased thrombotic risk.  If the patient is on an anticoagulant, recommend repeat testing without anticoagulation interference to rule out a false positive lupus anticoagulant.  Patients with a lupus anticoagulant on warfarin therapy should be considered for monitoring with a factor 2 (factor II) level or  chromogenic factor 10 (factor X) assay.   Causes of a prolonged Thrombin Time include: hypofibrinogenemia, dysfibrinogenemia, elevated fibrin degradation products (e.g. D-dimer),  anticoagulants (e.g. heparin and direct thrombin inhibitors), and inhibitors.        A resident or fellow in a training program, Akash Lambert,  was involved in  the initial review and interpretation of this case.  I, as the senior physician, attest that I have personally reviewed this case to determine the final diagnosis.    Opal Hairston MD, PhD  [facility]                [facility]   Date Value Ref Range Status   07/18/2024 98  Final

## 2024-09-25 NOTE — LETTER
9/25/2024      Wendy Kapoor  [address]  [place] [address]      Dear Colleague,    Thank you for referring your patient, Wendy Kapoor, to the [facility]. Please see a copy of my visit note below.    Transplant Surgery Consult Note    Medical record number: [id number]  YOB: 1970,   Consult requested by Dr. Brand for evaluation of kidney and pancreas transplant candidacy.    Assessment and Recommendations: Ms. Kapoor is a good candidate for transplantation and has a good understanding of the risks and benefits of this approach to management of renal failure and diabetes. The following issues should be addressed prior to transplant:     Ms. Kapoor has End stage renal failure due to diabetes mellitus type 2/HTN whose condition is not expected to resolve, is expected to progress, and is expected to continue to develop related comorbid conditions.  Cardiology consult for cardiac risk stratification to be ordered: Yes  CT abdomen and pelvis without contrast to be ordered for assessment of vascular targets: Yes  Transplant listing labs ordered to include HLA, ABOx2, Cr, etc.  Dietician consult ordered: Yes  Social work consult ordered: Yes  Imaging reports reviewed:  Yes  Radiology images reviewed:YEs  Recipient suitable to move forward with work up of living donors:  Yes    DM II:  hgb A1c is 7.8.    Likely has Sjogrens syndrome, but not biopsy proven.  Elevated ARANZA, SSA, SSB without keratoconjunctivitis sicca.  Scheduled in 1/2025    CT below:- reviewed by surgery.    IMPRESSION:   1.  Mild splenomegaly. Recommend ultrasound to evaluate the liver  echotexture.  2.  Colonic diverticulosis.  3.  Minimal atherosclerotic calcification of the aorta and proximal,  iliac arteries.         Per hem/onc:1) IgG Lambda MGUS:    Kidney biopsy done on 1/9/23 showed changes consistent with diabetic nephropathy. Congo red staining for amyloid was negative. Cleared for txp.           CAD: 2024 coronary angiogram showed    Mid LAD lesion is 30% stenosed.   Dist LAD lesion is 30% stenosed.    Her last ECHO in 2023 showed normal LVEF ~ 60-65%.     Positive Lupus Anticoagulant: will repeat with beta 2 glycoprotein IgM and IgM.       The majority of our visit was spent in counselling, discussing the medical and surgical risks of living or  donor kidney and pancreas transplantation, either in a simultaneous or sequential fashion. I contrasted approximate wait time for SPK vs living vs  donor kidneys from normal (0-85%) or higher (%) kidney donor profile index (KDPI) donors and their associated outcomes. I would not recommend this individual to consider kidneys from high KDPI donors. The reason for this decision is best summarized as: multi-organ transplant candidate.  Access to transplant will be impacted by living donor availability and overall candidacy for SPK, as well as the influence of blood type and degree of sensitization. We discussed advantages of preemptive transplant as well as living donor kidney transplant, and graft and patient survival outcomes associated with these options. Potential surgical complications of kidney and pancreas transplantation include bleeding, clotting, infection, wound complications, anastomotic failure and other issues such as cardiac complications, pneumonia, deep venous thrombosis, pulmonary embolism, post transplant diabetes and death. We discussed the need for protocol biopsy of the kidney and the possible need for a ureteral stent (and subsequent removal). We discussed benefits and risks associated with different approaches to exocrine drainage of pancreatic secretions. We also discussed differences in the average length of stay, recovery process, and posttransplant lab and monitoring protocol. We discussed the risk of graft rejection and recurrent diabetic nephropathy in the setting of poor glycemic control. I emphasized the  need for strict immunosuppression adherence and the potential for complications of immunosuppression such as skin cancer or lymphoma, as well as a very low but not zero risk of donor-derived disease transmission risks (infection, cancer). Ms. Kapoor asked good questions and the patient's candidacy will be reviewed at our Multidisciplinary Selection Committee. Thank you for the opportunity to participate in Ms. Kapoor's care.    Total time: 40 minutes  Counselling time: 25 minutes      ---------------------------------------------------------------------------------------------------    HPI: Ms. Kapoor has End stage renal failure due to diabetes mellitus type 2/ HTN. The patient has had diabetes for 24 years. Management is by Lantus 14 units and glimeride   . The patient usually checks her blood sugar 2 times/day.  Daily blood glucoses range typically from 125 to 130.  Hypoglyemic unawareness is not an issue.  The diabetes is controlled.    Complications of diabetes include:    Retinopathy:  Yes   Neuropathy: Yes   Gastroparesis:  No    The patient is on dialysis.    Has potential kidney donors:  No.  Interested in participation in paired exchange if a donor is willing: Doesn't know     The patient has the following pertinent history:       No    Yes  Dialysis:    []      [x] via:    on PD   Blood Transfusion                  [x]      []  Number of units:   Most recently:   Pregnancy:    []      [x] Number:   5    Previous Transplant:  [x]      [] Details:    Cancer    []      [x] Comment: IgG Lambda MGUS   Kidney stones   [x]      [] Comment:      Recurrent infections  [x]      []  Type:                  Bladder dysfunction  [x]      [] Cause:    Claudication   [x]      [] Distance:    Previous Amputation  [x]      [] Cause:     Chronic anticoagulation  [x]      [] Indication:  Taoist  [x]      []     Past Medical History:   Diagnosis Date     Chronic kidney disease      Diabetes (H)     DM Type 2      History of blood transfusion 2019     Hypertension      MGUS (monoclonal gammopathy of unknown significance)      Pericardial effusion      Sjogren's syndrome (H24)      Type 2 diabetes mellitus with diabetic nephropathy (H)      Past Surgical History:   Procedure Laterality Date     BIOPSY        SECTION       CV CORONARY ANGIOGRAM N/A 2024    Procedure: Coronary Angiogram;  Surgeon: Cody Mckenzie MD;  Location:  [facility]     CV PCI N/A 2024    Procedure: Percutaneous Coronary Intervention;  Surgeon: Cody Mckenzie MD;  Location:  [facility]     Family History   Problem Relation Age of Onset     Kidney Disease No family hx of      Social History     Socioeconomic History     Marital status:      Spouse name: Not on file     Number of children: Not on file     Years of education: Not on file     Highest education level: Not on file   Occupational History     Not on file   Tobacco Use     Smoking status: Never     Smokeless tobacco: Never   Substance and Sexual Activity     Alcohol use: Never     Drug use: Never     Sexual activity: Not on file   Other Topics Concern     Not on file   Social History Narrative     Not on file     Social Determinants of Health     Financial Resource Strain: Not on file   Food Insecurity: Not on file   Transportation Needs: Not on file   Physical Activity: Not on file   Stress: Not on file   Social Connections: Not on file   Interpersonal Safety: Not At Risk (2024)    Received from [facility]    Intimate Partner Violence      Are you in a relationship where you are physically hurt, threatened and/or made to feel afraid?: No   Housing Stability: Not on file       ROS:   CONSTITUTIONAL:  No fevers or chills  EYES: negative for icterus  ENT:  negative for hearing loss, tinnitus and sore throat  RESPIRATORY:  negative for cough, sputum, dyspnea  CARDIOVASCULAR:  negative for chest pain\.  positive for Fatigue  Renal Insufficiency  GASTROINTESTINAL:  negative for nausea, vomiting, diarrhea or constipation  GENITOURINARY:  negative for incontinence, dysuria, bladder emptying problems  HEME:  No easy bruising  INTEGUMENT:  negative for rash and pruritus  NEURO:  Negative for headache, seizure disorder    Allergies:   No Known Allergies    Medications:  Prescription Medications as of 9/25/2024         Rx Number Disp Refills Start End Last Dispensed Date Next Fill Date Owning Pharmacy    acetaminophen (TYLENOL) 500 MG tablet  -- -- 9/7/2023 --       Sig: Take by mouth.    Class: Historical    Route: Oral    allopurinol (ZYLOPRIM) 100 MG tablet  -- -- 6/12/2024 --       Sig: Take 1 tablet by mouth.    Class: Historical    Route: Oral    aspirin 81 MG EC tablet  -- -- 4/24/2024 --       Sig: Take 1 tablet (81 mg) by mouth daily    Class: Historical    Route: Oral    BD PEN NEEDLE HUBER 2ND GEN 32G X 4 MM miscellaneous  -- -- 6/27/2024 --       Sig: USE 1 NEEDLE ONCE DAILY.    Class: Historical    blood glucose (FREESTYLE TEST STRIPS) test strip  -- -- 3/6/2022 --       Sig: by Misc.(Non-Drug; Combo Route) route three times a day.    Class: Historical    Route: Other    cholecalciferol 50 MCG (2000 UT) tablet  -- -- 5/18/2023 --       Sig: Take 50 mcg by mouth    Class: Historical    Route: Oral    DARBEPOETIN DALTON IJ  -- -- 5/19/2023 --       Sig: Inject 25 mcg Subcutaneous every 28 days    Class: Historical    Route: Subcutaneous    diltiazem ER COATED BEADS (CARDIZEM CD/CARTIA XT) 180 MG 24 hr capsule  -- --  --       Sig: TAKE 1 CAPSULE (180 MG) BY MOUTH ONCE DAILY.    Class: Historical    GAVILYTE-G 236 g suspension  -- -- 7/2/2024 --       Sig: TAKE 4,000 ML BY MOUTH AS DIRECTED    Class: Historical    gentamicin (GARAMYCIN) 0.1 % external cream  -- --  --       Sig: APPLY TO EXIT SITE AS DIRECTED    Class: Historical    glimepiride (AMARYL) 4 MG tablet  -- --  --       Sig: TAKE 2 TABLETS (8 MG) BY  MOUTH EVERY MORNING BEFORE BREAKFAST.    Class: Historical    glipiZIDE (GLUCOTROL) 5 MG tablet  -- -- 3/28/2023 --       Sig: TAKE 1 TABLET (5MG) BY MOUTH IN THE MORNING AND 1 TABLET (5MG) IN THE EVENING.    Class: Historical    hydrALAZINE (APRESOLINE) 25 MG tablet  -- --  --       Sig: Take 50 mg by mouth 3 times daily    Class: Historical    Route: Oral    Insulin Glargine w/ Trans Port 100 UNIT/ML SOPN  -- --  --       Sig: Inject 14 Units Subcutaneous    Class: Historical    Route: Subcutaneous    insulin lispro (HUMALOG KWIKPEN) 100 UNIT/ML (1 unit dial) KWIKPEN  -- --  --       Sig: Inject subcutaneously.    Class: Historical    Route: Subcutaneous    losartan (COZAAR) 50 MG tablet  -- -- 2023 --       Sig: Take 1 tablet by mouth every morning    Class: Historical    Route: Oral    LYCOPENE PO  -- --  --       Sig: Take 1 tablet by mouth daily    Class: Historical    Route: Oral    Methoxy PEG-Epoetin Beta (MIRCERA IJ)  -- -- 2024 --       Sig: Inject 200 mcg subcutaneously.    Class: Historical    Route: Subcutaneous    metoprolol succinate ER (TOPROL XL) 100 MG 24 hr tablet  -- -- 2024 --       Sig: Take 2 tablets (200 mg) by mouth daily at 2 pm    Class: Historical    Route: Oral    pioglitazone (ACTOS) 30 MG tablet  -- -- 2023 --       Sig: Take 30 mg by mouth    Class: Historical    Route: Oral    potassium chloride ER (K-TAB/KLOR-CON) 10 MEQ CR tablet  -- --  --       Sig: Take 10 mEq by mouth daily.    Class: Historical    Route: Oral    predniSONE (DELTASONE) 20 MG tablet  -- -- 2024 --       Sig: TAKE 3 TABLETS BY MOUTH DAILY FOR 5 DAYS, THEN 2 TABLETS FOR 5 DAYS, THEN 1 TABLET FOR 5 DAYS.    Class: Historical    sevelamer carbonate (RENVELA) 800 MG tablet  -- --  --       Sig: TAKE 2 TABLETS BY MOUTH THREE TIMES DAILY WITH MEALS AND 1 TABLET WITH A SNACK    Class: Historical    sodium bicarbonate 650 MG tablet  -- -- 2023 --       Si,300 mg    Class: Historical     torsemide (DEMADEX) 100 MG tablet  -- -- 4/24/2024 --       Sig: Take 1 tablet (100 mg) by mouth every morning    Class: Historical    Route: Oral    torsemide (DEMADEX) 20 MG tablet  -- -- 9/6/2024 --       Sig: Take 1 tablet by mouth daily at 2 pm.    Class: Historical    Route: Oral    traMADol (ULTRAM) 50 MG tablet  -- -- 11/22/2023 --       Sig: TAKE 1-2 TABLETS ( MG) BY MOUTH EVERY 6 (SIX) HOURS AS NEEDED FOR PAIN.    Class: Historical    Tuberculin PPD (TUBERSOL ID)  -- -- 9/20/2023 --       Sig: Inject 0.1 mLs into the skin.    Class: Historical    Route: Intradermal            Exam:   Pulse:  [85] 85  BP: (178)/(89) 178/89  SpO2:  [96 %] 96 %  Appearance: in no apparent distress.   Skin: normal  Head and Neck: Normal, no rashes or jaundice  Respiratory: easy respirations, no audible wheezing.  Cardiovascular: RRR  Abdomen: rounded, No distention   Extremities: femoral 2+/2+, Edema, none  Neuro: without deficit     Diagnostics:   Recent Results (from the past 672 hour(s))   Beta 2 Glycoprotein Antibodies IGG IGM    Collection Time: 09/13/24 10:01 AM   Result Value Ref Range    Beta 2 Glycoprotein 1 Antibody IgG 1.9 <7.0 U/mL    Beta 2 Glycoprotein 1 Antibody IgM 4.6 <7.0 U/mL   Lupus Anticoagulant Panel    Collection Time: 09/13/24 10:01 AM   Result Value Ref Range    INR 0.98 0.85 - 1.15    Thrombin Time 19.9 (H) 13.0 - 19.0 Seconds    PTT Ratio 1.23 <1.30    DRVVT Screen Ratio 1.30 (H) <1.08    DRVVT Confirm Normalized Ratio 1.34 (H) <1.21    Lupus Result Positive (A) Negative    Lupus Interpretation       INR is normal.  APTT ratio is normal.    DRVVT Screen ratio is elevated.  DRVVT Confirm Normalized ratio is elevated.  Thrombin time is elevated.  POSITIVE TEST; THIS PATIENT HAS A  LUPUS ANTICOAGULANT.  Recommend repeat testing in 12 weeks to determine if the Lupus Anticoagulant is persistent or transient, since a transient one does not confer an increased thrombotic risk.  If the patient is on an  anticoagulant, recommend repeat testing without anticoagulation interference to rule out a false positive lupus anticoagulant.  Patients with a lupus anticoagulant on warfarin therapy should be considered for monitoring with a factor 2 (factor II) level or chromogenic factor 10 (factor X) assay.   Causes of a prolonged Thrombin Time include: hypofibrinogenemia, dysfibrinogenemia, elevated fibrin degradation products (e.g. D-dimer),  anticoagulants (e.g. heparin and direct thrombin inhibitors), and inhibitors.        A resident or fellow in a training program, Akash Lambert,  was involved in  the initial review and interpretation of this case.  I, as the senior physician, attest that I have personally reviewed this case to determine the final diagnosis.    Opal Hairston MD, PhD  [facility]   Date Value Ref Range Status   07/18/2024 98  Final          Again, thank you for allowing me to participate in the care of your patient.        Sincerely,        Chanelle Hdez NP

## 2024-09-26 NOTE — PROGRESS NOTES
Patient Name: Wendy Kapoor  : 1970  Age: 53 year old  MRN: [id number]  Date of Initial Social Work Evaluation: 2023    Patient on transplant wait list. Visit completed today in clinic with Wendy and her ex-spouse Benny to update psychosocial assessment.      Presenting Information   Living Situation: Wendy continues to [place] [facility]  If not local, plans for short term stay:  N/A  Previous Functional Status: Independent ADLs  Cultural/Language/Spiritual Considerations: None noted.     Support System  Primary Support Person Benny (ex-spouse)  Other support:  Kota (son), Celia (daughter), Frida (daughter)  Plan for support in immediate post-transplant period: Benny and adult children to follow Wendy through tx process, provide support, and identify transportation post tx.    Health Care Directive  Decision Maker: Self  Alternate Decision Maker: NOK - adult children  Health Care Directive: Provided education    Mental Health/Coping:   History of Mental Health: No hx of mental health concerns  History of Chemical Health: No hx of chemical health concerns  Current status: Pt reports some intake in anxious and depressive symptoms. She feels a lot of this has to do with isolation from dialysis days and anxiousness to get transplanted. She notes that she does feel these symptoms are well managed by support from her family and situational to her current health.  Coping: Talking with family, especially her children  Services Needed/Recommended: Not applicable at this time, continue to monitor symptoms of mental health    Financial   Income: SSDI  Impact of transplant on income: Pt will likely lose SSDI 1 year post tx  Insurance and medication coverage: [facility] [place]  Financial concerns: None noted at this time  Resources needed: Not applicable at this time.     Assessment and recommendations and plan: Wendy continues to be an acceptable candidate for transplant based on updated psychosocial assessment.  Reviewed  transplant education (Medicare, rehabilitation, donor issues, community/financial resources, and psych/family adjustment) as well as psychosocial risks of transplant. Provided patient with a copy of post-transplant informational sheet that includes information on potential costs of medications, Medicare ESRD, post-transplant lodging, etc. Patient seemed to process information well. Appeared well informed, motivated, and able to follow post transplant requirements. Behavior was appropriate during interview. Has adequate income and insurance coverage. Adequate social support. No major contraindications noted for transplant. At this time, patient appears to understand the risks and benefits of transplant.

## 2024-10-12 ENCOUNTER — HEALTH MAINTENANCE LETTER (OUTPATIENT)
Age: 54
End: 2024-10-12

## 2024-10-16 ENCOUNTER — HOSPITAL ENCOUNTER (OUTPATIENT)
Facility: CLINIC | Age: 54
Discharge: HOME | End: 2024-10-17
Attending: SURGERY | Admitting: SURGERY
Payer: COMMERCIAL

## 2024-10-16 ENCOUNTER — ORGAN (OUTPATIENT)
Dept: TRANSPLANT | Facility: CLINIC | Age: 54
End: 2024-10-16
Payer: COMMERCIAL

## 2024-10-16 ENCOUNTER — HOSPITAL ENCOUNTER (INPATIENT)
Facility: CLINIC | Age: 54
Setting detail: SURGERY ADMIT
End: 2024-10-16
Attending: TRANSPLANT SURGERY | Admitting: TRANSPLANT SURGERY
Payer: COMMERCIAL

## 2024-10-16 DIAGNOSIS — Z76.82 AWAITING ORGAN TRANSPLANT: Primary | ICD-10-CM

## 2024-10-16 PROBLEM — Z01.818 ENCOUNTER FOR PRE-TRANSPLANT EVALUATION FOR KIDNEY AND PANCREAS TRANSPLANT: Status: ACTIVE | Noted: 2024-10-16

## 2024-10-16 LAB
ABO/RH(D): NORMAL
ANTIBODY SCREEN: NEGATIVE
SPECIMEN EXPIRATION DATE: NORMAL

## 2024-10-16 RX ORDER — ACETAMINOPHEN 325 MG/1
975 TABLET ORAL ONCE
Status: DISCONTINUED | OUTPATIENT
Start: 2024-10-17 | End: 2024-10-17 | Stop reason: HOSPADM

## 2024-10-16 RX ORDER — ACETAMINOPHEN 650 MG/1
650 SUPPOSITORY RECTAL ONCE
Status: DISCONTINUED | OUTPATIENT
Start: 2024-10-17 | End: 2024-10-17 | Stop reason: HOSPADM

## 2024-10-16 RX ORDER — PIPERACILLIN SODIUM, TAZOBACTAM SODIUM 3; .375 G/15ML; G/15ML
3.38 INJECTION, POWDER, LYOPHILIZED, FOR SOLUTION INTRAVENOUS ONCE
Status: DISCONTINUED | OUTPATIENT
Start: 2024-10-17 | End: 2024-10-17 | Stop reason: HOSPADM

## 2024-10-16 RX ORDER — LIDOCAINE 40 MG/G
CREAM TOPICAL
Status: DISCONTINUED | OUTPATIENT
Start: 2024-10-16 | End: 2024-10-17 | Stop reason: HOSPADM

## 2024-10-16 RX ORDER — OCTREOTIDE ACETATE 100 UG/ML
100 INJECTION, SOLUTION INTRAVENOUS; SUBCUTANEOUS ONCE
Status: DISCONTINUED | OUTPATIENT
Start: 2024-10-17 | End: 2024-10-17 | Stop reason: HOSPADM

## 2024-10-17 ENCOUNTER — APPOINTMENT (OUTPATIENT)
Dept: GENERAL RADIOLOGY | Facility: CLINIC | Age: 54
End: 2024-10-17
Attending: SURGERY
Payer: COMMERCIAL

## 2024-10-17 VITALS
BODY MASS INDEX: 28.2 KG/M2 | OXYGEN SATURATION: 98 % | HEART RATE: 93 BPM | DIASTOLIC BLOOD PRESSURE: 71 MMHG | WEIGHT: 139.9 LBS | TEMPERATURE: 98.6 F | RESPIRATION RATE: 16 BRPM | SYSTOLIC BLOOD PRESSURE: 144 MMHG

## 2024-10-17 LAB
ALBUMIN SERPL BCG-MCNC: 3.6 G/DL (ref 3.5–5.2)
ALP SERPL-CCNC: 79 U/L (ref 40–150)
ALT SERPL W P-5'-P-CCNC: 19 U/L (ref 0–50)
AMYLASE SERPL-CCNC: 38 U/L (ref 28–100)
ANION GAP SERPL CALCULATED.3IONS-SCNC: 14 MMOL/L (ref 7–15)
APTT PPP: 28 SECONDS (ref 22–38)
AST SERPL W P-5'-P-CCNC: 21 U/L (ref 0–45)
BILIRUB SERPL-MCNC: 0.3 MG/DL
BLD PROD TYP BPU: NORMAL
BLD PROD TYP BPU: NORMAL
BLOOD COMPONENT TYPE: NORMAL
BLOOD COMPONENT TYPE: NORMAL
BUN SERPL-MCNC: 51.5 MG/DL (ref 6–20)
CALCIUM SERPL-MCNC: 9.9 MG/DL (ref 8.8–10.4)
CHLORIDE SERPL-SCNC: 97 MMOL/L (ref 98–107)
CMV IGG SERPL IA-ACNC: >10 U/ML
CMV IGG SERPL IA-ACNC: ABNORMAL
CODING SYSTEM: NORMAL
CODING SYSTEM: NORMAL
CREAT SERPL-MCNC: 3.41 MG/DL (ref 0.51–0.95)
CROSSMATCH: NORMAL
CROSSMATCH: NORMAL
EBV VCA IGG SER IA-ACNC: 225 U/ML
EBV VCA IGG SER IA-ACNC: POSITIVE
EGFRCR SERPLBLD CKD-EPI 2021: 15 ML/MIN/1.73M2
ERYTHROCYTE [DISTWIDTH] IN BLOOD BY AUTOMATED COUNT: 15.9 % (ref 10–15)
EST. AVERAGE GLUCOSE BLD GHB EST-MCNC: 209 MG/DL
GLUCOSE BLDC GLUCOMTR-MCNC: 221 MG/DL (ref 70–99)
GLUCOSE BLDC GLUCOMTR-MCNC: 291 MG/DL (ref 70–99)
GLUCOSE SERPL-MCNC: 285 MG/DL (ref 70–99)
HBA1C MFR BLD: 8.9 %
HBV CORE AB SERPL QL IA: NONREACTIVE
HBV SURFACE AB SERPL IA-ACNC: <3.5 M[IU]/ML
HBV SURFACE AB SERPL IA-ACNC: NONREACTIVE M[IU]/ML
HBV SURFACE AG SERPL QL IA: NONREACTIVE
HCO3 SERPL-SCNC: 21 MMOL/L (ref 22–29)
HCT VFR BLD AUTO: 27.9 % (ref 35–47)
HCV AB SERPL QL IA: NONREACTIVE
HGB BLD-MCNC: 9 G/DL (ref 11.7–15.7)
HIV 1+2 AB+HIV1 P24 AG SERPL QL IA: NONREACTIVE
INR PPP: 0.87 (ref 0.85–1.15)
LACTATE SERPL-SCNC: 2.6 MMOL/L (ref 0.7–2)
LIPASE SERPL-CCNC: 99 U/L (ref 13–60)
MCH RBC QN AUTO: 30.2 PG (ref 26.5–33)
MCHC RBC AUTO-ENTMCNC: 32.3 G/DL (ref 31.5–36.5)
MCV RBC AUTO: 94 FL (ref 78–100)
PLATELET # BLD AUTO: 153 10E3/UL (ref 150–450)
POTASSIUM SERPL-SCNC: 4.2 MMOL/L (ref 3.4–5.3)
PROT SERPL-MCNC: 7.6 G/DL (ref 6.4–8.3)
RBC # BLD AUTO: 2.98 10E6/UL (ref 3.8–5.2)
SARS-COV-2 RNA RESP QL NAA+PROBE: NEGATIVE
SODIUM SERPL-SCNC: 132 MMOL/L (ref 135–145)
UNIT ABO/RH: NORMAL
UNIT ABO/RH: NORMAL
UNIT NUMBER: NORMAL
UNIT NUMBER: NORMAL
UNIT STATUS: NORMAL
UNIT STATUS: NORMAL
UNIT TYPE ISBT: 7300
UNIT TYPE ISBT: 7300
WBC # BLD AUTO: 9 10E3/UL (ref 4–11)

## 2024-10-17 PROCEDURE — 86644 CMV ANTIBODY: CPT

## 2024-10-17 PROCEDURE — 250N000011 HC RX IP 250 OP 636: Mod: JW

## 2024-10-17 PROCEDURE — 86665 EPSTEIN-BARR CAPSID VCA: CPT

## 2024-10-17 PROCEDURE — 86833 HLA CLASS II HIGH DEFIN QUAL: CPT

## 2024-10-17 PROCEDURE — 83036 HEMOGLOBIN GLYCOSYLATED A1C: CPT

## 2024-10-17 PROCEDURE — 83605 ASSAY OF LACTIC ACID: CPT | Performed by: SURGERY

## 2024-10-17 PROCEDURE — 86645 CMV ANTIBODY IGM: CPT

## 2024-10-17 PROCEDURE — 85610 PROTHROMBIN TIME: CPT

## 2024-10-17 PROCEDURE — 85027 COMPLETE CBC AUTOMATED: CPT

## 2024-10-17 PROCEDURE — 250N000012 HC RX MED GY IP 250 OP 636 PS 637

## 2024-10-17 PROCEDURE — 36415 COLL VENOUS BLD VENIPUNCTURE: CPT | Performed by: SURGERY

## 2024-10-17 PROCEDURE — 86923 COMPATIBILITY TEST ELECTRIC: CPT

## 2024-10-17 PROCEDURE — 87340 HEPATITIS B SURFACE AG IA: CPT

## 2024-10-17 PROCEDURE — 82962 GLUCOSE BLOOD TEST: CPT

## 2024-10-17 PROCEDURE — 87516 HEPATITIS B DNA AMP PROBE: CPT

## 2024-10-17 PROCEDURE — 93005 ELECTROCARDIOGRAM TRACING: CPT

## 2024-10-17 PROCEDURE — 86706 HEP B SURFACE ANTIBODY: CPT

## 2024-10-17 PROCEDURE — 86803 HEPATITIS C AB TEST: CPT

## 2024-10-17 PROCEDURE — 85730 THROMBOPLASTIN TIME PARTIAL: CPT

## 2024-10-17 PROCEDURE — 86704 HEP B CORE ANTIBODY TOTAL: CPT

## 2024-10-17 PROCEDURE — 99214 OFFICE O/P EST MOD 30 MIN: CPT | Mod: GC | Performed by: SURGERY

## 2024-10-17 PROCEDURE — 71046 X-RAY EXAM CHEST 2 VIEWS: CPT

## 2024-10-17 PROCEDURE — 71046 X-RAY EXAM CHEST 2 VIEWS: CPT | Mod: 26 | Performed by: RADIOLOGY

## 2024-10-17 PROCEDURE — 80053 COMPREHEN METABOLIC PANEL: CPT

## 2024-10-17 PROCEDURE — 86900 BLOOD TYPING SEROLOGIC ABO: CPT

## 2024-10-17 PROCEDURE — 999N000248 HC STATISTIC IV INSERT WITH US BY RN

## 2024-10-17 PROCEDURE — 36415 COLL VENOUS BLD VENIPUNCTURE: CPT

## 2024-10-17 PROCEDURE — 87389 HIV-1 AG W/HIV-1&-2 AB AG IA: CPT

## 2024-10-17 PROCEDURE — 82150 ASSAY OF AMYLASE: CPT

## 2024-10-17 PROCEDURE — 86832 HLA CLASS I HIGH DEFIN QUAL: CPT

## 2024-10-17 PROCEDURE — 87535 HIV-1 PROBE&REVERSE TRNSCRPJ: CPT

## 2024-10-17 PROCEDURE — 86790 VIRUS ANTIBODY NOS: CPT

## 2024-10-17 PROCEDURE — 83690 ASSAY OF LIPASE: CPT

## 2024-10-17 PROCEDURE — 87635 SARS-COV-2 COVID-19 AMP PRB: CPT

## 2024-10-17 RX ORDER — HYDRALAZINE HYDROCHLORIDE 20 MG/ML
10 INJECTION INTRAMUSCULAR; INTRAVENOUS EVERY 30 MIN PRN
Status: DISCONTINUED | OUTPATIENT
Start: 2024-10-17 | End: 2024-10-17 | Stop reason: HOSPADM

## 2024-10-17 RX ORDER — NICOTINE POLACRILEX 4 MG
15-30 LOZENGE BUCCAL
Status: DISCONTINUED | OUTPATIENT
Start: 2024-10-17 | End: 2024-10-17 | Stop reason: HOSPADM

## 2024-10-17 RX ORDER — DEXTROSE MONOHYDRATE 25 G/50ML
25-50 INJECTION, SOLUTION INTRAVENOUS
Status: DISCONTINUED | OUTPATIENT
Start: 2024-10-17 | End: 2024-10-17 | Stop reason: HOSPADM

## 2024-10-17 RX ORDER — LABETALOL HYDROCHLORIDE 5 MG/ML
10 INJECTION, SOLUTION INTRAVENOUS EVERY 10 MIN PRN
Status: DISCONTINUED | OUTPATIENT
Start: 2024-10-17 | End: 2024-10-17 | Stop reason: HOSPADM

## 2024-10-17 RX ADMIN — INSULIN ASPART 2 UNITS: 100 INJECTION, SOLUTION INTRAVENOUS; SUBCUTANEOUS at 04:42

## 2024-10-17 RX ADMIN — LABETALOL HYDROCHLORIDE 10 MG: 5 INJECTION, SOLUTION INTRAVENOUS at 03:51

## 2024-10-17 RX ADMIN — LABETALOL HYDROCHLORIDE 20 MG: 5 INJECTION, SOLUTION INTRAVENOUS at 04:35

## 2024-10-17 ASSESSMENT — ACTIVITIES OF DAILY LIVING (ADL)
ADLS_ACUITY_SCORE: 35

## 2024-10-17 NOTE — CODE/RAPID RESPONSE
10/17/24 0400   Call Information   Date of Call 10/17/24   Time of Call 0420   Name of person requesting the team Lydia   Title of person requesting team RN   RRT Arrival time 0425   Time RRT ended 0430   Reason for call   Type of RRT Adult   Primary reason for call Sepsis suspected   Sepsis Suspected Elevated Lactate level   Was patient transferred from the ED, ICU, or PACU within last 24 hours prior to RRT call? No  (Admitted from home for preop Kid/panc)   SBAR   Situation LA - 2.6   Background Pt arrived from home for pre-op of Kid/panc transplant   Notable History/Conditions Diabetes;Hypertension;End-Stage disease   Assessment AVSS except blood pressure high, orders already in for maintaining blood pressure   Interventions No interventions   Patient Outcome   Patient Outcome Stabilized on unit   RRT Team   Attending/Primary/Covering Physician Surgery   Physician(s) Alia Triana NP   Lead RN Loni Hdz   RT na   Post RRT Intervention Assessment   Post RRT Assessment Stable/Improved   Date Follow Up Done 10/17/24   Time Follow Up Done 0615   Comments Lactic acid recheck ordered for 0600.  Will watch for result.      Large right ovarian mass

## 2024-10-17 NOTE — PROGRESS NOTES
Admitted/transferred from: Home  Time of arrival on unit 0180  2 RN full  skin assessment completed by Lydia TATE  Skin assessment finding: other Pt has a PD cath in her LLQ    Interventions/actions: other Cath redressed after shower.      Will continue to monitor.

## 2024-10-17 NOTE — PROVIDER NOTIFICATION
7A, Room 7216, [place] Kapoor  Pt had a lactic acid of 2.6. Per sepsis protocol, we called a Code Sepsis. The rapid is not indicative of a change in our patient.    Code Sepsis called, team responded and rounded on patient, no course of action needed as of now.

## 2024-10-17 NOTE — H&P
[facility], [place]    Transplant Surgery  History and Physical    Wendy Kapoor  : 1970  MRN # [id number]    ADMIT DATE: 10/17/2024    PCP: Hafsa Harrington    CHIEF COMPLAINT: Pre-operative kidney and pancreas transplant    HPI: Wendy Kapoor is a 53 year old female with end stage renal failure due to T2DM (dx 25 years ago) and HTN who presents for a kidney and pancreas transplant. She has been receiving peritoneal dialysis daily for the past year, last dialysis completed at 12pm today. She makes urine at baseline. She reports hx of gout, most recently took predisone 10mg today and yesterday for flare in her left index finger. She denies recent travel or illness. She does not take anticoagulation, on ASA 81mg for antiplatelet. Previous abdominal surgeries include 3 c-sections, last one 16 years ago. She takes insulin at home for T2DM at bedtime, self-administered insulin glargine 14u dose today prior to arrival.     ROS:   CONSTITUTIONAL: Denies fever, chills, fatigue, or weight fluctuations  HEAD: Denies headache.  EENT: Denies vision changes, hearing changes, dysphagia, or sore throat.   NECK: Denies lymphadenopathy.   CV:Denies chest pain, palpitations, or shortness of breath.   PULMONARY:Denies shortness of breath, cough, or previous TB exposure.   GI:Denies nausea, vomiting, diarrhea, and abdominal pain. Bowel movements are regular.   :Denies urinary alterations, dysuria, urinary frequency, hematuria, and abnormal drainage.   EXT:Denies lower extremity edema.   SKIN:Denies abnormal rashes or lesions.   MUSCULOSKELETAL:Denies upper or lower extremity weakness and pain.   NEUROLOGIC:Denies lightheadedness, dizziness, seizures, or upper or lower extremity paresthesia.   HEMATOLOGICAL:No abnormal bruising or bleeding.   PSYCHIATRIC:Denies any mood alterations.     PMH:  Past Medical History:   Diagnosis Date    Chronic kidney disease     Diabetes (H)     DM Type 2     History of blood transfusion     Hypertension     MGUS (monoclonal gammopathy of unknown significance)     Pericardial effusion     Sjogren's syndrome (H)     Type 2 diabetes mellitus with diabetic nephropathy (H)        PSH:  Past Surgical History:   Procedure Laterality Date    BIOPSY       SECTION      CV CORONARY ANGIOGRAM N/A 2024    Procedure: Coronary Angiogram;  Surgeon: Cody Mckenzie MD;  Location:  [facility]    CV PCI N/A 2024    Procedure: Percutaneous Coronary Intervention;  Surgeon: Cody Mckenzie MD;  Location:  [facility]       MEDICATIONS:  Prior to Admission Medications   Prescriptions Last Dose Informant Patient Reported? Taking?   BD PEN NEEDLE HUBER 2ND GEN 32G X 4 MM miscellaneous   Yes No   Sig: USE 1 NEEDLE ONCE DAILY.   DARBEPOETIN DALTON IJ   Yes No   Sig: Inject 25 mcg Subcutaneous every 28 days   GAVILYTE-G 236 g suspension   Yes No   Sig: TAKE 4,000 ML BY MOUTH AS DIRECTED   Insulin Glargine w/ Trans Port 100 UNIT/ML SOPN   Yes No   Sig: Inject 14 Units Subcutaneous   LYCOPENE PO   Yes No   Sig: Take 1 tablet by mouth daily   Methoxy PEG-Epoetin Beta (MIRCERA IJ)   Yes No   Sig: Inject 200 mcg subcutaneously.   Tuberculin PPD (TUBERSOL ID)   Yes No   Sig: Inject 0.1 mLs into the skin.   acetaminophen (TYLENOL) 500 MG tablet   Yes No   Sig: Take by mouth.   allopurinol (ZYLOPRIM) 100 MG tablet   Yes No   Sig: Take 1 tablet by mouth.   aspirin 81 MG EC tablet   Yes No   Sig: Take 1 tablet (81 mg) by mouth daily   blood glucose (FREESTYLE TEST STRIPS) test strip   Yes No   Sig: by Misc.(Non-Drug; Combo Route) route three times a day.   cholecalciferol 50 MCG (2000 UT) tablet   Yes No   Sig: Take 50 mcg by mouth   diltiazem ER COATED BEADS (CARDIZEM CD/CARTIA XT) 180 MG 24 hr capsule   Yes No   Sig: TAKE 1 CAPSULE (180 MG) BY MOUTH ONCE DAILY.   gentamicin (GARAMYCIN) 0.1 % external cream   Yes No   Sig: APPLY TO EXIT SITE AS  DIRECTED   glimepiride (AMARYL) 4 MG tablet   Yes No   Sig: TAKE 2 TABLETS (8 MG) BY MOUTH EVERY MORNING BEFORE BREAKFAST.   glipiZIDE (GLUCOTROL) 5 MG tablet   Yes No   Sig: TAKE 1 TABLET (5MG) BY MOUTH IN THE MORNING AND 1 TABLET (5MG) IN THE EVENING.   hydrALAZINE (APRESOLINE) 25 MG tablet   Yes No   Sig: Take 50 mg by mouth 3 times daily   insulin lispro (HUMALOG KWIKPEN) 100 UNIT/ML (1 unit dial) KWIKPEN   Yes No   Sig: Inject subcutaneously.   losartan (COZAAR) 50 MG tablet   Yes No   Sig: Take 1 tablet by mouth every morning   metoprolol succinate ER (TOPROL XL) 100 MG 24 hr tablet   Yes No   Sig: Take 2 tablets (200 mg) by mouth daily at 2 pm   pioglitazone (ACTOS) 30 MG tablet   Yes No   Sig: Take 30 mg by mouth   potassium chloride ER (K-TAB/KLOR-CON) 10 MEQ CR tablet   Yes No   Sig: Take 10 mEq by mouth daily.   predniSONE (DELTASONE) 20 MG tablet   Yes No   Sig: TAKE 3 TABLETS BY MOUTH DAILY FOR 5 DAYS, THEN 2 TABLETS FOR 5 DAYS, THEN 1 TABLET FOR 5 DAYS.   Patient not taking: Reported on 2024   sevelamer carbonate (RENVELA) 800 MG tablet   Yes No   Sig: TAKE 2 TABLETS BY MOUTH THREE TIMES DAILY WITH MEALS AND 1 TABLET WITH A SNACK   sodium bicarbonate 650 MG tablet   Yes No   Si,300 mg   torsemide (DEMADEX) 100 MG tablet   Yes No   Sig: Take 1 tablet (100 mg) by mouth every morning   torsemide (DEMADEX) 20 MG tablet   Yes No   Sig: Take 1 tablet by mouth daily at 2 pm.   traMADol (ULTRAM) 50 MG tablet   Yes No   Sig: TAKE 1-2 TABLETS ( MG) BY MOUTH EVERY 6 (SIX) HOURS AS NEEDED FOR PAIN.      Facility-Administered Medications: None        ALLERGIES:   No Known Allergies    FAMILY HISTORY:  Family History   Problem Relation Age of Onset    Kidney Disease No family hx of        SOCIAL HISTORY:  Social History     Socioeconomic History    Marital status:      Spouse name: Not on file    Number of children: Not on file    Years of education: Not on file    Highest education level: Not  on file   Occupational History    Not on file   Tobacco Use    Smoking status: Never    Smokeless tobacco: Never   Substance and Sexual Activity    Alcohol use: Never    Drug use: Never    Sexual activity: Not on file   Other Topics Concern    Not on file   Social History Narrative    Not on file     Social Determinants of Health     Financial Resource Strain: Not on file   Food Insecurity: Not on file   Transportation Needs: Not on file   Physical Activity: Not on file   Stress: Not on file   Social Connections: Not on file   Interpersonal Safety: Not At Risk (6/26/2024)    Received from [facility] and [facility] the [facility]    Intimate Partner Violence     Are you in a relationship where you are physically hurt, threatened and/or made to feel afraid?: No   Housing Stability: Not on file       PHYSICAL EXAM:  Weight 63.5 kg (139 lb 14.4 oz).  GENERAL: Appears alert and oriented times three.   HEENT: Eye symmetrical and free of discharge bilaterally. Mucous membranes moist and without lesions.  NECK: Supple and without lymphadenopathy.  CV: RRR  RESPIRATORY: Respirations regular, even, and unlabored.   GI: Soft and non distended. No tenderness, rebound, guarding. No organomegaly.   EXTREMITIES: No peripheral edema. 2+ bilateral pedal pulses.   NEUROLOGIC: Alert and orientated x 3. CN II-XII grossly intact. No focal deficits.   MUSCULOSKELETAL: No joint swelling or tenderness.   SKIN: No jaundice. No rashes or lesions.     LABS:  CBC:  Recent Labs   Lab Test 04/12/24  1110   WBC 11.3*   RBC 2.73*   HGB 8.6*   HCT 25.8*   MCV 95   MCH 31.5   MCHC 33.3   RDW 13.4          CMP:  Recent Labs   Lab Test 04/12/24  1534 04/12/24  1110 08/21/23  1546   NA  --  134*  --    POTASSIUM  --  3.8  --    CHLORIDE  --  96*  --    VIJAY  --  9.4  --    CO2  --  22  --    BUN  --  50.4*  --    CR  --  4.53*  --    * 216*  --    AST  --   --  45   ALT  --   --  29   BILITOTAL  --   --  0.2   ALBUMIN  --   --  3.5   PROTTOTAL  --    --  8.0   ALKPHOS  --   --  109*       IMAGING:    ASSESSMENT & PLAN:  Wendy Kapoor is a 53 year old female with end stage renal failure on peritoneal dialysis due to T2DM (dx 25 years ago) and HTN who presents for a kidney and pancreas transplant.     Thorough transplant pre-op work-up ordered, COVID negative, Crossmatch ordered.   T2DM: Insulin aspart ordered, insulin glargine 14u at bedtime 10/18  HTN: Prn IV hydralazine and IV labetalol ordered with parameters   LINES:  PIV  DISPO:  Floor  CODE STATUS:  full    Discussed with Dr. Shaw.     Lorena Gayle MD  General Surgery PGY-1    **For on call schedules and contact information, please refer to [facility]**      - - - - - - - - - - - - - - - - - -

## 2024-10-17 NOTE — TELEPHONE ENCOUNTER
TRANSPLANT OR REPORT    Organ: KP  Laterality (if known): TBD  Organ Location: [facility] QUAN ID: [id number]  Donor OR Time: 10/17/24 0300  Expected/Actual Cross Clamp Time: 10/17/24 0500  Expected Organ Arrival Time:10/17/24 1400    Surgeon: Lance/Rosibel  Time in OR: 1200  Time in 3C: 1100    Recipient Details  Admission ETA: 10/16/24 2200  Unit: 7A  Isolation: No  Latex Allergy: No  : No  Diagnosis: CKD/ DM    Kidney/Panc Transplants  XM Status (Need to wait for XM?): Yes    Liver or KP/PA Recipients - Vessel Banking:  Donor has positive serologies for HIV/HCV/HBV: No  Donor has risk criteria for HIV/HCV/HBV: No      Transplant Coordinator Contact Info: Mira Mathew [phone]      Vessel Bank Information  Transplant hospitals must not store a donor s extra vessels if the donor has tested positive for any of the following:   - HIV by antibody, antigen, or nucleic acid test (BELLE)   - Hepatitis B surface antigen (HBsAg)   - Hepatitis B (HBV) by BELLE   - Hepatitis C (HCV) by antibody or BELLE     Extra vessels from donors that do not test positive for HIV, HBV, or HCV as above may be stored

## 2024-10-17 NOTE — PROVIDER NOTIFICATION
7A, Room 7216, [place]  VITALS FYI  Pt's BP was 184/89 mmHg and her BG was 281. Just letting you know!    Orders for IV Antihypertensives and short acting insulin placed.

## 2024-10-17 NOTE — CODE/RAPID RESPONSE
Rapid Response Team Note    Assessment   A rapid response was called on eWndy Kapoor due to SIRS/Sepsis trigger and lactic acidosis. Patient recently arrived to 7A for pre-operative kidney and pancreas transplant.    Plan   -  Recheck lactic acid per protocol     -  The  surgery  primary team was updated by nursing  -  Disposition: The patient will remain on 7A  -  Reassessment and plan follow-up will be performed by the primary team    MIA Herrera CNP  Rapid Response Team YNES  Securely message with Yabidu     Medical Decision Making     Hospital Course   Brief Summary of events leading to rapid response:   BPA for sepsis eval based on criteria below:    - BUN  ????10%  51.5 mg/dL    - Number of Past ED Visits  ????8%  0    - Total Active Inpatient Meds  ????8%  21    - Martin Scale  ??-8%  21    - Creatinine  ????7%  3.41 mg/dL    - Has Imaging Procedure  ????5%  present    - Bilirubin  ??-4%  0.3 mg/dL    - Age  ????4%  53 years old    - Pulse  ????3%  100    - Chloride  ????3%  97 mmol/L      Physical Exam   Vital Signs: Temp: 98.5  F (36.9  C) Temp src: Oral BP: (!) 163/80 Pulse: 93   Resp: 17 SpO2: 98 % O2 Device: None (Room air)    Weight: 139 lbs 14.4 oz      Exam:   Respiratory: non labored  Cardiovascular: tachy at 102, regular    Significant Results and Procedures   Lactic acid of 2.6    Sepsis Evaluation   The patient is not known to have an infection.    NO EVIDENCE OF SEPSIS at this time.  Vital sign, physical exam, and lab findings are due to renal failure.

## 2024-10-17 NOTE — TELEPHONE ENCOUNTER
Organ Offer Encounter Information    Organ Offer Information  Organ offer date & time: 10/16/2024  7:39 PM  Coordinator/Fellow/Attending name: Mira Mathew RN   Organ(s):  Organ UNOS ID Match Run ID Comment Organ Laterality   Kidney [id number] Import, MOMA    Pancreas [id number] Import, MOMA         Recent infections?: No      New medications?: No Recent pregnancy?: No     Angicoagulation medications?: Yes (Comment: Aspirin 81mg) Recent vaccinations?: No     Recent blood transfusions?: No Recent hospitalizations?: No   Has your insurance changed in the last 6-12 months?: Neg    Patient last dialyzed: 10/16/2024  5:00 AM  Dialysis type: Peritoneal  Discussed organ offer with: Patient  Discussed risk category with Patient/Other: N/A  Understood donor criteria, verbalized understanding  Discussed program-specific outcomes: Did not have questions regarding SRTR  Right to decline organ offer without penalty, Patient/Other: Aware of option to decline without penalty  Organ offer decision status Patient/Other: Accepted Offer  Organ disposition: Case Cancelled - Donor quality - Other (specify) (Comment: Edematous at OR visualization per Rosibel)  Additional Comments: Admissions: 10/16/24 7:49 PM Opal, 8:01 PM return call received that 7A will be transferring off a pt and will have a bed available shortly.  Unit: 10/16/24 7:48 PM 7A Lyla, 8:27 PM Lyla bed will be available at 10pm tonight  Update Provider Entering Orders (XM Plan & COVID Testing):  10/16/24 8:52 PM Lorena Gayle  Immunology: 10/16/24 9:34 PM Janice   K[facility] ([phone]): 10/16/24 9:40 PM Page sent  Book OR: 10/16/24 8:53 PM Celso  Vessel Storage Confirmation (PA/KP/LI): OK to store  Blood Bank: 10/16/24 9:36 PM Mukund  TransNet/ABO Verification: 10/16/24 9:48 PM Left KI and Panc printed  Add Organ: 10/16/24 9:51 PM     10/16/2024 7:42 PM  KP: MARCELO Espino  MD: Rosibel/Lance  OPO Contact: Clementina [phone]  VXM Results: 20% change  of FXM being positive. Dr. Benjamin would recommend prospective   XM Plan (FXM must be done with serum no older than 10 days from transplant):  Pt will have blood drawn once admitted, Donor blood ETA 0800 10/17/24  Plan (Admission, NPO, Donor OR): Admit 10/16/24 2200, NPO at [place], Donor OR 10/17/24 0300. Recip OR with Dr. Lucas with OR 10/17/24 at 1200.  - - -   COVID Screening  In the past month, have you:  Or anyone close to you had a positive COVID test or suspected to have COVID:  No  Had any COVID symptoms (Fever, Cough, Short of Breath, Loss of Taste/Smell, Rash):  No    Call [facility] ([phone]): NA , [facility] to set up  Organ or Specimen ([employer] sets up /KI):   Donor Hospital Location:  [facility] [place]  Donor OR Time: 10/17/24 0300  Team or Organ Only: Organ only  Special Equipment (OCS, Pumps): No  [facility] SRC (See Call Schedule): NA  - - -  Tail Number: [id number], commercial flight  Ground Time in [place]: 20m  Flight Time: 1h35m  Ground Time at OPO: 30m    Donor OR Time: 10/17/24 0300  Procuring MD: Local procurement- Keon Rainey  Contact in the OR: [phone]  Organs Being Procured: Full allocation  Expected Delays: No  Flush Solution: UW  Biopsy: NA  Pump: NA  Special Requests (Special blood tubes, nodes, waivers-XM and/or anatomical): XM waivers confirmed per Clementina OPO 10/16/24 1830.   MD for Visualization: Jean-Pierre Gleason  Transportation Details: See above: Commercial delta flight [id number] organ arrival to [facility] 1345 10/17/24    10/16/24 6:30 PM Crossmatch waiver given for KP per Clementina at [facility]. If positive [facility] will need to return organs. Not able to find a recipient.   Mira Mathew, RN    10/16/24 9:43 PM Call received from  stating bed was delayed, bed will be available at 2300. Call placed to pt to update, she is already on her way to the hospital with a current ETA of 2230. Pt will plan to wait in the lobby until room is available.   Mira RICHARDSON  CHIRAG Mathew    10/17/24 3:58 AM Rosibel confirmed that the KP was being declined due to OR visualization of edematous panc.  Mira Mathew RN      Scumlyjqio00/17/24 4:43 AM updated that case was cancelled.  Immunology 10/17/24 4:45 AM Page sent updating that case cancelled, no need to run FXM.  Blood Bank 10/17/24 4:53 AM Yves, updated on case cancellation  Unit 10/17/24 4:47 AM 7A charge Lyla updated of case cancellation  Patient- MD to update. Dr. Trinh confirmed that he will discuss case cancellation with the pt  OR 10/17/24 4:54 AM Morro updated on case cancellation  Attestation I have discussed all of the above with the Patient/Legal Guardian/Caregiver regarding this organ offer.: Yes  Coordinator/Fellow/Attending name: Mira Mathew RN

## 2024-10-17 NOTE — PROGRESS NOTES
Pt's surgery was cancelled. PIV was pulled and patient went home with family without complications.

## 2024-10-18 LAB
ATRIAL RATE - MUSE: 107 BPM
CMV IGM SERPL IA-ACNC: 29.8 AU/ML
CMV IGM SERPL IA-ACNC: NEGATIVE
DIASTOLIC BLOOD PRESSURE - MUSE: NORMAL MMHG
EBV VCA IGM SER IA-ACNC: 10.3 U/ML
EBV VCA IGM SER IA-ACNC: NORMAL
INTERPRETATION ECG - MUSE: NORMAL
P AXIS - MUSE: 64 DEGREES
PR INTERVAL - MUSE: 160 MS
QRS DURATION - MUSE: 84 MS
QT - MUSE: 342 MS
QTC - MUSE: 456 MS
R AXIS - MUSE: 41 DEGREES
SYSTOLIC BLOOD PRESSURE - MUSE: NORMAL MMHG
T AXIS - MUSE: 95 DEGREES
VENTRICULAR RATE- MUSE: 107 BPM

## 2024-10-23 ENCOUNTER — LAB REQUISITION (OUTPATIENT)
Dept: LAB | Facility: CLINIC | Age: 54
End: 2024-10-23
Payer: COMMERCIAL

## 2024-10-24 LAB — BK VIRUS IGG ANTIBODY: ABNORMAL TITER

## 2024-10-25 LAB
HBV DNA SERPL QL NAA+PROBE: NORMAL
HCV RNA SERPL QL NAA+PROBE: NORMAL
HIV1+2 RNA SERPL QL NAA+PROBE: NORMAL

## 2024-11-12 ENCOUNTER — TELEPHONE (OUTPATIENT)
Dept: TRANSPLANT | Facility: CLINIC | Age: 54
End: 2024-11-12
Payer: COMMERCIAL

## 2024-11-12 DIAGNOSIS — N18.6 ESRD (END STAGE RENAL DISEASE) (H): ICD-10-CM

## 2024-11-12 DIAGNOSIS — E11.9 DIABETES MELLITUS TYPE 2, UNCOMPLICATED (H): ICD-10-CM

## 2024-11-12 DIAGNOSIS — Z76.82 ORGAN TRANSPLANT CANDIDATE: Primary | ICD-10-CM

## 2024-11-12 NOTE — TELEPHONE ENCOUNTER
Reviewed patient due to have antibody red cell titer test drawn and I have placed the order.  Patient reports she will call the lab in the morning and schedule an afternoon appointment.

## 2024-11-14 ENCOUNTER — DOCUMENTATION ONLY (OUTPATIENT)
Dept: TRANSPLANT | Facility: CLINIC | Age: 54
End: 2024-11-14
Payer: COMMERCIAL

## 2024-11-15 ENCOUNTER — LAB (OUTPATIENT)
Dept: LAB | Facility: OTHER | Age: 54
End: 2024-11-15
Payer: COMMERCIAL

## 2024-11-15 DIAGNOSIS — E11.9 DIABETES MELLITUS TYPE 2, UNCOMPLICATED (H): ICD-10-CM

## 2024-11-15 DIAGNOSIS — Z76.82 ORGAN TRANSPLANT CANDIDATE: ICD-10-CM

## 2024-11-15 DIAGNOSIS — N18.6 ESRD (END STAGE RENAL DISEASE) (H): ICD-10-CM

## 2024-11-20 DIAGNOSIS — N18.6 ESRD (END STAGE RENAL DISEASE) (H): Primary | ICD-10-CM

## 2024-11-20 DIAGNOSIS — Z76.82 ORGAN TRANSPLANT CANDIDATE: ICD-10-CM

## 2024-12-03 ENCOUNTER — TELEPHONE (OUTPATIENT)
Dept: TRANSPLANT | Facility: CLINIC | Age: 54
End: 2024-12-03
Payer: COMMERCIAL

## 2024-12-21 ENCOUNTER — HEALTH MAINTENANCE LETTER (OUTPATIENT)
Age: 54
End: 2024-12-21

## 2024-12-23 ENCOUNTER — TELEPHONE (OUTPATIENT)
Dept: TRANSPLANT | Facility: CLINIC | Age: 54
End: 2024-12-23
Payer: COMMERCIAL

## 2024-12-23 NOTE — TELEPHONE ENCOUNTER
Left voice message requesting return call from patient as needs anti a titers re-drawn.  Last sample was received, but not labeled correctly.

## 2025-01-06 ENCOUNTER — TELEPHONE (OUTPATIENT)
Dept: TRANSPLANT | Facility: CLINIC | Age: 55
End: 2025-01-06
Payer: COMMERCIAL

## 2025-01-06 NOTE — TELEPHONE ENCOUNTER
Reviewed with patient that she needs anti A titers redrawn as last sample was received incorrectly labeled.  Patient reports she will call today to schedule another lab appointment.

## 2025-01-07 ENCOUNTER — LAB (OUTPATIENT)
Dept: LAB | Facility: OTHER | Age: 55
End: 2025-01-07
Payer: COMMERCIAL

## 2025-01-07 DIAGNOSIS — Z76.82 ORGAN TRANSPLANT CANDIDATE: ICD-10-CM

## 2025-01-07 DIAGNOSIS — N18.6 ESRD (END STAGE RENAL DISEASE) (H): ICD-10-CM

## 2025-01-07 LAB
A1 AB TITR SERPL: 2 {TITER}
A1 AB TITR SERPL: 2 {TITER}
A2 AB TITR SERPL: <1 {TITER}
A2 AB TITR SERPL: <1 {TITER}
ANTIBODY TITER IGM SCREEN: NEGATIVE
SPECIMEN EXP DATE BLD: NORMAL

## 2025-01-07 PROCEDURE — 86886 COOMBS TEST INDIRECT TITER: CPT

## 2025-01-07 PROCEDURE — 86850 RBC ANTIBODY SCREEN: CPT | Mod: 59

## 2025-01-08 LAB
BLD GP AB SCN SERPL QL: NEGATIVE
SPECIMEN EXP DATE BLD: NORMAL

## 2025-01-12 DIAGNOSIS — Z76.82 AWAITING ORGAN TRANSPLANT: Primary | ICD-10-CM

## 2025-01-14 ENCOUNTER — TELEPHONE (OUTPATIENT)
Dept: TRANSPLANT | Facility: CLINIC | Age: 55
End: 2025-01-14
Payer: COMMERCIAL

## 2025-01-14 NOTE — TELEPHONE ENCOUNTER
Reviewed patient requests to have PRA sample drawn at [facility] unit on 01/16/2025.  Noted if possible, please spin the sample as with the colder weather, samples can hemolyze.  Patricia reports if the unit has clear tubes, they will spin the sample.

## 2025-01-14 NOTE — TELEPHONE ENCOUNTER
Reviewed patient's dialysis unit didn't draw a new PRA sample on 01/08/2025.  Patient confirms since she is going to the unit on 01/16/2025, she will have the sample drawn at dialysis.  Noted if any issue with getting drawn on 01/16/2025, please contact me and I can make certain there's an order in her chart for [facility] lab to draw.  Patient verbalizes agreement with this plan.

## 2025-01-16 ENCOUNTER — LAB (OUTPATIENT)
Dept: LAB | Facility: CLINIC | Age: 55
End: 2025-01-16
Payer: COMMERCIAL

## 2025-01-16 DIAGNOSIS — N18.6 ESRD (END STAGE RENAL DISEASE) (H): ICD-10-CM

## 2025-01-16 DIAGNOSIS — Z76.82 ORGAN TRANSPLANT CANDIDATE: ICD-10-CM

## 2025-01-16 LAB — SCANNED LAB RESULT: NORMAL

## 2025-01-16 PROCEDURE — 86833 HLA CLASS II HIGH DEFIN QUAL: CPT | Performed by: SURGERY

## 2025-01-21 ENCOUNTER — PRE VISIT (OUTPATIENT)
Dept: UROLOGY | Facility: CLINIC | Age: 55
End: 2025-01-21
Payer: COMMERCIAL

## 2025-01-21 NOTE — TELEPHONE ENCOUNTER
Reason for visit: 6 month follow up on renal cysts     Relevant information: hx ESRD, diabetes type 2, organ transplant candidate(kidney and pancreas), colonic diverticulosis    Records/imaging/labs/orders: All records available    At Rooming: Virtual visit      Rao Fernando  1/21/2025  8:57 AM

## 2025-01-23 ENCOUNTER — LAB REQUISITION (OUTPATIENT)
Dept: LAB | Facility: CLINIC | Age: 55
End: 2025-01-23
Payer: COMMERCIAL

## 2025-01-25 ENCOUNTER — HEALTH MAINTENANCE LETTER (OUTPATIENT)
Age: 55
End: 2025-01-25

## 2025-01-28 ENCOUNTER — TELEPHONE (OUTPATIENT)
Dept: UROLOGY | Facility: CLINIC | Age: 55
End: 2025-01-28
Payer: COMMERCIAL

## 2025-01-28 DIAGNOSIS — N28.1 RENAL CYST: Primary | ICD-10-CM

## 2025-01-28 NOTE — TELEPHONE ENCOUNTER
Clinic coordinators, it appears that this patient was supposed to have a renal MRI with and without IV contrast prior to her visit with me on Thursday, but I do not see any order or appointment for this.  Can you contact this patient and determine whether or not she has plans to get this MRI prior to Thursday?  If not, we will need to get her scheduled for 1.  I am happy to place a new order if need be. Thank you!

## 2025-01-29 NOTE — TELEPHONE ENCOUNTER
Patient confirmed scheduled appointment:  Date: 5/16  Time: 10am  Visit type: return patient  Provider: Yves Mcgraw  Location: Virtual  Testing/imaging: Renal MRI scheduled 5/9  Additional notes: pt was okay scheduling appointment next available.

## 2025-02-06 LAB — SCANNED LAB RESULT: NORMAL

## 2025-02-12 ENCOUNTER — HOSPITAL ENCOUNTER (OUTPATIENT)
Dept: MRI IMAGING | Facility: CLINIC | Age: 55
Discharge: HOME | End: 2025-02-12
Attending: STUDENT IN AN ORGANIZED HEALTH CARE EDUCATION/TRAINING PROGRAM
Payer: COMMERCIAL

## 2025-02-12 DIAGNOSIS — N28.1 RENAL CYST: ICD-10-CM

## 2025-02-12 PROCEDURE — 74181 MRI ABDOMEN W/O CONTRAST: CPT

## 2025-02-13 ENCOUNTER — TELEPHONE (OUTPATIENT)
Dept: UROLOGY | Facility: CLINIC | Age: 55
End: 2025-02-13
Payer: COMMERCIAL

## 2025-02-13 DIAGNOSIS — Z76.82 ORGAN TRANSPLANT CANDIDATE: ICD-10-CM

## 2025-02-13 DIAGNOSIS — N28.1 RENAL CYST: Primary | ICD-10-CM

## 2025-02-13 NOTE — TELEPHONE ENCOUNTER
Updated renal MRI was completed without contrast, though it is unclear why.  However, it appears that the lesion in question is stable, specifically the septation we have been watching.  While the cyst itself grew, this is unconcerning.  I will happily survey this with a 6-month scan follow-up per radiology recommendations.    Clinic coordinators, would you please assist me in contacting this patient to set up a repeat MRI in 6 months with a follow-up visit with me shortly afterwards to discuss results?    Daja, wanted to make you aware of this recommendation, and ultimately no concerns from a urologic perspective to continue with transplant evaluation.

## 2025-02-17 ENCOUNTER — TELEPHONE (OUTPATIENT)
Dept: TRANSPLANT | Facility: CLINIC | Age: 55
End: 2025-02-17
Payer: COMMERCIAL

## 2025-02-17 NOTE — TELEPHONE ENCOUNTER
Otilia reports patient is in the middle of completing the Hep B vaccine; has 3rd dose in March 2025 and will have titers checked in May 2025.

## 2025-03-13 ENCOUNTER — LAB (OUTPATIENT)
Dept: LAB | Facility: OTHER | Age: 55
End: 2025-03-13
Payer: COMMERCIAL

## 2025-03-13 DIAGNOSIS — D47.2 MGUS (MONOCLONAL GAMMOPATHY OF UNKNOWN SIGNIFICANCE): ICD-10-CM

## 2025-03-13 LAB
ALBUMIN SERPL BCG-MCNC: 3.4 G/DL (ref 3.5–5.2)
ALP SERPL-CCNC: 80 U/L (ref 40–150)
ALT SERPL W P-5'-P-CCNC: 14 U/L (ref 0–50)
ANION GAP SERPL CALCULATED.3IONS-SCNC: 17 MMOL/L (ref 7–15)
AST SERPL W P-5'-P-CCNC: 22 U/L (ref 0–45)
BASOPHILS # BLD AUTO: 0 10E3/UL (ref 0–0.2)
BASOPHILS NFR BLD AUTO: 0 %
BILIRUB SERPL-MCNC: 0.2 MG/DL
BUN SERPL-MCNC: 59.9 MG/DL (ref 6–20)
CALCIUM SERPL-MCNC: 9.2 MG/DL (ref 8.8–10.4)
CHLORIDE SERPL-SCNC: 97 MMOL/L (ref 98–107)
CREAT SERPL-MCNC: 6.11 MG/DL (ref 0.51–0.95)
EGFRCR SERPLBLD CKD-EPI 2021: 7 ML/MIN/1.73M2
EOSINOPHIL # BLD AUTO: 0 10E3/UL (ref 0–0.7)
EOSINOPHIL NFR BLD AUTO: 0 %
ERYTHROCYTE [DISTWIDTH] IN BLOOD BY AUTOMATED COUNT: 15.1 % (ref 10–15)
GLUCOSE SERPL-MCNC: 169 MG/DL (ref 70–99)
HCO3 SERPL-SCNC: 20 MMOL/L (ref 22–29)
HCT VFR BLD AUTO: 29.4 % (ref 35–47)
HGB BLD-MCNC: 9.8 G/DL (ref 11.7–15.7)
IMM GRANULOCYTES # BLD: 0 10E3/UL
IMM GRANULOCYTES NFR BLD: 0 %
LYMPHOCYTES # BLD AUTO: 0.6 10E3/UL (ref 0.8–5.3)
LYMPHOCYTES NFR BLD AUTO: 7 %
MCH RBC QN AUTO: 30.9 PG (ref 26.5–33)
MCHC RBC AUTO-ENTMCNC: 33.3 G/DL (ref 31.5–36.5)
MCV RBC AUTO: 93 FL (ref 78–100)
MONOCYTES # BLD AUTO: 0.2 10E3/UL (ref 0–1.3)
MONOCYTES NFR BLD AUTO: 2 %
NEUTROPHILS # BLD AUTO: 8.1 10E3/UL (ref 1.6–8.3)
NEUTROPHILS NFR BLD AUTO: 90 %
PLATELET # BLD AUTO: 195 10E3/UL (ref 150–450)
POTASSIUM SERPL-SCNC: 3.9 MMOL/L (ref 3.4–5.3)
PROT SERPL-MCNC: 7.4 G/DL (ref 6.4–8.3)
RBC # BLD AUTO: 3.17 10E6/UL (ref 3.8–5.2)
SODIUM SERPL-SCNC: 134 MMOL/L (ref 135–145)
TOTAL PROTEIN SERUM FOR ELP: 6.6 G/DL (ref 6.4–8.3)
WBC # BLD AUTO: 9.1 10E3/UL (ref 4–11)

## 2025-03-19 ENCOUNTER — PATIENT OUTREACH (OUTPATIENT)
Dept: ONCOLOGY | Facility: CLINIC | Age: 55
End: 2025-03-19
Payer: COMMERCIAL

## 2025-03-19 NOTE — PROGRESS NOTES
[facility]: Cancer Care                                                                                          Completed chart audit to update Oncology Care Coordination enrollment status.  Reviewed POC and pt has appropriate follow up scheduled.     DONNA LooN, RN  RN Care Coordinator  [facility]

## 2025-04-30 ENCOUNTER — ORGAN (OUTPATIENT)
Dept: TRANSPLANT | Facility: CLINIC | Age: 55
End: 2025-04-30
Payer: COMMERCIAL

## 2025-04-30 DIAGNOSIS — Z76.82 AWAITING ORGAN TRANSPLANT: Primary | ICD-10-CM

## 2025-05-01 ENCOUNTER — LAB (OUTPATIENT)
Dept: LAB | Facility: CLINIC | Age: 55
End: 2025-05-01
Payer: COMMERCIAL

## 2025-05-01 DIAGNOSIS — N18.6 ESRD (END STAGE RENAL DISEASE) (H): ICD-10-CM

## 2025-05-01 DIAGNOSIS — Z76.82 ORGAN TRANSPLANT CANDIDATE: ICD-10-CM

## 2025-05-01 DIAGNOSIS — Z76.82 AWAITING ORGAN TRANSPLANT: ICD-10-CM

## 2025-05-01 LAB
A1 AB TITR SERPL: 2 {TITER}
A1 AB TITR SERPL: 2 {TITER}
A2 AB TITR SERPL: <1 {TITER}
A2 AB TITR SERPL: <1 {TITER}
ANTIBODY TITER IGM SCREEN: NEGATIVE
BLD GP AB SCN SERPL QL: NEGATIVE
SARS-COV-2 RNA RESP QL NAA+PROBE: NEGATIVE
SPECIMEN EXP DATE BLD: NORMAL
SPECIMEN EXP DATE BLD: NORMAL

## 2025-05-01 PROCEDURE — 86886 COOMBS TEST INDIRECT TITER: CPT

## 2025-05-01 PROCEDURE — 87635 SARS-COV-2 COVID-19 AMP PRB: CPT | Performed by: SURGERY

## 2025-05-01 PROCEDURE — 86833 HLA CLASS II HIGH DEFIN QUAL: CPT | Performed by: SURGERY

## 2025-05-01 PROCEDURE — 99000 SPECIMEN HANDLING OFFICE-LAB: CPT | Performed by: PATHOLOGY

## 2025-05-01 PROCEDURE — 86850 RBC ANTIBODY SCREEN: CPT

## 2025-05-02 ENCOUNTER — APPOINTMENT (OUTPATIENT)
Dept: GENERAL RADIOLOGY | Facility: CLINIC | Age: 55
End: 2025-05-02
Payer: COMMERCIAL

## 2025-05-02 ENCOUNTER — HOSPITAL ENCOUNTER (INPATIENT)
Facility: CLINIC | Age: 55
End: 2025-05-02
Attending: SURGERY | Admitting: SURGERY
Payer: MEDICARE

## 2025-05-02 ENCOUNTER — ANESTHESIA EVENT (OUTPATIENT)
Dept: SURGERY | Facility: CLINIC | Age: 55
End: 2025-05-02
Payer: MEDICARE

## 2025-05-02 ENCOUNTER — ANESTHESIA (OUTPATIENT)
Dept: SURGERY | Facility: CLINIC | Age: 55
End: 2025-05-02
Payer: MEDICARE

## 2025-05-02 DIAGNOSIS — Z94.0 KIDNEY TRANSPLANT RECIPIENT: Primary | ICD-10-CM

## 2025-05-02 PROBLEM — Z01.818 PRE-TRANSPLANT EVALUATION FOR KIDNEY AND PANCREAS TRANSPLANT: Status: ACTIVE | Noted: 2025-05-02

## 2025-05-02 LAB
ABO + RH BLD: NORMAL
ALBUMIN MFR UR ELPH: 463 MG/DL
ALBUMIN SERPL BCG-MCNC: 3.7 G/DL (ref 3.5–5.2)
ALBUMIN UR-MCNC: 300 MG/DL
ALP SERPL-CCNC: 84 U/L (ref 40–150)
ALT SERPL W P-5'-P-CCNC: 18 U/L (ref 0–50)
AMYLASE SERPL-CCNC: 50 U/L (ref 28–100)
ANION GAP SERPL CALCULATED.3IONS-SCNC: 12 MMOL/L (ref 7–15)
APPEARANCE UR: CLEAR
APTT PPP: 30 SECONDS (ref 22–38)
AST SERPL W P-5'-P-CCNC: 29 U/L (ref 0–45)
BASE EXCESS BLDA CALC-SCNC: -2.5 MMOL/L (ref -3–3)
BASE EXCESS BLDA CALC-SCNC: -3.9 MMOL/L (ref -3–3)
BASE EXCESS BLDA CALC-SCNC: -7.6 MMOL/L (ref -3–3)
BILIRUB SERPL-MCNC: 0.2 MG/DL
BILIRUB UR QL STRIP: NEGATIVE
BLD GP AB SCN SERPL QL: NEGATIVE
BUN SERPL-MCNC: 45.6 MG/DL (ref 6–20)
CA-I BLD-MCNC: 4.1 MG/DL (ref 4.4–5.2)
CA-I BLD-MCNC: 4.5 MG/DL (ref 4.4–5.2)
CA-I BLD-MCNC: 4.5 MG/DL (ref 4.4–5.2)
CALCIUM SERPL-MCNC: 9.7 MG/DL (ref 8.8–10.4)
CHLORIDE SERPL-SCNC: 103 MMOL/L (ref 98–107)
COLOR UR AUTO: ABNORMAL
CREAT SERPL-MCNC: 6.11 MG/DL (ref 0.51–0.95)
CREAT UR-MCNC: 105 MG/DL
EGFRCR SERPLBLD CKD-EPI 2021: 7 ML/MIN/1.73M2
ERYTHROCYTE [DISTWIDTH] IN BLOOD BY AUTOMATED COUNT: 14.6 % (ref 10–15)
EST. AVERAGE GLUCOSE BLD GHB EST-MCNC: 126 MG/DL
GLUCOSE BLD-MCNC: 119 MG/DL (ref 70–99)
GLUCOSE BLD-MCNC: 201 MG/DL (ref 70–99)
GLUCOSE BLD-MCNC: 224 MG/DL (ref 70–99)
GLUCOSE BLDC GLUCOMTR-MCNC: 117 MG/DL (ref 70–99)
GLUCOSE SERPL-MCNC: 105 MG/DL (ref 70–99)
GLUCOSE UR STRIP-MCNC: 30 MG/DL
HBA1C MFR BLD: 6 %
HBV CORE AB SERPL QL IA: NONREACTIVE
HBV SURFACE AB SERPL IA-ACNC: <3.5 M[IU]/ML
HBV SURFACE AB SERPL IA-ACNC: NONREACTIVE M[IU]/ML
HBV SURFACE AG SERPL QL IA: NONREACTIVE
HCO3 BLDA-SCNC: 18 MMOL/L (ref 21–28)
HCO3 BLDA-SCNC: 20 MMOL/L (ref 21–28)
HCO3 BLDA-SCNC: 21 MMOL/L (ref 21–28)
HCO3 SERPL-SCNC: 25 MMOL/L (ref 22–29)
HCT VFR BLD AUTO: 32.1 % (ref 35–47)
HCV AB SERPL QL IA: NONREACTIVE
HGB BLD-MCNC: 10.4 G/DL (ref 11.7–15.7)
HGB BLD-MCNC: 8.3 G/DL (ref 11.7–15.7)
HGB BLD-MCNC: 8.5 G/DL (ref 11.7–15.7)
HGB BLD-MCNC: 9.1 G/DL (ref 11.7–15.7)
HGB UR QL STRIP: NEGATIVE
HIV 1+2 AB+HIV1 P24 AG SERPL QL IA: NONREACTIVE
INR PPP: 0.95 (ref 0.85–1.15)
KETONES UR STRIP-MCNC: NEGATIVE MG/DL
LACTATE BLD-SCNC: 0.8 MMOL/L (ref 0.7–2)
LACTATE BLD-SCNC: 0.9 MMOL/L (ref 0.7–2)
LACTATE BLD-SCNC: 4 MMOL/L (ref 0.7–2)
LEUKOCYTE ESTERASE UR QL STRIP: ABNORMAL
LIPASE SERPL-CCNC: 106 U/L (ref 13–60)
MCH RBC QN AUTO: 29.7 PG (ref 26.5–33)
MCHC RBC AUTO-ENTMCNC: 32.4 G/DL (ref 31.5–36.5)
MCV RBC AUTO: 92 FL (ref 78–100)
MUCOUS THREADS #/AREA URNS LPF: PRESENT /LPF
NITRATE UR QL: NEGATIVE
O2/TOTAL GAS SETTING VFR VENT: 43 %
O2/TOTAL GAS SETTING VFR VENT: 44 %
O2/TOTAL GAS SETTING VFR VENT: 45 %
OXYHGB MFR BLDA: 95 % (ref 92–100)
OXYHGB MFR BLDA: 95 % (ref 92–100)
OXYHGB MFR BLDA: 96 % (ref 92–100)
PCO2 BLDA: 32 MM HG (ref 35–45)
PCO2 BLDA: 33 MM HG (ref 35–45)
PCO2 BLDA: 35 MM HG (ref 35–45)
PH BLDA: 7.32 [PH] (ref 7.35–7.45)
PH BLDA: 7.4 [PH] (ref 7.35–7.45)
PH BLDA: 7.44 [PH] (ref 7.35–7.45)
PH UR STRIP: 6.5 [PH] (ref 5–7)
PLATELET # BLD AUTO: 196 10E3/UL (ref 150–450)
PO2 BLDA: 101 MM HG (ref 80–105)
PO2 BLDA: 102 MM HG (ref 80–105)
PO2 BLDA: 94 MM HG (ref 80–105)
POTASSIUM BLD-SCNC: 3.5 MMOL/L (ref 3.4–5.3)
POTASSIUM BLD-SCNC: 3.9 MMOL/L (ref 3.4–5.3)
POTASSIUM BLD-SCNC: 4.7 MMOL/L (ref 3.4–5.3)
POTASSIUM SERPL-SCNC: 3.9 MMOL/L (ref 3.4–5.3)
PROT SERPL-MCNC: 7.9 G/DL (ref 6.4–8.3)
PROT/CREAT 24H UR: 4.41 MG/MG CR (ref 0–0.2)
PROTHROMBIN TIME: 12.7 SECONDS (ref 11.8–14.8)
RBC # BLD AUTO: 3.5 10E6/UL (ref 3.8–5.2)
RBC URINE: 3 /HPF
SAO2 % BLDA: 98 % (ref 96–97)
SODIUM BLD-SCNC: 135 MMOL/L (ref 135–145)
SODIUM BLD-SCNC: 138 MMOL/L (ref 135–145)
SODIUM BLD-SCNC: 142 MMOL/L (ref 135–145)
SODIUM SERPL-SCNC: 140 MMOL/L (ref 135–145)
SP GR UR STRIP: 1.01 (ref 1–1.03)
SPECIMEN EXP DATE BLD: NORMAL
SQUAMOUS EPITHELIAL: 2 /HPF
TRANSITIONAL EPI: <1 /HPF
UROBILINOGEN UR STRIP-MCNC: NORMAL MG/DL
WBC # BLD AUTO: 7.1 10E3/UL (ref 4–11)
WBC URINE: 16 /HPF

## 2025-05-02 PROCEDURE — 120N000011 HC R&B TRANSPLANT UMMC

## 2025-05-02 PROCEDURE — 85041 AUTOMATED RBC COUNT: CPT

## 2025-05-02 PROCEDURE — 83945 ASSAY OF OXALATE: CPT

## 2025-05-02 PROCEDURE — 93005 ELECTROCARDIOGRAM TRACING: CPT

## 2025-05-02 PROCEDURE — 88342 IMHCHEM/IMCYTCHM 1ST ANTB: CPT | Mod: 26 | Performed by: PATHOLOGY

## 2025-05-02 PROCEDURE — 87521 HEPATITIS C PROBE&RVRS TRNSC: CPT

## 2025-05-02 PROCEDURE — 88304 TISSUE EXAM BY PATHOLOGIST: CPT | Mod: 26 | Performed by: PATHOLOGY

## 2025-05-02 PROCEDURE — 71046 X-RAY EXAM CHEST 2 VIEWS: CPT

## 2025-05-02 PROCEDURE — 0DTJ0ZZ RESECTION OF APPENDIX, OPEN APPROACH: ICD-10-PCS | Performed by: SURGERY

## 2025-05-02 PROCEDURE — 272N000001 HC OR GENERAL SUPPLY STERILE: Performed by: SURGERY

## 2025-05-02 PROCEDURE — 82947 ASSAY GLUCOSE BLOOD QUANT: CPT

## 2025-05-02 PROCEDURE — 250N000009 HC RX 250

## 2025-05-02 PROCEDURE — 87086 URINE CULTURE/COLONY COUNT: CPT

## 2025-05-02 PROCEDURE — 30243S1 TRANSFUSION OF NONAUTOLOGOUS GLOBULIN INTO CENTRAL VEIN, PERCUTANEOUS APPROACH: ICD-10-PCS | Performed by: SURGERY

## 2025-05-02 PROCEDURE — 812N000002 HC ACQUISITION KIDNEY CADAVER OF SPK

## 2025-05-02 PROCEDURE — 88341 IMHCHEM/IMCYTCHM EA ADD ANTB: CPT | Mod: TC | Performed by: SURGERY

## 2025-05-02 PROCEDURE — 0FYG0Z0 TRANSPLANTATION OF PANCREAS, ALLOGENEIC, OPEN APPROACH: ICD-10-PCS | Performed by: SURGERY

## 2025-05-02 PROCEDURE — 258N000003 HC RX IP 258 OP 636

## 2025-05-02 PROCEDURE — 86706 HEP B SURFACE ANTIBODY: CPT

## 2025-05-02 PROCEDURE — 86665 EPSTEIN-BARR CAPSID VCA: CPT

## 2025-05-02 PROCEDURE — 250N000011 HC RX IP 250 OP 636: Performed by: SURGERY

## 2025-05-02 PROCEDURE — 0T7D0DZ DILATION OF URETHRA WITH INTRALUMINAL DEVICE, OPEN APPROACH: ICD-10-PCS | Performed by: SURGERY

## 2025-05-02 PROCEDURE — 999N000248 HC STATISTIC IV INSERT WITH US BY RN

## 2025-05-02 PROCEDURE — 250N000011 HC RX IP 250 OP 636: Mod: JZ

## 2025-05-02 PROCEDURE — 86790 VIRUS ANTIBODY NOS: CPT

## 2025-05-02 PROCEDURE — 83690 ASSAY OF LIPASE: CPT

## 2025-05-02 PROCEDURE — 48554 TRANSPL ALLOGRAFT PANCREAS: CPT | Mod: GC | Performed by: SURGERY

## 2025-05-02 PROCEDURE — 258N000001 HC RX 258: Performed by: SURGERY

## 2025-05-02 PROCEDURE — 86901 BLOOD TYPING SEROLOGIC RH(D): CPT

## 2025-05-02 PROCEDURE — 370N000017 HC ANESTHESIA TECHNICAL FEE, PER MIN: Performed by: SURGERY

## 2025-05-02 PROCEDURE — 0TY10Z0 TRANSPLANTATION OF LEFT KIDNEY, ALLOGENEIC, OPEN APPROACH: ICD-10-PCS | Performed by: SURGERY

## 2025-05-02 PROCEDURE — 99207 PR PREOP VISIT IN GLOBAL PKG: CPT | Performed by: SURGERY

## 2025-05-02 PROCEDURE — 82565 ASSAY OF CREATININE: CPT

## 2025-05-02 PROCEDURE — 250N000025 HC SEVOFLURANE, PER MIN: Performed by: SURGERY

## 2025-05-02 PROCEDURE — 86704 HEP B CORE ANTIBODY TOTAL: CPT

## 2025-05-02 PROCEDURE — 87389 HIV-1 AG W/HIV-1&-2 AB AG IA: CPT

## 2025-05-02 PROCEDURE — 81001 URINALYSIS AUTO W/SCOPE: CPT

## 2025-05-02 PROCEDURE — 87340 HEPATITIS B SURFACE AG IA: CPT

## 2025-05-02 PROCEDURE — 86923 COMPATIBILITY TEST ELECTRIC: CPT

## 2025-05-02 PROCEDURE — 84295 ASSAY OF SERUM SODIUM: CPT

## 2025-05-02 PROCEDURE — C2617 STENT, NON-COR, TEM W/O DEL: HCPCS | Performed by: SURGERY

## 2025-05-02 PROCEDURE — 71046 X-RAY EXAM CHEST 2 VIEWS: CPT | Mod: 26 | Performed by: RADIOLOGY

## 2025-05-02 PROCEDURE — 250N000011 HC RX IP 250 OP 636

## 2025-05-02 PROCEDURE — 82150 ASSAY OF AMYLASE: CPT

## 2025-05-02 PROCEDURE — 812N000013 HC ACQUISITION PANCREAS CADAVER OF SPK

## 2025-05-02 PROCEDURE — 84156 ASSAY OF PROTEIN URINE: CPT

## 2025-05-02 PROCEDURE — 88341 IMHCHEM/IMCYTCHM EA ADD ANTB: CPT | Mod: 26 | Performed by: PATHOLOGY

## 2025-05-02 PROCEDURE — 82962 GLUCOSE BLOOD TEST: CPT

## 2025-05-02 PROCEDURE — 710N000010 HC RECOVERY PHASE 1, LEVEL 2, PER MIN: Performed by: SURGERY

## 2025-05-02 PROCEDURE — 87799 DETECT AGENT NOS DNA QUANT: CPT

## 2025-05-02 PROCEDURE — 86645 CMV ANTIBODY IGM: CPT

## 2025-05-02 PROCEDURE — 88360 TUMOR IMMUNOHISTOCHEM/MANUAL: CPT | Mod: 26 | Performed by: PATHOLOGY

## 2025-05-02 PROCEDURE — 250N000009 HC RX 250: Performed by: SURGERY

## 2025-05-02 PROCEDURE — 85730 THROMBOPLASTIN TIME PARTIAL: CPT

## 2025-05-02 PROCEDURE — 86644 CMV ANTIBODY: CPT

## 2025-05-02 PROCEDURE — 85018 HEMOGLOBIN: CPT

## 2025-05-02 PROCEDURE — 999N000141 HC STATISTIC PRE-PROCEDURE NURSING ASSESSMENT: Performed by: SURGERY

## 2025-05-02 PROCEDURE — 85610 PROTHROMBIN TIME: CPT

## 2025-05-02 PROCEDURE — 36415 COLL VENOUS BLD VENIPUNCTURE: CPT

## 2025-05-02 PROCEDURE — 86803 HEPATITIS C AB TEST: CPT

## 2025-05-02 PROCEDURE — 50360 RNL ALTRNSPLJ W/O RCP NFRCT: CPT | Mod: RT | Performed by: SURGERY

## 2025-05-02 PROCEDURE — 360N000078 HC SURGERY LEVEL 5, PER MIN: Performed by: SURGERY

## 2025-05-02 PROCEDURE — 83036 HEMOGLOBIN GLYCOSYLATED A1C: CPT

## 2025-05-02 RX ORDER — SODIUM CHLORIDE, SODIUM LACTATE, POTASSIUM CHLORIDE, CALCIUM CHLORIDE 600; 310; 30; 20 MG/100ML; MG/100ML; MG/100ML; MG/100ML
INJECTION, SOLUTION INTRAVENOUS CONTINUOUS PRN
Status: DISCONTINUED | OUTPATIENT
Start: 2025-05-02 | End: 2025-05-03

## 2025-05-02 RX ORDER — OCTREOTIDE ACETATE 100 UG/ML
100 INJECTION, SOLUTION INTRAVENOUS; SUBCUTANEOUS ONCE
Status: COMPLETED | OUTPATIENT
Start: 2025-05-02 | End: 2025-05-02

## 2025-05-02 RX ORDER — SODIUM CHLORIDE 9 MG/ML
INJECTION, SOLUTION INTRAVENOUS CONTINUOUS PRN
Status: DISCONTINUED | OUTPATIENT
Start: 2025-05-02 | End: 2025-05-03

## 2025-05-02 RX ORDER — SODIUM CHLORIDE, SODIUM LACTATE, POTASSIUM CHLORIDE, CALCIUM CHLORIDE 600; 310; 30; 20 MG/100ML; MG/100ML; MG/100ML; MG/100ML
INJECTION, SOLUTION INTRAVENOUS CONTINUOUS PRN
Status: DISCONTINUED | OUTPATIENT
Start: 2025-05-02 | End: 2025-05-02

## 2025-05-02 RX ORDER — SODIUM CHLORIDE, SODIUM GLUCONATE, SODIUM ACETATE, POTASSIUM CHLORIDE AND MAGNESIUM CHLORIDE 526; 502; 368; 37; 30 MG/100ML; MG/100ML; MG/100ML; MG/100ML; MG/100ML
INJECTION, SOLUTION INTRAVENOUS CONTINUOUS PRN
Status: DISCONTINUED | OUTPATIENT
Start: 2025-05-02 | End: 2025-05-03

## 2025-05-02 RX ORDER — HEPARIN SODIUM 1000 [USP'U]/ML
INJECTION, SOLUTION INTRAVENOUS; SUBCUTANEOUS PRN
Status: DISCONTINUED | OUTPATIENT
Start: 2025-05-02 | End: 2025-05-03

## 2025-05-02 RX ORDER — LIDOCAINE HYDROCHLORIDE 20 MG/ML
INJECTION, SOLUTION INFILTRATION; PERINEURAL PRN
Status: DISCONTINUED | OUTPATIENT
Start: 2025-05-02 | End: 2025-05-03

## 2025-05-02 RX ORDER — ACETAMINOPHEN 650 MG/1
650 SUPPOSITORY RECTAL ONCE
Status: DISCONTINUED | OUTPATIENT
Start: 2025-05-02 | End: 2025-05-03 | Stop reason: HOSPADM

## 2025-05-02 RX ORDER — FUROSEMIDE 10 MG/ML
INJECTION INTRAMUSCULAR; INTRAVENOUS PRN
Status: DISCONTINUED | OUTPATIENT
Start: 2025-05-02 | End: 2025-05-03

## 2025-05-02 RX ORDER — PIPERACILLIN SODIUM, TAZOBACTAM SODIUM 3; .375 G/15ML; G/15ML
3.38 INJECTION, POWDER, LYOPHILIZED, FOR SOLUTION INTRAVENOUS ONCE
Status: COMPLETED | OUTPATIENT
Start: 2025-05-02 | End: 2025-05-02

## 2025-05-02 RX ORDER — FENTANYL CITRATE 50 UG/ML
INJECTION, SOLUTION INTRAMUSCULAR; INTRAVENOUS PRN
Status: DISCONTINUED | OUTPATIENT
Start: 2025-05-02 | End: 2025-05-03

## 2025-05-02 RX ORDER — ACETAMINOPHEN 325 MG/1
975 TABLET ORAL ONCE
Status: DISCONTINUED | OUTPATIENT
Start: 2025-05-02 | End: 2025-05-03 | Stop reason: HOSPADM

## 2025-05-02 RX ORDER — PROPOFOL 10 MG/ML
INJECTION, EMULSION INTRAVENOUS PRN
Status: DISCONTINUED | OUTPATIENT
Start: 2025-05-02 | End: 2025-05-03

## 2025-05-02 RX ORDER — VASOPRESSIN IN 0.9 % NACL 2 UNIT/2ML
SYRINGE (ML) INTRAVENOUS PRN
Status: DISCONTINUED | OUTPATIENT
Start: 2025-05-02 | End: 2025-05-03

## 2025-05-02 RX ORDER — CALCIUM CHLORIDE 100 MG/ML
INJECTION INTRAVENOUS; INTRAVENTRICULAR PRN
Status: DISCONTINUED | OUTPATIENT
Start: 2025-05-02 | End: 2025-05-03

## 2025-05-02 RX ORDER — MANNITOL 20 G/100ML
INJECTION, SOLUTION INTRAVENOUS PRN
Status: DISCONTINUED | OUTPATIENT
Start: 2025-05-02 | End: 2025-05-03

## 2025-05-02 RX ORDER — EPHEDRINE SULFATE 50 MG/ML
INJECTION, SOLUTION INTRAMUSCULAR; INTRAVENOUS; SUBCUTANEOUS PRN
Status: DISCONTINUED | OUTPATIENT
Start: 2025-05-02 | End: 2025-05-03

## 2025-05-02 RX ORDER — BUMETANIDE 0.25 MG/ML
5 INJECTION, SOLUTION INTRAMUSCULAR; INTRAVENOUS ONCE
Status: COMPLETED | OUTPATIENT
Start: 2025-05-02 | End: 2025-05-03

## 2025-05-02 RX ORDER — LIDOCAINE 40 MG/G
CREAM TOPICAL
Status: DISCONTINUED | OUTPATIENT
Start: 2025-05-02 | End: 2025-05-03 | Stop reason: HOSPADM

## 2025-05-02 RX ADMIN — LIDOCAINE HYDROCHLORIDE 60 MG: 20 INJECTION, SOLUTION INFILTRATION; PERINEURAL at 19:10

## 2025-05-02 RX ADMIN — Medication 20 MG: at 21:48

## 2025-05-02 RX ADMIN — HUMAN INSULIN 3 UNITS/HR: 100 INJECTION, SOLUTION SUBCUTANEOUS at 22:10

## 2025-05-02 RX ADMIN — EPHEDRINE SULFATE 5 MG: 5 INJECTION INTRAVENOUS at 23:55

## 2025-05-02 RX ADMIN — NOREPINEPHRINE BITARTRATE 6.4 MCG: 1 INJECTION, SOLUTION, CONCENTRATE INTRAVENOUS at 23:09

## 2025-05-02 RX ADMIN — FENTANYL CITRATE 50 MCG: 50 INJECTION INTRAMUSCULAR; INTRAVENOUS at 20:21

## 2025-05-02 RX ADMIN — HEPARIN SODIUM 2000 UNITS: 1000 INJECTION INTRAVENOUS; SUBCUTANEOUS at 21:45

## 2025-05-02 RX ADMIN — EPHEDRINE SULFATE 10 MG: 5 INJECTION INTRAVENOUS at 23:09

## 2025-05-02 RX ADMIN — PIPERACILLIN AND TAZOBACTAM 3.38 G: 3; .375 INJECTION, POWDER, LYOPHILIZED, FOR SOLUTION INTRAVENOUS at 19:20

## 2025-05-02 RX ADMIN — Medication 0.5 UNITS: at 19:59

## 2025-05-02 RX ADMIN — Medication 50 MG: at 19:11

## 2025-05-02 RX ADMIN — SODIUM CHLORIDE, SODIUM GLUCONATE, SODIUM ACETATE, POTASSIUM CHLORIDE AND MAGNESIUM CHLORIDE: 526; 502; 368; 37; 30 INJECTION, SOLUTION INTRAVENOUS at 22:04

## 2025-05-02 RX ADMIN — Medication 20 MG: at 20:38

## 2025-05-02 RX ADMIN — CALCIUM CHLORIDE INJECTION 0.5 G: 100 INJECTION, SOLUTION INTRAVENOUS at 23:25

## 2025-05-02 RX ADMIN — SODIUM CHLORIDE 500 MG: 9 INJECTION, SOLUTION INTRAVENOUS at 19:30

## 2025-05-02 RX ADMIN — PHENYLEPHRINE HYDROCHLORIDE 200 MCG: 10 INJECTION INTRAVENOUS at 19:37

## 2025-05-02 RX ADMIN — ANTI-THYMOCYTE GLOBULIN (RABBIT) 125 MG: 5 INJECTION, POWDER, LYOPHILIZED, FOR SOLUTION INTRAVENOUS at 20:27

## 2025-05-02 RX ADMIN — SODIUM CHLORIDE, SODIUM GLUCONATE, SODIUM ACETATE, POTASSIUM CHLORIDE AND MAGNESIUM CHLORIDE: 526; 502; 368; 37; 30 INJECTION, SOLUTION INTRAVENOUS at 23:28

## 2025-05-02 RX ADMIN — Medication 20 MG: at 23:28

## 2025-05-02 RX ADMIN — MYCOPHENOLATE MOFETIL 1000 MG: 500 INJECTION, POWDER, LYOPHILIZED, FOR SOLUTION INTRAVENOUS at 20:24

## 2025-05-02 RX ADMIN — FENTANYL CITRATE 50 MCG: 50 INJECTION INTRAMUSCULAR; INTRAVENOUS at 19:10

## 2025-05-02 RX ADMIN — EPHEDRINE SULFATE 5 MG: 5 INJECTION INTRAVENOUS at 23:01

## 2025-05-02 RX ADMIN — SODIUM CHLORIDE, SODIUM LACTATE, POTASSIUM CHLORIDE, AND CALCIUM CHLORIDE: .6; .31; .03; .02 INJECTION, SOLUTION INTRAVENOUS at 18:58

## 2025-05-02 RX ADMIN — FENTANYL CITRATE 50 MCG: 50 INJECTION INTRAMUSCULAR; INTRAVENOUS at 20:11

## 2025-05-02 RX ADMIN — MANNITOL 12.5 G: 20 INJECTION, SOLUTION INTRAVENOUS at 23:15

## 2025-05-02 RX ADMIN — PROPOFOL 80 MG: 10 INJECTION, EMULSION INTRAVENOUS at 19:10

## 2025-05-02 RX ADMIN — Medication 0.5 UNITS: at 23:16

## 2025-05-02 RX ADMIN — PHENYLEPHRINE HYDROCHLORIDE 300 MCG: 10 INJECTION INTRAVENOUS at 19:15

## 2025-05-02 RX ADMIN — MICAFUNGIN SODIUM 100 MG: 50 INJECTION, POWDER, LYOPHILIZED, FOR SOLUTION INTRAVENOUS at 19:53

## 2025-05-02 RX ADMIN — PHENYLEPHRINE HYDROCHLORIDE 200 MCG: 10 INJECTION INTRAVENOUS at 19:11

## 2025-05-02 RX ADMIN — CALCIUM CHLORIDE INJECTION 0.5 G: 100 INJECTION, SOLUTION INTRAVENOUS at 23:45

## 2025-05-02 RX ADMIN — EPHEDRINE SULFATE 5 MG: 5 INJECTION INTRAVENOUS at 23:46

## 2025-05-02 RX ADMIN — FUROSEMIDE 40 MG: 10 INJECTION, SOLUTION INTRAVENOUS at 23:00

## 2025-05-02 RX ADMIN — PHENYLEPHRINE HYDROCHLORIDE 300 MCG: 10 INJECTION INTRAVENOUS at 19:20

## 2025-05-02 RX ADMIN — EPHEDRINE SULFATE 5 MG: 5 INJECTION INTRAVENOUS at 20:18

## 2025-05-02 RX ADMIN — EPHEDRINE SULFATE 5 MG: 5 INJECTION INTRAVENOUS at 22:47

## 2025-05-02 RX ADMIN — Medication 0.5 UNITS: at 23:09

## 2025-05-02 RX ADMIN — MIDAZOLAM 2 MG: 1 INJECTION INTRAMUSCULAR; INTRAVENOUS at 18:54

## 2025-05-02 RX ADMIN — SODIUM CHLORIDE: 0.9 INJECTION, SOLUTION INTRAVENOUS at 19:06

## 2025-05-02 RX ADMIN — OCTREOTIDE ACETATE 100 MCG: 100 INJECTION, SOLUTION INTRAVENOUS; SUBCUTANEOUS at 20:20

## 2025-05-02 ASSESSMENT — ACTIVITIES OF DAILY LIVING (ADL)
ADLS_ACUITY_SCORE: 46

## 2025-05-02 ASSESSMENT — LIFESTYLE VARIABLES: TOBACCO_USE: 0

## 2025-05-02 NOTE — ANESTHESIA PREPROCEDURE EVALUATION
Anesthesia Pre-Procedure Evaluation    Patient: Wendy Kaporo   MRN: [id number] : 1970        Procedure : Procedure(s):  Transplant pancreas, kidney  donor, combined          Past Medical History:   Diagnosis Date    Chronic kidney disease     Diabetes (H)     DM Type 2    History of blood transfusion 2019    Hypertension     MGUS (monoclonal gammopathy of unknown significance)     Pericardial effusion     Sjogren's syndrome     Type 2 diabetes mellitus with diabetic nephropathy (H)       Past Surgical History:   Procedure Laterality Date    BIOPSY       SECTION      CV CORONARY ANGIOGRAM N/A 2024    Procedure: Coronary Angiogram;  Surgeon: Cody Mckenzie MD;  Location:  [facility]    CV PCI N/A 2024    Procedure: Percutaneous Coronary Intervention;  Surgeon: Cody Mckenzie MD;  Location:  [facility]      Allergies   Allergen Reactions    Atorvastatin Itching    Extraneal Rash      Social History     Tobacco Use    Smoking status: Never    Smokeless tobacco: Never   Substance Use Topics    Alcohol use: Never      Wt Readings from Last 1 Encounters:   25 62.7 kg (138 lb 3.2 oz)        Anesthesia Evaluation   Pt has had prior anesthetic. Type: General and MAC.    No history of anesthetic complications       ROS/MED HX  ENT/Pulmonary:    (-) tobacco use and sleep apnea   Neurologic:       Cardiovascular:     (+)  hypertension- -  CAD -  - -                                 Previous cardiac testing   Echo: Date: 2023 Results:  Global and regional left ventricular function is normal with an EF of 60-65%.  Right ventricular function, chamber size, wall motion, and thickness are  normal.  Pulmonary artery systolic pressure is normal.  Ascending aorta 3.7 cm.  Mild dilatation of the aorta is present.  IVC diameter <2.1 cm collapsing >50% with sniff suggests a normal RA pressure  of 3 mmHg.  No pericardial effusion is present.  There is no  prior study for direct comparison.    Stress Test:  Date: 2023 Results:     The nuclear stress test is negative for inducible myocardial ischemia or infarction.     Hyperdynamic LV function with LV EF over 75%.     There is no prior study for comparison.    ECG Reviewed:  Date: 5/2025 Results:  Sinus rhythm  Cath:  Date: 4/2024 Results:     Mid LAD lesion is 30% stenosed.     Dist LAD lesion is 30% stenosed.     1. No significant obstructive coronary artery disease.   2. Uncomplicated right radial access.          METS/Exercise Tolerance:     Hematologic:       Musculoskeletal:       GI/Hepatic:       Renal/Genitourinary: Comment: Stage IV Chronic Kidney Disease    (+) renal disease,  Pt requires dialysis, type: Peritoneal dialysis,          Endo:     (+)  type II DM, Last HgA1c: 6.0, date: 5/2025,     Diabetic complications: nephropathy.             Psychiatric/Substance Use:       Infectious Disease:       Malignancy:       Other: Comment: Sjogren's syndrome           Physical Exam    Airway        Mallampati: III   TM distance: > 3 FB   Neck ROM: full   Mouth opening: < 3 cm    Respiratory Devices and Support         Dental       (+) Minor Abnormalities - some fillings, tiny chips      Cardiovascular   cardiovascular exam normal          Pulmonary   pulmonary exam normal                OUTSIDE LABS:  CBC:   Lab Results   Component Value Date    WBC 7.1 05/02/2025    WBC 9.1 03/13/2025    HGB 10.4 (L) 05/02/2025    HGB 9.8 (L) 03/13/2025    HCT 32.1 (L) 05/02/2025    HCT 29.4 (L) 03/13/2025     05/02/2025     03/13/2025     BMP:   Lab Results   Component Value Date     (L) 03/13/2025     (L) 10/17/2024    POTASSIUM 3.9 03/13/2025    POTASSIUM 4.2 10/17/2024    CHLORIDE 97 (L) 03/13/2025    CHLORIDE 97 (L) 10/17/2024    CO2 20 (L) 03/13/2025    CO2 21 (L) 10/17/2024    BUN 59.9 (H) 03/13/2025    BUN 51.5 (H) 10/17/2024    CR 6.11 (H) 03/13/2025    CR 3.41 (H) 10/17/2024     (H)  "05/02/2025     (H) 03/13/2025     COAGS:   Lab Results   Component Value Date    PTT 30 05/02/2025    INR 0.95 05/02/2025     POC: No results found for: \"BGM\", \"HCG\", \"HCGS\"  HEPATIC:   Lab Results   Component Value Date    ALBUMIN 3.4 (L) 03/13/2025    PROTTOTAL 7.4 03/13/2025    ALT 14 03/13/2025    AST 22 03/13/2025    ALKPHOS 80 03/13/2025    BILITOTAL 0.2 03/13/2025     OTHER:   Lab Results   Component Value Date    LACT 2.6 (H) 10/17/2024    A1C 6.0 (H) 05/02/2025    VIJAY 9.2 03/13/2025    LIPASE 99 (H) 10/17/2024    AMYLASE 38 10/17/2024       Anesthesia Plan    ASA Status:  3    NPO Status:  NPO Appropriate    Anesthesia Type: General.     - Airway: ETT   Induction: Intravenous, Propofol.   Maintenance: Balanced.   Techniques and Equipment:     - Lines/Monitors: Arterial Line, Central Line, BIS     - Blood: T&S     Consents    Anesthesia Plan(s) and associated risks, benefits, and realistic alternatives discussed. Questions answered and patient/representative(s) expressed understanding.     - Discussed: Risks, Benefits and Alternatives for BOTH SEDATION and the PROCEDURE were discussed     - Discussed with:  Patient       - Patient is DNR/DNI Status: No     Use of blood products discussed: Yes.     - Discussed with: Patient.     - Consented: consented to blood products     Postoperative Care    Pain management: IV analgesics, Oral pain medications, Multi-modal analgesia.   PONV prophylaxis: Ondansetron (or other 5HT-3), Dexamethasone or Solumedrol     Comments:               Ruby Amador MD    Clinically Significant Risk Factors Present on Admission                 # Drug Induced Platelet Defect: home medication list includes an antiplatelet medication   # Hypertension: Noted on problem list      # Anemia: based on hgb <11       # Overweight: Estimated body mass index is 26.99 kg/m  as calculated from the following:    Height as of this encounter: 1.524 m (5').    Weight as of this encounter: 62.7 kg " (138 lb 3.2 oz).

## 2025-05-02 NOTE — TELEPHONE ENCOUNTER
"TRANSPLANT OR REPORT    Organ: KP  Laterality (if known): RKI  Organ Location: Import    UNOS ID: [id number]  Donor OR Time: 0800  Expected/Actual Cross Clamp Time: 1028  Expected Organ Arrival Time: 1800    Surgeon: Rosibel  Time in OR: 1730  Time in 3C: 1630    Recipient Details  Admission ETA: 1500  Unit: 7A  Isolation: None  Latex Allergy: No  : No  Diagnosis: ESRD/DM    Liver Transplants  Bypass/Perfusion: N/A  Cellsaver: N/A  Hemodialysis: N/A  ~ \"RENAL STAFF TEACHING SERVICE MEDICINE\" : Remind LI Fellow to discuss with nephrology on call.  Fellow Contacted/Date/Time: N/A  ~ CRRT Resource Nurse: N/A  (Telephone Number for CRRT [phone]. When prompted, the caller should say  CRRT Resource 1\")    Kidney/Panc Transplants  XM Status (Need to wait for XM?): Done, negative    Liver or KP/PA Recipients - Vessel Banking:  Donor has positive serologies for HIV/HCV/HBV: No  Donor has risk criteria for HIV/HCV/HBV: No      Transplant Coordinator Contact Info: Anita [phone]      Vessel Bank Information  Transplant hospitals must not store a donor s extra vessels if the donor has tested positive for any of the following:   - HIV by antibody, antigen, or nucleic acid test (BELLE)   - Hepatitis B surface antigen (HBsAg)   - Hepatitis B (HBV) by BELLE   - Hepatitis C (HCV) by antibody or BELLE     Extra vessels from donors that do not test positive for HIV, HBV, or HCV as above may be stored    "

## 2025-05-02 NOTE — H&P
[facility]    History and Physical  Transplant Surgery     Date of Admission:  25      Assessment & Plan   Wendy Kapoor is a 54 year old female who presents with a history of DM2, HTN, ESRD on PD since 2023, IgG Lambda MGUS, non-obstructive CAD, and anemia of chronic disease. Patient was notified as an acceptable  donor organ became available and presented for further pre-operative work-up.  Patient was informed of the risks and benefits regarding  donor organ transplantation, and has elected to proceed with possible kidney pancreas transplant.      Plan:   -Pre-op labs including final crossmatch (pending from )  -CXR  -EKG  -NPO status  -Surgeon to obtain formal consent    MIA Green, CNP  Adult Solid Organ Transplant   Contact: Vocera Web Console    Code Status   Full Code    Primary Care Physician   GERSON BRYANT    Chief Complaint   Pre-op for kidney pancreas transplant    History is obtained from the patient and EMR.    History of Present Illness   Wendy Kapoor is a 54 year old female who presents with a history of DM2, HTN, ESRD on PD since 2023, IgG Lambda MGUS,  non-obstructive CAD, and anemia of chronic disease. She is being admitted today for a possible kidney pancreas transplant.     Last dialyzed: this AM; dwell overnight night last night   Dialysis access: PD  Urine production: adequate   EDW: 140 pounds   She was seen by Cardiology in 2024. Coronary angiogram revealed 30% mLAD and dLAD lesions. Last ECHO from 2023 with an EF of 60-65%. RCRI score of 3.    Past Medical History    I have reviewed this patient's medical history and updated it with pertinent information if needed.   Past Medical History:   Diagnosis Date    Chronic kidney disease     Diabetes (H)     DM Type 2    History of blood transfusion 2019    Hypertension     MGUS (monoclonal gammopathy of unknown significance)     Pericardial effusion      Sjogren's syndrome     Type 2 diabetes mellitus with diabetic nephropathy (H)        Past Surgical History   I have reviewed this patient's surgical history and updated it with pertinent information if needed.  Past Surgical History:   Procedure Laterality Date    BIOPSY       SECTION      CV CORONARY ANGIOGRAM N/A 2024    Procedure: Coronary Angiogram;  Surgeon: Cody Mckenzie MD;  Location:  [facility]    CV PCI N/A 2024    Procedure: Percutaneous Coronary Intervention;  Surgeon: Cody Mckenzie MD;  Location:  [facility]       Prior to Admission Medications   Cannot display prior to admission medications because the patient has not been admitted in this contact.     Allergies   Allergies   Allergen Reactions    Atorvastatin Itching    Extraneal Rash       Social History   I have reviewed this patient's social history and updated it with pertinent information if needed. Wendy Kapoor  reports that she has never smoked. She has never used smokeless tobacco. She reports that she does not drink alcohol and does not use drugs.    Family History   I have reviewed this patient's family history and updated it with pertinent information if needed.   Family History   Problem Relation Age of Onset    Kidney Disease No family hx of        Review of Systems   The 10 point Review of Systems is negative other than noted in the HPI or here.     Physical Exam                      Vital Signs with Ranges  BP: ()/()   Arterial Line BP: ()/()   0 lbs 0 oz    Constitutional: awake, alert, sitting up in bed, NAD  HEENT: EOMI, MMM, no cervical lymphadenopathy  Respiratory: nonlabored breathing on room air, CTABL  Cardiovascular: RRR, no murmur  GI: abdomen soft, nondistended, nontender  Skin: No rash  Musculoskeletal: moving all extremities  Neurologic: AOx3, no focal deficits  Neuropsychiatric: appropriate mood and affect     lymphadenopathy  Respiratory: nonlabored breathing on room air, CTABL  Cardiovascular: RRR, no murmur  GI: abdomen soft, nondistended, nontender  Skin: No rash  Musculoskeletal: moving all extremities  Neurologic: AOx3, no focal deficits  Neuropsychiatric: appropriate mood and affect

## 2025-05-02 NOTE — PROGRESS NOTES
Vitals: BP (!) 161/74 (BP Location: Left arm)   Temp 98.5  F (36.9  C) (Oral)   Resp 16   Ht 1.524 m (5')   Wt 62.7 kg (138 lb 3.2 oz)   SpO2 98%   BMI 26.99 kg/m    Room air, afebrile.   Blood glucose: bg 117.   Pain/nausea: denies.   Diet: NPO, last food @ 0900 today  Lines: PIV RUE saline locked.   : x 1, sent to lab.   GI: x 1 this morning.   Drains: PD cath site cdi, dressing changed.   Skin: Admitted/transferred from:   Time of arrival on unit 1630  2 RN full  skin assessment completed by Eli Hernandes.   Skin assessment finding: skin intact, no problems   Interventions/actions: PD cath site cdi.    Mobility: up ad allegra.     Neuro : a x o x 4.     Pre op checklist : completed. Showered.

## 2025-05-02 NOTE — OR NURSING
Patient brought down to pre-op. Patient status changed to direct per anesthesia. Charge RN unable to complete pre-op. Patient taken by anesthesia into OR for procedure. Report given to OR nurse and anesthesia at bedside.

## 2025-05-03 ENCOUNTER — APPOINTMENT (OUTPATIENT)
Dept: GENERAL RADIOLOGY | Facility: CLINIC | Age: 55
End: 2025-05-03
Attending: SURGERY
Payer: MEDICARE

## 2025-05-03 ENCOUNTER — APPOINTMENT (OUTPATIENT)
Dept: ULTRASOUND IMAGING | Facility: CLINIC | Age: 55
End: 2025-05-03
Attending: SURGERY
Payer: COMMERCIAL

## 2025-05-03 ENCOUNTER — DOCUMENTATION ONLY (OUTPATIENT)
Dept: TRANSPLANT | Facility: CLINIC | Age: 55
End: 2025-05-03
Payer: COMMERCIAL

## 2025-05-03 PROBLEM — Z94.0 KIDNEY TRANSPLANT RECIPIENT: Status: ACTIVE | Noted: 2025-05-03

## 2025-05-03 PROBLEM — Z94.83 PANCREAS TRANSPLANT STATUS (H): Status: ACTIVE | Noted: 2025-05-03

## 2025-05-03 LAB
AMYLASE SERPL-CCNC: 171 U/L (ref 28–100)
ANION GAP SERPL CALCULATED.3IONS-SCNC: 11 MMOL/L (ref 7–15)
ANION GAP SERPL CALCULATED.3IONS-SCNC: 18 MMOL/L (ref 7–15)
APTT PPP: 39 SECONDS (ref 22–38)
BACTERIA UR CULT: NORMAL
BASE EXCESS BLDA CALC-SCNC: -11.1 MMOL/L (ref -3–3)
BASE EXCESS BLDA CALC-SCNC: -8.2 MMOL/L (ref -3–3)
BASE EXCESS BLDA CALC-SCNC: -9 MMOL/L (ref -3–3)
BASE EXCESS BLDA CALC-SCNC: -9.5 MMOL/L (ref -3–3)
BASOPHILS # BLD AUTO: 0 10E3/UL (ref 0–0.2)
BASOPHILS NFR BLD AUTO: 0 %
BK VIRUS SPECIMEN TYPE: NORMAL
BKV DNA # SPEC NAA+PROBE: NOT DETECTED IU/ML
BLD PROD TYP BPU: NORMAL
BLOOD COMPONENT TYPE: NORMAL
BUN SERPL-MCNC: 38.8 MG/DL (ref 6–20)
BUN SERPL-MCNC: 42.9 MG/DL (ref 6–20)
BURR CELLS BLD QL SMEAR: SLIGHT
CA-I BLD-MCNC: 4.2 MG/DL (ref 4.4–5.2)
CA-I BLD-MCNC: 4.4 MG/DL (ref 4.4–5.2)
CA-I BLD-MCNC: 5 MG/DL (ref 4.4–5.2)
CA-I BLD-MCNC: 5.4 MG/DL (ref 4.4–5.2)
CALCIUM SERPL-MCNC: 7.7 MG/DL (ref 8.8–10.4)
CALCIUM SERPL-MCNC: 8.8 MG/DL (ref 8.8–10.4)
CHLORIDE SERPL-SCNC: 104 MMOL/L (ref 98–107)
CHLORIDE SERPL-SCNC: 105 MMOL/L (ref 98–107)
CODING SYSTEM: NORMAL
CREAT SERPL-MCNC: 5.11 MG/DL (ref 0.51–0.95)
CREAT SERPL-MCNC: 5.37 MG/DL (ref 0.51–0.95)
CROSSMATCH: NORMAL
DACRYOCYTES BLD QL SMEAR: SLIGHT
EGFRCR SERPLBLD CKD-EPI 2021: 9 ML/MIN/1.73M2
EGFRCR SERPLBLD CKD-EPI 2021: 9 ML/MIN/1.73M2
ELLIPTOCYTES BLD QL SMEAR: SLIGHT
EOSINOPHIL # BLD AUTO: 0 10E3/UL (ref 0–0.7)
EOSINOPHIL NFR BLD AUTO: 0 %
ERYTHROCYTE [DISTWIDTH] IN BLOOD BY AUTOMATED COUNT: 15.2 % (ref 10–15)
ERYTHROCYTE [DISTWIDTH] IN BLOOD BY AUTOMATED COUNT: 16.2 % (ref 10–15)
EST. AVERAGE GLUCOSE BLD GHB EST-MCNC: 123 MG/DL
FRAGMENTS BLD QL SMEAR: SLIGHT
GLUCOSE BLD-MCNC: 106 MG/DL (ref 70–99)
GLUCOSE BLD-MCNC: 168 MG/DL (ref 70–99)
GLUCOSE BLD-MCNC: 224 MG/DL (ref 70–99)
GLUCOSE BLD-MCNC: 83 MG/DL (ref 70–99)
GLUCOSE BLDC GLUCOMTR-MCNC: 107 MG/DL (ref 70–99)
GLUCOSE BLDC GLUCOMTR-MCNC: 111 MG/DL (ref 70–99)
GLUCOSE BLDC GLUCOMTR-MCNC: 113 MG/DL (ref 70–99)
GLUCOSE BLDC GLUCOMTR-MCNC: 113 MG/DL (ref 70–99)
GLUCOSE BLDC GLUCOMTR-MCNC: 118 MG/DL (ref 70–99)
GLUCOSE BLDC GLUCOMTR-MCNC: 119 MG/DL (ref 70–99)
GLUCOSE BLDC GLUCOMTR-MCNC: 147 MG/DL (ref 70–99)
GLUCOSE BLDC GLUCOMTR-MCNC: 67 MG/DL (ref 70–99)
GLUCOSE BLDC GLUCOMTR-MCNC: 70 MG/DL (ref 70–99)
GLUCOSE BLDC GLUCOMTR-MCNC: 75 MG/DL (ref 70–99)
GLUCOSE BLDC GLUCOMTR-MCNC: 79 MG/DL (ref 70–99)
GLUCOSE BLDC GLUCOMTR-MCNC: 80 MG/DL (ref 70–99)
GLUCOSE BLDC GLUCOMTR-MCNC: 83 MG/DL (ref 70–99)
GLUCOSE BLDC GLUCOMTR-MCNC: 91 MG/DL (ref 70–99)
GLUCOSE BLDC GLUCOMTR-MCNC: 92 MG/DL (ref 70–99)
GLUCOSE BLDC GLUCOMTR-MCNC: 95 MG/DL (ref 70–99)
GLUCOSE SERPL-MCNC: 120 MG/DL (ref 70–99)
GLUCOSE SERPL-MCNC: 84 MG/DL (ref 70–99)
HBA1C MFR BLD: 5.9 %
HCO3 BLDA-SCNC: 15 MMOL/L (ref 21–28)
HCO3 BLDA-SCNC: 16 MMOL/L (ref 21–28)
HCO3 BLDA-SCNC: 17 MMOL/L (ref 21–28)
HCO3 BLDA-SCNC: 18 MMOL/L (ref 21–28)
HCO3 SERPL-SCNC: 15 MMOL/L (ref 22–29)
HCO3 SERPL-SCNC: 20 MMOL/L (ref 22–29)
HCT VFR BLD AUTO: 24.3 % (ref 35–47)
HCT VFR BLD AUTO: 30.2 % (ref 35–47)
HGB BLD-MCNC: 6.9 G/DL (ref 11.7–15.7)
HGB BLD-MCNC: 7 G/DL (ref 11.7–15.7)
HGB BLD-MCNC: 7.7 G/DL (ref 11.7–15.7)
HGB BLD-MCNC: 7.7 G/DL (ref 11.7–15.7)
HGB BLD-MCNC: 7.8 G/DL (ref 11.7–15.7)
HGB BLD-MCNC: 8.3 G/DL (ref 11.7–15.7)
HGB BLD-MCNC: 8.7 G/DL (ref 11.7–15.7)
HGB BLD-MCNC: 9 G/DL (ref 11.7–15.7)
HGB BLD-MCNC: 9.5 G/DL (ref 11.7–15.7)
IMM GRANULOCYTES # BLD: 0.4 10E3/UL
IMM GRANULOCYTES NFR BLD: 2 %
INR PPP: 1.26 (ref 0.85–1.15)
ISSUE DATE AND TIME: NORMAL
LACTATE BLD-SCNC: 4.5 MMOL/L (ref 0.7–2)
LACTATE BLD-SCNC: 4.7 MMOL/L (ref 0.7–2)
LACTATE BLD-SCNC: 4.8 MMOL/L (ref 0.7–2)
LACTATE BLD-SCNC: 5.4 MMOL/L (ref 0.7–2)
LACTATE SERPL-SCNC: 1.5 MMOL/L (ref 0.7–2)
LIPASE SERPL-CCNC: 509 U/L (ref 13–60)
LYMPHOCYTES # BLD AUTO: 0.1 10E3/UL (ref 0.8–5.3)
LYMPHOCYTES NFR BLD AUTO: 0 %
MAGNESIUM SERPL-MCNC: 1.7 MG/DL (ref 1.7–2.3)
MAGNESIUM SERPL-MCNC: 1.9 MG/DL (ref 1.7–2.3)
MCH RBC QN AUTO: 30.2 PG (ref 26.5–33)
MCH RBC QN AUTO: 30.3 PG (ref 26.5–33)
MCHC RBC AUTO-ENTMCNC: 31.5 G/DL (ref 31.5–36.5)
MCHC RBC AUTO-ENTMCNC: 32.2 G/DL (ref 31.5–36.5)
MCV RBC AUTO: 92 FL (ref 78–100)
MCV RBC AUTO: 96 FL (ref 78–100)
MONOCYTES # BLD AUTO: 0.1 10E3/UL (ref 0–1.3)
MONOCYTES NFR BLD AUTO: 1 %
NEUTROPHILS # BLD AUTO: 18 10E3/UL (ref 1.6–8.3)
NEUTROPHILS NFR BLD AUTO: 97 %
NRBC # BLD AUTO: 0 10E3/UL
NRBC BLD AUTO-RTO: 0 /100
O2/TOTAL GAS SETTING VFR VENT: 52 %
O2/TOTAL GAS SETTING VFR VENT: 53 %
OXYHGB MFR BLDA: 96 % (ref 92–100)
OXYHGB MFR BLDA: 97 % (ref 92–100)
PCO2 BLDA: 33 MM HG (ref 35–45)
PCO2 BLDA: 34 MM HG (ref 35–45)
PCO2 BLDA: 35 MM HG (ref 35–45)
PCO2 BLDA: 36 MM HG (ref 35–45)
PH BLDA: 7.25 [PH] (ref 7.35–7.45)
PH BLDA: 7.29 [PH] (ref 7.35–7.45)
PH BLDA: 7.29 [PH] (ref 7.35–7.45)
PH BLDA: 7.3 [PH] (ref 7.35–7.45)
PHOSPHATE SERPL-MCNC: 3.1 MG/DL (ref 2.5–4.5)
PLAT MORPH BLD: ABNORMAL
PLATELET # BLD AUTO: 106 10E3/UL (ref 150–450)
PLATELET # BLD AUTO: 153 10E3/UL (ref 150–450)
PO2 BLDA: 125 MM HG (ref 80–105)
PO2 BLDA: 135 MM HG (ref 80–105)
PO2 BLDA: 137 MM HG (ref 80–105)
PO2 BLDA: 149 MM HG (ref 80–105)
POLYCHROMASIA BLD QL SMEAR: SLIGHT
POTASSIUM BLD-SCNC: 3.6 MMOL/L (ref 3.4–5.3)
POTASSIUM BLD-SCNC: 3.6 MMOL/L (ref 3.4–5.3)
POTASSIUM BLD-SCNC: 4 MMOL/L (ref 3.4–5.3)
POTASSIUM BLD-SCNC: 4 MMOL/L (ref 3.4–5.3)
POTASSIUM SERPL-SCNC: 3.9 MMOL/L (ref 3.4–5.3)
POTASSIUM SERPL-SCNC: 4.1 MMOL/L (ref 3.4–5.3)
POTASSIUM SERPL-SCNC: 4.7 MMOL/L (ref 3.4–5.3)
PROTHROMBIN TIME: 15.7 SECONDS (ref 11.8–14.8)
RBC # BLD AUTO: 2.62 10E6/UL (ref 3.8–5.2)
RBC # BLD AUTO: 3.14 10E6/UL (ref 3.8–5.2)
RBC MORPH BLD: ABNORMAL
SAO2 % BLDA: 99 % (ref 96–97)
SODIUM BLD-SCNC: 129 MMOL/L (ref 135–145)
SODIUM BLD-SCNC: 134 MMOL/L (ref 135–145)
SODIUM BLD-SCNC: 134 MMOL/L (ref 135–145)
SODIUM BLD-SCNC: 135 MMOL/L (ref 135–145)
SODIUM SERPL-SCNC: 136 MMOL/L (ref 135–145)
SODIUM SERPL-SCNC: 137 MMOL/L (ref 135–145)
UNIT ABO/RH: NORMAL
UNIT NUMBER: NORMAL
UNIT STATUS: NORMAL
UNIT TYPE ISBT: 1700
UNIT TYPE ISBT: 7300
WBC # BLD AUTO: 18.6 10E3/UL (ref 4–11)
WBC # BLD AUTO: 22.5 10E3/UL (ref 4–11)

## 2025-05-03 PROCEDURE — 250N000013 HC RX MED GY IP 250 OP 250 PS 637: Performed by: SURGERY

## 2025-05-03 PROCEDURE — 250N000011 HC RX IP 250 OP 636

## 2025-05-03 PROCEDURE — 85025 COMPLETE CBC W/AUTO DIFF WBC: CPT | Performed by: SURGERY

## 2025-05-03 PROCEDURE — 93975 VASCULAR STUDY: CPT

## 2025-05-03 PROCEDURE — 85004 AUTOMATED DIFF WBC COUNT: CPT | Performed by: SURGERY

## 2025-05-03 PROCEDURE — 258N000003 HC RX IP 258 OP 636

## 2025-05-03 PROCEDURE — 85018 HEMOGLOBIN: CPT | Performed by: SURGERY

## 2025-05-03 PROCEDURE — 250N000011 HC RX IP 250 OP 636: Performed by: SURGERY

## 2025-05-03 PROCEDURE — 84295 ASSAY OF SERUM SODIUM: CPT

## 2025-05-03 PROCEDURE — P9016 RBC LEUKOCYTES REDUCED: HCPCS

## 2025-05-03 PROCEDURE — 84100 ASSAY OF PHOSPHORUS: CPT | Performed by: SURGERY

## 2025-05-03 PROCEDURE — 83735 ASSAY OF MAGNESIUM: CPT | Performed by: NURSE PRACTITIONER

## 2025-05-03 PROCEDURE — 250N000009 HC RX 250: Performed by: NURSE PRACTITIONER

## 2025-05-03 PROCEDURE — 120N000003 HC R&B IMCU UMMC

## 2025-05-03 PROCEDURE — 83605 ASSAY OF LACTIC ACID: CPT | Performed by: SURGERY

## 2025-05-03 PROCEDURE — 83036 HEMOGLOBIN GLYCOSYLATED A1C: CPT | Performed by: SURGERY

## 2025-05-03 PROCEDURE — 82962 GLUCOSE BLOOD TEST: CPT

## 2025-05-03 PROCEDURE — 250N000012 HC RX MED GY IP 250 OP 636 PS 637: Performed by: SURGERY

## 2025-05-03 PROCEDURE — 85610 PROTHROMBIN TIME: CPT | Performed by: SURGERY

## 2025-05-03 PROCEDURE — 999N000065 XR CHEST PORT 1 VIEW

## 2025-05-03 PROCEDURE — 258N000003 HC RX IP 258 OP 636: Performed by: NURSE PRACTITIONER

## 2025-05-03 PROCEDURE — 84132 ASSAY OF SERUM POTASSIUM: CPT | Performed by: SURGERY

## 2025-05-03 PROCEDURE — 82150 ASSAY OF AMYLASE: CPT | Performed by: SURGERY

## 2025-05-03 PROCEDURE — 85730 THROMBOPLASTIN TIME PARTIAL: CPT | Performed by: SURGERY

## 2025-05-03 PROCEDURE — 250N000011 HC RX IP 250 OP 636: Mod: JZ | Performed by: STUDENT IN AN ORGANIZED HEALTH CARE EDUCATION/TRAINING PROGRAM

## 2025-05-03 PROCEDURE — 83690 ASSAY OF LIPASE: CPT | Performed by: SURGERY

## 2025-05-03 PROCEDURE — 80048 BASIC METABOLIC PNL TOTAL CA: CPT

## 2025-05-03 PROCEDURE — 99223 1ST HOSP IP/OBS HIGH 75: CPT | Mod: 24 | Performed by: NURSE PRACTITIONER

## 2025-05-03 PROCEDURE — 83735 ASSAY OF MAGNESIUM: CPT | Performed by: SURGERY

## 2025-05-03 PROCEDURE — 36592 COLLECT BLOOD FROM PICC: CPT | Performed by: NURSE PRACTITIONER

## 2025-05-03 PROCEDURE — 250N000009 HC RX 250

## 2025-05-03 PROCEDURE — 71045 X-RAY EXAM CHEST 1 VIEW: CPT | Mod: 26 | Performed by: RADIOLOGY

## 2025-05-03 PROCEDURE — 36592 COLLECT BLOOD FROM PICC: CPT | Performed by: SURGERY

## 2025-05-03 PROCEDURE — 99207 US RENAL TRANSPLANT WITH DOPPLER: CPT | Mod: 26 | Performed by: RADIOLOGY

## 2025-05-03 PROCEDURE — 96372 THER/PROPH/DIAG INJ SC/IM: CPT | Performed by: SURGERY

## 2025-05-03 PROCEDURE — 258N000001 HC RX 258: Performed by: SURGERY

## 2025-05-03 PROCEDURE — 76776 US EXAM K TRANSPL W/DOPPLER: CPT

## 2025-05-03 PROCEDURE — 250N000011 HC RX IP 250 OP 636: Performed by: NURSE PRACTITIONER

## 2025-05-03 PROCEDURE — 93975 VASCULAR STUDY: CPT | Mod: 26 | Performed by: RADIOLOGY

## 2025-05-03 PROCEDURE — 84295 ASSAY OF SERUM SODIUM: CPT | Performed by: SURGERY

## 2025-05-03 PROCEDURE — 258N000003 HC RX IP 258 OP 636: Performed by: SURGERY

## 2025-05-03 PROCEDURE — 84295 ASSAY OF SERUM SODIUM: CPT | Performed by: NURSE PRACTITIONER

## 2025-05-03 DEVICE — STENT URETERAL DBL PIGTAIL SOF-FLEX CVD 6FRX12CM G14867: Type: IMPLANTABLE DEVICE | Site: ABDOMEN | Status: FUNCTIONAL

## 2025-05-03 RX ORDER — ACETAMINOPHEN 325 MG/1
975 TABLET ORAL EVERY 8 HOURS
Status: DISCONTINUED | OUTPATIENT
Start: 2025-05-03 | End: 2025-05-03

## 2025-05-03 RX ORDER — AMOXICILLIN 250 MG
1 CAPSULE ORAL 2 TIMES DAILY
Status: DISCONTINUED | OUTPATIENT
Start: 2025-05-03 | End: 2025-05-08

## 2025-05-03 RX ORDER — FUROSEMIDE 10 MG/ML
80 INJECTION INTRAMUSCULAR; INTRAVENOUS
Status: DISCONTINUED | OUTPATIENT
Start: 2025-05-03 | End: 2025-05-03

## 2025-05-03 RX ORDER — EZETIMIBE 10 MG/1
10 TABLET ORAL DAILY
Status: ON HOLD | COMMUNITY

## 2025-05-03 RX ORDER — ONDANSETRON 2 MG/ML
4 INJECTION INTRAMUSCULAR; INTRAVENOUS EVERY 30 MIN PRN
Status: DISCONTINUED | OUTPATIENT
Start: 2025-05-03 | End: 2025-05-03 | Stop reason: HOSPADM

## 2025-05-03 RX ORDER — DEXTROSE MONOHYDRATE 100 MG/ML
INJECTION, SOLUTION INTRAVENOUS CONTINUOUS PRN
Status: ACTIVE | OUTPATIENT
Start: 2025-05-03

## 2025-05-03 RX ORDER — ONDANSETRON 2 MG/ML
4 INJECTION INTRAMUSCULAR; INTRAVENOUS EVERY 30 MIN PRN
Status: CANCELLED | OUTPATIENT
Start: 2025-05-03

## 2025-05-03 RX ORDER — SODIUM CHLORIDE, SODIUM LACTATE, POTASSIUM CHLORIDE, CALCIUM CHLORIDE 600; 310; 30; 20 MG/100ML; MG/100ML; MG/100ML; MG/100ML
INJECTION, SOLUTION INTRAVENOUS CONTINUOUS
Status: DISCONTINUED | OUTPATIENT
Start: 2025-05-03 | End: 2025-05-03 | Stop reason: HOSPADM

## 2025-05-03 RX ORDER — ONDANSETRON 4 MG/1
4 TABLET, ORALLY DISINTEGRATING ORAL EVERY 6 HOURS PRN
Status: ACTIVE | OUTPATIENT
Start: 2025-05-03

## 2025-05-03 RX ORDER — SULFAMETHOXAZOLE AND TRIMETHOPRIM 200; 40 MG/5ML; MG/5ML
10 SUSPENSION ORAL
Status: DISCONTINUED | OUTPATIENT
Start: 2025-05-05 | End: 2025-05-07

## 2025-05-03 RX ORDER — PROCHLORPERAZINE MALEATE 5 MG/1
10 TABLET ORAL EVERY 6 HOURS PRN
Status: ACTIVE | OUTPATIENT
Start: 2025-05-03

## 2025-05-03 RX ORDER — OCTREOTIDE ACETATE 100 UG/ML
100 INJECTION, SOLUTION INTRAVENOUS; SUBCUTANEOUS 3 TIMES DAILY
Status: COMPLETED | OUTPATIENT
Start: 2025-05-03 | End: 2025-05-07

## 2025-05-03 RX ORDER — HYDROMORPHONE HCL IN WATER/PF 6 MG/30 ML
0.4 PATIENT CONTROLLED ANALGESIA SYRINGE INTRAVENOUS EVERY 5 MIN PRN
Status: DISCONTINUED | OUTPATIENT
Start: 2025-05-03 | End: 2025-05-03 | Stop reason: HOSPADM

## 2025-05-03 RX ORDER — NALOXONE HYDROCHLORIDE 0.4 MG/ML
0.1 INJECTION, SOLUTION INTRAMUSCULAR; INTRAVENOUS; SUBCUTANEOUS
Status: DISCONTINUED | OUTPATIENT
Start: 2025-05-03 | End: 2025-05-03 | Stop reason: HOSPADM

## 2025-05-03 RX ORDER — POLYETHYLENE GLYCOL 3350 17 G/17G
17 POWDER, FOR SOLUTION ORAL DAILY
Status: DISPENSED | OUTPATIENT
Start: 2025-05-04

## 2025-05-03 RX ORDER — FERROUS SULFATE 325(65) MG
325 TABLET, DELAYED RELEASE (ENTERIC COATED) ORAL DAILY
Status: ON HOLD | COMMUNITY

## 2025-05-03 RX ORDER — NALOXONE HYDROCHLORIDE 0.4 MG/ML
0.4 INJECTION, SOLUTION INTRAMUSCULAR; INTRAVENOUS; SUBCUTANEOUS
Status: ACTIVE | OUTPATIENT
Start: 2025-05-03

## 2025-05-03 RX ORDER — PIPERACILLIN SODIUM, TAZOBACTAM SODIUM 2; .25 G/10ML; G/10ML
2.25 INJECTION, POWDER, LYOPHILIZED, FOR SOLUTION INTRAVENOUS EVERY 6 HOURS
Status: COMPLETED | OUTPATIENT
Start: 2025-05-03 | End: 2025-05-06

## 2025-05-03 RX ORDER — METHOCARBAMOL 500 MG/1
500 TABLET, FILM COATED ORAL EVERY 6 HOURS PRN
Status: DISPENSED | OUTPATIENT
Start: 2025-05-03

## 2025-05-03 RX ORDER — HYDROMORPHONE HCL IN WATER/PF 6 MG/30 ML
0.2 PATIENT CONTROLLED ANALGESIA SYRINGE INTRAVENOUS EVERY 5 MIN PRN
Status: DISCONTINUED | OUTPATIENT
Start: 2025-05-03 | End: 2025-05-03 | Stop reason: HOSPADM

## 2025-05-03 RX ORDER — CALCIUM GLUCONATE 20 MG/ML
2 INJECTION, SOLUTION INTRAVENOUS ONCE
Status: COMPLETED | OUTPATIENT
Start: 2025-05-03 | End: 2025-05-03

## 2025-05-03 RX ORDER — ASCORBIC ACID, THIAMINE, RIBOFLAVIN, NIACINAMIDE, PYRIDOXINE, FOLIC ACID, COBALAMIN, BIOTIN, PANTOTHENIC ACID 100; 1.5; 1.7; 20; 10; 1; 6; 300; 1 MG/1; MG/1; MG/1; MG/1; MG/1; MG/1; UG/1; UG/1; MG/1
1 TABLET, COATED ORAL DAILY
Status: ON HOLD | COMMUNITY

## 2025-05-03 RX ORDER — OXYCODONE HYDROCHLORIDE 5 MG/1
5 TABLET ORAL
Status: CANCELLED | OUTPATIENT
Start: 2025-05-03

## 2025-05-03 RX ORDER — ASPIRIN 81 MG/1
81 TABLET, CHEWABLE ORAL DAILY
Status: DISCONTINUED | OUTPATIENT
Start: 2025-05-03 | End: 2025-05-07

## 2025-05-03 RX ORDER — NALOXONE HYDROCHLORIDE 0.4 MG/ML
0.1 INJECTION, SOLUTION INTRAMUSCULAR; INTRAVENOUS; SUBCUTANEOUS
Status: CANCELLED | OUTPATIENT
Start: 2025-05-03

## 2025-05-03 RX ORDER — OXYCODONE HYDROCHLORIDE 10 MG/1
10 TABLET ORAL
Status: CANCELLED | OUTPATIENT
Start: 2025-05-03

## 2025-05-03 RX ORDER — HEPARIN SODIUM 10000 [USP'U]/100ML
200 INJECTION, SOLUTION INTRAVENOUS CONTINUOUS
Status: DISCONTINUED | OUTPATIENT
Start: 2025-05-03 | End: 2025-05-05

## 2025-05-03 RX ORDER — NALOXONE HYDROCHLORIDE 0.4 MG/ML
0.2 INJECTION, SOLUTION INTRAMUSCULAR; INTRAVENOUS; SUBCUTANEOUS
Status: ACTIVE | OUTPATIENT
Start: 2025-05-03

## 2025-05-03 RX ORDER — PAPAVERINE HYDROCHLORIDE 30 MG/ML
INJECTION INTRAMUSCULAR; INTRAVENOUS PRN
Status: DISCONTINUED | OUTPATIENT
Start: 2025-05-03 | End: 2025-05-03 | Stop reason: HOSPADM

## 2025-05-03 RX ORDER — HYDROMORPHONE HCL IN WATER/PF 6 MG/30 ML
0.4 PATIENT CONTROLLED ANALGESIA SYRINGE INTRAVENOUS
Status: DISCONTINUED | OUTPATIENT
Start: 2025-05-03 | End: 2025-05-04

## 2025-05-03 RX ORDER — FAMOTIDINE 20 MG/1
20 TABLET, FILM COATED ORAL DAILY
Status: DISCONTINUED | OUTPATIENT
Start: 2025-05-04 | End: 2025-05-07

## 2025-05-03 RX ORDER — ATORVASTATIN CALCIUM 10 MG/1
10 TABLET, FILM COATED ORAL DAILY
Status: ON HOLD | COMMUNITY

## 2025-05-03 RX ORDER — FENTANYL CITRATE 50 UG/ML
50 INJECTION, SOLUTION INTRAMUSCULAR; INTRAVENOUS EVERY 5 MIN PRN
Status: DISCONTINUED | OUTPATIENT
Start: 2025-05-03 | End: 2025-05-03 | Stop reason: HOSPADM

## 2025-05-03 RX ORDER — SODIUM CHLORIDE, SODIUM LACTATE, POTASSIUM CHLORIDE, CALCIUM CHLORIDE 600; 310; 30; 20 MG/100ML; MG/100ML; MG/100ML; MG/100ML
INJECTION, SOLUTION INTRAVENOUS CONTINUOUS
Status: DISCONTINUED | OUTPATIENT
Start: 2025-05-03 | End: 2025-05-05

## 2025-05-03 RX ORDER — ONDANSETRON 4 MG/1
4 TABLET, ORALLY DISINTEGRATING ORAL EVERY 30 MIN PRN
Status: CANCELLED | OUTPATIENT
Start: 2025-05-03

## 2025-05-03 RX ORDER — VALGANCICLOVIR 450 MG/1
450 TABLET, FILM COATED ORAL
Status: DISCONTINUED | OUTPATIENT
Start: 2025-05-05 | End: 2025-05-03

## 2025-05-03 RX ORDER — DEXTROSE MONOHYDRATE 25 G/50ML
25-50 INJECTION, SOLUTION INTRAVENOUS
Status: DISPENSED | OUTPATIENT
Start: 2025-05-03

## 2025-05-03 RX ORDER — CLOTRIMAZOLE 10 MG/1
10 LOZENGE ORAL 4 TIMES DAILY
Status: DISCONTINUED | OUTPATIENT
Start: 2025-05-10 | End: 2025-05-07

## 2025-05-03 RX ORDER — NICOTINE POLACRILEX 4 MG
15-30 LOZENGE BUCCAL
Status: ACTIVE | OUTPATIENT
Start: 2025-05-03

## 2025-05-03 RX ORDER — ACETAMINOPHEN 325 MG/10.15ML
975 LIQUID ORAL EVERY 8 HOURS
Status: DISCONTINUED | OUTPATIENT
Start: 2025-05-03 | End: 2025-05-07

## 2025-05-03 RX ORDER — ONDANSETRON 2 MG/ML
INJECTION INTRAMUSCULAR; INTRAVENOUS PRN
Status: DISCONTINUED | OUTPATIENT
Start: 2025-05-03 | End: 2025-05-03

## 2025-05-03 RX ORDER — VALGANCICLOVIR HYDROCHLORIDE 50 MG/ML
450 POWDER, FOR SOLUTION ORAL
Status: DISCONTINUED | OUTPATIENT
Start: 2025-05-05 | End: 2025-05-07

## 2025-05-03 RX ORDER — DEXAMETHASONE SODIUM PHOSPHATE 4 MG/ML
4 INJECTION, SOLUTION INTRA-ARTICULAR; INTRALESIONAL; INTRAMUSCULAR; INTRAVENOUS; SOFT TISSUE
Status: DISCONTINUED | OUTPATIENT
Start: 2025-05-03 | End: 2025-05-03 | Stop reason: HOSPADM

## 2025-05-03 RX ORDER — MAGNESIUM OXIDE 400 MG/1
400 TABLET ORAL EVERY 24 HOURS
Status: DISPENSED | OUTPATIENT
Start: 2025-05-05

## 2025-05-03 RX ORDER — SULFAMETHOXAZOLE AND TRIMETHOPRIM 200; 40 MG/5ML; MG/5ML
10 SUSPENSION ORAL DAILY
Status: DISCONTINUED | OUTPATIENT
Start: 2025-05-03 | End: 2025-05-03

## 2025-05-03 RX ORDER — MYCOPHENOLATE MOFETIL 200 MG/ML
1000 POWDER, FOR SUSPENSION ORAL
Status: DISCONTINUED | OUTPATIENT
Start: 2025-05-03 | End: 2025-05-07

## 2025-05-03 RX ORDER — FAMOTIDINE 20 MG/1
20 TABLET, FILM COATED ORAL 2 TIMES DAILY
Status: DISCONTINUED | OUTPATIENT
Start: 2025-05-03 | End: 2025-05-03

## 2025-05-03 RX ORDER — PIPERACILLIN SODIUM, TAZOBACTAM SODIUM 3; .375 G/15ML; G/15ML
3.38 INJECTION, POWDER, LYOPHILIZED, FOR SOLUTION INTRAVENOUS EVERY 6 HOURS
Status: DISCONTINUED | OUTPATIENT
Start: 2025-05-03 | End: 2025-05-03

## 2025-05-03 RX ORDER — ONDANSETRON 4 MG/1
4 TABLET, ORALLY DISINTEGRATING ORAL EVERY 30 MIN PRN
Status: DISCONTINUED | OUTPATIENT
Start: 2025-05-03 | End: 2025-05-03 | Stop reason: HOSPADM

## 2025-05-03 RX ORDER — ONDANSETRON 2 MG/ML
4 INJECTION INTRAMUSCULAR; INTRAVENOUS EVERY 6 HOURS PRN
Status: DISCONTINUED | OUTPATIENT
Start: 2025-05-03 | End: 2025-05-08

## 2025-05-03 RX ORDER — HYDROMORPHONE HCL IN WATER/PF 6 MG/30 ML
0.2 PATIENT CONTROLLED ANALGESIA SYRINGE INTRAVENOUS
Status: DISCONTINUED | OUTPATIENT
Start: 2025-05-03 | End: 2025-05-04

## 2025-05-03 RX ORDER — FENTANYL CITRATE 50 UG/ML
25 INJECTION, SOLUTION INTRAMUSCULAR; INTRAVENOUS EVERY 5 MIN PRN
Status: DISCONTINUED | OUTPATIENT
Start: 2025-05-03 | End: 2025-05-03 | Stop reason: HOSPADM

## 2025-05-03 RX ORDER — BISACODYL 10 MG
10 SUPPOSITORY, RECTAL RECTAL DAILY PRN
Status: ACTIVE | OUTPATIENT
Start: 2025-05-06

## 2025-05-03 RX ORDER — DEXAMETHASONE SODIUM PHOSPHATE 4 MG/ML
4 INJECTION, SOLUTION INTRA-ARTICULAR; INTRALESIONAL; INTRAMUSCULAR; INTRAVENOUS; SOFT TISSUE
Status: CANCELLED | OUTPATIENT
Start: 2025-05-03

## 2025-05-03 RX ADMIN — FUROSEMIDE 10 MG/HR: 10 INJECTION, SOLUTION INTRAMUSCULAR; INTRAVENOUS at 06:32

## 2025-05-03 RX ADMIN — ONDANSETRON 4 MG: 2 INJECTION INTRAMUSCULAR; INTRAVENOUS at 03:07

## 2025-05-03 RX ADMIN — HEPARIN SODIUM 200 UNITS/HR: 10000 INJECTION, SOLUTION INTRAVENOUS at 09:51

## 2025-05-03 RX ADMIN — PHENYLEPHRINE HYDROCHLORIDE 0.5 MCG/KG/MIN: 10 INJECTION INTRAVENOUS at 00:52

## 2025-05-03 RX ADMIN — Medication 0.5 UNITS: at 01:33

## 2025-05-03 RX ADMIN — ACETAMINOPHEN 975 MG: 325 SOLUTION ORAL at 18:57

## 2025-05-03 RX ADMIN — TACROLIMUS 2 MG: 5 CAPSULE ORAL at 18:02

## 2025-05-03 RX ADMIN — MICAFUNGIN 100 MG: 20 INJECTION, POWDER, LYOPHILIZED, FOR SOLUTION INTRAVENOUS at 23:37

## 2025-05-03 RX ADMIN — Medication 0.5 UNITS: at 01:43

## 2025-05-03 RX ADMIN — Medication 100 MG: at 03:08

## 2025-05-03 RX ADMIN — PHENYLEPHRINE HYDROCHLORIDE 100 MCG: 10 INJECTION INTRAVENOUS at 00:44

## 2025-05-03 RX ADMIN — PIPERACILLIN AND TAZOBACTAM 2.25 G: 2; .25 INJECTION, POWDER, FOR SOLUTION INTRAVENOUS at 22:10

## 2025-05-03 RX ADMIN — HYDROMORPHONE HYDROCHLORIDE 0.5 MG: 1 INJECTION, SOLUTION INTRAMUSCULAR; INTRAVENOUS; SUBCUTANEOUS at 02:39

## 2025-05-03 RX ADMIN — OCTREOTIDE ACETATE 100 MCG: 100 INJECTION, SOLUTION INTRAVENOUS; SUBCUTANEOUS at 14:38

## 2025-05-03 RX ADMIN — OCTREOTIDE ACETATE 100 MCG: 100 INJECTION, SOLUTION INTRAVENOUS; SUBCUTANEOUS at 19:42

## 2025-05-03 RX ADMIN — TACROLIMUS 2 MG: 5 CAPSULE ORAL at 11:14

## 2025-05-03 RX ADMIN — SODIUM CHLORIDE 1000 ML: 0.9 INJECTION, SOLUTION INTRAVENOUS at 08:24

## 2025-05-03 RX ADMIN — SODIUM BICARBONATE: 84 INJECTION, SOLUTION INTRAVENOUS at 10:32

## 2025-05-03 RX ADMIN — Medication 50 MG: at 03:21

## 2025-05-03 RX ADMIN — DEXTROSE MONOHYDRATE 50 ML: 25 INJECTION, SOLUTION INTRAVENOUS at 09:05

## 2025-05-03 RX ADMIN — EPHEDRINE SULFATE 5 MG: 5 INJECTION INTRAVENOUS at 00:22

## 2025-05-03 RX ADMIN — PHENYLEPHRINE HYDROCHLORIDE 200 MCG: 10 INJECTION INTRAVENOUS at 02:23

## 2025-05-03 RX ADMIN — SENNOSIDES AND DOCUSATE SODIUM 1 TABLET: 50; 8.6 TABLET ORAL at 19:42

## 2025-05-03 RX ADMIN — EPHEDRINE SULFATE 10 MG: 5 INJECTION INTRAVENOUS at 00:28

## 2025-05-03 RX ADMIN — FENTANYL CITRATE 50 MCG: 50 INJECTION, SOLUTION INTRAMUSCULAR; INTRAVENOUS at 04:42

## 2025-05-03 RX ADMIN — SODIUM CHLORIDE, SODIUM GLUCONATE, SODIUM ACETATE, POTASSIUM CHLORIDE AND MAGNESIUM CHLORIDE: 526; 502; 368; 37; 30 INJECTION, SOLUTION INTRAVENOUS at 02:51

## 2025-05-03 RX ADMIN — PIPERACILLIN AND TAZOBACTAM 2.25 G: 2; .25 INJECTION, POWDER, FOR SOLUTION INTRAVENOUS at 14:38

## 2025-05-03 RX ADMIN — Medication 50 MG: at 03:18

## 2025-05-03 RX ADMIN — Medication 0.5 UNITS: at 00:35

## 2025-05-03 RX ADMIN — SODIUM CHLORIDE, SODIUM LACTATE, POTASSIUM CHLORIDE, AND CALCIUM CHLORIDE: .6; .31; .03; .02 INJECTION, SOLUTION INTRAVENOUS at 21:11

## 2025-05-03 RX ADMIN — BUMETANIDE 5 MG: 0.25 INJECTION INTRAMUSCULAR; INTRAVENOUS at 01:04

## 2025-05-03 RX ADMIN — MYCOPHENOLATE MOFETIL 1000 MG: 200 POWDER, FOR SUSPENSION ORAL at 11:14

## 2025-05-03 RX ADMIN — SODIUM CHLORIDE 1000 ML: 0.9 INJECTION, SOLUTION INTRAVENOUS at 15:34

## 2025-05-03 RX ADMIN — OCTREOTIDE ACETATE 100 MCG: 100 INJECTION, SOLUTION INTRAVENOUS; SUBCUTANEOUS at 10:14

## 2025-05-03 RX ADMIN — NOREPINEPHRINE BITARTRATE 6.4 MCG: 1 INJECTION, SOLUTION, CONCENTRATE INTRAVENOUS at 00:41

## 2025-05-03 RX ADMIN — FUROSEMIDE 100 MG: 10 INJECTION, SOLUTION INTRAVENOUS at 01:03

## 2025-05-03 RX ADMIN — SODIUM CHLORIDE, SODIUM LACTATE, POTASSIUM CHLORIDE, AND CALCIUM CHLORIDE 500 ML: .6; .31; .03; .02 INJECTION, SOLUTION INTRAVENOUS at 23:37

## 2025-05-03 RX ADMIN — MANNITOL 25 G: 20 INJECTION, SOLUTION INTRAVENOUS at 01:04

## 2025-05-03 RX ADMIN — MYCOPHENOLATE MOFETIL 1000 MG: 200 POWDER, FOR SUSPENSION ORAL at 18:02

## 2025-05-03 RX ADMIN — Medication 100 MG: at 03:14

## 2025-05-03 RX ADMIN — CALCIUM CHLORIDE INJECTION 0.5 G: 100 INJECTION, SOLUTION INTRAVENOUS at 02:50

## 2025-05-03 RX ADMIN — Medication 0.5 UNITS: at 02:10

## 2025-05-03 RX ADMIN — CALCIUM GLUCONATE 2 G: 20 INJECTION, SOLUTION INTRAVENOUS at 16:49

## 2025-05-03 RX ADMIN — Medication 40 MG: at 10:14

## 2025-05-03 RX ADMIN — DEXTROSE, SODIUM CHLORIDE, SODIUM LACTATE, POTASSIUM CHLORIDE, AND CALCIUM CHLORIDE: 5; .6; .31; .03; .02 INJECTION, SOLUTION INTRAVENOUS at 04:08

## 2025-05-03 RX ADMIN — CALCIUM CHLORIDE INJECTION 0.5 G: 100 INJECTION, SOLUTION INTRAVENOUS at 02:32

## 2025-05-03 RX ADMIN — SODIUM CHLORIDE, SODIUM GLUCONATE, SODIUM ACETATE, POTASSIUM CHLORIDE AND MAGNESIUM CHLORIDE: 526; 502; 368; 37; 30 INJECTION, SOLUTION INTRAVENOUS at 00:54

## 2025-05-03 RX ADMIN — ACETAMINOPHEN 975 MG: 325 SOLUTION ORAL at 11:13

## 2025-05-03 RX ADMIN — ASPIRIN 81 MG CHEWABLE TABLET 81 MG: 81 TABLET CHEWABLE at 11:14

## 2025-05-03 ASSESSMENT — ACTIVITIES OF DAILY LIVING (ADL)
ADLS_ACUITY_SCORE: 46
ADLS_ACUITY_SCORE: 53
ADLS_ACUITY_SCORE: 46
ADLS_ACUITY_SCORE: 53
ADLS_ACUITY_SCORE: 46
ADLS_ACUITY_SCORE: 48
ADLS_ACUITY_SCORE: 46
ADLS_ACUITY_SCORE: 53
ADLS_ACUITY_SCORE: 46
ADLS_ACUITY_SCORE: 53
ADLS_ACUITY_SCORE: 46
ADLS_ACUITY_SCORE: 53

## 2025-05-03 NOTE — PLAN OF CARE
Goal Outcome Evaluation:      Plan of Care Reviewed With: patient, family        Outcome Evaluation: Transfer from PACU    Vitals: /54   Pulse 100   Temp 99.1  F (37.3  C) (Oral)   Resp 20   Ht 1.524 m (5')   Wt 62.7 kg (138 lb 3.2 oz)   SpO2 97%   BMI 26.99 kg/m    O2 sats 97% on 2L, capnography on.   I=O, hourly glucose checks  Endocrine: Glucose , Insulin drip off.  Labs: Lactic 1.5. Q4 hour K+/HGB.  Pain: Minimal abdominal discomfort.  PRN's: D50 25cc X1.  Diet: NPO, ice chips.  LDA: R CVC, velázquez, NGT to LIS (scant output) JESUS (400cc out) IVF's changed to D5W +  100 meq Bicarb @100cc/hr. Heparin drip started at 200U/hr. LLQ PD catheter capped.  GI: Not passing gas, no nausea.  : Velázquez in place, good urine output. Lasix gtt stopped  Skin: Abdominal incision with surgical dressing, old drainage marked,  Neuro: Sleepy, lethargic, awakens to voice, family in the room and very supportive.  Mobility: Bedrest.  Education: Will need transplant education. Medication card, lab book started.  Plan: Continue with plan of care.

## 2025-05-03 NOTE — PROGRESS NOTES
Patient removed from UNOS waitlist after  donor SPK transplant. UNOS ID [id number].    Donor Has Risk Criteria for Transmission of HIV/HCV/HBV: No  Recipient Notified of Risk Criteria: N/A

## 2025-05-03 NOTE — ANESTHESIA PROCEDURE NOTES
Airway       Patient location during procedure: OR       Procedure Start/Stop Times: 5/2/2025 7:13 PM  Staff -        Anesthesiologist:  Marquis Martinez MD       Resident/Fellow: Ruby Amador MD       Performed By: resident and with residents       Procedure performed by resident/fellow/CRNA in presence of a teaching physician.    Consent for Airway        Urgency: elective  Indications and Patient Condition       Indications for airway management: tuan-procedural       Induction type:intravenous       Mask difficulty assessment: 2 - vent by mask + OA or adjuvant +/- NMBA    Final Airway Details       Final airway type: endotracheal airway       Successful airway: ETT - single  Endotracheal Airway Details        ETT size (mm): 6.5       Cuffed: yes       Successful intubation technique: video laryngoscopy       VL Blade Size: Glidescope 3       Grade View of Cords: 2       Adjucts: stylet       Position: Left       Measured from: lips       Secured at (cm): 19       Bite block used: None    Post intubation assessment        Placement verified by: capnometry and chest rise        Number of attempts at approach: 1       Number of other approaches attempted: 0       Secured with: tape       Ease of procedure: easy       Dentition: Intact    Medication(s) Administered   Medication Administration Time: 5/2/2025 7:13 PM

## 2025-05-03 NOTE — PROGRESS NOTES
CLINICAL NUTRITION SERVICES - ASSESSMENT NOTE    Malnutrition Status:    Does not meet criteria at this time based on information available (may change when able to do hands on physical assessment but based on current info, unlikely)    Registered Dietitian Interventions:  Nutrition Education:Provided Handouts: Post-transplant diet guidelines and USDA Food Safety Book. Handouts only today, as pt is busy with nursing cares, NPO, and POD1.     Future/Additional Recommendations:  - Monitor POC regarding diet advancement, oral nutrition supplement needs, and appropriate time for verbal txp edu.   - Minimize diet restrictions as able d/t high calorie/protein needs post-transplant.  Oral supplements as needed to help meet nutritional needs.   - High protein food choices with meals to help meet high needs post-transplant over the next 6-8 weeks.   - Heart-healthy diet (low saturated fat, low sodium, high fiber) and food safety precautions long term due to immunosuppression regimen post-transplant      REASON FOR ASSESSMENT  Provider order - Post-Op Kidney/pancreas Transplant assess and educate     SUBJECTIVE INFORMATION  Assessed patient in room. However was busy with nursing cares so visit brief    NUTRITION HISTORY  - Outpatient RD assessment prior to txp was on 8/2/23. At that time, patient had not yet started on PD. Good appetite/intake and no weight loss reported. Baseline weight noted ~135 lbs which appears to be consistent with recent weight measures.     - Patient accompanied by daughter, , and ex-. Per daughter, hx of diabetes but no concern for unplanned weight loss or decreased appetite PTA. Daughter accepted transplant nutrition edu handouts from writer which will be discussed during week when medically appropriate (currently NPO, POD1).      CURRENT NUTRITION ORDERS  Diet: NPO    CURRENT INTAKE/TOLERANCE  N/A     LABS  Nutrition-relevant labs: Reviewed    MEDICATIONS  Nutrition-relevant  "medications: Reviewed; liquid Tylenol Q8hrs; Oscal BID; Pepcid; Protonix; Miralax daily (to start tomorrow); Senokot BID; IVF with D5W in LR at 100 mL/hr (stopped); Lasix drip (stopped); insulin drip (not running)    ANTHROPOMETRICS  Height: 152.4 cm (5' 0\")  Most Recent Weight: 62.7 kg (138 lb 3.2 oz)  IBW: 45.5 kg  % IBW: 138%  BMI (kg/m ): Overweight BMI 25-29.9  Weight History:   - EDW ~61-62 kg    Wt Readings from Last 10 Encounters:   05/02/25 62.7 kg (138 lb 3.2 oz)   03/14/25 65.9 kg (145 lb 3.2 oz)   10/17/24 63.5 kg (139 lb 14.4 oz)   09/25/24 64.5 kg (142 lb 3.2 oz)   09/13/24 63.9 kg (140 lb 12.8 oz)   04/24/24 61.6 kg (135 lb 11.2 oz)   04/12/24 61.2 kg (134 lb 14.7 oz)   01/10/24 61.5 kg (135 lb 9.6 oz)   10/10/23 62.7 kg (138 lb 4.8 oz)   08/21/23 64.9 kg (143 lb)     Dosing Weight: 50 kg, based on adjusted wt    ASSESSED NUTRITION NEEDS:  Estimated Energy Needs: 1500 - 1750 kcals (30-35 Kcal/Kg)  Justification: increased needs post-op SOT  Estimated Protein Needs: 65 - 100 grams protein (1.3-2 gm/Kg)  Justification: increased needs post op SOT  Estimated Fluid Needs: per MD pending fluid status and adequate UOP    SYSTEM FINDINGS    Skin/wounds: WOCN not following. Monitor surgical wound site/healing.   GI symptoms: Monitor for ROBF post-op.     MALNUTRITION  % Intake: No decreased intake noted  % Weight Loss: None noted  Subcutaneous Fat Loss: None observed visually   Muscle Loss: None observed  Fluid Accumulation/Edema: None noted  Malnutrition Diagnosis: Patient does not meet two of the established criteria necessary for diagnosing malnutrition but is at risk for malnutrition  Malnutrition Present on Admission: No    NUTRITION DIAGNOSIS  Food and nutrition-related knowledge deficit r/t length of time since previous post-transplant education AEB patient verbal report, review of chart record, and MD consult for nutrition education.     INTERVENTIONS  Nutrition education content/txp handouts "     Goals  1. Patient will verbalize understanding of 3 important aspects of post-transplant diet guidelines  2. PO intake >50% meals TID once able to advance diet     Monitoring/Evaluation  Progress toward goals will be monitored and evaluated per policy.    Chao Raines MS, RDN, LD, CNSC  Weekend/Holiday (7:00-3:30) - Weekend Clinical Dietitian   **Clinical Dietitians are no longer available by pager.

## 2025-05-03 NOTE — PROVIDER NOTIFICATION
Dr. Trinh was paged about urine output being low- 5cc in the last hour. Lasix gtt was started at 0630. Dr. Trinh said it was ok for pt to go to the floor with low urine output. Frida Davidson RN on 5/3/2025 at 7:31 AM

## 2025-05-03 NOTE — PHARMACY-TRANSPLANT NOTE
Adult Kidney/Pancreas Transplant Post Operative Note    54 year old female s/p SPK  transplant on 5/3/2025 for Type II diabetes.      Planned immunosuppression regimen per kidney/pancreas transplant protocol:  INDUCTION: thymoglobulin 6.5 mg/kg IV total, divided into  3 doses  and methylprednisolone IV daily x 3 doses used as a pre-med for thymoglobulin.    MAINTENANCE:  mycophenolate and tacrolimus with goal trough levels of 8-10 mcg/L for the first 6 months post-transplant.      Opportunistic pathogen prophylaxis includes: trimethoprim/sulfamethoxazole, clotrimazole and valganciclovir.    Post-op antibiotic/antifungal surgical prophylaxis includes: piperacillin-tazobactam for 3 days post-procedure and micafungin IV for 6 days post-procedure.    Patient is not enrolled in medication study.    Pharmacy will monitor for medication interactions and immunosuppression levels in conjunction with the team.  Medication therapy needs for discharge planning will continue to be addressed throughout the current admission via multidisciplinary rounds and order review.   Pharmacy will make recommendations as appropriate.

## 2025-05-03 NOTE — DISCHARGE INSTRUCTIONS
Nutrition Services - Post-Transplant Diet Guidelines   Follow recommendations on the Guide to Your Diet after Transplant handout (summary below).    You have increased energy and protein needs for six to eight weeks after transplant. Your goal is to consume at least 65 grams of protein per day during this time frame.   Eat a heart-healthy diet (low sodium, low saturated and trans-fat) once you are outside of the 6-8 week post-transplant window, and once your appetite improves to normal  In some cases (but not in all cases), adjust potassium intake   Take calcium and vitamin D supplement if your doctor or team orders  Take measures to prevent food poisoning: store and prepare foods to the proper temperature, practice good handwashing, heat all deli meat (to 165 degrees Fahrenheit), avoid raw fish and meats (including uncooked eggs, non-pasteurized or raw milk, and mold-ripened, non-pasteurized, and raw cheeses [including Brie, Camembert, Roquefort, Stilton, Gorgonzola and Remberto or other soft, unpasteurized cheeses such as Mexican queso fresco]), and throw out leftovers older than two days.   Do not consume grapefruit or pomegranate-containing products. These can interact with your medications.   Avoid the following post txp d/t risk for rejection, unknown effects on the organs, and/or potential interactions with immunosuppressants:       - Herbal, Chinese, holistic, chiropractic, natural, alternative medicines and supplements       - Detoxes and cleanses       - Weight loss pills       - Protein powders or other products with extracts or herbs (ie green tea extract)  If you have any nutrition-related questions or concerns after discharge, please contact our outpatient transplant dietitian, Irene Abbott at [phone]

## 2025-05-03 NOTE — PROVIDER NOTIFICATION
Dr. Gates verified chest xray looked ok for line placement and set up a CVP transducer. Frida Davidson RN on 5/3/2025 at 8:00 AM

## 2025-05-03 NOTE — OP NOTE
Transplant Surgery  Operative Note     Procedure Date:  Case start 05/02/25, complete 5/3/25    Preoperative Diagnosis:  End Stage renal failure due to diabetes mellitus type 2    Postoperative Diagnosis:  Same    Procedure:  1. Right Kidney Donation after Brain Death Kidney, Left iliac fossa, with venous reconstruction. A J-J stent was placed.   2. Kidney allograft preparation on Back Table   3. Open appendectomy    4. Whole Pancreas Donation after Brain Death Pancreas Transplant   5. Pancreas allograft preparation on Back Table    Surgeon:  Surgeons and Role:     * Jean-Pierre Gleason MD - Primary     * Oleksandr Warner MD - Resident - Assisting. Dr. ySbil Harvey was a tertiary assistant for the procedure, He participated in the exposure, bowel anastomosis, ureteral anastomosis, and closure.      * Andres Trinh MD - Fellow - Assisting. Dr. Trinh was a secondary assistant for the operation. He participated in the kidney bench and vascular exposure.      * Marquis Marinelli MD - Fellow - Assisting. Dr. Marinelli was the primary assistant for the procedure and participated in all aspects of the case under my supervision. His presence was necessary due to lack of a suitable level surgical resident to assist.     Fellow/Assistant:  As above    Anesthesia:  General    Specimen:  Appendix- permanent    Drains:  Osmani-Zuñiga drain    Urine Output:  150 mls    Estimated Blood Loss:  500mL    Fluids Administered:         Intraoperative Events: None    Complications: None.    Findings:   Donor: 37 yo DBD, KDPI 27%, import. Cr and A1c wnl. Recip 98% cPRA, no DSA, negative FXM. 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 Confirmed SMA back flushed through GDA and vice versa, then ligated GDA and performed standard Y graft reconstruction. Placed head down to right iliac vessels-  internal iliac veins ligated and divided. Bowel hand sewn two layered DJ at ~100cm distal to ligament of treitz  Kidney: three arteries, single vein. Vein reconstruction with caval conduit. Two arteries on common patch superiorly, single artery lower pole implanted separately. Good reperfusion. Bladder thick and small- 7 day velázquez. URETERAL STENT PLACED.       None.    Indication: The patient has Type 2 diabetes with End Stage kidney failure. The patient received an organ offer for a Donation after Brain Death kidney and Donation after Brain Death pancreas. After discussing the risks and benefits of proceeding, the patient agreed to proceed with surgery and provided informed consent.    Final ABO/Crossmatch Verification: After the donor organ arrived to the operating room and prior to anastomosis, I participated in the transplant pre-verification upon organ receipt timeout by visually verifying the donor ID, organ and laterality, donor blood type, recipient unique identifier, recipient blood type, and that the donor and recipient are blood type compatible. The crossmatch was done prospectively; and the T cell crossmatch result was negative and B cell Flow crossmatch result was negative. The patient received Thymoglobulin, Cellcept, and Solumedrol on induction.    Pancreas Donor Organ Information:   Donor UNOS ID: [id number]  Donor ABO: B  Donor Arterial Clamp on: 5/2/2025 10:28 AM  Vessels with organ: Yes    Ischemic time:  Total:  761  Cold: 761  Warm: 28     Pancreas Back Table Details:   Procedure:  Bench preparation of the pancreas allograft for transplantation with arterial reconstruction    Preoperative Diagnosis:  End stage renal failure due to diabetes mellitus type 2    Postoperative Diagnosis:  Same    Surgeon:  Surgeons and Role:     * Jean-Pierre Gleason MD - Primary     * Oleksandr Warner MD - Resident - Assisting     * Andres Trinh MD - Fellow - Assisting     * Marquis Marinelli MD -  "Fellow - Assisting    Faculty Co-Surgeon:  RHIANNON LIAO    Fellow/Assistant:  As above    Anesthesia:  None    Graft Injury:  No    Donor Arrival to Recipient Room:  5/2/2025  7:55 PM    Portal Venous Extension:  No    Donor Iliac Vessel Quality:  Normal     Findings: as above    Pancreas Back Table Preparation: The donor pancreas was received and inspected. It had been previously flushed with UW. We removed the spleen by doubly ligating vessels between the tail of the pancreas and the spleen. The peripancreatic fat was ligated with 3-0 silk ties and removed. The proximal duodenum staple line was inverted and oversewn using running 4-0 Prolene suture. The bile duct and GDA were re-secured. The previously stapled root of the mesentery was oversewn using 4-0 Prolene forward and back. The third and fourth portions of the duodenum were freed away from the pancreas by ligating and dividing the intervening tissue with a series of 3-0 and 4-0 silk ties. Ganglionectomy was performed with 3-0 and 4-0 silk ties.    65 Arterial \"Y graft\" construction was required: the donor iliac artery was dilated, leak checked, and tailored to create an extension \"Y' graft by anastomosing the internal iliac artery to the splenic artery and the external iliac artery to the superior mesenteric artery in an end-to end fashion using running 7-0 Prolene suture.  . The pancreas was transferred to a new bowl with fresh iced cold preservation solution. Faculty was present for key portions of the procedure.    Operative Procedure:   Arterial Anastomosis Start:  5/2/2025 10:41 PM    Recipient Arterial Unclamp:  5/2/2025 11:09 PM    Extra Vessels Used:   Yes- artery    Extra Vessels Banked:  Yes     Kidney Donor Organ Information:    Donor UNOS ID: [id number]  Donor ABO: B  Donor Arterial Clamp on: 5/2/2025 10:28 AM  Vessels with organ: No    Ischemic time:  Total:    Cold:   Warm:      Kidney Back Table Details:    Preoperative " Procedure:  Bench preparation of the kidney allograft for transplantation with venous  reconstruction    Diagnosis:  End stage renal failure due to diabetes mellitus type 2    Postoperative Diagnosis:  Same    Surgeon:  Surgeons and Role:     * Rhiannon Gleason MD - Primary     * Oleksandr Warner MD - Resident - Assisting     * Andres Trinh MD - Fellow - Assisting     * Marquis Marinelli MD - Fellow - Assisting    Faculty Co-Surgeon:  RHIANNON GLEASON    Fellow/Assistant:  feliberto    Anesthesia:  None    Donor Arrival to Recipient Room:  5/2/2025  9:16 PM      Graft Injury: No  Graft Biopsy: no      Organ Received On: Ice  Pump Resistance: n/a   Pump Flow: n/a    Ureteral Anatomy: Single  Arterial Anatomy: Triple  Venous Anatomy: Single      Any Reconstruction:  Yes- caval conduit    Artery:   No- two arteries on common patch, third artery implanted separately    Vein:   Caval conduit     Findings: Normal. As above    Kidney Back Table Preparation: The donor kidney was received and inspected. It had been previously flushed with UW. The graft was prepared on the back table by removing perinephric fat and ligating venous tributaries and lymphatics. The ureter was also cleaned of excess tissue. If required, reconstruction was performed as detailed above. The kidney was stored in iced cold preservation solution until ready for transplantation. Faculty was present for the critical portions of the procedure.    Operative Procedure:   Arterial Anastomosis Start:  0033    Recipient Arterial Unclamp:  0119    Extra Vessels Used:  no    Extra Vessels Banked:  N/a       The patient was brought to the operating room, placed in a supine position, and a time out was performed. Sequential compression devices were placed on both lower extremities and general endotracheal anesthesia was induced. The patient was given IV antibiotics, Thymoglobulin, Cellcept, and Solumedrol, and a Merlos  catheter. A central line and arterial lines were placed by Anesthesia service. The abdomen was then shaved, prepped, and draped in the usual sterile fashion. We performed a lower midline incision, divided the linea alba and opened the peritoneum sharply under direct vision. Retractors were placed.    Pancreas Transplant: We mobilized the right colon and the ureter medially and proceeded to circumferentially dissect the right iliac vessels. The internal iliac vein was ligated and divided. We obtained vascular control, performed a venotomy, and performed an anastomosis between the donor portal vein and the recipient's right Common Iliac. We then obtained proximal and distal control of the right common iliac artery . Arteriotomy and irrigation ensued, and the donor Y graft was anastomosed to the common iliac artery  in an end to side fashion. After both anastomoses were completed, we opened the clamps. The pancreas consistency and reperfusion quality was Pink throughout, the graft duodenum was Pink throughout. There was no pancreatitis. Overall the graft was rated [place] grade A. The exocrine secretions of the pancreas were drained via Enteric w/o [id number] employing  a side to side hand sewn 2-layered. The pancreas placement was Intra-Peritoneal. Faculty was present for key portions of the procedure.    Kidney Transplant: The patient was heparinized. We applied atraumatic vascular clamps and the donor kidney was brought to the operative field. We made a venotomy and the caval conduit was anastomosed to the recipient left External Iliac vein in an end-to-side fashion. An arteriotomy was made and the donor renal artery was anastomosed to the recipient left external iliac artery in an end to side fashion. A separate arteriotomy was made and widened with the hole punch in the distal external iliac artery for the lower pole artery anastomosis., The patient was simultaneously loaded with IV mannitol, Lasix and volume.  The renal artery was protected and the clamps were removed. After several cardiac cycles, we opened the renal artery and the kidney had good reperfusion and was firm and pink .    The transplant ureter was managed by creating a Liche (anterior multistitch) anastomosis with absorbable suture. A stent was placed across the anastomosis. The kidney made good urine prior to implantation.    Hemostasis was obtained, the anastomoses inspected, and the kidney placed in the iliac fossa. After placement, the vessel lay was inspected and found to be acceptable. The kidney position was Intra-peritoneal. The field was irrigated with antibiotic solution. A drain was placed. The retractor was removed and the abdominal wall fascia reapproximated. Subcutaneous tissues were irrigated and hemostasis obtained. The skin was reapproximated with staples and a dry dressing was applied. Faculty was present for key portions of the procedure.    The order of the organ reperfusion was: pancreas then kidney.    The appendix was excised using standard open technique..    All needle, sponge and instrument counts were correct x 2. The patient was awakened, extubated, and transferred to the PACU for post-op monitoring.

## 2025-05-03 NOTE — ANESTHESIA PROCEDURE NOTES
Arterial Line Procedure Note    Pre-Procedure   Staff -        Anesthesiologist:  Marquis Martinez MD       Performed By: anesthesiologist       Location: OR       Pre-Anesthestic Checklist: patient identified, IV checked, risks and benefits discussed, informed consent, monitors and equipment checked, pre-op evaluation and at physician/surgeon's request  Timeout:       Correct Patient: Yes        Correct Procedure: Yes        Correct Site: Yes        Correct Position: Yes   Line Placement:   This line was placed Post Induction  Procedure   Procedure: arterial line       Diagnosis: hemodynamic monitoring       Laterality: left       Insertion Site: radial.  Sterile Prep        Standard elements of sterile barrier followed       Skin prep: Chloraprep  Insertion/Injection        Technique: ultrasound guided and Seldinger Technique        1. Ultrasound was used to evaluate the access site.       2. Artery evaluated via ultrasound for patency/adequacy.       3. Using real-time ultrasound the needle/catheter was observed entering the artery/vein.       Catheter Type/Size: 20 G, 1.75 in/4.5 cm quick cath (integral wire)  Narrative         Secured by: other       Tegaderm dressing used.       Complications: None apparent,        Arterial waveform: Yes        IBP within 10% of NIBP: Yes

## 2025-05-03 NOTE — PROGRESS NOTES
Transplant Surgery  Inpatient Daily Progress Note  2025    Assessment & Plan: Wendy Kapoor is a 54 year old female who presents with a history of DM2, HTN, ESRD on PD since 2023, IgG Lambda MGUS, non-obstructive CAD, and anemia of chronic disease. On 2025 she was notified as an acceptable  donor organ became available and presented for further pre-operative work-up and on 2025 she underwent DBD SPK with stent, and open appendectomy which was well tolerated as performed by Dr. Gleason. Ms. Kapoor was admitted to 7A post-operatively for ongoing post-operative care.     Graft function: POD #1  Kidney: Cr 5.11; admission creatinine 6.11. UOP down-trending post-operatively. Post-op US patent Doppler.   - Transplant Nephrology following    - 2 liter bolus upon arrival to  from PACU    - UOP replacement fluids as needed    - Trend CVP     Pancreas: Amylase 171, lipase 509. Post-op US patient Doppler.    - Monitor blood glucose, insulin drip per protocol   - Trend amylase and lipase    - ASA 81 mg     Immunosuppressed status secondary to medications:   Thymo: 125 mg POD#0   Steroids:  mg POD#0  MMF: 1000 mg BID  Tacro: Goal level 8-10.    Hematology:   Anemia of chronic disease: Trend hemoglobin.   Acute blood loss anemia: Trend, transfuse to meet goals.   Thrombocytopenia: Secondary to induction IS. Monitor.     Cardiorespiratory: Home medications Cardizem 180 mg every day, Hydralazine 50 mg TID, losartan 100 mg every day, Toprol  mg every day at 2 pm  Hypertension: Hold home medications.     GI/Nutrition:   Diet: NPO, NGT  Bowel regimen: Senna, PEG    Fluid/Electrolytes: MIVF: Straight rate.   Metabolic Acidosis: Sodium bicarbonate to IVF. Lactic acid normalized with IVF.   Hypocalcemia: Calcium gluconate x 1.     : Merlos to remain due to new surgical anastomosis x 7 days.    Infectious disease: Afebrile.   Leukocytosis: Secondary to steroids.     Prophylaxis: DVT, fall, GI,  viral (Valcyte), pneumocystis (Bactrim)    Disposition: 7A     YNES/Fellow/Resident Provider:   MIA Green, CNP  Adult Solid Organ Transplant   Contact: Vocera Web Console    I saw and evaluated this patient as part of a shared visit. I spent 40 minutes on review of labs and imaging, exam, care coordination, and counseling.  Medically Ready for Discharge: Anticipated in 5+ Days       Faculty: Dr. Gleason  _________________________________________________________________    Interval History: History from patient and/or EMR  Overnight events: Returned from OR early this AM, no acute events.     ROS:   A 10-point review of systems was negative except as noted above.    Meds:  Current Facility-Administered Medications   Medication Dose Route Frequency Provider Last Rate Last Admin    aspirin (ASA) chewable tablet 81 mg  81 mg Oral or NG Tube Daily Jean-Pierre Gleason MD        mycophenolate (CELLCEPT BRAND) suspension 1,000 mg  1,000 mg Oral or NG Tube BID IS Jean-Pierre Gleason MD        sodium chloride (PF) 0.9% PF flush 10 mL  10 mL Intracatheter Q8H Jean-Pierre Gleason MD        tacrolimus (GENERIC) suspension 2 mg  2 mg Oral BID IS Jean-Pierre Gleason MD           Physical Exam:     Admit Weight: 62.7 kg (138 lb 3.2 oz)    Current vitals:   /65   Pulse 96   Temp 99  F (37.2  C) (Axillary)   Resp 20   Ht 1.524 m (5')   Wt 62.7 kg (138 lb 3.2 oz)   SpO2 100%   BMI 26.99 kg/m      Vital sign ranges:    Temp:  [98.5  F (36.9  C)-99  F (37.2  C)] 99  F (37.2  C)  Pulse:  [91-96] 96  Resp:  [14-21] 20  BP: (102-161)/(54-75) 106/65  MAP:  [21 mmHg-67 mmHg] 59 mmHg  Arterial Line BP: ()/(37-60) 91/47  SpO2:  [98 %-100 %] 100 %    General Appearance: in no apparent distress.   Skin: Warm, perfused  Heart: she appears adequately perfused   Lungs: minimal oxygen requirement   Abdomen: The abdomen is non-distended. JESUS x 1, dressing intact.    : velázquez is present.  Urine is clear .  Extremities: edema: minimal generalized edema , strength adequate  Neurologic: awake, alert, and oriented. Tremor absent..     Data:   CMP  Recent Labs   Lab 05/03/25 0526 05/03/25 0356 05/03/25 0257 05/03/25 0216 05/02/25 1714 05/02/25  1652   NA  --  137 134* 135   < > 140   POTASSIUM  --  3.9 3.6 3.6   < > 3.9   CHLORIDE  --  104  --   --   --  103   CO2  --  15*  --   --   --  25   * 120* 83 106*   < > 105*   BUN  --  38.8*  --   --   --  45.6*   CR  --  5.11*  --   --   --  6.11*   GFRESTIMATED  --  9*  --   --   --  7*   VIJAY  --  8.8  --   --   --  9.7   ICAW  --   --  5.4* 4.2*   < >  --    MAG  --  1.9  --   --   --   --    PHOS  --  3.1  --   --   --   --    AMYLASE  --  171*  --   --   --  50   LIPASE  --  509*  --   --   --  106*   ALBUMIN  --   --   --   --   --  3.7   BILITOTAL  --   --   --   --   --  0.2   ALKPHOS  --   --   --   --   --  84   AST  --   --   --   --   --  29   ALT  --   --   --   --   --  18    < > = values in this interval not displayed.     CBC  Recent Labs   Lab 05/03/25 0356 05/03/25 0257 05/02/25 2050 05/02/25  1652   HGB 9.5* 9.0*   < > 10.4*   WBC 18.6*  --   --  7.1     --   --  196   A1C  --   --   --  6.0*    < > = values in this interval not displayed.

## 2025-05-03 NOTE — CONSULTS
[facility]  Transplant Nephrology Consult Note  Date of Admission:  5/2/2025  Today's Date: 05/03/2025  Requesting physician: Jean-Pierre Gleason*    Reason for Consult:  Kidney/pancreas transplant    Recommendations:   - Give IV sodium Bicarbonate 100mEq bicarb @100ml/hr x1L   -Calcium 1gram  -Continue to monitor Hgb/K Q4hrs     Assessment & Plan   # DDKT (SPK): Trend down     - Baseline Creatinine: ~ TBD   - Proteinuria: Not checked post transplant   - DSA Hx: No DSA at time of transplant   - Last cPRA: 98% 1/2025   - BK Viremia: Not checked post transplant   - Kidney Tx Biopsy Hx: No biopsy history.        # Pancreas Tx (SPK):    - Pancreatic Exocrine Drainage: Enteric drained     - Blood glucose: Euglycemia      On insulin: No   - HbA1c: Stable      Latest HbA1c: 6%   - Pancreatic enzymes: Trend postoperatively   - DSA Hx: No DSA at time of transplant   - Pancreas Tx Biopsy Hx: No biopsy history    # Immunosuppression: Tacrolimus immediate release (goal 8-10), Mycophenolate mofetil (dose 1000 mg every 12 hours), and Methylprednisolone (dose taper)   - Induction with Recent Transplant:  High Intensity Protocol   - Continue with intensive monitoring of immunosuppression for efficacy and toxicity.   - Historical Changes in Immunosuppression: None   - Changes: Not at this time    # Infection Prevention:   Last CD4 Level: Not checked  - PJP: Sulfa/TMP (Bactrim)  - CMV: Valganciclovir (Valcyte)  - Fungal: Micafungin (Mycamine)      - CMV IgG Ab High Risk Discordance (D+/R-) at time of transplant: No  Present CMV Serostatus: Positive  - EBV IgG Ab High Risk Discordance (D+/R-) at time of transplant: No  Present EBV Serostatus: Positive    # Hypertension: Hypotensive;  Goal BP: > 100, but < 130 systolic   - Changes: Not at this time    # Diabetes: Controlled (HbA1c <7%) Last HbA1c: 6%  -continue to trend postoperatively     # Anemia in Chronic Renal Disease: Hgb: Trend  down      BRIANNA: No   - Iron studies: Not checked recently, low iron sat with last check 8/2023    # Mineral Bone Disorder:    - Secondary renal hyperparathyroidism; PTH level: Unknown at this time, but checked with dialysis        On treatment: None  - Vitamin D; level: Not checked recently, but was low last check        On supplement: Yes, cholecalciferol PTA  - Calcium; level: Low        On supplement: No  - Phosphorus; level: Normal        On supplement: No    # Electrolytes:   - Potassium; level: Low normal        On supplement: No  - Magnesium; level: Low        On supplement: No  - Bicarbonate; level: Low        On supplement: No  - Sodium; level: Normal    # Other Significant PMH:  #CAD: 4/2024 coronary angiogram showed 30# stenosis to Mid LAD and Dist LAD lesion is 30% stenosed.     # Sjogren's: Follows with Rheum     # IgG Lambda MGUS: with stable monoclonal peak ~  0.3 and K/L ratio ~ 0.64  in July 2024.      # Positive Lupus Anticoagulant: positive, but cardiolipins not elevated, neg for Factor V and Factor 2 absent     # Transplant History:  Etiology of Kidney Failure: Diabetes mellitus type 2  Tx: DDKT (SPK)  Transplant: 5/2/2025 (Kidney / Pancreas)  Significant transplant-related complications: None    Recommendations were communicated to the primary team verbally.    Seen and discussed with Dr. Niyah Owen NP  Transplant Nephrology  Contact information via Vocera Web Console or via [facility] Paging/Directory      History of Present Illness  Wendy Kapoor is a 54 year old female with PMH significant for CAD, Sjogrens, Type II DM, HTN who was previously on PD. She is now s/p SPK 5/2/25 with ureteral stent placement. She is being seen postoperatively and reports generalized fatigue/sleepiness. Denies nausea/vomiting. NGT in place. Denies chest pain/sob.  She reports pain adequately controlled.    Review of Systems   The 10 point Review of Systems is negative other than noted in the HPI or  here.      MEDICATIONS:  Current Facility-Administered Medications   Medication Dose Route Frequency Provider Last Rate Last Admin    aspirin (ASA) chewable tablet 81 mg  81 mg Oral or NG Tube Daily Jean-Pierre Gleason MD        mycophenolate (CELLCEPT BRAND) suspension 1,000 mg  1,000 mg Oral or NG Tube BID IS Jean-Pierre Gleason MD        sodium chloride (PF) 0.9% PF flush 10 mL  10 mL Intracatheter Q8H Jean-Pierre Gleason MD        tacrolimus (GENERIC) suspension 2 mg  2 mg Oral BID IS Jean-Pierre Gleason MD         Current Facility-Administered Medications   Medication Dose Route Frequency Provider Last Rate Last Admin    dextrose 10% infusion   Intravenous Continuous PRN Jean-Pierre Gleason MD        dextrose 5% in lactated ringers 1,000 mL infusion   Intravenous Continuous Jean-Pierre Gleason  mL/hr at 25 0408 New Bag at 25 0408    furosemide (LASIX) 100 mg in sodium chloride 0.9 % 100 mL infusion  10 mg/hr Intravenous Continuous Jean-Pierre Gleason MD 10 mL/hr at 25 0632 10 mg/hr at 25 0632    insulin regular (MYXREDLIN) 1 unit/mL infusion  0-24 Units/hr Intravenous Continuous Jean-Pierre Gleason MD        lactated ringers infusion   Intravenous Continuous Marquis Martinez MD        sodium chloride 0.45 % 1,000 mL infusion   Intravenous Continuous Jean-Pierre Gleason MD        sodium chloride 0.9 % 1,000 mL infusion   Intravenous Continuous Jean-Pierre Gleason MD           Physical Exam   Temp  Av.8  F (37.1  C)  Min: 98.5  F (36.9  C)  Max: 99  F (37.2  C)  Arterial Line BP  Min: 82/57  Max: 101/55  Arterial Line MAP (mmHg)  Av.8 mmHg  Min: 21 mmHg  Max: 67 mmHg      Pulse  Av.6  Min: 91  Max: 102 Resp  Av.6  Min: 14  Max: 23  SpO2  Av.6 %  Min: 98 %  Max: 100 %    CVP (mmHg): 11 mmHgBP 100/65   Pulse 102   Temp 99   F (37.2  C) (Axillary)   Resp 23   Ht 1.524 m (5')   Wt 62.7 kg (138 lb 3.2 oz)   SpO2 100%   BMI 26.99 kg/m     Date 05/03/25 0700 - 05/04/25 0659   Shift 9747-0078 8240-7502 3496-5420 24 Hour Total   INTAKE   Shift Total(mL/kg)       OUTPUT   Urine 5   5   Shift Total(mL/kg) 5(0.08)   5(0.08)   Weight (kg) 62.69 62.69 62.69 62.69      Admit Weight: 62.7 kg (138 lb 3.2 oz)     GENERAL APPEARANCE: alert and no distress  HENT: mouth without ulcers or lesions  RESP: lungs clear to auscultation - no rales, rhonchi or wheezes  CV: regular rhythm, normal rate, no rub, no murmur  EDEMA: no LE edema bilaterally  ABDOMEN: soft, appropriately tender to palpation. Incision with surgical dressing with s/s strikethrough. JESUS x 1 with s/s output.   PD cath in place.   : velázquez with clear yellow output.   MS: extremities normal - no gross deformities noted, no evidence of inflammation in joints, no muscle tenderness.   SKIN: no rash  NEURO: sleepy but easy to arouse, Oriented x3  TX KIDNEY: normal    Data   All labs reviewed by me.  CMP  Recent Labs   Lab 05/03/25  0651 05/03/25  0526 05/03/25  0356 05/03/25  0257 05/03/25  0216 05/03/25  0107 05/02/25  1714 05/02/25  1652   NA  --   --  137 134* 135 129*   < > 140   POTASSIUM  --   --  3.9 3.6 3.6 4.0   < > 3.9   CHLORIDE  --   --  104  --   --   --   --  103   CO2  --   --  15*  --   --   --   --  25   ANIONGAP  --   --  18*  --   --   --   --  12   * 147* 120* 83 106* 168*   < > 105*   BUN  --   --  38.8*  --   --   --   --  45.6*   CR  --   --  5.11*  --   --   --   --  6.11*   GFRESTIMATED  --   --  9*  --   --   --   --  7*   VIJAY  --   --  8.8  --   --   --   --  9.7   MAG  --   --  1.9  --   --   --   --   --    PHOS  --   --  3.1  --   --   --   --   --    PROTTOTAL  --   --   --   --   --   --   --  7.9   ALBUMIN  --   --   --   --   --   --   --  3.7   BILITOTAL  --   --   --   --   --   --   --  0.2   ALKPHOS  --   --   --   --   --   --   --  84   AST  --    --   --   --   --   --   --  29   ALT  --   --   --   --   --   --   --  18    < > = values in this interval not displayed.     CBC  Recent Labs   Lab 05/03/25 0356 05/03/25 0257 05/03/25 0216 05/03/25 0107 05/02/25  2050 05/02/25  1652   HGB 9.5* 9.0* 8.3* 8.7*   < > 10.4*   WBC 18.6*  --   --   --   --  7.1   RBC 3.14*  --   --   --   --  3.50*   HCT 30.2*  --   --   --   --  32.1*   MCV 96  --   --   --   --  92   MCH 30.3  --   --   --   --  29.7   MCHC 31.5  --   --   --   --  32.4   RDW 15.2*  --   --   --   --  14.6     --   --   --   --  196    < > = values in this interval not displayed.     INR  Recent Labs   Lab 05/03/25 0356 05/02/25  1652   INR 1.26* 0.95   PTT 39* 30     ABG  Recent Labs   Lab 05/03/25 0257 05/03/25 0216 05/03/25 0107 05/03/25  0001   PH 7.29* 7.29* 7.25* 7.30*   PCO2 35 33* 34* 36   PO2 135* 137* 149* 125*   HCO3 17* 16* 15* 18*   O2PER 52.0 52.0 53.0 52.0      Urine Studies  Recent Labs   Lab Test 05/02/25  1635 08/02/23  1350   COLOR Light Yellow Straw   APPEARANCE Clear Clear   URINEGLC 30* 300*   URINEBILI Negative Negative   URINEKETONE Negative Negative   SG 1.014 1.012   UBLD Negative Negative   URINEPH 6.5 7.0   PROTEIN 300* 300*   NITRITE Negative Negative   LEUKEST Small* Negative   RBCU 3* <1   WBCU 16* 1     No lab results found.  PTH  No lab results found.  Iron Studies  No lab results found.    IMAGING:  All imaging studies reviewed by me.    Past Medical History    I have reviewed this patient's medical history and updated it with pertinent information if needed.   Past Medical History:   Diagnosis Date    Chronic kidney disease     Diabetes (H)     DM Type 2    History of blood transfusion 2019    Hypertension     MGUS (monoclonal gammopathy of unknown significance)     Pericardial effusion     Sjogren's syndrome     Type 2 diabetes mellitus with diabetic nephropathy (H)        Past Surgical History   I have reviewed this patient's surgical history and  updated it with pertinent information if needed.  Past Surgical History:   Procedure Laterality Date    BIOPSY       SECTION      CV CORONARY ANGIOGRAM N/A 2024    Procedure: Coronary Angiogram;  Surgeon: Cody Mckenzie MD;  Location: [facility] CARDIAC CATH LAB    CV PCI N/A 2024    Procedure: Percutaneous Coronary Intervention;  Surgeon: Cody Mckenzie MD;  Location: [facility] CARDIAC CATH LAB       Family History   I have reviewed this patient's family history and updated it with pertinent information if needed.   Family History   Problem Relation Age of Onset    Kidney Disease No family hx of        Social History   I have reviewed this patient's social history and updated it with pertinent information if needed. Wendy Kapoor  reports that she has never smoked. She has never used smokeless tobacco. She reports that she does not drink alcohol and does not use drugs.    Prior to Admission Medications   Medications Prior to Admission   Medication Sig Dispense Refill Last Dose/Taking    acetaminophen (TYLENOL) 500 MG tablet Take by mouth.       allopurinol (ZYLOPRIM) 100 MG tablet Take 1 tablet by mouth.       aspirin 81 MG EC tablet Take 1 tablet (81 mg) by mouth daily       BD PEN NEEDLE HUBER 2ND GEN 32G X 4 MM miscellaneous USE 1 NEEDLE ONCE DAILY.       blood glucose (FREESTYLE TEST STRIPS) test strip by Misc.(Non-Drug; Combo Route) route three times a day.       cholecalciferol 50 MCG ( UT) tablet Take 50 mcg by mouth       Continuous Glucose Sensor (FREESTYLE ISAAC 3 PLUS SENSOR) MISC USE TO MONITOR GLUCOSE PER  INSTRUCTIONS. APPLY NEW SENSOR EVERY 15 DAYS.       DARBEPOETIN DALTON IJ Inject 25 mcg Subcutaneous every 28 days (Patient not taking: Reported on 3/14/2025)       diltiazem ER COATED BEADS (CARDIZEM CD/CARTIA XT) 180 MG 24 hr capsule TAKE 1 CAPSULE (180 MG) BY MOUTH ONCE DAILY.       GAVILYTE-G 236 g suspension TAKE 4,000 ML BY MOUTH AS DIRECTED        gentamicin (GARAMYCIN) 0.1 % external cream APPLY TO EXIT SITE AS DIRECTED       glimepiride (AMARYL) 4 MG tablet TAKE 2 TABLETS (8 MG) BY MOUTH EVERY MORNING BEFORE BREAKFAST.       glipiZIDE (GLUCOTROL) 5 MG tablet TAKE 1 TABLET (5MG) BY MOUTH IN THE MORNING AND 1 TABLET (5MG) IN THE EVENING.       hydrALAZINE (APRESOLINE) 25 MG tablet Take 50 mg by mouth 3 times daily       Insulin Glargine w/ Trans Port 100 UNIT/ML SOPN Inject 14 Units Subcutaneous       insulin lispro (HUMALOG KWIKPEN) 100 UNIT/ML (1 unit dial) KWIKPEN Inject subcutaneously.       losartan (COZAAR) 50 MG tablet Take 100 mg by mouth every morning.       LYCOPENE PO Take 1 tablet by mouth daily       Methoxy PEG-Epoetin Beta (MIRCERA IJ) Inject 200 mcg subcutaneously.       metoprolol succinate ER (TOPROL XL) 100 MG 24 hr tablet Take 2 tablets (200 mg) by mouth daily at 2 pm       pioglitazone (ACTOS) 30 MG tablet Take 30 mg by mouth       potassium chloride ER (K-TAB/KLOR-CON) 10 MEQ CR tablet Take 10 mEq by mouth daily. (Patient not taking: Reported on 3/14/2025)       predniSONE (DELTASONE) 20 MG tablet        sevelamer carbonate (RENVELA) 800 MG tablet TAKE 2 TABLETS BY MOUTH THREE TIMES DAILY WITH MEALS AND 1 TABLET WITH A SNACK       sodium bicarbonate 650 MG tablet 1,300 mg (Patient not taking: Reported on 3/14/2025)       torsemide (DEMADEX) 100 MG tablet Take 1 tablet (100 mg) by mouth every morning       torsemide (DEMADEX) 20 MG tablet Take 1 tablet by mouth daily at 2 pm. (Patient not taking: Reported on 3/14/2025)       traMADol (ULTRAM) 50 MG tablet TAKE 1-2 TABLETS ( MG) BY MOUTH EVERY 6 (SIX) HOURS AS NEEDED FOR PAIN. (Patient not taking: Reported on 3/14/2025)       Tuberculin PPD (TUBERSOL ID) Inject 0.1 mLs into the skin. (Patient not taking: Reported on 3/14/2025)

## 2025-05-03 NOTE — ANESTHESIA CARE TRANSFER NOTE
Patient: Wendy Kapoor    Procedure: Procedure(s):  Transplant pancreas, kidney  donor, appendectomy, with ureteral stent placement.  Bench whole pancreas and right kidney       Diagnosis: End stage renal disease (H) [N18.6]  Diabetes (H) [E11.9]  Diagnosis Additional Information: No value filed.    Anesthesia Type:   General     Note:    Oropharynx: oropharynx clear of all foreign objects and spontaneously breathing  Level of Consciousness: awake and drowsy  Oxygen Supplementation: face mask  Level of Supplemental Oxygen (L/min / FiO2): 10  Independent Airway: airway patency satisfactory and stable  Dentition: dentition unchanged  Vital Signs Stable: post-procedure vital signs reviewed and stable  Report to RN Given: handoff report given  Patient transferred to: PACU    Handoff Report: Identifed the Patient, Identified the Reponsible Provider, Reviewed the pertinent medical history, Discussed the surgical course and Reviewed Intra-OP anesthesia mangement and issues during anesthesia      Vitals:  Vitals Value Taken Time   /58 25 0415   Temp 37.2  C (99  F) 25 0345   Pulse 97 25 0415   Resp 24 25 0415   SpO2 93 % 25 0415   Vitals shown include unfiled device data.    Electronically Signed By: Ruby Amador MD  May 3, 2025  4:16 AM

## 2025-05-03 NOTE — PHARMACY-ADMISSION MEDICATION HISTORY
Pharmacist Admission Medication History    Admission medication history is complete. The information provided in this note is only as accurate as the sources available at the time of the update.    Information Source(s): Patient's pharmacy and CareEverywhere/SureScripts via phone    Pertinent Information: Patient was not able to respond and family did not have list.  Information was gathered by reviewing list in chart, Surescripts fill history, and phone call with the [facility] in [place].    Changes made to PTA medication list:  Added: Atorvastatin 10 mg daily; Dialyvite 1 tablet daily; ezetimibe 10 mg daily; ferrous sulfate 325 mg daily  Deleted: In addition to what was previously documented, the patient is no longer using the following: gentamicin 0.1% internal cream; glimepride 4 mg daily; and torsemide either 20 mg or 100 mg daily  Changed: The following medication doses were adjusted: cholecalciferol changed to 4000 units (100 mcg) daily; diltiazem XL changed to 240 mg daily; metoprolol changed to 100 mg daily; prednisone changed to 10 mg daily.    Allergies reviewed with patient and updates made in EHR: unable to assess    Prior to Admission medications    Medication Sig Last Dose Taking? Auth Provider Long Term End Date   atorvastatin (LIPITOR) 10 MG tablet Take 10 mg by mouth daily.  Yes Unknown, Entered By History Yes    B Complex-C-Folic Acid (DIALYVITE) TABS Take 1 tablet by mouth daily.  Yes Unknown, Entered By History     ezetimibe (ZETIA) 10 MG tablet Take 10 mg by mouth daily.  Yes Unknown, Entered By History Yes    ferrous sulfate (FE TABS) 325 (65 Fe) MG EC tablet Take 325 mg by mouth daily.  Yes Unknown, Entered By History     acetaminophen (TYLENOL) 500 MG tablet Take by mouth.   Reported, Patient     allopurinol (ZYLOPRIM) 100 MG tablet Take 1 tablet by mouth.   Reported, Patient     aspirin 81 MG EC tablet Take 1 tablet (81 mg) by mouth daily   Anette Powell, MIA CNP     BD PEN  NEEDLE HUBER 2ND GEN 32G X 4 MM miscellaneous USE 1 NEEDLE ONCE DAILY.   Reported, Patient     blood glucose (FREESTYLE TEST STRIPS) test strip by Misc.(Non-Drug; Combo Route) route three times a day.   Reported, Patient     cholecalciferol 50 MCG (2000 UT) tablet Take 50 mcg by mouth  Patient taking differently: Take 100 mcg by mouth daily.   Reported, Patient     Continuous Glucose Sensor (FREESTYLE ISAAC 3 PLUS SENSOR) MISC USE TO MONITOR GLUCOSE PER  INSTRUCTIONS. APPLY NEW SENSOR EVERY 15 DAYS.   Reported, Patient     DARBEPOETIN DALTON IJ Inject 25 mcg Subcutaneous every 28 days  Patient not taking: Reported on 3/14/2025   Reported, Patient     diltiazem ER COATED BEADS (CARDIZEM CD/CARTIA XT) 180 MG 24 hr capsule TAKE 1 CAPSULE (180 MG) BY MOUTH ONCE DAILY.  Patient taking differently: Take 240 mg by mouth daily.   Reported, Patient Yes    GAVILYTE-G 236 g suspension TAKE 4,000 ML BY MOUTH AS DIRECTED  Patient not taking: Reported on 5/3/2025 Not Taking  Reported, Patient     gentamicin (GARAMYCIN) 0.1 % external cream APPLY TO EXIT SITE AS DIRECTED  Patient not taking: Reported on 5/3/2025 Not Taking  Reported, Patient     glimepiride (AMARYL) 4 MG tablet TAKE 2 TABLETS (8 MG) BY MOUTH EVERY MORNING BEFORE BREAKFAST.  Patient not taking: Reported on 5/3/2025 Not Taking  Reported, Patient Yes    glipiZIDE (GLUCOTROL) 5 MG tablet TAKE 1 TABLET (5MG) BY MOUTH IN THE MORNING AND 1 TABLET (5MG) IN THE EVENING.   Reported, Patient Yes    hydrALAZINE (APRESOLINE) 25 MG tablet Take 50 mg by mouth 3 times daily   Reported, Patient Yes    Insulin Glargine w/ Trans Port 100 UNIT/ML SOPN Inject 14 Units Subcutaneous   Reported, Patient Yes    insulin lispro (HUMALOG KWIKPEN) 100 UNIT/ML (1 unit dial) KWIKPEN Inject subcutaneously.   Reported, Patient Yes    losartan (COZAAR) 50 MG tablet Take 100 mg by mouth every morning.   Reported, Patient Yes    LYCOPENE PO Take 1 tablet by mouth daily   Reported, Patient      Methoxy PEG-Epoetin Beta (MIRCERA IJ) Inject 200 mcg subcutaneously.   Reported, Patient     metoprolol succinate ER (TOPROL XL) 100 MG 24 hr tablet Take 2 tablets (200 mg) by mouth daily at 2 pm  Patient taking differently: Take 100 mg by mouth daily at 2 pm.   Anette Powell, MIA CNP Yes    pioglitazone (ACTOS) 30 MG tablet Take 30 mg by mouth   Reported, Patient Yes    potassium chloride ER (K-TAB/KLOR-CON) 10 MEQ CR tablet Take 10 mEq by mouth daily.  Patient not taking: Reported on 3/14/2025   Reported, Patient     predniSONE (DELTASONE) 20 MG tablet    Reported, Patient     sevelamer carbonate (RENVELA) 800 MG tablet TAKE 2 TABLETS BY MOUTH THREE TIMES DAILY WITH MEALS AND 1 TABLET WITH A SNACK   Reported, Patient     sodium bicarbonate 650 MG tablet 1,300 mg  Patient not taking: Reported on 3/14/2025   Reported, Patient     torsemide (DEMADEX) 100 MG tablet Take 1 tablet (100 mg) by mouth every morning  Patient not taking: Reported on 5/3/2025 Not Taking  Anette Powell, MIA CNP Yes    torsemide (DEMADEX) 20 MG tablet Take 1 tablet by mouth daily at 2 pm.  Patient not taking: Reported on 3/14/2025   Reported, Patient Yes    traMADol (ULTRAM) 50 MG tablet TAKE 1-2 TABLETS ( MG) BY MOUTH EVERY 6 (SIX) HOURS AS NEEDED FOR PAIN.  Patient not taking: Reported on 3/14/2025   Reported, Patient     Tuberculin PPD (TUBERSOL ID) Inject 0.1 mLs into the skin.  Patient not taking: Reported on 3/14/2025   Reported, Patient              Medication History Completed By: Tamera Hoyos RP 5/3/2025 2:55 PM

## 2025-05-03 NOTE — OR NURSING
Patient's urine output has trended down during PACU stay, 225mL at 0400, 75mL at 0500, now 20mL at 0600. Merlos catheter irrigated with sterile NS, flushed easily, no clots or obstructions. Kidney transplant fellow paged twice and Dr. Gleason once. Waiting for response. Hleen Alfaro RN on 5/3/2025 at 6:13 AM

## 2025-05-03 NOTE — ANESTHESIA PROCEDURE NOTES
Central Line/PA Catheter Placement    Pre-Procedure   Staff -        Anesthesiologist:  Marquis Martinez MD       Resident/Fellow: Ruby Amador MD       Performed By: resident and with residents       Procedure performed by resident/fellow/CRNA in presence of a teaching physician.         Location: OR       Pre-Anesthestic Checklist: patient identified, IV checked, site marked, risks and benefits discussed, informed consent, monitors and equipment checked, pre-op evaluation and at physician/surgeon's request  Timeout:       Correct Patient: Yes        Correct Procedure: Yes        Correct Site: Yes        Correct Position: Yes        Correct Laterality: Yes   Line Placement:   This line was placed Post Induction    Procedure   Procedure: central line       Diagnosis: kidney transplant       Laterality: right       Insertion Site: internal jugular.       Patient Position: Trendelenburg and supine  Sterile Prep        All elements of maximal sterile barrier technique followed       Patient Prep/Sterile Barriers: draped, hand hygiene, gloves , hat , mask , draped, gown, sterile gel and probe cover       Skin prep: Chloraprep  Insertion/Injection        Technique: ultrasound guided and Seldinger Technique        1. Ultrasound was used to evaluate the access site.       2. Vein evaluated via ultrasound for patency/adequacy.       3. Using real-time ultrasound the needle/catheter was observed entering the artery/vein.       Type: CVC       Catheter Size: 7 Fr       Catheter Length: 20       Number of Lumens: triple lumen  Narrative         Secured by: suture       Tegaderm and Biopatch dressing used.       Complications: None apparent,        blood aspirated from all lumens,        All lumens flushed: Yes       Verification method: Placement to be verified post-op

## 2025-05-03 NOTE — TELEPHONE ENCOUNTER
"Organ Offer Encounter Information    Organ Offer Information  Organ offer date & time: 4/30/2025  9:34 PM  Coordinator/Fellow/Attending name: hCanelle Bravo RN   Organ(s):  Organ UNOS ID Match Run ID Comment Organ Laterality   Kidney [id number]    Pancreas [id number] TXGC         Recent infections?: No      New medications?: Yes (Comment: iron pill) Recent pregnancy?: No     Angicoagulation medications?: Yes (Comment: 81 mg aspirin) Recent vaccinations?: Yes (Comment: Hep B vaccine 3/2025)     Recent blood transfusions?: No Recent hospitalizations?: No   Has your insurance changed in the last 6-12 months?: Neg    Patient last dialyzed: 4/30/2025  2:00 AM  Dialysis type: Peritoneal  Discussed organ offer with: Patient  Patient/Caregiver name: Wendy & daughter Frida  Discussed risk category with Patient/Other: N/A  Patient/Other asked to speak to a surgeon?: No  Discussed program-specific outcomes: Did not have questions regarding SRTR  Right to decline organ offer without penalty, Patient/Other: Aware of option to decline without penalty  Organ offer decision status Patient/Other: Accepted Offer  Organ disposition: Transplanted  Additional Comments: 4/30/2025 9:36 PM  KP: Import, Primary   MD: Rosibel  OPO Contact: Allyson ([phone])  VXM Results: Remote DPB1*02:01, moderate risk  XM Plan (FXM must be done with serum no older than 10 days from transplant): Donor blood requested, recipient to come in for FXM 5/1 AM   IF SOLITARY PANC: Local or Import Donor: NA  (For import cases, utilize the \"TRANSPORTSETUP\" smart phrase to arrange transportation)  Plan (Admission, NPO, Donor OR): Donor OR TBD, donor blood requested- ETA TBD. Called Wendy, discussed offer with her and daughter Frida. Answered all questions. Plan to come to [facility] for FXM and COVID 5/1 @ 0900. Wendy will hold her 81 mg asp tomorrow just in case. No need for NPO at this time.  - - -   COVID Screening  In the past month, have " "you:  Or anyone close to you had a positive COVID test or suspected to have COVID:  no  Had any COVID symptoms (Fever, Cough, Short of Breath, Loss of Taste/Smell, Rash): no     Immunology: Edilia9, Janice  K[facility]NEY [facility] ([phone]): NA, MV  Chanelle Bravo RN  Donor     5/1/2025 1:25 AM:   Per SHANKAR Valadez, donor blood ETA 5/1 @ 1402. Immunology paged with updated information.  Chanelle Bravo RN  Transplant Coordinator    5/1/2025 6:25 AM:   Spoke to SHANKAR Valadez, they are still waiting for hospital availability to book donor OR.   Chanelle Bravo RN  Transplant Coordinator  _ _ _  5/1/2025 15:45 PM;  Received incoming call from [facility] Coordinator advising donor OR is set at  5/2/25 @ 0800. [employer] notified to set up commercial fight for KP. KP will be delivered on a \"hand carry\" commercial flight.  [employer] Trip # [id number]. [employer] flight [id number]. Depart [facility] @ 1420CST. Land at [facility] @ 1740CST. Arrive at [place] @ 1740. Dr Victoria & Dr Gleason notified.   Damien Fontaine RN  On-Call Transplant Coordinator  _ _ _   5/1/2025 8:01 PM;  Received incoming message from Immunology [place] advising FXM if negative. Dr Gleason  & Dr Victoria notified.   Damien Fontaine RN  On-Call Transplant Coordinator  _ _ _    May 2, 2025 6:59 AM  Called patient to introduce myself & provide contact information.  Admissions: 0705 - [place], ETA 1000  Unit: 0742 - [place] [facility]A, will work on getting a bed  Update Provider Entering Orders (XM Plan & COVID Testing): Kathia Neff NP notified, no need for XM and COVID (done yesterday)  - - -  Donor OR Time: 5/2 at 0800  Procuring MD: Dr. Christie  Contact in the OR: Estephania [phone]  Organs Being Procured: HR, JONATAN, YASEMIN, PA, KI  Expected Delays: Potential 2hr XC delay from HR team (unsure until they get into OR)  Flush Solution:  UW  Biopsy: None  Pump: None  Special Requests (Special blood tubes, nodes, waivers-XM and/or anatomical):  None  MD for Visualization: " Rosibel  Transportation Details: LS/Erin arranged  Anita Bray RN  Transplant Coordinator    9:36 AM  XC delay of 2-3 hours, estimated 1814-4834 XC. Due to XC delay, commercial options may be limited. [facility]   Halley Sheth RN    10:54 AM  Quote for commercial plane option with plane in place in [place]: 33,000 plus additional fees. Cancellation fee 2400.   Halley Sheth RN    May 2, 2025 12:20 PM  Updated patient that we are still waiting to see if pancreas will be accepted.  Anita Bray RN   Transplant Coordinator    May 2, 2025 1:36 PM  LS notified to book/activate charter flight, to be split with OPO. Cost 33,0000. Flight time 2 hrs 55 mn. Earliest wheels up 1430. Estimated ETA 1750  Halley Sheth RN    May 2, 2025 1:57 PM  RKI and PA accepted by Dr. Gleason.  Diana at [facility] agreed to splitting charter.  Book OR: 1436 - [place], booked for 1730  Vessel Storage Confirmation (PA/KP/LI): Ok to bank, confirmed with Rosie  Blood Bank: [address] [place] [address] notified  TransNet/ABO Verification: 1344, labels printed RKI/PA(SK)  Add Organ: 1339, added RKI and PA (SK)  Anita Bray RN   Transplant Coordinator    Transportation:   Pickup [facility]  Drop off [facility]  Plan in place at 1430, ready for wheels up  Latest wheels up 1900  Donor [place] Ground: 30 mn  Flight time 2 hrs 55 mn  Local Ground: 30 mn  D2D 3hrs 55 mn  Halley Sheth RN           Attestation I have discussed all of the above with the Patient/Legal Guardian/Caregiver regarding this organ offer.: Yes  Coordinator/Fellow/Attending name: Chanelle Bravo RN

## 2025-05-03 NOTE — PLAN OF CARE
Vitals: weaned off oxygen, room air, sating good. BP soft, treated with 1 L NS bolus + calcium gluconate. Hourly VS.   /56 (BP Location: Left arm)   Pulse 90   Temp 98.4  F (36.9  C) (Axillary)   Resp 15   Ht 1.524 m (5')   Wt 62.7 kg (138 lb 3.2 oz)   SpO2 97%   BMI 26.99 kg/m      CVP : 6-8.  Tele : normal sinus.   Neuro : sleepy. Easily arouses with voice. Oriented x 4.   Blood glucose: q1-2 hrs: BG 90s-100s.   Pain/nausea: denies.  Diet: npo. Ice.   Lines: central line infusing mivf + tko ns (abx) + cvp. R PIV infusing heparin 200 unit(s)/hr.   : velázquez OP 40 to 70 ml per hr.   GI: no bm, no gas  Drains: R JESUS 175 ml. Serosang. NG LIS  ml yellow, pink, marked.   Skin: OP dressing drainage marked, unchanged. Edema : face, feet, hands.   Mobility: noob yet.   Labs : Hgb 6.9, gave 1 unit., K 4.7. recheck pushed back 2330 as blood is running.   Education : med and lab book updated.

## 2025-05-03 NOTE — ANESTHESIA POSTPROCEDURE EVALUATION
Patient: Wendy Kapoor    Procedure: Procedure(s):  Transplant pancreas, kidney  donor, appendectomy, with ureteral stent placement.  Bench whole pancreas and right kidney       Anesthesia Type:  General    Note:  Disposition: Inpatient   Postop Pain Control: Uneventful            Sign Out: Well controlled pain   PONV: No   Neuro/Psych: Uneventful            Sign Out: Acceptable/Baseline neuro status   Airway/Respiratory: Uneventful            Sign Out: Acceptable/Baseline resp. status   CV/Hemodynamics: Uneventful            Sign Out: Acceptable CV status; No obvious hypovolemia; No obvious fluid overload   Other NRE: NONE   DID A NON-ROUTINE EVENT OCCUR? No           Last vitals:  Vitals Value Taken Time   /65 25 0750   Temp 37.2  C (99  F) 25 0345   Pulse 103 25 0750   Resp 22 25 0750   SpO2 100 % 25 0750   Vitals shown include unfiled device data.    Electronically Signed By: Wilmer Gates MD  May 3, 2025  7:51 AM

## 2025-05-04 LAB
AMYLASE SERPL-CCNC: 119 U/L (ref 28–100)
ANION GAP SERPL CALCULATED.3IONS-SCNC: 13 MMOL/L (ref 7–15)
BASOPHILS # BLD AUTO: 0 10E3/UL (ref 0–0.2)
BASOPHILS NFR BLD AUTO: 0 %
BUN SERPL-MCNC: 48.1 MG/DL (ref 6–20)
CALCIUM SERPL-MCNC: 7.8 MG/DL (ref 8.8–10.4)
CHLORIDE SERPL-SCNC: 105 MMOL/L (ref 98–107)
CREAT SERPL-MCNC: 5.51 MG/DL (ref 0.51–0.95)
EGFRCR SERPLBLD CKD-EPI 2021: 8 ML/MIN/1.73M2
EOSINOPHIL # BLD AUTO: 0 10E3/UL (ref 0–0.7)
EOSINOPHIL NFR BLD AUTO: 0 %
ERYTHROCYTE [DISTWIDTH] IN BLOOD BY AUTOMATED COUNT: 16 % (ref 10–15)
GLUCOSE BLDC GLUCOMTR-MCNC: 103 MG/DL (ref 70–99)
GLUCOSE BLDC GLUCOMTR-MCNC: 106 MG/DL (ref 70–99)
GLUCOSE BLDC GLUCOMTR-MCNC: 107 MG/DL (ref 70–99)
GLUCOSE BLDC GLUCOMTR-MCNC: 109 MG/DL (ref 70–99)
GLUCOSE BLDC GLUCOMTR-MCNC: 110 MG/DL (ref 70–99)
GLUCOSE BLDC GLUCOMTR-MCNC: 111 MG/DL (ref 70–99)
GLUCOSE BLDC GLUCOMTR-MCNC: 113 MG/DL (ref 70–99)
GLUCOSE BLDC GLUCOMTR-MCNC: 126 MG/DL (ref 70–99)
GLUCOSE BLDC GLUCOMTR-MCNC: 84 MG/DL (ref 70–99)
GLUCOSE BLDC GLUCOMTR-MCNC: 84 MG/DL (ref 70–99)
GLUCOSE BLDC GLUCOMTR-MCNC: 90 MG/DL (ref 70–99)
GLUCOSE SERPL-MCNC: 107 MG/DL (ref 70–99)
HCO3 SERPL-SCNC: 19 MMOL/L (ref 22–29)
HCT VFR BLD AUTO: 23 % (ref 35–47)
HGB BLD-MCNC: 7.4 G/DL (ref 11.7–15.7)
HGB BLD-MCNC: 7.6 G/DL (ref 11.7–15.7)
HGB BLD-MCNC: 7.7 G/DL (ref 11.7–15.7)
IMM GRANULOCYTES # BLD: 0.1 10E3/UL
IMM GRANULOCYTES NFR BLD: 1 %
LIPASE SERPL-CCNC: 98 U/L (ref 13–60)
LYMPHOCYTES # BLD AUTO: 0.1 10E3/UL (ref 0.8–5.3)
LYMPHOCYTES NFR BLD AUTO: 0 %
MAGNESIUM SERPL-MCNC: 1.8 MG/DL (ref 1.7–2.3)
MCH RBC QN AUTO: 29.3 PG (ref 26.5–33)
MCHC RBC AUTO-ENTMCNC: 33.3 G/DL (ref 31.5–36.5)
MCV RBC AUTO: 90 FL (ref 78–100)
MONOCYTES # BLD AUTO: 0.4 10E3/UL (ref 0–1.3)
MONOCYTES NFR BLD AUTO: 2 %
NEUTROPHILS # BLD AUTO: 16.3 10E3/UL (ref 1.6–8.3)
NEUTROPHILS NFR BLD AUTO: 97 %
NRBC # BLD AUTO: 0 10E3/UL
NRBC BLD AUTO-RTO: 0 /100
PHOSPHATE SERPL-MCNC: 6.8 MG/DL (ref 2.5–4.5)
PLATELET # BLD AUTO: 78 10E3/UL (ref 150–450)
POTASSIUM SERPL-SCNC: 4.6 MMOL/L (ref 3.4–5.3)
RBC # BLD AUTO: 2.56 10E6/UL (ref 3.8–5.2)
SODIUM SERPL-SCNC: 137 MMOL/L (ref 135–145)
UFH PPP CHRO-ACNC: <0.1 IU/ML
WBC # BLD AUTO: 16.9 10E3/UL (ref 4–11)

## 2025-05-04 PROCEDURE — 84100 ASSAY OF PHOSPHORUS: CPT | Performed by: SURGERY

## 2025-05-04 PROCEDURE — 83735 ASSAY OF MAGNESIUM: CPT | Performed by: SURGERY

## 2025-05-04 PROCEDURE — 99207 PR NO BILLABLE SERVICE THIS VISIT: CPT | Performed by: NURSE PRACTITIONER

## 2025-05-04 PROCEDURE — 250N000013 HC RX MED GY IP 250 OP 250 PS 637: Performed by: NURSE PRACTITIONER

## 2025-05-04 PROCEDURE — 36592 COLLECT BLOOD FROM PICC: CPT | Performed by: SURGERY

## 2025-05-04 PROCEDURE — 82565 ASSAY OF CREATININE: CPT | Performed by: SURGERY

## 2025-05-04 PROCEDURE — 99233 SBSQ HOSP IP/OBS HIGH 50: CPT | Mod: 24 | Performed by: NURSE PRACTITIONER

## 2025-05-04 PROCEDURE — 80048 BASIC METABOLIC PNL TOTAL CA: CPT | Performed by: SURGERY

## 2025-05-04 PROCEDURE — 85018 HEMOGLOBIN: CPT | Performed by: NURSE PRACTITIONER

## 2025-05-04 PROCEDURE — 250N000012 HC RX MED GY IP 250 OP 636 PS 637: Performed by: SURGERY

## 2025-05-04 PROCEDURE — 258N000003 HC RX IP 258 OP 636: Performed by: NURSE PRACTITIONER

## 2025-05-04 PROCEDURE — 85520 HEPARIN ASSAY: CPT | Performed by: SURGERY

## 2025-05-04 PROCEDURE — 84132 ASSAY OF SERUM POTASSIUM: CPT | Performed by: SURGERY

## 2025-05-04 PROCEDURE — 120N000003 HC R&B IMCU UMMC

## 2025-05-04 PROCEDURE — 85041 AUTOMATED RBC COUNT: CPT | Performed by: SURGERY

## 2025-05-04 PROCEDURE — 85004 AUTOMATED DIFF WBC COUNT: CPT | Performed by: SURGERY

## 2025-05-04 PROCEDURE — 258N000003 HC RX IP 258 OP 636

## 2025-05-04 PROCEDURE — 85018 HEMOGLOBIN: CPT | Performed by: SURGERY

## 2025-05-04 PROCEDURE — 83690 ASSAY OF LIPASE: CPT | Performed by: SURGERY

## 2025-05-04 PROCEDURE — 250N000011 HC RX IP 250 OP 636: Performed by: NURSE PRACTITIONER

## 2025-05-04 PROCEDURE — 250N000011 HC RX IP 250 OP 636: Performed by: SURGERY

## 2025-05-04 PROCEDURE — 250N000013 HC RX MED GY IP 250 OP 250 PS 637: Performed by: SURGERY

## 2025-05-04 PROCEDURE — 82150 ASSAY OF AMYLASE: CPT | Performed by: SURGERY

## 2025-05-04 PROCEDURE — 36592 COLLECT BLOOD FROM PICC: CPT | Performed by: NURSE PRACTITIONER

## 2025-05-04 PROCEDURE — 85025 COMPLETE CBC W/AUTO DIFF WBC: CPT | Performed by: SURGERY

## 2025-05-04 RX ORDER — DIPHENHYDRAMINE HCL 12.5MG/5ML
25-50 LIQUID (ML) ORAL ONCE
Status: COMPLETED | OUTPATIENT
Start: 2025-05-04 | End: 2025-05-04

## 2025-05-04 RX ORDER — ACETAMINOPHEN 325 MG/1
650 TABLET ORAL ONCE
Status: COMPLETED | OUTPATIENT
Start: 2025-05-04 | End: 2025-05-04

## 2025-05-04 RX ORDER — HYDRALAZINE HYDROCHLORIDE 20 MG/ML
10-20 INJECTION INTRAMUSCULAR; INTRAVENOUS EVERY 30 MIN PRN
Status: DISPENSED | OUTPATIENT
Start: 2025-05-04

## 2025-05-04 RX ORDER — LABETALOL HYDROCHLORIDE 5 MG/ML
10-20 INJECTION, SOLUTION INTRAVENOUS EVERY 10 MIN PRN
Status: DISPENSED | OUTPATIENT
Start: 2025-05-04

## 2025-05-04 RX ORDER — DIPHENHYDRAMINE HCL 25 MG
25-50 CAPSULE ORAL ONCE
Status: COMPLETED | OUTPATIENT
Start: 2025-05-04 | End: 2025-05-04

## 2025-05-04 RX ADMIN — PIPERACILLIN AND TAZOBACTAM 2.25 G: 2; .25 INJECTION, POWDER, FOR SOLUTION INTRAVENOUS at 03:04

## 2025-05-04 RX ADMIN — ACETAMINOPHEN 975 MG: 325 SOLUTION ORAL at 02:26

## 2025-05-04 RX ADMIN — ASPIRIN 81 MG CHEWABLE TABLET 81 MG: 81 TABLET CHEWABLE at 08:00

## 2025-05-04 RX ADMIN — DIPHENHYDRAMINE HYDROCHLORIDE 50 MG: 25 SOLUTION ORAL at 11:27

## 2025-05-04 RX ADMIN — SENNOSIDES AND DOCUSATE SODIUM 1 TABLET: 50; 8.6 TABLET ORAL at 19:44

## 2025-05-04 RX ADMIN — POLYETHYLENE GLYCOL 3350 17 G: 17 POWDER, FOR SOLUTION ORAL at 07:59

## 2025-05-04 RX ADMIN — SODIUM CHLORIDE, SODIUM LACTATE, POTASSIUM CHLORIDE, AND CALCIUM CHLORIDE: .6; .31; .03; .02 INJECTION, SOLUTION INTRAVENOUS at 22:36

## 2025-05-04 RX ADMIN — ANTI-THYMOCYTE GLOBULIN (RABBIT) 125 MG: 5 INJECTION, POWDER, LYOPHILIZED, FOR SOLUTION INTRAVENOUS at 12:11

## 2025-05-04 RX ADMIN — ACETAMINOPHEN 975 MG: 325 SOLUTION ORAL at 16:07

## 2025-05-04 RX ADMIN — TACROLIMUS 2 MG: 5 CAPSULE ORAL at 08:00

## 2025-05-04 RX ADMIN — METHOCARBAMOL 500 MG: 500 TABLET ORAL at 18:22

## 2025-05-04 RX ADMIN — ACETAMINOPHEN 650 MG: 325 TABLET ORAL at 11:28

## 2025-05-04 RX ADMIN — PIPERACILLIN AND TAZOBACTAM 2.25 G: 2; .25 INJECTION, POWDER, FOR SOLUTION INTRAVENOUS at 09:36

## 2025-05-04 RX ADMIN — OCTREOTIDE ACETATE 100 MCG: 100 INJECTION, SOLUTION INTRAVENOUS; SUBCUTANEOUS at 19:44

## 2025-05-04 RX ADMIN — MYCOPHENOLATE MOFETIL 1000 MG: 200 POWDER, FOR SUSPENSION ORAL at 07:59

## 2025-05-04 RX ADMIN — PIPERACILLIN AND TAZOBACTAM 2.25 G: 2; .25 INJECTION, POWDER, FOR SOLUTION INTRAVENOUS at 22:11

## 2025-05-04 RX ADMIN — TACROLIMUS 2 MG: 5 CAPSULE ORAL at 18:23

## 2025-05-04 RX ADMIN — SENNOSIDES AND DOCUSATE SODIUM 1 TABLET: 50; 8.6 TABLET ORAL at 08:04

## 2025-05-04 RX ADMIN — MYCOPHENOLATE MOFETIL 1000 MG: 200 POWDER, FOR SUSPENSION ORAL at 18:23

## 2025-05-04 RX ADMIN — ACETAMINOPHEN 975 MG: 325 SOLUTION ORAL at 19:44

## 2025-05-04 RX ADMIN — METHOCARBAMOL 500 MG: 500 TABLET ORAL at 10:24

## 2025-05-04 RX ADMIN — SODIUM CHLORIDE, SODIUM LACTATE, POTASSIUM CHLORIDE, AND CALCIUM CHLORIDE: .6; .31; .03; .02 INJECTION, SOLUTION INTRAVENOUS at 09:28

## 2025-05-04 RX ADMIN — OCTREOTIDE ACETATE 100 MCG: 100 INJECTION, SOLUTION INTRAVENOUS; SUBCUTANEOUS at 08:00

## 2025-05-04 RX ADMIN — METHYLPREDNISOLONE SODIUM SUCCINATE 250 MG: 125 INJECTION, POWDER, LYOPHILIZED, FOR SOLUTION INTRAMUSCULAR; INTRAVENOUS at 11:28

## 2025-05-04 RX ADMIN — FAMOTIDINE 20 MG: 20 TABLET, FILM COATED ORAL at 08:00

## 2025-05-04 RX ADMIN — SODIUM CHLORIDE 1000 ML: 0.9 INJECTION, SOLUTION INTRAVENOUS at 09:39

## 2025-05-04 RX ADMIN — PIPERACILLIN AND TAZOBACTAM 2.25 G: 2; .25 INJECTION, POWDER, FOR SOLUTION INTRAVENOUS at 16:08

## 2025-05-04 RX ADMIN — Medication 40 MG: at 07:59

## 2025-05-04 RX ADMIN — OCTREOTIDE ACETATE 100 MCG: 100 INJECTION, SOLUTION INTRAVENOUS; SUBCUTANEOUS at 14:20

## 2025-05-04 ASSESSMENT — ACTIVITIES OF DAILY LIVING (ADL)
ADLS_ACUITY_SCORE: 53
ADLS_ACUITY_SCORE: 56
ADLS_ACUITY_SCORE: 59
ADLS_ACUITY_SCORE: 58
ADLS_ACUITY_SCORE: 59
ADLS_ACUITY_SCORE: 56
ADLS_ACUITY_SCORE: 58
ADLS_ACUITY_SCORE: 59
ADLS_ACUITY_SCORE: 58
ADLS_ACUITY_SCORE: 59
DEPENDENT_IADLS:: INDEPENDENT
ADLS_ACUITY_SCORE: 59
ADLS_ACUITY_SCORE: 53
ADLS_ACUITY_SCORE: 56
ADLS_ACUITY_SCORE: 53
ADLS_ACUITY_SCORE: 58
ADLS_ACUITY_SCORE: 56
ADLS_ACUITY_SCORE: 58
ADLS_ACUITY_SCORE: 59
ADLS_ACUITY_SCORE: 59
ADLS_ACUITY_SCORE: 58
ADLS_ACUITY_SCORE: 56

## 2025-05-04 NOTE — PLAN OF CARE
Goal Outcome Evaluation: Progressing  Problem: Adult Inpatient Plan of Care  Goal: Readiness for Transition of Care  Recent Flowsheet Documentation  Taken 5/4/2025 1500 by Saida Schumacher  Transportation Anticipated: family or friend will provide  Concerns to be Addressed: discharge planning  Intervention: Mutually Develop Transition Plan  Recent Flowsheet Documentation  Taken 5/4/2025 1500 by Saida Schumacher  Readmission Within the Last 30 Days: no previous admission in last 30 days  Transportation Anticipated: family or friend will provide  Transportation Concerns: none  Concerns to be Addressed: discharge planning  Patient/Family Anticipated Services at Transition:   outpatient care   home health care  Patient/Family Anticipates Transition to:   home   home with family   home with help/services  Offered/Gave Vendor List: no  Equipment Currently Used at Home: (had PD [employer] prior to transplant.) other (see comments)

## 2025-05-04 NOTE — PROGRESS NOTES
Transplant Surgery  Inpatient Daily Progress Note  2025    Assessment & Plan: Wendy Kapoor is a 54 year old female who presents with a history of DM2, HTN, ESRD on PD since 2023, IgG Lambda MGUS, non-obstructive CAD, and anemia of chronic disease. On 2025 she was notified as an acceptable  donor organ became available and presented for further pre-operative work-up and on 2025 she underwent DBD SPK with stent, and open appendectomy which was well tolerated as performed by Dr. Gleason. Ms. Kapoor was admitted to 7A post-operatively for ongoing post-operative care.     Graft function: POD #2  Kidney: Admission creatinine 6.11. Post-op US patent Doppler. Creatinine POD#2 5.51. UOP 3 1.8 liters.    - Transplant Nephrology following    - Saline bolus this AM   - Discontinue CVP, cardiac monitor, hourly UOP, urine output replacement this afternoon     Pancreas: Amylase 119, lipase 98. Post-op US patient Doppler. Has not required insulin post-operatively.    - Monitor blood glucose   - Trend amylase and lipase    - ASA 81 mg    - Octreotide subcutaneous x 5 days   - Heparin straight rate 200 units/hour     Immunosuppressed status secondary to medications: cPRA 98  Thymo: 125 mg POD#0, Thymo 125 mg today  Steroids:  mg POD#0,  mg today  MMF: 1000 mg BID  Tacro: Goal level 8-10.    Hematology:   Anemia of chronic disease: Trend hemoglobin.   Acute blood loss anemia: Trend, transfuse to meet goals. Received 1-  unit PRBC on 5/3.   Thrombocytopenia: Secondary to induction IS. Monitor.     Cardiorespiratory: Home medications Cardizem 180 mg every day, Hydralazine 50 mg TID, losartan 100 mg every day, Toprol  mg every day at 2 pm  Hypertension: Hold home medications, resume when appropriate     GI/Nutrition: PPI, famotidine   Diet: NPO, resume when appropriate with return of bowel function   Bowel regimen: Senna, PEG    Fluid/Electrolytes: MIVF: Straight rate.   Metabolic  Acidosis: Sodium bicarbonate to IVF. Lactic acid normalized with IVF.   Hypocalcemia:   Hyperphosphatemia:    : Merlos to remain due to new surgical anastomosis x 7 days.    Infectious disease: Afebrile.   Leukocytosis: Secondary to steroids.     Prophylaxis: DVT, fall, GI, viral (Valcyte), pneumocystis (Bactrim), fungal (micafungin x 6 doses then clotrimazole)     Disposition: 7A     YNES/Fellow/Resident Provider:   MIA Green, CNP  Adult Solid Organ Transplant   Contact: Vocera Web Console    I saw and evaluated this patient as part of a shared visit. I spent 40 minutes on review of labs and imaging, exam, care coordination, and counseling.  Medically Ready for Discharge: Anticipated in 5+ Days       Faculty: Dr. Gleason  _________________________________________________________________    Interval History: History from patient and/or EMR  Overnight events: No acute events. She offers no specific complaints this AM.     ROS:   A 10-point review of systems was negative except as noted above.    Meds:  Current Facility-Administered Medications   Medication Dose Route Frequency Provider Last Rate Last Admin    acetaminophen (TYLENOL) oral liquid 975 mg  975 mg Oral Q8H Jean-Pierre Gleason MD   975 mg at 05/04/25 0226    aspirin (ASA) chewable tablet 81 mg  81 mg Oral or NG Tube Daily Jean-Pierre Gleason MD   81 mg at 05/03/25 1114    [START ON 5/8/2025] calcium carbonate-vitamin D (OSCAL) 500-5 MG-MCG per tablet 1 tablet  1 tablet Oral BID w/meals Jean-Pierre Gleason MD        [START ON 5/10/2025] clotrimazole (MYCELEX) lozenge 10 mg  10 mg Buccal 4x Daily Jean-Pierre Gleason MD        famotidine (PEPCID) tablet 20 mg  20 mg Oral Daily Jean-Pierre Gleason MD        Or    famotidine (PEPCID) injection 20 mg  20 mg Intravenous Daily Jean-Pierre Gleason MD        [START ON 5/5/2025] magnesium oxide (MAG-OX) tablet 400 mg   400 mg Oral Q24H Jean-Pierre Gleason MD        micafungin (MYCAMINE) 100 mg in sodium chloride 0.9 % 100 mL intermittent infusion  100 mg Intravenous Q24H Jean-Pierre Gleason  mL/hr at 05/03/25 2337 100 mg at 05/03/25 2337    mycophenolate (CELLCEPT BRAND) suspension 1,000 mg  1,000 mg Oral or NG Tube BID IS Jean-Pierre Gleason MD   1,000 mg at 05/03/25 1802    octreotide (sandoSTATIN) injection 100 mcg  100 mcg Subcutaneous TID Jean-Pierre Gleason MD   100 mcg at 05/03/25 1942    pantoprazole (PROTONIX) 2 mg/mL suspension 40 mg  40 mg Per NG tube Daily Jean-Pierre Gleason MD   40 mg at 05/03/25 1014    piperacillin-tazobactam (ZOSYN) 2.25 g vial to attach to  ml bag  2.25 g Intravenous Q6H Jean-Pierre Gleason MD   2.25 g at 05/04/25 0304    polyethylene glycol (MIRALAX) Packet 17 g  17 g Oral Daily Jean-Pierre Gleason MD        senna-docusate (SENOKOT-S/PERICOLACE) 8.6-50 MG per tablet 1 tablet  1 tablet Oral BID Jean-Pierre Gleason MD   1 tablet at 05/03/25 1942    sodium chloride (PF) 0.9% PF flush 10 mL  10 mL Intracatheter Q8H Jean-Pierre Gleason MD   10 mL at 05/03/25 1020    sodium chloride (PF) 0.9% PF flush 3 mL  3 mL Intravenous Q8H Jean-Pierre Gleason MD        [START ON 5/5/2025] sulfamethoxazole-trimethoprim (BACTRIM/SEPTRA) suspension 80 mg  10 mL Per NG tube Once per day on Monday Wednesday Friday Jean-Pierre Gleason MD        tacrolimus (GENERIC) suspension 2 mg  2 mg Oral BID IS Jean-Pierre Gleason MD   2 mg at 05/03/25 1802    [START ON 5/5/2025] valGANciclovir (VALCYTE) solution 450 mg  450 mg Oral Once per day on Monday Thursday Jean-Pierre Gleason MD           Physical Exam:     Admit Weight: 62.7 kg (138 lb 3.2 oz)    Current vitals:   BP (!) 141/65   Pulse 91   Temp 97.2  F (36.2  C) (Axillary)    Resp 13   Ht 1.524 m (5')   Wt 62.7 kg (138 lb 3.2 oz)   SpO2 97%   BMI 26.99 kg/m      Vital sign ranges:    Temp:  [97.2  F (36.2  C)-99.5  F (37.5  C)] 97.2  F (36.2  C)  Pulse:  [] 91  Resp:  [13-27] 13  BP: ()/(44-74) 141/65  SpO2:  [93 %-100 %] 97 %    General Appearance: in no apparent distress.   Skin: Warm, perfused  Heart: she appears adequately perfused   Lungs: minimal oxygen requirement   Abdomen: The abdomen is non-distended. JESUS x 1, dressing intact.   : velázquez is present.  Urine is clear .  Extremities: edema: minimal generalized edema , strength adequate  Neurologic: awake, alert, and oriented. Tremor absent..     Data:   CMP  Recent Labs   Lab 05/04/25  0613 05/04/25  0426 05/04/25  0114 05/04/25  0022 05/03/25  1500 05/03/25  1430 05/03/25  0526 05/03/25  0356 05/03/25  0257 05/03/25  0216 05/02/25  1714 05/02/25  1652 05/02/25  1652   NA  --  137  --   --   --  136  --  137 134* 135   < >  --  140   POTASSIUM  --  4.6  4.6  --  4.6   < > 4.7  4.7   < > 3.9 3.6 3.6   < >  --  3.9   CHLORIDE  --  105  --   --   --  105  --  104  --   --   --   --  103   CO2  --  19*  --   --   --  20*  --  15*  --   --   --   --  25   * 107*   < >  --    < > 84   < > 120* 83 106*   < >  --  105*   BUN  --  48.1*  --   --   --  42.9*  --  38.8*  --   --   --   --  45.6*   CR  --  5.51*  --   --   --  5.37*  --  5.11*  --   --   --   --  6.11*   GFRESTIMATED  --  8*  --   --   --  9*  --  9*  --   --   --   --  7*   VIJAY  --  7.8*  --   --   --  7.7*  --  8.8  --   --   --   --  9.7   ICAW  --   --   --   --   --   --   --   --  5.4* 4.2*   < >  --   --    MAG  --  1.8  --   --   --  1.7  --  1.9  --   --   --    < >  --    PHOS  --  6.8*  --   --   --   --   --  3.1  --   --   --   --   --    AMYLASE  --  119*  --   --   --   --   --  171*  --   --   --   --  50   LIPASE  --  98*  --   --   --   --   --  509*  --   --   --   --  106*   ALBUMIN  --   --   --   --   --   --   --   --   --   --    --   --  3.7   BILITOTAL  --   --   --   --   --   --   --   --   --   --   --   --  0.2   ALKPHOS  --   --   --   --   --   --   --   --   --   --   --   --  84   AST  --   --   --   --   --   --   --   --   --   --   --   --  29   ALT  --   --   --   --   --   --   --   --   --   --   --   --  18    < > = values in this interval not displayed.     CBC  Recent Labs   Lab 05/04/25  0426 05/04/25  0022 05/03/25  1758 05/03/25  1430 05/03/25  0948   HGB 7.6*  7.6* 7.4*   < > 7.7*  7.7* 7.8*   WBC 16.9*  --   --  22.5*  --    PLT 78*  --   --  106*  --    A1C  --   --   --   --  5.9*    < > = values in this interval not displayed.

## 2025-05-04 NOTE — CONSULTS
Care Management Initial Consult    General Information  Assessment completed with: Children (Kota Brown), Kota  Type of CM/SW Visit: Initial Assessment    Primary Care Provider verified and updated as needed: Yes (Hafsa Harrington MD)   Readmission within the last 30 days: no previous admission in last 30 days      Reason for Consult: discharge planning  Advance Care Planning: Advance Care Planning Reviewed: no concerns identified, other (see comments) (Full code)        Communication Assessment  Patient's communication style: spoken language (English or Bilingual)        Cognitive  Cognitive/Neuro/Behavioral: WDL  Level of Consciousness: lethargic  Arousal Level: arouses to voice  Orientation: oriented x 4  Mood/Behavior: calm, cooperative  Best Language: 0 - No aphasia  Speech: clear, logical, spontaneous    Living Environment:   People in home: child(elizabeth), adult, child(elizabeth), dependent, spouse, other (see comments) (ex-)  Killian Edmondson (), Benny Brown (ex-), Antoniohernandez Romie (daughter), Mike Grubbs (minor daughter)  Current living Arrangements: house      Able to return to prior arrangements: yes    Family/Social Support:  Care provided by: self, spouse/significant other, child(elizabeth)  Provides care for: pet(s) (cat)  Marital Status:   Support system: , Children, Other (specify) (ex )  Killian (speaks Tagalog)       Description of Support System: Supportive, Involved    Support Assessment: Adequate family and caregiver support, Adequate social supports    Current Resources:   Patient receiving home care services: No     Community Resources: None  Equipment currently used at home: other (see comments) (had PD [employer] prior to transplant.)  Supplies currently used at home: Other (HD supplies PD prior)    Employment/Financial:  Employment Status: disabled        Financial Concerns: none   Referral to Financial Worker: No     Does the patient's insurance plan have a 3  day qualifying hospital stay waiver?  No    Lifestyle & Psychosocial Needs:  Social Drivers of Health     Food Insecurity: Not on file   Depression: Not at risk (3/14/2025)    PHQ-2     PHQ-2 Score: 1   Housing Stability: Not on file   Tobacco Use: Low Risk  (3/14/2025)    Patient History     Smoking Tobacco Use: Never     Smokeless Tobacco Use: Never     Passive Exposure: Not on file   Financial Resource Strain: Not on file   Alcohol Use: Not At Risk (2023)    [facility] from [facility]     AUDIT-C     Frequency of Alcohol Consumption: Never     Average Number of Drinks: Patient does not drink     Frequency of Binge Drinking: Never   Transportation Needs: Not on file   Physical Activity: Not on file   Interpersonal Safety: Not At Risk (2024)    [facility]    Intimate Partner Violence     Are you in a relationship where you are physically hurt, threatened and/or made to feel afraid?: No   Stress: Not on file   Social Connections: Not on file   Health Literacy: Not on file     Functional Status:  Prior to admission patient needed assistance:   Dependent ADLs:: Independent  Dependent IADLs:: Independent  Assesssment of Functional Status: Not at  functional baseline    Mental Health Status:  Mental Health Status: No Current Concerns (per son)       Chemical Dependency Status:  Chemical Dependency Status: No Current Concerns (per son)         Values/Beliefs:  Spiritual, Cultural Beliefs, Yazdanism Practices, Values that affect care: no          Values/Beliefs Comment: Harish    Discussed  Partnership in Safe Discharge Planning  document with patient/family: Yes: with son    Additional Information:  From H&P 25: Wendy Kapoor is a 54 year old female who presents with a history of DM2, HTN, ESRD on PD since 2023, IgG Lambda MGUS, non-obstructive CAD, and anemia of chronic disease. Patient was notified as an acceptable  donor  organ became available and presented for further pre-operative work-up.  Patient was informed of the risks and benefits regarding  donor organ transplantation, and has elected to proceed with possible kidney pancreas transplant.       RNCC completed CM assessment due to elevated risk score and need for discharge planning. RNCC attempted to meet with pt at bedside but patient sleeping heavily. RNCC called son Kota (per YNES) and introduced RNCC role.   Kota stated patient lives in a handicap accessible home with main floor and basement.   Patient lives with her  Killian Kapoor (who speaks only Tagalog), patient's adult daughter Jaimee Grubbs and minor daughter Mike Grubbs, patient's ex- Benny Brown and a cat. Patient has three additional adult children, Kota, Frida and Celia Brown. The family system as a whole is supportive of one another.   Prior to transplant, patient was getting HD PD from [employer].   The house is equipped with handicap bathroom on main level. Patient sleep on main level on couch (by choice).  Patient was a  at [employer] but is no longer employed. Patient is on disability for the last year+.  No financial concerns. No history of MH concerns or substance abuse.   NO HCD and is Full Code.   Patient was independent prior and drives.     Next Steps:   [] ATC appts to be scheduled before discharge  [] Home Care to be ordered. Will need labs every M/Th  No Simulect appts for this pt.   [] IMM  [] EHO  Family to transport    Patience Schumacher RNCC Float  2025  Nurse Coordinator    Covering for 7A  Phone [phone]    Social Work and Care Management Department   SEARCHABLE in [facility] - search CARE COORDINATOR   [place] [facility] [place] ([phone]) Saturday & ; ([phone]) [facility] Recognized Holidays   Units: 5A Onc 5206 - 5219 RNCC,  5A Onc 5220 thru 5240 RNCC, 5C OFFSERVICE [phone] RNCC & 5C OFF SERVICE [phone] RNCC   Units: 6B Vocera,  6C Card 6401 thru 6420 RNCC, 6C Card 6502 thru 6514 RNCC & 6C Card 6515 thru 6519 RNCC    Units: 7A SOT RNCC Vocera, 7B Med Surg Vocera, 7C Med Surg 7401 thru 7418 RNCC & 7C Med Surg 7502 thru 7521 RNCC   Units: 6A Vocera & 4A CVICU Vocera, 4C MICU Vocera, and 4E SICU Vocera     Units: 5 Ortho Vocera & 5 Med Surg Vocera    Units: 6 Med Surg Vocera & 8 Med Surg Vocera

## 2025-05-04 NOTE — PROGRESS NOTES
[facility]  Transplant Nephrology Progress Note  Date of Admission:  5/2/2025  Today's Date: 05/04/2025  Requesting physician: Jean-Pierre lGeason*       Recommendations:    -Sodium bicarbonate 1300mg BID  -PD nurse to flush catheter  -no acute indication for dialysis    Assessment & Plan   # DDKT (SPK): Trend up     - Baseline Creatinine: ~ TBD   - Proteinuria: Not checked post transplant   - DSA Hx: No DSA at time of transplant   - Last cPRA: 98% 1/2025   - BK Viremia: Not checked post transplant   - Kidney Tx Biopsy Hx: No biopsy history.      # Pancreas Tx (SPK):    - Pancreatic Exocrine Drainage: Enteric drained     - Blood glucose: Euglycemia      On insulin: No   - HbA1c: Stable      Latest HbA1c: 6%   - Pancreatic enzymes: Trend down   - DSA Hx: No DSA at time of transplant   - Pancreas Tx Biopsy Hx: No biopsy history    # Immunosuppression: Tacrolimus immediate release (goal 8-10), Mycophenolate mofetil (dose 1000 mg every 12 hours), and Methylprednisolone (dose taper)   - Induction with Recent Transplant:  High Intensity Protocol   - Continue with intensive monitoring of immunosuppression for efficacy and toxicity.   - Historical Changes in Immunosuppression: None   - Changes: Not at this time    # Infection Prevention:   Last CD4 Level: Not checked  - PJP: Sulfa/TMP (Bactrim)  - CMV: Valganciclovir (Valcyte)  - Fungal: Micafungin (Mycamine)      - CMV IgG Ab High Risk Discordance (D+/R-) at time of transplant: No  Present CMV Serostatus: Positive  - EBV IgG Ab High Risk Discordance (D+/R-) at time of transplant: No  Present EBV Serostatus: Positive    # Hypertension: Controlled, but low at times;  Goal BP: > 100, but < 130 systolic   - Changes: Not at this time    # Diabetes: Controlled (HbA1c <7%) Last HbA1c: 6%  -continue to trend, not requiring insulin    # Anemia in Chronic Renal Disease: Hgb: Trend up following PRBC transfusion      BRIANNA: No   - Iron  studies: Not checked recently, low iron sat with last check 8/2023    # Mineral Bone Disorder:    - Secondary renal hyperparathyroidism; PTH level: Unknown at this time, but checked with dialysis        On treatment: None  - Vitamin D; level: Not checked recently, but was low last check        On supplement: Yes, cholecalciferol PTA  - Calcium; level: Low        On supplement: Yes, Calcium 500mg daily  - Phosphorus; level: High        On supplement: No    # Electrolytes:   - Potassium; level: Normal        On supplement: No  - Magnesium; level: Low normal        On supplement: Yes  - Bicarbonate; level: Low        On supplement: Yes, Na bicarb 1300mg BID  - Sodium; level: Normal    # Other Significant PMH:  #CAD: 4/2024 coronary angiogram showed 30# stenosis to Mid LAD and Dist LAD lesion is 30% stenosed.     # Sjogren's: Follows with Rheum     # IgG Lambda MGUS: with stable monoclonal peak ~  0.3 and K/L ratio ~ 0.64  in July 2024.      # Positive Lupus Anticoagulant: positive, but cardiolipins not elevated, neg for Factor V and Factor 2 absent     # Transplant History:  Etiology of Kidney Failure: Diabetes mellitus type 2  Tx: DDKT (SPK)  Transplant: 5/2/2025 (Kidney / Pancreas)  Significant transplant-related complications: None    Recommendations were communicated to the primary team verbally.    Seen and discussed with Dr. Niyah Owen NP  Transplant Nephrology  Contact information via Vocera Web Console or via [facility] Paging/Directory      Interval History  Ms. Gupta creatinine is 5.51 (05/04 0426); Trend up.  Good urine output. Made normal amounts prior to transplant  Other significant labs/tests/vitals: SBP improved following fluids 120-130s   Received 1 PRBC overnight for Hgb 6.9 with appropriate response.  Pain 7/10 but tolerable   No chest pain or shortness of breath.  No leg swelling.  No nausea and vomiting.  Bowel movements are none postop. +Flatus  No fever, sweats or  chills.      Review of Systems   4 point ROS was obtained and negative except as noted in the Interval History.    MEDICATIONS:  Current Facility-Administered Medications   Medication Dose Route Frequency Provider Last Rate Last Admin    acetaminophen (TYLENOL) oral liquid 975 mg  975 mg Oral Q8H Jean-Pierre Gleason MD   975 mg at 05/04/25 0226    aspirin (ASA) chewable tablet 81 mg  81 mg Oral or NG Tube Daily Jean-Pierre Gleason MD   81 mg at 05/03/25 1114    [START ON 5/8/2025] calcium carbonate-vitamin D (OSCAL) 500-5 MG-MCG per tablet 1 tablet  1 tablet Oral BID w/meals Jean-Pierre Gleason MD        [START ON 5/10/2025] clotrimazole (MYCELEX) lozenge 10 mg  10 mg Buccal 4x Daily Jean-Pierre Gleason MD        famotidine (PEPCID) tablet 20 mg  20 mg Oral Daily Jean-Pierre Gleason MD        Or    famotidine (PEPCID) injection 20 mg  20 mg Intravenous Daily Jean-Pierre Gleason MD        [START ON 5/5/2025] magnesium oxide (MAG-OX) tablet 400 mg  400 mg Oral Q24H Jean-Pierre Gleason MD        micafungin (MYCAMINE) 100 mg in sodium chloride 0.9 % 100 mL intermittent infusion  100 mg Intravenous Q24H Jean-Pierre Gleason  mL/hr at 05/03/25 2337 100 mg at 05/03/25 2337    mycophenolate (CELLCEPT BRAND) suspension 1,000 mg  1,000 mg Oral or NG Tube BID IS Jean-Pierre Gleason MD   1,000 mg at 05/03/25 1802    octreotide (sandoSTATIN) injection 100 mcg  100 mcg Subcutaneous TID Jean-Pierre Gleason MD   100 mcg at 05/03/25 1942    pantoprazole (PROTONIX) 2 mg/mL suspension 40 mg  40 mg Per NG tube Daily Jean-Pierre Gleason MD   40 mg at 05/03/25 1014    piperacillin-tazobactam (ZOSYN) 2.25 g vial to attach to  ml bag  2.25 g Intravenous Q6H Jean-Pierre Gleason MD   2.25 g at 05/04/25 0304    polyethylene glycol (MIRALAX) Packet 17 g  17 g Oral  Daily Jean-Pierre Gleason MD        senna-docusate (SENOKOT-S/PERICOLACE) 8.6-50 MG per tablet 1 tablet  1 tablet Oral BID Jean-Pierre Gleason MD   1 tablet at 05/03/25 1942    sodium chloride (PF) 0.9% PF flush 10 mL  10 mL Intracatheter Q8H Jean-Pierre Gleason MD   10 mL at 05/03/25 1020    sodium chloride (PF) 0.9% PF flush 3 mL  3 mL Intravenous Q8H Jean-Pierre Gleason MD        [START ON 5/5/2025] sulfamethoxazole-trimethoprim (BACTRIM/SEPTRA) suspension 80 mg  10 mL Per NG tube Once per day on Monday Wednesday Friday Jean-Pierre Gleason MD        tacrolimus (GENERIC) suspension 2 mg  2 mg Oral BID IS Jena-Pierre Gleason MD   2 mg at 05/03/25 1802    [START ON 5/5/2025] valGANciclovir (VALCYTE) solution 450 mg  450 mg Oral Once per day on Monday Thursday Jean-Pierre Gleason MD         Current Facility-Administered Medications   Medication Dose Route Frequency Provider Last Rate Last Admin    dextrose 10% infusion   Intravenous Continuous PRN Jean-Pierre Gleason MD        [Held by provider] dextrose 5% in lactated ringers 1,000 mL infusion   Intravenous Continuous Jean-Pierre Gleason MD   Stopped at 05/03/25 1030    [Held by provider] furosemide (LASIX) 100 mg in sodium chloride 0.9 % 100 mL infusion  10 mg/hr Intravenous Continuous Jean-Pierre Gleason MD   Stopped at 05/03/25 1231    heparin 25,000 units in 0.45% NaCl 250 mL ANTICOAGULANT infusion  200 Units/hr Intravenous Continuous Jean-Pierre Gleason MD 2 mL/hr at 05/04/25 0500 200 Units/hr at 05/04/25 0500    insulin regular (MYXREDLIN) 1 unit/mL infusion  0-24 Units/hr Intravenous Continuous Jean-Pierre Gleason MD        lactated ringers infusion   Intravenous Continuous Salan Harvey, Oleksandr, MD 75 mL/hr at 05/04/25 0500 Rate Verify at 05/04/25 0500    sodium chloride 0.45 % 1,000 mL  infusion   Intravenous Continuous Jean-Pierre Gleason MD        sodium chloride 0.9 % 1,000 mL infusion   Intravenous Continuous Jean-Pierre Gleason MD   Stopped at 25 0700       Physical Exam   Temp  Av.8  F (37.1  C)  Min: 98.5  F (36.9  C)  Max: 99  F (37.2  C)  Arterial Line BP  Min: 82/57  Max: 101/55  Arterial Line MAP (mmHg)  Av.8 mmHg  Min: 21 mmHg  Max: 67 mmHg      Pulse  Av.6  Min: 91  Max: 102 Resp  Av.6  Min: 14  Max: 23  SpO2  Av.6 %  Min: 98 %  Max: 100 %    CVP (mmHg): 11 mmHgBP 126/64 (BP Location: Left arm)   Pulse 94   Temp 97.2  F (36.2  C) (Axillary)   Resp 13   Ht 1.524 m (5')   Wt 62.7 kg (138 lb 3.2 oz)   SpO2 97%   BMI 26.99 kg/m     Date 25 0700 - 25 0659   Shift 8406-7859 1210-9209 0542-4592 24 Hour Total   INTAKE   Shift Total(mL/kg)       OUTPUT   Urine 5   5   Shift Total(mL/kg) 5(0.08)   5(0.08)   Weight (kg) 62.69 62.69 62.69 62.69      Admit Weight: 62.7 kg (138 lb 3.2 oz)     GENERAL APPEARANCE: alert and no distress  HENT: mouth without ulcers or lesions, +periorbital edema  RESP: +fine crackles with +clear sputum production. lungs clear to bases- no wheezes. NLB on RA  CV: regular rhythm, normal rate, no rub, no murmur  EDEMA: trace LE edema bilaterally  ABDOMEN: soft, appropriately tender to palpation. Incision with surgical dressing with s/s strikethrough. JESUS x 1 with s/s output.   PD cath in place.   : velázquez with clear yellow output.   MS: extremities normal - no gross deformities noted, no evidence of inflammation in joints, no muscle tenderness.   SKIN: no rash  NEURO: sleepy but easy to arouse, Oriented x3  TX KIDNEY: normal    Data   All labs reviewed by me.  CMP  Recent Labs   Lab 25  0613 25  0426 25  0405 25  0213 25  0114 25  0022 25  1905 25  1758 25  1500 25  1430 25  0526 25  0356 25  0257 25  1714  05/02/25  1652   NA  --  137  --   --   --   --   --   --   --  136  --  137 134*   < > 140   POTASSIUM  --  4.6  4.6  --   --   --  4.6  --  4.7  --  4.7  4.7   < > 3.9 3.6   < > 3.9   CHLORIDE  --  105  --   --   --   --   --   --   --  105  --  104  --   --  103   CO2  --  19*  --   --   --   --   --   --   --  20*  --  15*  --   --  25   ANIONGAP  --  13  --   --   --   --   --   --   --  11  --  18*  --   --  12   * 107* 90 84   < >  --    < >  --    < > 84   < > 120* 83   < > 105*   BUN  --  48.1*  --   --   --   --   --   --   --  42.9*  --  38.8*  --   --  45.6*   CR  --  5.51*  --   --   --   --   --   --   --  5.37*  --  5.11*  --   --  6.11*   GFRESTIMATED  --  8*  --   --   --   --   --   --   --  9*  --  9*  --   --  7*   VIJAY  --  7.8*  --   --   --   --   --   --   --  7.7*  --  8.8  --   --  9.7   MAG  --  1.8  --   --   --   --   --   --   --  1.7  --  1.9  --   --   --    PHOS  --  6.8*  --   --   --   --   --   --   --   --   --  3.1  --   --   --    PROTTOTAL  --   --   --   --   --   --   --   --   --   --   --   --   --   --  7.9   ALBUMIN  --   --   --   --   --   --   --   --   --   --   --   --   --   --  3.7   BILITOTAL  --   --   --   --   --   --   --   --   --   --   --   --   --   --  0.2   ALKPHOS  --   --   --   --   --   --   --   --   --   --   --   --   --   --  84   AST  --   --   --   --   --   --   --   --   --   --   --   --   --   --  29   ALT  --   --   --   --   --   --   --   --   --   --   --   --   --   --  18    < > = values in this interval not displayed.     CBC  Recent Labs   Lab 05/04/25  0426 05/04/25  0022 05/03/25  1758 05/03/25  1430 05/03/25  0948 05/03/25  0356 05/02/25 2050 05/02/25  1652   HGB 7.6*  7.6* 7.4* 6.9* 7.7*  7.7*   < > 9.5*   < > 10.4*   WBC 16.9*  --   --  22.5*  --  18.6*  --  7.1   RBC 2.56*  --   --  2.62*  --  3.14*  --  3.50*   HCT 23.0*  --   --  24.3*  --  30.2*  --  32.1*   MCV 90  --   --  92  --  96  --  92   MCH 29.3  --    --  30.2  --  30.3  --  29.7   MCHC 33.3  --   --  32.2  --  31.5  --  32.4   RDW 16.0*  --   --  16.2*  --  15.2*  --  14.6   PLT 78*  --   --  106*  --  153  --  196    < > = values in this interval not displayed.     INR  Recent Labs   Lab 05/03/25  0356 05/02/25  1652   INR 1.26* 0.95   PTT 39* 30     ABG  Recent Labs   Lab 05/03/25  0257 05/03/25  0216 05/03/25  0107 05/03/25  0001   PH 7.29* 7.29* 7.25* 7.30*   PCO2 35 33* 34* 36   PO2 135* 137* 149* 125*   HCO3 17* 16* 15* 18*   O2PER 52.0 52.0 53.0 52.0      Urine Studies  Recent Labs   Lab Test 05/02/25  1635 08/02/23  1350   COLOR Light Yellow Straw   APPEARANCE Clear Clear   URINEGLC 30* 300*   URINEBILI Negative Negative   URINEKETONE Negative Negative   SG 1.014 1.012   UBLD Negative Negative   URINEPH 6.5 7.0   PROTEIN 300* 300*   NITRITE Negative Negative   LEUKEST Small* Negative   RBCU 3* <1   WBCU 16* 1     No lab results found.  PTH  No lab results found.  Iron Studies  No lab results found.    IMAGING:  All imaging studies reviewed by me.

## 2025-05-04 NOTE — PROGRESS NOTES
Brief Surgery Crossover Note    HgB check 6.9    Assessed pt at bedside. Wendy is resting comfortably in bed, accompanied by family at bedside. Complains of back soreness due to positioning. Denies abdominal pain.     BP (S) 98/55 (BP Location: Left arm)   Pulse 94   Temp 98.8  F (37.1  C) (Axillary)   Resp 20   Ht 1.524 m (5')   Wt 62.7 kg (138 lb 3.2 oz)   SpO2 98%   BMI 26.99 kg/m      General: alert and oriented, in no acute distress  CV: regular rate and rhythm, palpable peripheral pulses, no edema   Resp: no increased work of breathing   GI: soft, non-distended, mild tenderness to palpation, no rebound, non-peritonitic. Incision c/d/I with dressings, no signs of bleeding. JESUS drain with thin serosanguinous output.   Extremities: no significant pitting edema     -120 ml/hr in last 2 hours.    -- Transfuse 1 unit(s) RBCs    Oleksandr Harvey MD  General Surgery PGY-1  Pager: [phone]      **For on call schedules and contact information, please refer to [facility]**

## 2025-05-04 NOTE — PLAN OF CARE
Goal Outcome Evaluation:      Plan of Care Reviewed With: patient    Overall Patient Progress: improving    Outcome Evaluation: AOX4. VSS on RA. Minimal abdominal pain, denies pain. UOP 30-70mL/hr. CVPs 7-9.    Pt is AOX4. Soft BP 92/58, 500mL LR bolus. BP's improved 110-120/60s. Telemetry in place, NSR. CVP's 8-10. BG's 84 - 109. Pt has minimal abdominal pain managed w/ scheduled tylenol. Denies nausea. NG tube to LIS, 150mL OP. JESUS w/ 100mL OP serosang. Merlos in place w/ 30 - 100mL/hr clear yellow UOP. Heparin SR @ 200u/hr and LR @ 75mL/hr. Abdominal incision w/ OP dressing in place - marked w/ no change. Hgb: 7.6, K: 4.6. Continue with current POC and update MD any

## 2025-05-04 NOTE — PLAN OF CARE
Goal Outcome Evaluation:          Outcome Evaluation: UOP 50-75cc/hr, stable glucoses    Vitals: /61   Pulse 96   Temp 97.5  F (36.4  C) (Oral)   Resp 16   Ht 1.524 m (5')   Wt 62.7 kg (138 lb 3.2 oz)   SpO2 96%   BMI 26.99 kg/m    On telemetry and HD monitoring.  Endocrine: Stable glucoses, not on any insulin, checking Q2hrs.  Labs: Stable HGB, unchanged creatinine.  Pain: Minimal pain this morning, 7/10 after getting up.  PRN's: Robaxin, pre-meds for Thymo.  Diet: NPO, ice chips.  LDA: R TL internal jugular, R PIV, velázquez, NGT, JESUS, capped PD catheter.  GI: Not passing gas, NGT to LIS, given laxatives this morning. Abdominal distention but denies nausea.   : Velázquez in place, poor response to 1LNS bolus, around 50-75cc/hr urine output.  Skin: Moderate generalized edema, hands, LE's, abdomen. Weight is up 20 lbs.  Neuro: Oriented, awakens to voice, sleepy after pre-meds.  Mobility: Stand by assist of 2, gait belt from the bed to the chair, which she has tolerated for hours.  Education: Lab book medication card.   Plan: Thymoglobulin started this afternoon. Increase activity as tolerated.

## 2025-05-05 ENCOUNTER — DOCUMENTATION ONLY (OUTPATIENT)
Dept: TRANSPLANT | Facility: CLINIC | Age: 55
End: 2025-05-05
Payer: COMMERCIAL

## 2025-05-05 ENCOUNTER — TELEPHONE (OUTPATIENT)
Dept: TRANSPLANT | Facility: CLINIC | Age: 55
End: 2025-05-05
Payer: COMMERCIAL

## 2025-05-05 LAB
AMYLASE SERPL-CCNC: 42 U/L (ref 28–100)
ANION GAP SERPL CALCULATED.3IONS-SCNC: 14 MMOL/L (ref 7–15)
APTT PPP: 31 SECONDS (ref 22–38)
BASOPHILS # BLD AUTO: 0 10E3/UL (ref 0–0.2)
BASOPHILS NFR BLD AUTO: 0 %
BUN SERPL-MCNC: 51.8 MG/DL (ref 6–20)
BURR CELLS BLD QL SMEAR: SLIGHT
CALCIUM SERPL-MCNC: 7.6 MG/DL (ref 8.8–10.4)
CHLORIDE SERPL-SCNC: 108 MMOL/L (ref 98–107)
CMV IGG SERPL IA-ACNC: >10 U/ML
CMV IGG SERPL IA-ACNC: ABNORMAL
CMV IGM SERPL IA-ACNC: 11.3 AU/ML
CMV IGM SERPL IA-ACNC: NEGATIVE
CREAT SERPL-MCNC: 5.12 MG/DL (ref 0.51–0.95)
DACRYOCYTES BLD QL SMEAR: SLIGHT
EBV VCA IGG SER IA-ACNC: 498 U/ML
EBV VCA IGG SER IA-ACNC: POSITIVE
EBV VCA IGM SER IA-ACNC: 11 U/ML
EBV VCA IGM SER IA-ACNC: NORMAL
EGFRCR SERPLBLD CKD-EPI 2021: 9 ML/MIN/1.73M2
EOSINOPHIL # BLD AUTO: 0 10E3/UL (ref 0–0.7)
EOSINOPHIL NFR BLD AUTO: 0 %
ERYTHROCYTE [DISTWIDTH] IN BLOOD BY AUTOMATED COUNT: 16.4 % (ref 10–15)
FIBRINOGEN PPP-MCNC: 399 MG/DL (ref 170–510)
GLUCOSE BLDC GLUCOMTR-MCNC: 112 MG/DL (ref 70–99)
GLUCOSE BLDC GLUCOMTR-MCNC: 112 MG/DL (ref 70–99)
GLUCOSE BLDC GLUCOMTR-MCNC: 113 MG/DL (ref 70–99)
GLUCOSE BLDC GLUCOMTR-MCNC: 117 MG/DL (ref 70–99)
GLUCOSE BLDC GLUCOMTR-MCNC: 118 MG/DL (ref 70–99)
GLUCOSE BLDC GLUCOMTR-MCNC: 119 MG/DL (ref 70–99)
GLUCOSE BLDC GLUCOMTR-MCNC: 125 MG/DL (ref 70–99)
GLUCOSE BLDC GLUCOMTR-MCNC: 131 MG/DL (ref 70–99)
GLUCOSE BLDC GLUCOMTR-MCNC: 140 MG/DL (ref 70–99)
GLUCOSE SERPL-MCNC: 135 MG/DL (ref 70–99)
HBV DNA SERPL QL NAA+PROBE: NORMAL
HCO3 SERPL-SCNC: 18 MMOL/L (ref 22–29)
HCT VFR BLD AUTO: 22 % (ref 35–47)
HCV RNA SERPL QL NAA+PROBE: NORMAL
HGB BLD-MCNC: 7.2 G/DL (ref 11.7–15.7)
HIV1+2 RNA SERPL QL NAA+PROBE: NORMAL
IMM GRANULOCYTES # BLD: 0 10E3/UL
IMM GRANULOCYTES NFR BLD: 0 %
INR PPP: 0.91 (ref 0.85–1.15)
LIPASE SERPL-CCNC: 43 U/L (ref 13–60)
LYMPHOCYTES # BLD AUTO: <0.1 10E3/UL (ref 0.8–5.3)
LYMPHOCYTES NFR BLD AUTO: 0 %
MAGNESIUM SERPL-MCNC: 1.9 MG/DL (ref 1.7–2.3)
MCH RBC QN AUTO: 29.5 PG (ref 26.5–33)
MCHC RBC AUTO-ENTMCNC: 32.7 G/DL (ref 31.5–36.5)
MCV RBC AUTO: 90 FL (ref 78–100)
MONOCYTES # BLD AUTO: 0.2 10E3/UL (ref 0–1.3)
MONOCYTES NFR BLD AUTO: 2 %
NEUTROPHILS # BLD AUTO: 9.7 10E3/UL (ref 1.6–8.3)
NEUTROPHILS NFR BLD AUTO: 97 %
NRBC # BLD AUTO: 0 10E3/UL
NRBC BLD AUTO-RTO: 0 /100
OXALATE SERPL-SCNC: 19.5 UMOL/L
PHOSPHATE SERPL-MCNC: 7.2 MG/DL (ref 2.5–4.5)
PLAT MORPH BLD: ABNORMAL
PLATELET # BLD AUTO: 43 10E3/UL (ref 150–450)
POTASSIUM SERPL-SCNC: 4.1 MMOL/L (ref 3.4–5.3)
PROTHROMBIN TIME: 12.6 SECONDS (ref 11.8–14.8)
RBC # BLD AUTO: 2.44 10E6/UL (ref 3.8–5.2)
RBC MORPH BLD: ABNORMAL
SODIUM SERPL-SCNC: 140 MMOL/L (ref 135–145)
UFH PPP CHRO-ACNC: <0.1 IU/ML
WBC # BLD AUTO: 10 10E3/UL (ref 4–11)

## 2025-05-05 PROCEDURE — 258N000003 HC RX IP 258 OP 636: Performed by: SURGERY

## 2025-05-05 PROCEDURE — 82150 ASSAY OF AMYLASE: CPT | Performed by: SURGERY

## 2025-05-05 PROCEDURE — 120N000011 HC R&B TRANSPLANT UMMC

## 2025-05-05 PROCEDURE — 36592 COLLECT BLOOD FROM PICC: CPT | Performed by: SURGERY

## 2025-05-05 PROCEDURE — 250N000012 HC RX MED GY IP 250 OP 636 PS 637: Performed by: SURGERY

## 2025-05-05 PROCEDURE — 250N000011 HC RX IP 250 OP 636

## 2025-05-05 PROCEDURE — 85025 COMPLETE CBC W/AUTO DIFF WBC: CPT | Performed by: SURGERY

## 2025-05-05 PROCEDURE — 85520 HEPARIN ASSAY: CPT | Performed by: SURGERY

## 2025-05-05 PROCEDURE — 84100 ASSAY OF PHOSPHORUS: CPT | Performed by: SURGERY

## 2025-05-05 PROCEDURE — 250N000011 HC RX IP 250 OP 636: Mod: JZ | Performed by: SURGERY

## 2025-05-05 PROCEDURE — 85730 THROMBOPLASTIN TIME PARTIAL: CPT | Performed by: STUDENT IN AN ORGANIZED HEALTH CARE EDUCATION/TRAINING PROGRAM

## 2025-05-05 PROCEDURE — 85610 PROTHROMBIN TIME: CPT | Performed by: STUDENT IN AN ORGANIZED HEALTH CARE EDUCATION/TRAINING PROGRAM

## 2025-05-05 PROCEDURE — 250N000011 HC RX IP 250 OP 636: Mod: JZ | Performed by: STUDENT IN AN ORGANIZED HEALTH CARE EDUCATION/TRAINING PROGRAM

## 2025-05-05 PROCEDURE — 85384 FIBRINOGEN ACTIVITY: CPT | Performed by: STUDENT IN AN ORGANIZED HEALTH CARE EDUCATION/TRAINING PROGRAM

## 2025-05-05 PROCEDURE — 250N000013 HC RX MED GY IP 250 OP 250 PS 637: Performed by: SURGERY

## 2025-05-05 PROCEDURE — 99233 SBSQ HOSP IP/OBS HIGH 50: CPT | Performed by: INTERNAL MEDICINE

## 2025-05-05 PROCEDURE — 83690 ASSAY OF LIPASE: CPT | Performed by: SURGERY

## 2025-05-05 PROCEDURE — 80048 BASIC METABOLIC PNL TOTAL CA: CPT | Performed by: SURGERY

## 2025-05-05 PROCEDURE — 250N000013 HC RX MED GY IP 250 OP 250 PS 637

## 2025-05-05 PROCEDURE — 83735 ASSAY OF MAGNESIUM: CPT | Performed by: SURGERY

## 2025-05-05 RX ORDER — SODIUM BICARBONATE 650 MG/1
650 TABLET ORAL 2 TIMES DAILY
Status: DISCONTINUED | OUTPATIENT
Start: 2025-05-05 | End: 2025-05-05

## 2025-05-05 RX ORDER — CARVEDILOL 12.5 MG/1
12.5 TABLET ORAL 2 TIMES DAILY WITH MEALS
Status: DISCONTINUED | OUTPATIENT
Start: 2025-05-05 | End: 2025-05-06

## 2025-05-05 RX ORDER — NICOTINE POLACRILEX 4 MG
15-30 LOZENGE BUCCAL
Status: DISCONTINUED | OUTPATIENT
Start: 2025-05-05 | End: 2025-05-05

## 2025-05-05 RX ORDER — OXYCODONE HCL 5 MG/5 ML
5 SOLUTION, ORAL ORAL EVERY 4 HOURS PRN
Status: DISCONTINUED | OUTPATIENT
Start: 2025-05-05 | End: 2025-05-07

## 2025-05-05 RX ORDER — SODIUM BICARBONATE 650 MG/1
1300 TABLET ORAL 2 TIMES DAILY
Status: DISPENSED | OUTPATIENT
Start: 2025-05-05

## 2025-05-05 RX ORDER — DEXTROSE MONOHYDRATE 25 G/50ML
25-50 INJECTION, SOLUTION INTRAVENOUS
Status: DISCONTINUED | OUTPATIENT
Start: 2025-05-05 | End: 2025-05-05

## 2025-05-05 RX ORDER — OXYCODONE HYDROCHLORIDE 5 MG/1
5 TABLET ORAL EVERY 4 HOURS PRN
Status: DISCONTINUED | OUTPATIENT
Start: 2025-05-05 | End: 2025-05-05

## 2025-05-05 RX ORDER — HEPARIN SODIUM 10000 [USP'U]/100ML
200 INJECTION, SOLUTION INTRAVENOUS CONTINUOUS
Status: DISCONTINUED | OUTPATIENT
Start: 2025-05-05 | End: 2025-05-07

## 2025-05-05 RX ORDER — METHYLPREDNISOLONE SODIUM SUCCINATE 125 MG/2ML
100 INJECTION INTRAMUSCULAR; INTRAVENOUS ONCE
Status: COMPLETED | OUTPATIENT
Start: 2025-05-05 | End: 2025-05-05

## 2025-05-05 RX ORDER — FUROSEMIDE 10 MG/ML
40 INJECTION INTRAMUSCULAR; INTRAVENOUS ONCE
Status: COMPLETED | OUTPATIENT
Start: 2025-05-05 | End: 2025-05-05

## 2025-05-05 RX ADMIN — LABETALOL HYDROCHLORIDE 20 MG: 5 INJECTION, SOLUTION INTRAVENOUS at 09:35

## 2025-05-05 RX ADMIN — PIPERACILLIN AND TAZOBACTAM 2.25 G: 2; .25 INJECTION, POWDER, FOR SOLUTION INTRAVENOUS at 16:07

## 2025-05-05 RX ADMIN — ASPIRIN 81 MG CHEWABLE TABLET 81 MG: 81 TABLET CHEWABLE at 07:57

## 2025-05-05 RX ADMIN — ACETAMINOPHEN 975 MG: 325 SOLUTION ORAL at 02:51

## 2025-05-05 RX ADMIN — FAMOTIDINE 20 MG: 20 TABLET, FILM COATED ORAL at 07:57

## 2025-05-05 RX ADMIN — SULFAMETHOXAZOLE AND TRIMETHOPRIM 80 MG: 200; 40 SUSPENSION ORAL at 08:01

## 2025-05-05 RX ADMIN — LABETALOL HYDROCHLORIDE 20 MG: 5 INJECTION, SOLUTION INTRAVENOUS at 23:38

## 2025-05-05 RX ADMIN — PIPERACILLIN AND TAZOBACTAM 2.25 G: 2; .25 INJECTION, POWDER, FOR SOLUTION INTRAVENOUS at 21:49

## 2025-05-05 RX ADMIN — POLYETHYLENE GLYCOL 3350 17 G: 17 POWDER, FOR SOLUTION ORAL at 07:58

## 2025-05-05 RX ADMIN — METHOCARBAMOL 500 MG: 500 TABLET ORAL at 09:34

## 2025-05-05 RX ADMIN — METHOCARBAMOL 500 MG: 500 TABLET ORAL at 02:50

## 2025-05-05 RX ADMIN — METHOCARBAMOL 500 MG: 500 TABLET ORAL at 23:53

## 2025-05-05 RX ADMIN — PIPERACILLIN AND TAZOBACTAM 2.25 G: 2; .25 INJECTION, POWDER, FOR SOLUTION INTRAVENOUS at 09:35

## 2025-05-05 RX ADMIN — ACETAMINOPHEN 975 MG: 325 SOLUTION ORAL at 20:01

## 2025-05-05 RX ADMIN — FUROSEMIDE 40 MG: 10 INJECTION, SOLUTION INTRAMUSCULAR; INTRAVENOUS at 12:49

## 2025-05-05 RX ADMIN — OCTREOTIDE ACETATE 100 MCG: 100 INJECTION, SOLUTION INTRAVENOUS; SUBCUTANEOUS at 08:14

## 2025-05-05 RX ADMIN — LABETALOL HYDROCHLORIDE 20 MG: 5 INJECTION, SOLUTION INTRAVENOUS at 22:47

## 2025-05-05 RX ADMIN — SODIUM BICARBONATE 1300 MG: 650 TABLET ORAL at 20:01

## 2025-05-05 RX ADMIN — CARVEDILOL 12.5 MG: 12.5 TABLET, FILM COATED ORAL at 17:29

## 2025-05-05 RX ADMIN — Medication 40 MG: at 07:58

## 2025-05-05 RX ADMIN — MYCOPHENOLATE MOFETIL 1000 MG: 200 POWDER, FOR SUSPENSION ORAL at 17:29

## 2025-05-05 RX ADMIN — LABETALOL HYDROCHLORIDE 20 MG: 5 INJECTION, SOLUTION INTRAVENOUS at 13:53

## 2025-05-05 RX ADMIN — SENNOSIDES AND DOCUSATE SODIUM 1 TABLET: 50; 8.6 TABLET ORAL at 20:01

## 2025-05-05 RX ADMIN — LABETALOL HYDROCHLORIDE 10 MG: 5 INJECTION, SOLUTION INTRAVENOUS at 21:58

## 2025-05-05 RX ADMIN — TACROLIMUS 2 MG: 5 CAPSULE ORAL at 07:58

## 2025-05-05 RX ADMIN — ACETAMINOPHEN 975 MG: 325 SOLUTION ORAL at 10:43

## 2025-05-05 RX ADMIN — MAGNESIUM OXIDE TAB 400 MG (241.3 MG ELEMENTAL MG) 400 MG: 400 (241.3 MG) TAB at 11:27

## 2025-05-05 RX ADMIN — LABETALOL HYDROCHLORIDE 10 MG: 5 INJECTION, SOLUTION INTRAVENOUS at 05:35

## 2025-05-05 RX ADMIN — VALGANCICLOVIR HYDROCHLORIDE 450 MG: 50 POWDER, FOR SOLUTION ORAL at 07:58

## 2025-05-05 RX ADMIN — OXYCODONE HYDROCHLORIDE 5 MG: 5 SOLUTION ORAL at 17:29

## 2025-05-05 RX ADMIN — TACROLIMUS 2 MG: 5 CAPSULE ORAL at 17:29

## 2025-05-05 RX ADMIN — PIPERACILLIN AND TAZOBACTAM 2.25 G: 2; .25 INJECTION, POWDER, FOR SOLUTION INTRAVENOUS at 04:12

## 2025-05-05 RX ADMIN — OXYCODONE HYDROCHLORIDE 5 MG: 5 TABLET ORAL at 11:04

## 2025-05-05 RX ADMIN — OCTREOTIDE ACETATE 100 MCG: 100 INJECTION, SOLUTION INTRAVENOUS; SUBCUTANEOUS at 13:58

## 2025-05-05 RX ADMIN — OCTREOTIDE ACETATE 100 MCG: 100 INJECTION, SOLUTION INTRAVENOUS; SUBCUTANEOUS at 20:01

## 2025-05-05 RX ADMIN — LABETALOL HYDROCHLORIDE 10 MG: 5 INJECTION, SOLUTION INTRAVENOUS at 00:16

## 2025-05-05 RX ADMIN — MICAFUNGIN 100 MG: 20 INJECTION, POWDER, LYOPHILIZED, FOR SOLUTION INTRAVENOUS at 23:56

## 2025-05-05 RX ADMIN — MYCOPHENOLATE MOFETIL 1000 MG: 200 POWDER, FOR SUSPENSION ORAL at 07:58

## 2025-05-05 RX ADMIN — MICAFUNGIN 100 MG: 20 INJECTION, POWDER, LYOPHILIZED, FOR SOLUTION INTRAVENOUS at 00:17

## 2025-05-05 RX ADMIN — SENNOSIDES AND DOCUSATE SODIUM 1 TABLET: 50; 8.6 TABLET ORAL at 07:58

## 2025-05-05 RX ADMIN — METHYLPREDNISOLONE SODIUM SUCCINATE 100 MG: 125 INJECTION, POWDER, FOR SOLUTION INTRAMUSCULAR; INTRAVENOUS at 11:18

## 2025-05-05 ASSESSMENT — ACTIVITIES OF DAILY LIVING (ADL)
ADLS_ACUITY_SCORE: 61
ADLS_ACUITY_SCORE: 56
ADLS_ACUITY_SCORE: 56
ADLS_ACUITY_SCORE: 61
ADLS_ACUITY_SCORE: 62
ADLS_ACUITY_SCORE: 61
ADLS_ACUITY_SCORE: 62
ADLS_ACUITY_SCORE: 56
ADLS_ACUITY_SCORE: 61
ADLS_ACUITY_SCORE: 56
ADLS_ACUITY_SCORE: 62
ADLS_ACUITY_SCORE: 61
ADLS_ACUITY_SCORE: 56
ADLS_ACUITY_SCORE: 59
ADLS_ACUITY_SCORE: 62
ADLS_ACUITY_SCORE: 56
ADLS_ACUITY_SCORE: 56
ADLS_ACUITY_SCORE: 61
ADLS_ACUITY_SCORE: 56

## 2025-05-05 ASSESSMENT — ENCOUNTER SYMPTOMS: NEW SYMPTOMS OF CORONARY ARTERY DISEASE: 0

## 2025-05-05 NOTE — PLAN OF CARE
Goal Outcome Evaluation:      Plan of Care Reviewed With: patient, spouse, child        Outcome Evaluation: Increased abdominal discomfort.    Vitals: BP (!) 157/87   Pulse 89   Temp (!) 96.6  F (35.9  C) (Axillary)   Resp 16   Ht 1.524 m (5')   Wt 73.4 kg (161 lb 13.1 oz)   SpO2 98%   BMI 31.60 kg/m    Hypertensive.  Endocrine: Blood sugars 130, on Novolog correction scale.  Labs: Improving creatinine, HGB 7.2.  Pain: Increased abdominal discomfort with activity.  PRN's: Robaxin, Oxycodone 5 mg, IV Labetalol X2.  Diet: NPO, few ice chips.  LDA: NGT LIS, tolerates clamping for medications, velázquez, R JESUS with high output (larger bulb placed), R TL internal jugular, capped PD catheter. Heparin increased to 400U/hr, IVF's DC'd.  GI: Not passing flatus, reports being hungry, is on Miralax and Senna.  : Velázquez, improving urine output, Lasix 40 mg IV X1.  Skin: Abdominal incision with staples. Moderate edema, weight is up 21 pounts.  Neuro: Oriented, family in the room and very supportive.  Mobility: Stand by assist of 2 with ambulation, moderate pain, gait belt, abdominal binder.  Education: Will need transplant education.  Plan: Increase activity.

## 2025-05-05 NOTE — PROGRESS NOTES
Care Management Follow Up    Length of Stay (days): 3    Expected Discharge Date: 05/07/2025     Concerns to be Addressed: discharge planning     Patient plan of care discussed at interdisciplinary rounds: Yes    Anticipated Discharge Disposition: Home, Home Care, Outpatient Infusion Services       Anticipated Discharge Services: Home Care  Anticipated Discharge DME: None    Patient/family educated on Medicare website which has current facility and service quality ratings: no  Education Provided on the Discharge Plan: No  Patient/Family in Agreement with the Plan:      Referrals Placed by CM/SW: External Care Coordination  Private pay costs discussed: Not applicable    Discussed  Partnership in Safe Discharge Planning  document with patient/family: No     Handoff Completed: No, handoff not indicated or clinically appropriate    Additional Information:  Patient underwent DDKT and DDPT on 05/02/25.  Met with patient, son (Kota), and Benny (ex-spouse) to update psychosocial assessment and provide brief education about SW role while inpatient, as well as expectations/requirements and follow up needs post-transplant. SW also provided education about need for compliance with transplant medications, and explained ESRD Medicare benefits and medication coverage under Medicare part B.  Medicare 2728 forms completed and signed by family representative due to patient preference.    MSW engaged Wendy and family in a conversation specific to her psychosocial well being following transplant. Wendy and family denied any psychosocial concerns at this time. They report tentative plans to stay locally in a hotel for a bit before returning home (Wendy lives 55 min - 1hr away). Wendy denied any changes or concerns to her mental health. Family declined financial concerns.     Next Steps: SOT SW team to continue to follow for post transplant support and safe discharge planning.\    Lexy Broderick, SURI, LGSW  Clinical        [facility]  Kidney, Pancreas, Auto-Islet   [address]  [place] [address]  [email or web address]  Office: [phone]  Fax: [phone]  Gender pronouns: she/her  Employed by [employer]       SOT*LIADAVID Bowling) IS A (Kidney, Pancreas) TRANSPLANT PATIENT  (DATE OF TRANSPLANT: (05/02/2025) )         PRIMARY HEALTH BENEFIT: COMMERCIAL    ID# [id number] GRP# [id number] (EFFECTIVE (01/01/2024) )    PROCESSING INFO: ID# [id number]  GRP#  [id number] BIN# [id number]    SECONDARY MEDICARE HEALTH BENEFIT: (Medicare Part B)    ID# [id number] (PART A EFFECTIVE (09/01/2023) , PART B EFFECTIVE (08/01/2024) )    PROCESSING INFO: (MDCR Secondary) ID# [id number] GRP# OTHER BIN#[id number]    PT WILL PAY $0 AT TIME OF SERVICE  FOR IMMUNOS         PHARMACY BENEFIT: ([employer])    PROCESSING INFO: ID# [id number] GRP# [id number] PCN# [id number]  (EFFECTIVE (01/01/2024) ).    DEDUCTIBLE ($1,800) & MAX OUT-OF-POCKET ($7,600)    COPAY STRUCTURE:    % (20) FOR GENERIC    % (20) FOR BRAND    % (20) FOR NON-FORMULARY MEDICATIONS         TEST CLAIM SPECIALTY #28    MYCOPHENOLATE 250mg (#240/30DS) $0 AT TIME OF SERVICE FOR IMMUNOS    PROGRAF 1mg (#120/30DS) $0 AT TIME OF SERVICE FOR IMMUNOS    TACROLIMUS 1mg (#120/30DS) $0 AT TIME OF SERVICE FOR IMMUNOS    CYCLOSPORINE 100mg (#60/30DS) $0 AT TIME OF SERVICE FOR IMMUNOS    VALGANCICLOVIR 450mg (#60/30DS) $0    VALACYCLOVIR 1gm (#90/30DS) PA Required              TEST CLAIM DISCHARGE #27 (21 YEARS AND OLDER):    MYCOPHENOLATE 250mg (#240/30DS) $0 AT TIME OF SERVICE FOR IMMUNOS    PROGRAF 1mg (#120/30DS) $0 AT TIME OF SERVICE FOR IMMUNOS    TACROLIMUS 1mg (#120/30DS) $0 AT TIME OF SERVICE FOR IMMUNOS    CYCLOSPORINE 100mg (#60/30DS) $0 AT TIME OF SERVICE FOR IMMUNOS    VALGANCICLOVIR 450mg (#60/30DS) $0    VALACYCLOVIR 1gm (#90/30DS) PA Required         **CAN PATIENT FILL AT [place] [facility] FOR MEDICATIONS  LISTED? yes

## 2025-05-05 NOTE — PROGRESS NOTES
Care Management Follow Up    Length of Stay (days): 3    Expected Discharge Date: 05/07/2025     Concerns to be Addressed: discharge planning     Patient plan of care discussed at interdisciplinary rounds: Yes    Anticipated Discharge Disposition: Home, Home Care, Outpatient Infusion Services              Anticipated Discharge Services: Home Care  Anticipated Discharge DME: None    Patient/family educated on Medicare website which has current facility and service quality ratings: no  Education Provided on the Discharge Plan: No  Patient/Family in Agreement with the Plan:      Referrals Placed by CM/SW: External Care Coordination  Private pay costs discussed: Not applicable    Discussed  Partnership in Safe Discharge Planning  document with patient/family: No     Handoff Completed: No, handoff not indicated or clinically appropriate    Additional Information:  Patient s/p SPK.  Pt will need ATC appointments confirmed prior to discharge. Noted weekend RNCC met with pt.  Plan for home care RN.  Initiated referral to [facility] hub.      1500: Received message from [facility] Care Liaison requesting clarification on where pt will be staying post hospitalization.  Met with pt, spouse, daughter and son.  Pt will be returning to her own home with her family in [facility].  Reviewed anticipated plan for ATC, transplant labs and home care RN services.  Pt and her family note no concerns or questions at this time.  Pt and her son Kota will be the main point of contact to review/discuss discharge plans.      Next Steps:   ATC  Follow up on home care referral, ensure home care order placed  Medical clearance.    Maria Luisa Seo, RN BSN, PHN, ACM-RN  May 5, 2025  7A Nurse Coordinator    Phone: [phone]  Available on [place] 7A SOT RNCC  Weekend/Holiday 7A SOT RNCC    5/5/2025

## 2025-05-05 NOTE — CONFIDENTIAL NOTE
Handoff information     Type of transplant: Simultaneous Kidney/Pancreas  Date of transplant: 05/02/2025  Direct/non-direct/PEP- Direct  Transplant history: No prior transplant  Outstanding items for patient: None    Pertinent history: ESKD from Biopsy Proven DM/HTN: biopsy Jan 2023 also with findings thought 2/2 Sjogren's.  Was on peritoneal dialysis since September 2023.     Type 2 Diabetes: currently using 14 units insulin daily. Hemoglobin A1c 8.9% 10/17/2024.     Cardiac Risk: history of pericardial effusion 2019 requiring admission but no surgical intervention. Completed coronary angiogram=no significant obstructive CAD.     PAD Screening: CT and iliac US reviewed  by transplant surgeon, has suitable vessels for transplant.     Sjogren's: new diagnosis - seen by Rheum; should have return appointment in 2025.     IgG Lambda MGUS: with monoclonal peak of 1.7 in Jan 2023, but normal serum free light chain ratio. Identified a few years ago. Repeat SPEP with monoclonal peak down to 0.3 and serum free light chain ratio was normal today. Will repeat labs in 3-6 months.   Committee didn't recommend Hem consult, but at neph rounds after reviewing immunofixation serum, urine and updated MGUS results, Dr. Vizcarra did recommend, but not to delay transplant.  Patient seen by hematology 03/13/2025; next appointment scheduled 03/13/2026.     Positive Lupus Anticoagulant: repeat was positive, but cardiolipins not elevated, neg for Factor V and Factor 2 absent.  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

## 2025-05-05 NOTE — PROGRESS NOTES
[facility]  Transplant Nephrology Progress Note  Date of Admission:  5/2/2025  Today's Date: 05/05/2025    Recommendations:    - No acute indications for dialysis.  - Agree with high intensity induction.  - Recommend starting sodium bicarbonate 1300 mg bid.  - Would hold IV fluids and give furosemide 40 mg IV bid.  - Recommend starting carvedilol 12.5 mg twice daily.    Assessment & Plan   # DDKT (SPK): Trend down in creatinine.  Good urine output.  No acute indications for dialysis.     - Baseline Creatinine: ~ TBD   - Proteinuria: Not checked post transplant   - DSA Hx: No DSA at time of transplant   - Last cPRA: 98%   - BK Viremia: Not checked post transplant   - Kidney Tx Biopsy Hx: No biopsy history.    # Pancreas Tx (SPK):    - Pancreatic Exocrine Drainage: Enteric drained     - Blood glucose: Near euglycemia      On insulin: No   - HbA1c: Last checked at time of transplant      Latest HbA1c: 5.9%   - Pancreatic enzymes: Trend down   - DSA Hx: No DSA at time of transplant   - Pancreas Tx Biopsy Hx: No biopsy history    # Immunosuppression: Tacrolimus immediate release (goal 8-10), Mycophenolate mofetil (dose 1000 mg every 12 hours), and Methylprednisolone (dose taper)   - Induction with Recent Transplant:  High Intensity Protocol   - Continue with intensive monitoring of immunosuppression for efficacy and toxicity.   - Historical Changes in Immunosuppression: None   - Changes: Not at this time    # Infection Prevention:   Last CD4 Level: Not checked  - PJP: Sulfa/TMP (Bactrim)  - CMV: Valganciclovir (Valcyte)  - Fungal: Micafungin (Mycamine)      - CMV IgG Ab High Risk Discordance (D+/R-) at time of transplant: No  Present CMV Serostatus: Positive  - EBV IgG Ab High Risk Discordance (D+/R-) at time of transplant: No  Present EBV Serostatus: Positive    # Hypertension: Borderline control;  Goal BP: < 150/90   - Changes: Yes - Recommend starting carvedilol 12.5 mg  bid, as well as giving diuretic.    # Anemia in Chronic Renal Disease/Surgery: Hgb: Stable      BRIANNA: No   - Iron studies: Unknown at this time, but checked with dialysis    # Mineral Bone Disorder:    - Secondary renal hyperparathyroidism; PTH level: Unknown at this time, but checked with dialysis        On treatment: None  - Vitamin D; level: Not checked recently, but was low last check        On supplement: Yes  - Calcium; level: Low        On supplement: Yes  - Phosphorus; level: High        On supplement: No    # Electrolytes:   - Potassium; level: Normal        On supplement: No  - Magnesium; level: Normal        On supplement: Yes  - Bicarbonate; level: Stable low        On supplement: No; Recommend starting sodium bicarbonate 1300 mg bid.    # Other Significant PMH:   - CAD: Patient has mild non obstructive coronary artery disease on last coronary angiogram 4/2024.  - HFpEF: Last cardiac echo (8/2023) showed normal LVEF ~ 60-65% with grade II diastolic dysfunction.  - Sjogren's: Stable with no recent symptoms.  Followed by Rheumatology.  - IgG Lambda MGUS: Stable with monoclonal peak ~  0.3 and K/L ratio ~ 0.64  in July 2024.   - Complex Native Kidney Cyst: Patient with cyst in mid pole right native kidney with thin and mildly thickened internal septations noted on MRI 6/2024.  Repeat imaging with MRI 2/2025 showed slight increased size of the partly exophytic 2.0 cm cyst in the medial cortex of the interpolar region of the right kidney, previously 1.7 cm. Stable 3 mm septation with T2 dark signal in the septation, which may be related to calcifications.  Recommended follow with ultrasound or MRI in 6 months.    - Recommend bilateral native kidney ultrasound 4 months post transplant (9/2025).  - Positive Lupus Anticoagulant: Patient is positive, but cardiolipins not elevated, neg for Factor V and Factor 2 absent     # Transplant History:  Etiology of Kidney Failure: Diabetes mellitus type 2  Tx: DDKT  (SPK)  Transplant: 5/2/2025 (Kidney / Pancreas)  Significant transplant-related complications: None    Recommendations were communicated to the primary team via this note.    Lj Vizcarra MD  Transplant Nephrology  Contact information via Vocera Web Console or via [facility] Paging/Directory      Interval History  Ms. Kapoor's creatinine is 5.12 (05/05 0641); Trend down.  Good urine output.  Trend down in serum lipase.  Off insulin.  Other significant labs/tests/vitals: Stable electrolytes.  Stable hemoglobin.  Trend down in platelet level.  Trend down in WBC.  No new events overnight.  No chest pain or shortness of breath.  Some leg swelling.  No nausea and vomiting.  Bowel movements are none and not passing gas yet.  No fever, sweats or chills.    Review of Systems   4 point ROS was obtained and negative except as noted in the Interval History.    MEDICATIONS:  Current Facility-Administered Medications   Medication Dose Route Frequency Provider Last Rate Last Admin    acetaminophen (TYLENOL) oral liquid 975 mg  975 mg Oral Q8H Jean-Pierre Gleason MD   975 mg at 05/05/25 1043    aspirin (ASA) chewable tablet 81 mg  81 mg Oral or NG Tube Daily Jean-Pierre Gleason MD   81 mg at 05/05/25 0757    [START ON 5/8/2025] calcium carbonate-vitamin D (OSCAL) 500-5 MG-MCG per tablet 1 tablet  1 tablet Oral BID w/meals Jean-Pierre Gleason MD        [START ON 5/10/2025] clotrimazole (MYCELEX) lozenge 10 mg  10 mg Buccal 4x Daily Jean-Pierre Gleason MD        famotidine (PEPCID) tablet 20 mg  20 mg Oral Daily Jean-Pierre Gleason MD   20 mg at 05/05/25 0757    Or    famotidine (PEPCID) injection 20 mg  20 mg Intravenous Daily Jean-Pierre Gleason MD        insulin aspart (NovoLOG) injection (RAPID ACTING)  1-7 Units Subcutaneous Q4H Aishwarya Del Toro APRN CNP        magnesium oxide (MAG-OX) tablet 400 mg  400 mg Oral Q24H Jean-Pierre Gleason  MD Chapo   400 mg at 05/05/25 1127    micafungin (MYCAMINE) 100 mg in sodium chloride 0.9 % 100 mL intermittent infusion  100 mg Intravenous Q24H Jean-Pierre Gleason  mL/hr at 05/05/25 0017 100 mg at 05/05/25 0017    mycophenolate (CELLCEPT BRAND) suspension 1,000 mg  1,000 mg Oral or NG Tube BID IS Jean-Pierre Gleason MD   1,000 mg at 05/05/25 0758    octreotide (sandoSTATIN) injection 100 mcg  100 mcg Subcutaneous TID Jean-Pierre Gleason MD   100 mcg at 05/05/25 1358    pantoprazole (PROTONIX) 2 mg/mL suspension 40 mg  40 mg Per NG tube Daily Jean-Pierre Gleason MD   40 mg at 05/05/25 0758    piperacillin-tazobactam (ZOSYN) 2.25 g vial to attach to  ml bag  2.25 g Intravenous Q6H Jean-Pierre Gleason MD   2.25 g at 05/05/25 0935    polyethylene glycol (MIRALAX) Packet 17 g  17 g Oral Daily Jean-Pierre Gleason MD   17 g at 05/05/25 0758    senna-docusate (SENOKOT-S/PERICOLACE) 8.6-50 MG per tablet 1 tablet  1 tablet Oral BID Jean-Pierre Gleason MD   1 tablet at 05/05/25 0758    sodium bicarbonate tablet 1,300 mg  1,300 mg Oral BID Aishwarya Del Toro, APRN CNP        sodium chloride (PF) 0.9% PF flush 10 mL  10 mL Intracatheter Q8H Jean-Pierre Gleason MD   10 mL at 05/04/25 1128    sodium chloride (PF) 0.9% PF flush 3 mL  3 mL Intravenous Q8H Jean-Pierre Gleason MD   3 mL at 05/05/25 0801    sulfamethoxazole-trimethoprim (BACTRIM/SEPTRA) suspension 80 mg  10 mL Per NG tube Once per day on Monday Wednesday Friday Jean-Pierre Gleason MD   80 mg at 05/05/25 0801    tacrolimus (GENERIC) suspension 2 mg  2 mg Oral BID IS Jean-Pierre Gleason MD   2 mg at 05/05/25 0758    valGANciclovir (VALCYTE) solution 450 mg  450 mg Oral Once per day on Monday Thursday Jean-Pierre Gleason MD   450 mg at 05/05/25 0758     Current Facility-Administered  Medications   Medication Dose Route Frequency Provider Last Rate Last Admin    dextrose 10% infusion   Intravenous Continuous PRN Jean-Pierre Gleason MD        heparin infusion 25,000 units in 0.45% NaCl 250 mL ANTICOAGULANT  400 Units/hr Intravenous Continuous Aishwarya Del Toro APRN CNP 4 mL/hr at 25 0934 400 Units/hr at 25 0934       Physical Exam   Temp  Av.8  F (37.1  C)  Min: 98.5  F (36.9  C)  Max: 99  F (37.2  C)  Arterial Line BP  Min: 82/57  Max: 101/55  Arterial Line MAP (mmHg)  Av.8 mmHg  Min: 21 mmHg  Max: 67 mmHg      Pulse  Av.6  Min: 91  Max: 102 Resp  Av.6  Min: 14  Max: 23  SpO2  Av.6 %  Min: 98 %  Max: 100 %    CVP (mmHg): 11 mmHgBP (!) 165/85 (BP Location: Left arm)   Pulse 82   Temp (!) 96.3  F (35.7  C) (Axillary)   Resp 16   Ht 1.524 m (5')   Wt 73.4 kg (161 lb 13.1 oz)   SpO2 99%   BMI 31.60 kg/m     Date 25 0700 - 25 0659   Shift 2239-8399 7479-2739 3531-2429 24 Hour Total   INTAKE   Shift Total(mL/kg)       OUTPUT   Urine 5   5   Shift Total(mL/kg) 5(0.08)   5(0.08)   Weight (kg) 62.69 62.69 62.69 62.69      Admit Weight: 62.7 kg (138 lb 3.2 oz)     GENERAL APPEARANCE: alert and no distress  HENT: mouth without ulcers or lesions, NG in place  RESP: lungs clear to auscultation - no rales, rhonchi or wheezes  CV: regular rhythm, normal rate, no rub, no murmur  EDEMA: trace to 1+ LE edema bilaterally  ABDOMEN: soft, nondistended, mildly tender, bowel sounds a little quiet  MS: extremities normal - no gross deformities noted, no evidence of inflammation in joints, no muscle tenderness  SKIN: no rash  TX KIDNEY: mild TTP  DIALYSIS ACCESS:  Peritoneal dialysis catheter    Data   All labs reviewed by me.  CMP  Recent Labs   Lab 25  1124 25  0808 25  0641 25  0544 25  0850 25  0728 25  0613 25  0426 25  0114 25  0022 25  1500 25  1430 25  0526  05/03/25  0356 05/02/25  1714 05/02/25  1652   NA  --   --  140  --   --   --   --  137  --   --   --  136  --  137   < > 140   POTASSIUM  --   --  4.1  --   --  4.6  --  4.6  4.6  --  4.6   < > 4.7  4.7   < > 3.9   < > 3.9   CHLORIDE  --   --  108*  --   --   --   --  105  --   --   --  105  --  104  --  103   CO2  --   --  18*  --   --   --   --  19*  --   --   --  20*  --  15*  --  25   ANIONGAP  --   --  14  --   --   --   --  13  --   --   --  11  --  18*  --  12   * 119* 135* 125*   < >  --    < > 107*   < >  --    < > 84   < > 120*   < > 105*   BUN  --   --  51.8*  --   --   --   --  48.1*  --   --   --  42.9*  --  38.8*  --  45.6*   CR  --   --  5.12*  --   --   --   --  5.51*  --   --   --  5.37*  --  5.11*  --  6.11*   GFRESTIMATED  --   --  9*  --   --   --   --  8*  --   --   --  9*  --  9*  --  7*   VIJAY  --   --  7.6*  --   --   --   --  7.8*  --   --   --  7.7*  --  8.8  --  9.7   MAG  --   --  1.9  --   --   --   --  1.8  --   --   --  1.7  --  1.9  --   --    PHOS  --   --  7.2*  --   --   --   --  6.8*  --   --   --   --   --  3.1  --   --    PROTTOTAL  --   --   --   --   --   --   --   --   --   --   --   --   --   --   --  7.9   ALBUMIN  --   --   --   --   --   --   --   --   --   --   --   --   --   --   --  3.7   BILITOTAL  --   --   --   --   --   --   --   --   --   --   --   --   --   --   --  0.2   ALKPHOS  --   --   --   --   --   --   --   --   --   --   --   --   --   --   --  84   AST  --   --   --   --   --   --   --   --   --   --   --   --   --   --   --  29   ALT  --   --   --   --   --   --   --   --   --   --   --   --   --   --   --  18    < > = values in this interval not displayed.     CBC  Recent Labs   Lab 05/05/25  0641 05/04/25  1555 05/04/25  0728 05/04/25  0426 05/03/25  1758 05/03/25  1430 05/03/25  0948 05/03/25  0356   HGB 7.2* 7.6* 7.7* 7.6*  7.6*   < > 7.7*  7.7*   < > 9.5*   WBC 10.0  --   --  16.9*  --  22.5*  --  18.6*   RBC 2.44*  --   --  2.56*  --   2.62*  --  3.14*   HCT 22.0*  --   --  23.0*  --  24.3*  --  30.2*   MCV 90  --   --  90  --  92  --  96   MCH 29.5  --   --  29.3  --  30.2  --  30.3   MCHC 32.7  --   --  33.3  --  32.2  --  31.5   RDW 16.4*  --   --  16.0*  --  16.2*  --  15.2*   PLT 43*  --   --  78*  --  106*  --  153    < > = values in this interval not displayed.     INR  Recent Labs   Lab 05/05/25  0641 05/03/25  0356 05/02/25  1652   INR 0.91 1.26* 0.95   PTT 31 39* 30     ABG  Recent Labs   Lab 05/03/25  0257 05/03/25  0216 05/03/25  0107 05/03/25  0001   PH 7.29* 7.29* 7.25* 7.30*   PCO2 35 33* 34* 36   PO2 135* 137* 149* 125*   HCO3 17* 16* 15* 18*   O2PER 52.0 52.0 53.0 52.0      Urine Studies  Recent Labs   Lab Test 05/02/25  1635 08/02/23  1350   COLOR Light Yellow Straw   APPEARANCE Clear Clear   URINEGLC 30* 300*   URINEBILI Negative Negative   URINEKETONE Negative Negative   SG 1.014 1.012   UBLD Negative Negative   URINEPH 6.5 7.0   PROTEIN 300* 300*   NITRITE Negative Negative   LEUKEST Small* Negative   RBCU 3* <1   WBCU 16* 1     No lab results found.  PTH  No lab results found.  Iron Studies  No lab results found.    IMAGING:  All imaging studies reviewed by me.

## 2025-05-05 NOTE — PLAN OF CARE
Goal Outcome Evaluation:      Plan of Care Reviewed With: patient    Overall Patient Progress: improving    Outcome Evaluation: BGs stable; pain managed with prn robaxin and scheduled tylenol; good urine output    Psychosocial: Calm and cooperative   Neuro: Alert and oriented x4  Vitals: Hypertensive (165/76), other VSS on RA  Blood glucose: stable - q 2 hr checks - 113, 111, 110, and 126  Pain/nausea: denies nausea; pain 7/10 - prn robaxin and scheduled tylenol given, lowered to 3/10  Diet: NPO + ice chips  Lines: IJ - Hep continuous @ 200 units/hr, LR @ 75ml/hr; TKO + abx; PIV - SL  Tubes: NG to LIS + meds - 100 ml OP; JESUS - 60 ml OP  /GI: Merlos - 740 ml OP; no BM yet since surgery   Skin: moderate generalized edema; abd incision - op dressing on w/ dried drainage; JESUS site cdi; PD cath clamped   Mobility: assist of 1 w/ walker/GB, not oob this shift   New this shift/Plan: Lab book updated

## 2025-05-05 NOTE — PLAN OF CARE
Goal Outcome Evaluation:      Plan of Care Reviewed With: patient    Overall Patient Progress: no changeOverall Patient Progress: no change    Outcome Evaluation: pt is AOx4. VSS on RA. Denies pain or nausea.    Pt is AOX4. Hypertensive 170/70s, PRN labetalol given x2. BP's now 140/80s, on RA. Mild abdominal pain managed w/ scheduled tylenol and PRN robaxin. Pt reported new bladder spasms/pain - provider notified. Will assess with day team. Denies nausea. NG to LIS w/ 250mL OP. JESUS w/ serosang OP. Merlos w/ approximately 70mL/hr .  - 140. Continue with current POC and update MD with any changes.

## 2025-05-05 NOTE — PLAN OF CARE
Goal Outcome Evaluation:      Plan of Care Reviewed With: patient, significant other, child    Overall Patient Progress: no change    Outcome Evaluation: Pt minimally engaged in conversation due to discomfort. Family engaged and confirmed support plan post d/c. Family appeared open to offered support.

## 2025-05-05 NOTE — PROGRESS NOTES
Transplant Surgery  Inpatient Daily Progress Note  2025    Assessment & Plan: Wendy Kapoor is a 54 year old female who presents with a history of DM2, HTN, ESRD on PD since 2023, IgG Lambda MGUS, non-obstructive CAD, and anemia of chronic disease. On 2025 she was notified as an acceptable  donor organ became available and presented for further pre-operative work-up and on 2025 she underwent DBD SPK with stent, and open appendectomy which was well tolerated as performed by Dr. Gleason. Ms. Kapoor was admitted to  post-operatively for ongoing post-operative care.     Today:   -  mg. No thymo today with Plt of 43    - Increase Heparin gtt to 400 unit(s)/hr   - Switch insulin gtt to sliding scale insulin   - Add PRN oxycodone   - Lasix 40 mg IV x 1   - Discontinue MIVF   - Start sodium bicarb 1300 mg BID   - Start carvedilol 12.5 mg BID                                                                                                                                 Graft function: POD #3  Kidney: Cr 5.12 today (5.51). Post-op US patent Doppler. UOP 1.8L yesterday.   - Transplant Nephrology following    - Lasix 40 mg IV x 1    Pancreas: Amylase 42 (119), lipase 43 (98). Post-op US patient Doppler. Has not required insulin post-operatively.    - Switch to sliding scale insulin today. Monitor blood glucose Q4H   - ASA 81 mg    - Octreotide subcutaneous x 5 days   - Heparin straight rate 400 units/hour     Immunosuppressed status secondary to medications: cPRA 98  Thymo: 125 mg POD#0, Thymo 125 mg POD#2. Holding Thymo today with Plt of 43.  Steroids:  mg POD#0,  mg POD#2, 100 mg today  MMF: 1000 mg BID  Tacro: Goal level 8-10.    Hematology:   Anemia of chronic disease; Acute blood loss anemia: Hgb 7.2 (7.6). Last transfused 1 unit pRBC on 5/3. Continue to monitor.  Thrombocytopenia: Plt 43 (78). Secondary to induction IS. Monitor.     Cardiorespiratory:   Hypertension: PTA  Cardizem 180 mg daily, Hydralazine 50 mg TID, losartan 100 mg daily, Toprol  mg daily.   - Start carvedilol 12.5 mg BID    GI/Nutrition:  Diet: NPO, resume when appropriate with return of bowel function.   Bowel regimen: Senna, PEG    Fluid/Electrolytes: MIVF: Discontinue today  Low bicarb: start bicarb 1300 mg BID   Hypocalcemia: calcium 500 mg BID  Hyperphosphatemia: monitor    : Merlos to remain due to new surgical anastomosis x 7 days.    Infectious disease: Afebrile.   Leukocytosis: Secondary to steroids. Improving.    Prophylaxis: DVT, fall, GI (PPI), viral (Valcyte), pneumocystis (Bactrim), fungal (micafungin x 6 doses then clotrimazole)     Disposition: 7A     YNES/Fellow/Resident Provider: MIA Lopez CNP, René/pager [phone]    I saw and evaluated this patient as part of a shared visit. I spent 40 minutes on review of labs and imaging, exam, care coordination, and counseling.  Medically Ready for Discharge: Anticipated in 5+ Days       Faculty: Jose Wyatt M.D., Ph.D.  _________________________________________________________________    Interval History: History from patient and/or EMR  Overnight events: No acute events overnight. Not passing gas. Denies nausea. Feeling hungry.    ROS:   A 10-point review of systems was negative except as noted above.    Meds:  Current Facility-Administered Medications   Medication Dose Route Frequency Provider Last Rate Last Admin    acetaminophen (TYLENOL) oral liquid 975 mg  975 mg Oral Q8H Jean-Pierre Gleason MD   975 mg at 05/05/25 0251    aspirin (ASA) chewable tablet 81 mg  81 mg Oral or NG Tube Daily Jean-Pierre Gleason MD   81 mg at 05/05/25 0757    [START ON 5/8/2025] calcium carbonate-vitamin D (OSCAL) 500-5 MG-MCG per tablet 1 tablet  1 tablet Oral BID w/meals Jean-Pierre Gleason, MD        [START ON 5/10/2025] clotrimazole (MYCELEX) lozenge 10 mg  10 mg Buccal 4x Daily Jean-Pierre Gleason MD         famotidine (PEPCID) tablet 20 mg  20 mg Oral Daily Jean-Pierre Gleason MD   20 mg at 05/05/25 0757    Or    famotidine (PEPCID) injection 20 mg  20 mg Intravenous Daily Jean-Pierre Gleason MD        insulin aspart (NovoLOG) injection (RAPID ACTING)  1-7 Units Subcutaneous Q4H Aishwarya Del Toro APRN CNP        magnesium oxide (MAG-OX) tablet 400 mg  400 mg Oral Q24H Jean-Pierre Gleason MD        micafungin (MYCAMINE) 100 mg in sodium chloride 0.9 % 100 mL intermittent infusion  100 mg Intravenous Q24H Jean-Pierre Gleason  mL/hr at 05/05/25 0017 100 mg at 05/05/25 0017    mycophenolate (CELLCEPT BRAND) suspension 1,000 mg  1,000 mg Oral or NG Tube BID IS Jean-Pierre Gleason MD   1,000 mg at 05/05/25 0758    octreotide (sandoSTATIN) injection 100 mcg  100 mcg Subcutaneous TID Jean-Pierre Gleason MD   100 mcg at 05/05/25 0814    pantoprazole (PROTONIX) 2 mg/mL suspension 40 mg  40 mg Per NG tube Daily Jean-Pierre Gleason MD   40 mg at 05/05/25 0758    piperacillin-tazobactam (ZOSYN) 2.25 g vial to attach to  ml bag  2.25 g Intravenous Q6H Jean-Pierre Gleason MD   2.25 g at 05/05/25 0935    polyethylene glycol (MIRALAX) Packet 17 g  17 g Oral Daily Jean-Pierre Gleason MD   17 g at 05/05/25 0758    senna-docusate (SENOKOT-S/PERICOLACE) 8.6-50 MG per tablet 1 tablet  1 tablet Oral BID Jean-Pierre Gleason MD   1 tablet at 05/05/25 0758    sodium chloride (PF) 0.9% PF flush 10 mL  10 mL Intracatheter Q8H Jean-Pierre Gleason MD   10 mL at 05/04/25 1128    sodium chloride (PF) 0.9% PF flush 3 mL  3 mL Intravenous Q8H Jean-Pierre Gleason MD   3 mL at 05/05/25 0801    sulfamethoxazole-trimethoprim (BACTRIM/SEPTRA) suspension 80 mg  10 mL Per NG tube Once per day on Monday Wednesday Friday Jean-Pierre Gleason MD   80 mg at 05/05/25  0801    tacrolimus (GENERIC) suspension 2 mg  2 mg Oral BID IS Jean-Pierre Gleason MD   2 mg at 05/05/25 0758    valGANciclovir (VALCYTE) solution 450 mg  450 mg Oral Once per day on Monday Thursday Jean-Pierre Gleason MD   450 mg at 05/05/25 0758       Physical Exam:     Admit Weight: 62.7 kg (138 lb 3.2 oz)    Current vitals:   BP (!) 162/84 (BP Location: Right arm)   Pulse 89   Temp (!) 96.6  F (35.9  C) (Axillary)   Resp 16   Ht 1.524 m (5')   Wt 73.4 kg (161 lb 13.1 oz)   SpO2 98%   BMI 31.60 kg/m      Vital sign ranges:    Temp:  [96.6  F (35.9  C)-98  F (36.7  C)] 96.6  F (35.9  C)  Pulse:  [] 89  Resp:  [16-18] 16  BP: (134-172)/(61-85) 162/84  SpO2:  [96 %-99 %] 98 %    General Appearance: in no apparent distress.   Skin: Warm, perfused  Heart: she appears adequately perfused   Lungs: minimal oxygen requirement   Abdomen: The abdomen is non-distended. JESUS x 1 with serosang drainage, dressing intact.   : velázquez is present.  Urine is clear yellow  Extremities: edema: minimal generalized edema , strength adequate  Neurologic: awake, alert, and oriented. Tremor absent..     Data:   CMP  Recent Labs   Lab 05/05/25  0808 05/05/25  0641 05/04/25  0850 05/04/25  0728 05/04/25  0613 05/04/25  0426 05/03/25  0356 05/03/25  0257 05/03/25  0216 05/02/25  1714 05/02/25  1652   NA  --  140  --   --   --  137   < > 134* 135   < > 140   POTASSIUM  --  4.1  --  4.6  --  4.6  4.6   < > 3.6 3.6   < > 3.9   CHLORIDE  --  108*  --   --   --  105   < >  --   --   --  103   CO2  --  18*  --   --   --  19*   < >  --   --   --  25   * 135*   < >  --    < > 107*   < > 83 106*   < > 105*   BUN  --  51.8*  --   --   --  48.1*   < >  --   --   --  45.6*   CR  --  5.12*  --   --   --  5.51*   < >  --   --   --  6.11*   GFRESTIMATED  --  9*  --   --   --  8*   < >  --   --   --  7*   VIJAY  --  7.6*  --   --   --  7.8*   < >  --   --   --  9.7   ICAW  --   --   --   --   --   --   --  5.4*  4.2*   < >  --    MAG  --  1.9  --   --   --  1.8   < >  --   --   --   --    PHOS  --  7.2*  --   --   --  6.8*   < >  --   --   --   --    AMYLASE  --  42  --   --   --  119*   < >  --   --   --  50   LIPASE  --  43  --   --   --  98*   < >  --   --   --  106*   ALBUMIN  --   --   --   --   --   --   --   --   --   --  3.7   BILITOTAL  --   --   --   --   --   --   --   --   --   --  0.2   ALKPHOS  --   --   --   --   --   --   --   --   --   --  84   AST  --   --   --   --   --   --   --   --   --   --  29   ALT  --   --   --   --   --   --   --   --   --   --  18    < > = values in this interval not displayed.     CBC  Recent Labs   Lab 05/05/25  0641 05/04/25  1555 05/04/25  0728 05/04/25  0426 05/03/25  1430 05/03/25  0948   HGB 7.2* 7.6*   < > 7.6*  7.6*   < > 7.8*   WBC 10.0  --   --  16.9*   < >  --    PLT 43*  --   --  78*   < >  --    A1C  --   --   --   --   --  5.9*    < > = values in this interval not displayed.

## 2025-05-06 ENCOUNTER — APPOINTMENT (OUTPATIENT)
Dept: PHYSICAL THERAPY | Facility: CLINIC | Age: 55
End: 2025-05-06
Attending: PHYSICIAN ASSISTANT
Payer: COMMERCIAL

## 2025-05-06 ENCOUNTER — TELEPHONE (OUTPATIENT)
Dept: PHARMACY | Facility: CLINIC | Age: 55
End: 2025-05-06
Payer: COMMERCIAL

## 2025-05-06 LAB
ABO + RH BLD: NORMAL
AMYLASE SERPL-CCNC: 51 U/L (ref 28–100)
ANION GAP SERPL CALCULATED.3IONS-SCNC: 15 MMOL/L (ref 7–15)
APTT PPP: 32 SECONDS (ref 22–38)
ATRIAL RATE - MUSE: 70 BPM
BASOPHILS # BLD AUTO: 0 10E3/UL (ref 0–0.2)
BASOPHILS NFR BLD AUTO: 0 %
BLD GP AB SCN SERPL QL: NEGATIVE
BLD PROD TYP BPU: NORMAL
BLOOD COMPONENT TYPE: NORMAL
BUN SERPL-MCNC: 59.7 MG/DL (ref 6–20)
CALCIUM SERPL-MCNC: 7.6 MG/DL (ref 8.8–10.4)
CHLORIDE SERPL-SCNC: 107 MMOL/L (ref 98–107)
CODING SYSTEM: NORMAL
CREAT SERPL-MCNC: 4.86 MG/DL (ref 0.51–0.95)
CROSSMATCH: NORMAL
DIASTOLIC BLOOD PRESSURE - MUSE: NORMAL MMHG
EGFRCR SERPLBLD CKD-EPI 2021: 10 ML/MIN/1.73M2
EOSINOPHIL # BLD AUTO: 0 10E3/UL (ref 0–0.7)
EOSINOPHIL NFR BLD AUTO: 0 %
ERYTHROCYTE [DISTWIDTH] IN BLOOD BY AUTOMATED COUNT: 16 % (ref 10–15)
FIBRINOGEN PPP-MCNC: 317 MG/DL (ref 170–510)
GLUCOSE BLDC GLUCOMTR-MCNC: 102 MG/DL (ref 70–99)
GLUCOSE BLDC GLUCOMTR-MCNC: 105 MG/DL (ref 70–99)
GLUCOSE BLDC GLUCOMTR-MCNC: 113 MG/DL (ref 70–99)
GLUCOSE BLDC GLUCOMTR-MCNC: 119 MG/DL (ref 70–99)
GLUCOSE BLDC GLUCOMTR-MCNC: 119 MG/DL (ref 70–99)
GLUCOSE BLDC GLUCOMTR-MCNC: 121 MG/DL (ref 70–99)
GLUCOSE BLDC GLUCOMTR-MCNC: 126 MG/DL (ref 70–99)
GLUCOSE SERPL-MCNC: 119 MG/DL (ref 70–99)
HCO3 SERPL-SCNC: 18 MMOL/L (ref 22–29)
HCT VFR BLD AUTO: 20.5 % (ref 35–47)
HGB BLD-MCNC: 6.8 G/DL (ref 11.7–15.7)
IMM GRANULOCYTES # BLD: 0.2 10E3/UL
IMM GRANULOCYTES NFR BLD: 2 %
INR PPP: 0.94 (ref 0.85–1.15)
INTERPRETATION ECG - MUSE: NORMAL
ISSUE DATE AND TIME: NORMAL
LIPASE SERPL-CCNC: 65 U/L (ref 13–60)
LYMPHOCYTES # BLD AUTO: <0.1 10E3/UL (ref 0.8–5.3)
LYMPHOCYTES NFR BLD AUTO: 0 %
MAGNESIUM SERPL-MCNC: 2.1 MG/DL (ref 1.7–2.3)
MCH RBC QN AUTO: 29.7 PG (ref 26.5–33)
MCHC RBC AUTO-ENTMCNC: 33.2 G/DL (ref 31.5–36.5)
MCV RBC AUTO: 90 FL (ref 78–100)
MONOCYTES # BLD AUTO: 0.2 10E3/UL (ref 0–1.3)
MONOCYTES NFR BLD AUTO: 2 %
NEUTROPHILS # BLD AUTO: 9.7 10E3/UL (ref 1.6–8.3)
NEUTROPHILS NFR BLD AUTO: 95 %
NRBC # BLD AUTO: 0 10E3/UL
NRBC BLD AUTO-RTO: 0 /100
P AXIS - MUSE: 23 DEGREES
PHOSPHATE SERPL-MCNC: 7.3 MG/DL (ref 2.5–4.5)
PLATELET # BLD AUTO: 34 10E3/UL (ref 150–450)
POTASSIUM SERPL-SCNC: 3.8 MMOL/L (ref 3.4–5.3)
PR INTERVAL - MUSE: 162 MS
PROTHROMBIN TIME: 12.9 SECONDS (ref 11.8–14.8)
QRS DURATION - MUSE: 86 MS
QT - MUSE: 414 MS
QTC - MUSE: 447 MS
R AXIS - MUSE: 13 DEGREES
RBC # BLD AUTO: 2.29 10E6/UL (ref 3.8–5.2)
SODIUM SERPL-SCNC: 140 MMOL/L (ref 135–145)
SPECIMEN EXP DATE BLD: NORMAL
SYSTOLIC BLOOD PRESSURE - MUSE: NORMAL MMHG
T AXIS - MUSE: 85 DEGREES
TACROLIMUS BLD-MCNC: 11.4 UG/L (ref 5–15)
TME LAST DOSE: NORMAL H
TME LAST DOSE: NORMAL H
UFH PPP CHRO-ACNC: <0.1 IU/ML
UNIT ABO/RH: NORMAL
UNIT NUMBER: NORMAL
UNIT STATUS: NORMAL
UNIT TYPE ISBT: 7300
VENTRICULAR RATE- MUSE: 70 BPM
WBC # BLD AUTO: 10.2 10E3/UL (ref 4–11)

## 2025-05-06 PROCEDURE — 250N000013 HC RX MED GY IP 250 OP 250 PS 637: Performed by: SURGERY

## 2025-05-06 PROCEDURE — 250N000012 HC RX MED GY IP 250 OP 636 PS 637: Performed by: SURGERY

## 2025-05-06 PROCEDURE — 83690 ASSAY OF LIPASE: CPT | Performed by: SURGERY

## 2025-05-06 PROCEDURE — 120N000011 HC R&B TRANSPLANT UMMC

## 2025-05-06 PROCEDURE — 80197 ASSAY OF TACROLIMUS: CPT | Performed by: SURGERY

## 2025-05-06 PROCEDURE — 86923 COMPATIBILITY TEST ELECTRIC: CPT | Performed by: PHYSICIAN ASSISTANT

## 2025-05-06 PROCEDURE — 250N000011 HC RX IP 250 OP 636: Performed by: PHYSICIAN ASSISTANT

## 2025-05-06 PROCEDURE — P9016 RBC LEUKOCYTES REDUCED: HCPCS | Performed by: NURSE PRACTITIONER

## 2025-05-06 PROCEDURE — 36415 COLL VENOUS BLD VENIPUNCTURE: CPT | Performed by: SURGERY

## 2025-05-06 PROCEDURE — 250N000012 HC RX MED GY IP 250 OP 636 PS 637: Performed by: PHYSICIAN ASSISTANT

## 2025-05-06 PROCEDURE — 82150 ASSAY OF AMYLASE: CPT | Performed by: SURGERY

## 2025-05-06 PROCEDURE — 83735 ASSAY OF MAGNESIUM: CPT | Performed by: SURGERY

## 2025-05-06 PROCEDURE — 258N000003 HC RX IP 258 OP 636: Performed by: SURGERY

## 2025-05-06 PROCEDURE — 97530 THERAPEUTIC ACTIVITIES: CPT | Mod: GP

## 2025-05-06 PROCEDURE — 85610 PROTHROMBIN TIME: CPT | Performed by: STUDENT IN AN ORGANIZED HEALTH CARE EDUCATION/TRAINING PROGRAM

## 2025-05-06 PROCEDURE — 86923 COMPATIBILITY TEST ELECTRIC: CPT | Performed by: NURSE PRACTITIONER

## 2025-05-06 PROCEDURE — 86900 BLOOD TYPING SEROLOGIC ABO: CPT | Performed by: NURSE PRACTITIONER

## 2025-05-06 PROCEDURE — 85025 COMPLETE CBC W/AUTO DIFF WBC: CPT | Performed by: SURGERY

## 2025-05-06 PROCEDURE — 250N000011 HC RX IP 250 OP 636: Performed by: SURGERY

## 2025-05-06 PROCEDURE — 250N000011 HC RX IP 250 OP 636: Performed by: STUDENT IN AN ORGANIZED HEALTH CARE EDUCATION/TRAINING PROGRAM

## 2025-05-06 PROCEDURE — 80048 BASIC METABOLIC PNL TOTAL CA: CPT | Performed by: SURGERY

## 2025-05-06 PROCEDURE — 85384 FIBRINOGEN ACTIVITY: CPT | Performed by: STUDENT IN AN ORGANIZED HEALTH CARE EDUCATION/TRAINING PROGRAM

## 2025-05-06 PROCEDURE — 99233 SBSQ HOSP IP/OBS HIGH 50: CPT | Performed by: INTERNAL MEDICINE

## 2025-05-06 PROCEDURE — 86901 BLOOD TYPING SEROLOGIC RH(D): CPT | Performed by: NURSE PRACTITIONER

## 2025-05-06 PROCEDURE — 97161 PT EVAL LOW COMPLEX 20 MIN: CPT | Mod: GP

## 2025-05-06 PROCEDURE — 36592 COLLECT BLOOD FROM PICC: CPT | Performed by: PHYSICIAN ASSISTANT

## 2025-05-06 PROCEDURE — 250N000013 HC RX MED GY IP 250 OP 250 PS 637

## 2025-05-06 PROCEDURE — 85520 HEPARIN ASSAY: CPT | Performed by: SURGERY

## 2025-05-06 PROCEDURE — 86022 PLATELET ANTIBODIES: CPT | Performed by: PHYSICIAN ASSISTANT

## 2025-05-06 PROCEDURE — 250N000013 HC RX MED GY IP 250 OP 250 PS 637: Performed by: PHYSICIAN ASSISTANT

## 2025-05-06 PROCEDURE — 85730 THROMBOPLASTIN TIME PARTIAL: CPT | Performed by: STUDENT IN AN ORGANIZED HEALTH CARE EDUCATION/TRAINING PROGRAM

## 2025-05-06 PROCEDURE — 84100 ASSAY OF PHOSPHORUS: CPT | Performed by: SURGERY

## 2025-05-06 RX ORDER — CARVEDILOL 25 MG/1
25 TABLET ORAL 2 TIMES DAILY
Status: DISPENSED | OUTPATIENT
Start: 2025-05-06

## 2025-05-06 RX ORDER — CARVEDILOL 12.5 MG/1
12.5 TABLET ORAL ONCE
Status: DISCONTINUED | OUTPATIENT
Start: 2025-05-06 | End: 2025-05-08

## 2025-05-06 RX ORDER — FUROSEMIDE 10 MG/ML
40 INJECTION INTRAMUSCULAR; INTRAVENOUS ONCE
Status: COMPLETED | OUTPATIENT
Start: 2025-05-06 | End: 2025-05-06

## 2025-05-06 RX ADMIN — CARVEDILOL 12.5 MG: 12.5 TABLET, FILM COATED ORAL at 08:00

## 2025-05-06 RX ADMIN — PIPERACILLIN AND TAZOBACTAM 2.25 G: 2; .25 INJECTION, POWDER, FOR SOLUTION INTRAVENOUS at 03:18

## 2025-05-06 RX ADMIN — LABETALOL HYDROCHLORIDE 20 MG: 5 INJECTION, SOLUTION INTRAVENOUS at 22:37

## 2025-05-06 RX ADMIN — TACROLIMUS 2 MG: 5 CAPSULE ORAL at 08:00

## 2025-05-06 RX ADMIN — METHOCARBAMOL 500 MG: 500 TABLET ORAL at 20:50

## 2025-05-06 RX ADMIN — ACETAMINOPHEN 975 MG: 325 SOLUTION ORAL at 03:18

## 2025-05-06 RX ADMIN — MAGNESIUM HYDROXIDE 30 ML: 400 SUSPENSION ORAL at 10:58

## 2025-05-06 RX ADMIN — OCTREOTIDE ACETATE 100 MCG: 100 INJECTION, SOLUTION INTRAVENOUS; SUBCUTANEOUS at 08:01

## 2025-05-06 RX ADMIN — Medication 40 MG: at 08:00

## 2025-05-06 RX ADMIN — SODIUM BICARBONATE 1300 MG: 650 TABLET ORAL at 20:05

## 2025-05-06 RX ADMIN — ACETAMINOPHEN 975 MG: 325 SOLUTION ORAL at 11:21

## 2025-05-06 RX ADMIN — LABETALOL HYDROCHLORIDE 10 MG: 5 INJECTION, SOLUTION INTRAVENOUS at 21:56

## 2025-05-06 RX ADMIN — ASPIRIN 81 MG CHEWABLE TABLET 81 MG: 81 TABLET CHEWABLE at 07:53

## 2025-05-06 RX ADMIN — MICAFUNGIN 100 MG: 20 INJECTION, POWDER, LYOPHILIZED, FOR SOLUTION INTRAVENOUS at 23:27

## 2025-05-06 RX ADMIN — OXYCODONE HYDROCHLORIDE 5 MG: 5 SOLUTION ORAL at 08:43

## 2025-05-06 RX ADMIN — LABETALOL HYDROCHLORIDE 10 MG: 5 INJECTION, SOLUTION INTRAVENOUS at 04:32

## 2025-05-06 RX ADMIN — TACROLIMUS 1.5 MG: 5 CAPSULE ORAL at 19:11

## 2025-05-06 RX ADMIN — LABETALOL HYDROCHLORIDE 10 MG: 5 INJECTION, SOLUTION INTRAVENOUS at 15:08

## 2025-05-06 RX ADMIN — FAMOTIDINE 20 MG: 10 INJECTION, SOLUTION INTRAVENOUS at 07:53

## 2025-05-06 RX ADMIN — LABETALOL HYDROCHLORIDE 10 MG: 5 INJECTION, SOLUTION INTRAVENOUS at 14:04

## 2025-05-06 RX ADMIN — SODIUM BICARBONATE 1300 MG: 650 TABLET ORAL at 07:53

## 2025-05-06 RX ADMIN — SENNOSIDES AND DOCUSATE SODIUM 1 TABLET: 50; 8.6 TABLET ORAL at 07:54

## 2025-05-06 RX ADMIN — MYCOPHENOLATE MOFETIL 1000 MG: 200 POWDER, FOR SUSPENSION ORAL at 19:11

## 2025-05-06 RX ADMIN — FUROSEMIDE 40 MG: 10 INJECTION, SOLUTION INTRAMUSCULAR; INTRAVENOUS at 10:58

## 2025-05-06 RX ADMIN — LABETALOL HYDROCHLORIDE 20 MG: 5 INJECTION, SOLUTION INTRAVENOUS at 23:27

## 2025-05-06 RX ADMIN — OCTREOTIDE ACETATE 100 MCG: 100 INJECTION, SOLUTION INTRAVENOUS; SUBCUTANEOUS at 20:05

## 2025-05-06 RX ADMIN — OXYCODONE HYDROCHLORIDE 5 MG: 5 SOLUTION ORAL at 20:50

## 2025-05-06 RX ADMIN — SENNOSIDES AND DOCUSATE SODIUM 1 TABLET: 50; 8.6 TABLET ORAL at 20:05

## 2025-05-06 RX ADMIN — MAGNESIUM OXIDE TAB 400 MG (241.3 MG ELEMENTAL MG) 400 MG: 400 (241.3 MG) TAB at 11:21

## 2025-05-06 RX ADMIN — ACETAMINOPHEN 975 MG: 325 SOLUTION ORAL at 20:00

## 2025-05-06 RX ADMIN — OXYCODONE HYDROCHLORIDE 5 MG: 5 SOLUTION ORAL at 15:19

## 2025-05-06 RX ADMIN — POLYETHYLENE GLYCOL 3350 17 G: 17 POWDER, FOR SOLUTION ORAL at 07:54

## 2025-05-06 RX ADMIN — OCTREOTIDE ACETATE 100 MCG: 100 INJECTION, SOLUTION INTRAVENOUS; SUBCUTANEOUS at 14:07

## 2025-05-06 RX ADMIN — CARVEDILOL 25 MG: 25 TABLET, FILM COATED ORAL at 20:04

## 2025-05-06 RX ADMIN — LABETALOL HYDROCHLORIDE 20 MG: 5 INJECTION, SOLUTION INTRAVENOUS at 05:06

## 2025-05-06 RX ADMIN — MYCOPHENOLATE MOFETIL 1000 MG: 200 POWDER, FOR SUSPENSION ORAL at 08:00

## 2025-05-06 ASSESSMENT — ACTIVITIES OF DAILY LIVING (ADL)
ADLS_ACUITY_SCORE: 66
ADLS_ACUITY_SCORE: 62
ADLS_ACUITY_SCORE: 66
ADLS_ACUITY_SCORE: 62

## 2025-05-06 NOTE — PROGRESS NOTES
"   25 1624   Appointment Info   Signing Clinician's Name / Credentials (PT) Delmis Bernabe PT, DPT   Rehab Comments (PT) abd prec   Living Environment   People in Home spouse;child(elizabeth), adult   Current Living Arrangements house   Home Accessibility wheelchair accessible   Transportation Anticipated car, drives self;family or friend will provide   Living Environment Comments Pt lives with spouse and 2 dtrs (16, 19 yo), home is fully accessible with grab bars t/o   Self-Care   Usual Activity Tolerance good   Current Activity Tolerance moderate   Regular Exercise No   Equipment Currently Used at Home none   Fall history within last six months no   Activity/Exercise/Self-Care Comment Pt denies falls, reports IND with mobility and ADLs, family very supportive. drivingb ut not working PTA   General Information   Onset of Illness/Injury or Date of Surgery 25   Referring Physician Kristy Layne PA-C   Patient/Family Therapy Goals Statement (PT) return home   Pertinent History of Current Problem (include personal factors and/or comorbidities that impact the POC) per EMR \" Wendy Kapoor is a 54 year old female who presents with a history of DM2, HTN, ESRD on PD since 2023, IgG Lambda MGUS, non-obstructive CAD, and anemia of chronic disease. On 2025 she was notified as an acceptable  donor organ became available and presented for further pre-operative work-up and on 2025 she underwent DBD SPK with stent, and open appendectomy which was well tolerated as performed by Dr. Gleason. Ms. Kapoor was admitted to 7A post-operatively for ongoing post-operative care. \"   Existing Precautions/Restrictions fall;abdominal   General Observations activity: ambulate   Cognition   Affect/Mental Status (Cognition) WFL   Pain Assessment   Patient Currently in Pain Yes, see Vital Sign flowsheet   Integumentary/Edema   Integumentary/Edema Comments consistent with procedure   Posture    Posture " Forward head position;Kyphosis   Range of Motion (ROM)   Range of Motion ROM is WFL   Strength (Manual Muscle Testing)   Strength Comments deconditioning post op   Bed Mobility   Comment, (Bed Mobility) Ax1, limited by pain/precautions   Transfers   Comment, (Transfers) min A x1 STS from recliner   Gait/Stairs (Locomotion)   Comment, (Gait/Stairs) CGA with walker, increased pain   Balance   Balance Comments impaired, benefits from UE support   Sensory Examination   Sensory Perception patient reports no sensory changes   Clinical Impression   Criteria for Skilled Therapeutic Intervention Yes, treatment indicated   PT Diagnosis (PT) impaired functional mobilit   Influenced by the following impairments impaired strength, balance, act tolerance, pain, post op precautions   Functional limitations due to impairments bed mobility, transfers, gait   Clinical Presentation (PT Evaluation Complexity) evolving   Clinical Presentation Rationale post op, clinical judgement   Clinical Decision Making (Complexity) low complexity   Planned Therapy Interventions (PT) balance training;bed mobility training;gait training;home exercise program;motor coordination training;neuromuscular re-education;patient/family education;ROM (range of motion);stair training;strengthening;transfer training;progressive activity/exercise;home program guidelines;risk factor education   Risk & Benefits of therapy have been explained evaluation/treatment results reviewed;care plan/treatment goals reviewed;risks/benefits reviewed;current/potential barriers reviewed;participants voiced agreement with care plan;participants included;patient;spouse/significant other;son   PT Total Evaluation Time   PT Eval, Low Complexity Minutes (09133) 8   Physical Therapy Goals   PT Frequency 6x/week   PT Predicted Duration/Target Date for Goal Attainment 07/12/25   PT Goals Bed Mobility;Transfers;Gait   PT: Bed Mobility Independent;Supine to/from sit;Rolling;Within  precautions   PT: Transfers Modified independent;Sit to/from stand;Bed to/from chair;Assistive device;Within precautions   PT: Gait Modified independent;Greater than 200 feet;Within precautions;Rolling walker   PT Discharge Planning   PT Plan progress gait with chair follow, ind flat bed mobility, ind transfers   PT Discharge Recommendation (DC Rec) home with assist;home with home care physical therapy   PT Rationale for DC Rec Pt presenting below baseline, requiring Ax1 for mobility, has strong support from family and returning to accessible home. Anticipate pt will continue to progress to home with family A. May need walker for discharge, rec HHPT for ongoign strength and conditioning post hospitalization   PT Brief overview of current status Ax1 with walker, encourage amb in peoples   PT Total Distance Amb During Session (feet) 5   Physical Therapy Time and Intention   Total Session Time (sum of timed and untimed services) 8

## 2025-05-06 NOTE — PROGRESS NOTES
[facility]  Transplant Nephrology Progress Note  Date of Admission:  5/2/2025  Today's Date: 05/06/2025    Recommendations:    - No acute indications for dialysis.  - Agree with high intensity induction, but agree with holding rATG due to thrombocytopenia.  - Recommend increasing carvedilol to 25 mg bid.  - Agree with blood transfusion.    Assessment & Plan   # DDKT (SPK): Trend down in creatinine.  Good urine output.  No acute indications for dialysis.     - Baseline Creatinine: ~ TBD   - Proteinuria: Not checked post transplant   - DSA Hx: No DSA at time of transplant   - Last cPRA: 98%   - BK Viremia: Not checked post transplant   - Kidney Tx Biopsy Hx: No biopsy history.    # Pancreas Tx (SPK):    - Pancreatic Exocrine Drainage: Enteric drained     - Blood glucose: Near euglycemia      On insulin: No   - HbA1c: Last checked at time of transplant      Latest HbA1c: 5.9%   - Pancreatic enzymes: Trend up   - DSA Hx: No DSA at time of transplant   - Pancreas Tx Biopsy Hx: No biopsy history    # Immunosuppression: Tacrolimus immediate release (goal 8-10), Mycophenolate mofetil (dose 1000 mg every 12 hours), and Methylprednisolone (dose taper)   - Induction with Recent Transplant:  High Intensity Protocol   - Continue with intensive monitoring of immunosuppression for efficacy and toxicity.   - Historical Changes in Immunosuppression: None   - Changes: Not at this time; Agree with holding rATG at this time due to thrombocytopenia.    # Infection Prevention:   Last CD4 Level: Not checked  - PJP: Sulfa/TMP (Bactrim)  - CMV: Valganciclovir (Valcyte)  - Fungal: Micafungin (Mycamine)      - CMV IgG Ab High Risk Discordance (D+/R-) at time of transplant: No  Present CMV Serostatus: Positive  - EBV IgG Ab High Risk Discordance (D+/R-) at time of transplant: No  Present EBV Serostatus: Positive    # Hypertension: Borderline control;  Goal BP: < 150/90   - Changes: Yes - Recommend  increasing carvedilol 25.0 mg bid.    # Anemia in Chronic Renal Disease/Surgery: Hgb: Trend down      BRIANNA: No   - Iron studies: Unknown at this time, but checked with dialysis   - Agree with blood transfusion for Hgb < 7.0 gm/dl.    # Thrombocytopenia: Trend down, significantly low platelet level.  Likely secondary to rATG.    # Mineral Bone Disorder:    - Secondary renal hyperparathyroidism; PTH level: Unknown at this time, but checked with dialysis        On treatment: None  - Vitamin D; level: Not checked recently, but was low last check        On supplement: Yes  - Calcium; level: Low        On supplement: Yes  - Phosphorus; level: High        On supplement: No    # Electrolytes:   - Potassium; level: Normal        On supplement: No  - Magnesium; level: Normal        On supplement: Yes  - Bicarbonate; level: Stable low        On supplement: Yes; Just started sodium bicarbonate supplement.    # Other Significant PMH:   - CAD: Patient has mild non obstructive coronary artery disease on last coronary angiogram 4/2024.  - HFpEF: Last cardiac echo (8/2023) showed normal LVEF ~ 60-65% with grade II diastolic dysfunction.  - Sjogren's: Stable with no recent symptoms.  Followed by Rheumatology.  - IgG Lambda MGUS: Stable with monoclonal peak ~  0.3 and K/L ratio ~ 0.64  in July 2024.   - Complex Native Kidney Cyst: Patient with cyst in mid pole right native kidney with thin and mildly thickened internal septations noted on MRI 6/2024.  Repeat imaging with MRI 2/2025 showed slight increased size of the partly exophytic 2.0 cm cyst in the medial cortex of the interpolar region of the right kidney, previously 1.7 cm. Stable 3 mm septation with T2 dark signal in the septation, which may be related to calcifications.  Recommended follow with ultrasound or MRI in 6 months.    - Recommend bilateral native kidney ultrasound 4 months post transplant (9/2025).  - Positive Lupus Anticoagulant: Patient is positive, but cardiolipins  not elevated, neg for Factor V and Factor 2 absent     # Transplant History:  Etiology of Kidney Failure: Diabetes mellitus type 2  Tx: DDKT (SPK)  Transplant: 5/2/2025 (Kidney / Pancreas)  Significant transplant-related complications: None    Recommendations were communicated to the primary team via this note.    Lj Vizcarra MD  Transplant Nephrology  Contact information via Vocera Web Console or via [facility] Paging/Directory      Interval History  Ms. Kapoor's creatinine is 4.86 (05/06 0601); Trend down.  Good urine output.  Slight increase in lipase.  Near euglycemia, but off insulin.  Other significant labs/tests/vitals: Stable electrolytes.  Trend down in hemoglobin.  Trend down in platelet level.  No new events overnight.  No chest pain or shortness of breath.  Decreased leg swelling.  No nausea and vomiting.  Bowel movements are small, but passing gas.  No fever, sweats or chills.    Review of Systems   4 point ROS was obtained and negative except as noted in the Interval History.    MEDICATIONS:  Current Facility-Administered Medications   Medication Dose Route Frequency Provider Last Rate Last Admin    acetaminophen (TYLENOL) oral liquid 975 mg  975 mg Oral Q8H Jean-Pierre Gleason MD   975 mg at 05/06/25 1121    aspirin (ASA) chewable tablet 81 mg  81 mg Oral or NG Tube Daily Jean-Pierre Gleason MD   81 mg at 05/06/25 0753    [START ON 5/8/2025] calcium carbonate-vitamin D (OSCAL) 500-5 MG-MCG per tablet 1 tablet  1 tablet Oral BID w/meals Jean-Pierre Gleason MD        carvedilol (COREG) tablet 12.5 mg  12.5 mg Oral Once Kristy Layne PA-C        carvedilol (COREG) tablet 25 mg  25 mg Oral BID Kristy Layne PA-C        [START ON 5/10/2025] clotrimazole (MYCELEX) lozenge 10 mg  10 mg Buccal 4x Daily Jean-Pierre Gleason MD        famotidine (PEPCID) tablet 20 mg  20 mg Oral Daily Jean-Pierre Gleason MD   20 mg  at 05/05/25 0757    Or    famotidine (PEPCID) injection 20 mg  20 mg Intravenous Daily Jean-Pierre Gleason MD   20 mg at 05/06/25 0753    insulin aspart (NovoLOG) injection (RAPID ACTING)  1-7 Units Subcutaneous Q4H Aishwarya Del Toro APRN CNP        magnesium oxide (MAG-OX) tablet 400 mg  400 mg Oral Q24H Jean-Pierre Gleason MD   400 mg at 05/06/25 1121    micafungin (MYCAMINE) 100 mg in sodium chloride 0.9 % 100 mL intermittent infusion  100 mg Intravenous Q24H Jean-Pierre Gleason  mL/hr at 05/05/25 2356 100 mg at 05/05/25 2356    mycophenolate (CELLCEPT BRAND) suspension 1,000 mg  1,000 mg Oral or NG Tube BID IS Jean-Pierre Gleason MD   1,000 mg at 05/06/25 0800    octreotide (sandoSTATIN) injection 100 mcg  100 mcg Subcutaneous TID Jean-Pierre Gleason MD   100 mcg at 05/06/25 0801    pantoprazole (PROTONIX) 2 mg/mL suspension 40 mg  40 mg Per NG tube Daily Jean-Pierre Gleason MD   40 mg at 05/06/25 0800    polyethylene glycol (MIRALAX) Packet 17 g  17 g Oral Daily Jean-Pierre Gleason MD   17 g at 05/06/25 0754    senna-docusate (SENOKOT-S/PERICOLACE) 8.6-50 MG per tablet 1 tablet  1 tablet Oral BID Jean-Pierre Gleason MD   1 tablet at 05/06/25 0754    sodium bicarbonate tablet 1,300 mg  1,300 mg Oral BID Aishwarya Del Toro APRN CNP   1,300 mg at 05/06/25 0753    sodium chloride (PF) 0.9% PF flush 10 mL  10 mL Intracatheter Q8H Jean-Pierre Gleason MD   10 mL at 05/06/25 1100    sodium chloride (PF) 0.9% PF flush 3 mL  3 mL Intravenous Q8H Jean-Pierre Gleason MD   3 mL at 05/06/25 0800    sulfamethoxazole-trimethoprim (BACTRIM/SEPTRA) suspension 80 mg  10 mL Per NG tube Once per day on Monday Wednesday Friday Jean-Pierre Gleason MD   80 mg at 05/05/25 0801    tacrolimus (GENERIC) suspension 2 mg  2 mg Oral BID IS Jean-Pierre Gleason MD   2 mg at  25 0800    valGANciclovir (VALCYTE) solution 450 mg  450 mg Oral Once per day on  Jean-Pierre Gleason MD   450 mg at 25 0758     Current Facility-Administered Medications   Medication Dose Route Frequency Provider Last Rate Last Admin    dextrose 10% infusion   Intravenous Continuous PRN Jean-Pierre Gleason MD        [Held by provider] heparin infusion 25,000 units in 0.45% NaCl 250 mL ANTICOAGULANT  400 Units/hr Intravenous Continuous Aishwarya Del Toro APRN CNP   Stopped at 25 1039       Physical Exam   Temp  Av.8  F (37.1  C)  Min: 98.5  F (36.9  C)  Max: 99  F (37.2  C)  Arterial Line BP  Min: 82/57  Max: 101/55  Arterial Line MAP (mmHg)  Av.8 mmHg  Min: 21 mmHg  Max: 67 mmHg      Pulse  Av.6  Min: 91  Max: 102 Resp  Av.6  Min: 14  Max: 23  SpO2  Av.6 %  Min: 98 %  Max: 100 %    CVP (mmHg): 11 mmHgBP (!) 166/78   Pulse 82   Temp 97.4  F (36.3  C)   Resp 16   Ht 1.524 m (5')   Wt 72.1 kg (158 lb 14.4 oz)   SpO2 99%   BMI 31.03 kg/m     Date 25 0700 - 25 0659   Shift 7240-3041 0710-3978 6676-8658 24 Hour Total   INTAKE   Shift Total(mL/kg)       OUTPUT   Urine 5   5   Shift Total(mL/kg) 5(0.08)   5(0.08)   Weight (kg) 62.69 62.69 62.69 62.69      Admit Weight: 62.7 kg (138 lb 3.2 oz)     GENERAL APPEARANCE: alert and no distress  HENT: mouth without ulcers or lesions, NG in place  RESP: lungs clear to auscultation - no rales, rhonchi or wheezes  CV: regular rhythm, normal rate, no rub, no murmur  EDEMA: trace LE edema bilaterally  ABDOMEN: soft, nondistended, mildly tender, bowel sounds normal  MS: extremities normal - no gross deformities noted, no evidence of inflammation in joints, no muscle tenderness  SKIN: no rash  TX KIDNEY: mild TTP  DIALYSIS ACCESS:  Peritoneal dialysis catheter    Data   All labs reviewed by me.  CMP  Recent Labs   Lab 25  1204 25  0802 25  0601 25  0346  05/05/25  0808 05/05/25  0641 05/04/25  0850 05/04/25  0728 05/04/25  0613 05/04/25  0426 05/03/25  1500 05/03/25  1430 05/03/25  0526 05/03/25  0356 05/02/25  1714 05/02/25  1652   NA  --   --  140  --   --  140  --   --   --  137  --  136  --  137   < > 140   POTASSIUM  --   --  3.8  --   --  4.1  --  4.6  --  4.6  4.6   < > 4.7  4.7   < > 3.9   < > 3.9   CHLORIDE  --   --  107  --   --  108*  --   --   --  105  --  105  --  104  --  103   CO2  --   --  18*  --   --  18*  --   --   --  19*  --  20*  --  15*  --  25   ANIONGAP  --   --  15  --   --  14  --   --   --  13  --  11  --  18*  --  12   * 121* 119* 119*   < > 135*   < >  --    < > 107*   < > 84   < > 120*   < > 105*   BUN  --   --  59.7*  --   --  51.8*  --   --   --  48.1*  --  42.9*  --  38.8*  --  45.6*   CR  --   --  4.86*  --   --  5.12*  --   --   --  5.51*  --  5.37*  --  5.11*  --  6.11*   GFRESTIMATED  --   --  10*  --   --  9*  --   --   --  8*  --  9*  --  9*  --  7*   VIJAY  --   --  7.6*  --   --  7.6*  --   --   --  7.8*  --  7.7*  --  8.8  --  9.7   MAG  --   --  2.1  --   --  1.9  --   --   --  1.8  --  1.7  --  1.9   < >  --    PHOS  --   --  7.3*  --   --  7.2*  --   --   --  6.8*  --   --   --  3.1  --   --    PROTTOTAL  --   --   --   --   --   --   --   --   --   --   --   --   --   --   --  7.9   ALBUMIN  --   --   --   --   --   --   --   --   --   --   --   --   --   --   --  3.7   BILITOTAL  --   --   --   --   --   --   --   --   --   --   --   --   --   --   --  0.2   ALKPHOS  --   --   --   --   --   --   --   --   --   --   --   --   --   --   --  84   AST  --   --   --   --   --   --   --   --   --   --   --   --   --   --   --  29   ALT  --   --   --   --   --   --   --   --   --   --   --   --   --   --   --  18    < > = values in this interval not displayed.     CBC  Recent Labs   Lab 05/06/25  0601 05/05/25  0641 05/04/25  1555 05/04/25  0728 05/04/25  0426 05/03/25  1758 05/03/25  1430   HGB 6.8* 7.2* 7.6* 7.7*  7.6*  7.6*   < > 7.7*  7.7*   WBC 10.2 10.0  --   --  16.9*  --  22.5*   RBC 2.29* 2.44*  --   --  2.56*  --  2.62*   HCT 20.5* 22.0*  --   --  23.0*  --  24.3*   MCV 90 90  --   --  90  --  92   MCH 29.7 29.5  --   --  29.3  --  30.2   MCHC 33.2 32.7  --   --  33.3  --  32.2   RDW 16.0* 16.4*  --   --  16.0*  --  16.2*   PLT 34* 43*  --   --  78*  --  106*    < > = values in this interval not displayed.     INR  Recent Labs   Lab 05/06/25  0601 05/05/25  0641 05/03/25  0356 05/02/25  1652   INR 0.94 0.91 1.26* 0.95   PTT 32 31 39* 30     ABG  Recent Labs   Lab 05/03/25  0257 05/03/25  0216 05/03/25  0107 05/03/25  0001   PH 7.29* 7.29* 7.25* 7.30*   PCO2 35 33* 34* 36   PO2 135* 137* 149* 125*   HCO3 17* 16* 15* 18*   O2PER 52.0 52.0 53.0 52.0      Urine Studies  Recent Labs   Lab Test 05/02/25  1635 08/02/23  1350   COLOR Light Yellow Straw   APPEARANCE Clear Clear   URINEGLC 30* 300*   URINEBILI Negative Negative   URINEKETONE Negative Negative   SG 1.014 1.012   UBLD Negative Negative   URINEPH 6.5 7.0   PROTEIN 300* 300*   NITRITE Negative Negative   LEUKEST Small* Negative   RBCU 3* <1   WBCU 16* 1     No lab results found.  PTH  No lab results found.  Iron Studies  No lab results found.    IMAGING:  All imaging studies reviewed by me.

## 2025-05-06 NOTE — PLAN OF CARE
Goal Outcome Evaluation:      Plan of Care Reviewed With: patient, spouse    Overall Patient Progress: no changeOverall Patient Progress: no change    Outcome Evaluation: Hypertensive with OVSS on RA, given multiple doses of Labetalol IV. Denied nausea, C/O moderate abdominal pain. NG to LIS, ~ 1 L thin yellow/brown output.    BP (!) 166/89 (BP Location: Left arm)   Pulse 84   Temp 97.7  F (36.5  C) (Oral)   Resp 16   Ht 1.524 m (5')   Wt 73.4 kg (161 lb 13.1 oz)   SpO2 98%   BMI 31.60 kg/m      Shift: 2654-2006  Isolation Status: standard/cytotoxic  VS: stable on RA, afebrile  Neuro: A&O x4  Behaviors: receptive to cares  BG: Q4: 126, 119  Labs/Imaging: Platelets 34 (down from 78), Creatinine 5.12, Hgb 6.8; pending other AM labs  Respiratory: WNL  Cardiac: hypertensive up to 170s/100s  Pain/Nausea: Denied nausea, C/O moderate abdominal pain.  PRN: Robaxin 500 mg x1 for pain  Diet: NPO ex ice chips  LDA: R PIV, R IJ; velázquez; R JESUS drain with ~ 110 mL serosang output; L NG to LIS with ~ 1200 mL thin yellow/brown output  Infusion(s): heparin SR @ 400 units/hr with carrier, TKO for abx  GI/: LBM PTS, passing gas, BS+. Velázquez patent with ~ 650 mL CYU  Skin: midline incision stapled and LUIS, scant serosang drainage  Mobility: Ax2 with GB + walker  Plan: Notify MD of any significant changes, continue POC.

## 2025-05-06 NOTE — PROGRESS NOTES
Transplant Surgery  Inpatient Daily Progress Note  2025    Assessment & Plan: Wendy Kapoor is a 54 year old female who presents with a history of DM2, HTN, ESRD on PD since 2023, IgG Lambda MGUS, non-obstructive CAD, and anemia of chronic disease. On 2025 she was notified as an acceptable  donor organ became available and presented for further pre-operative work-up and on 2025 she underwent DBD SPK with stent, and open appendectomy which was well tolerated as performed by Dr. Gleason. Ms. Kapoor was admitted to  post-operatively for ongoing post-operative care.     Today:   - No thymo today with Plt of 34    - Transfuse 1 unit pRBCs   - Lasix 40 mg IV x 1   - Increase carvedilol to 25 mg BID                                                                                                                                 Graft function: POD #4  Kidney: Cr 4.86 (5.12). Post-op US patent Doppler. UOP 2.5L yesterday.   - Transplant Nephrology following    - Lasix 40 mg IV x 1    Pancreas: Amylase 51 < 42 < 119, lipase 65 < 43 < 98. Post-op US patient Doppler. Has not required insulin post-operatively.    - Sliding scale insulin, no insulin needed   - ASA 81 mg    - Octreotide subcutaneous x 5 days   - Heparin straight rate 400 units/hour     Immunosuppressed status secondary to medications: cPRA 98  Thymo: 125 mg POD#0, Thymo 125 mg POD#2. Holding Thymo today with Plt of 34.  Steroids:  mg POD#0,  mg POD#2, 100 mg POD 3  MMF: 1000 mg BID  Tacro: Goal level 8-10.    Hematology:   Anemia of chronic disease; Acute blood loss anemia: Hgb 6.8 (7.2), transfusing 1 unit pRBC . Last transfused 1 unit pRBC on 5/3. Continue to monitor.  Thrombocytopenia: Plt 34 (43 < 78). Secondary to induction IS. Monitor.     Cardiorespiratory:   Hypertension: PTA Cardizem 180 mg daily, Hydralazine 50 mg TID, losartan 100 mg daily, Toprol  mg daily.   - Increase carvedilol to 25 mg  BID    GI/Nutrition:  Diet: NPO, resume when appropriate with return of bowel function. Reported small BM  Bowel regimen: Senna, PEG    Fluid/Electrolytes: MIVF: Discontinued  Low bicarb: start bicarb 1300 mg BID   Hypocalcemia: calcium 500 mg BID  Hyperphosphatemia: monitor    : Merlos to remain due to new surgical anastomosis x 7 days.    Infectious disease: Afebrile.   Leukocytosis: Secondary to steroids. Improving.    Prophylaxis: DVT, fall, GI (PPI), viral (Valcyte), pneumocystis (Bactrim), fungal (micafungin x 6 doses then clotrimazole)     Disposition: 7A     YNES/Fellow/Resident Provider: Kristy Layne PA-C pager [phone]    I saw and evaluated this patient as part of a shared visit. I spent 30 minutes on review of labs and imaging, exam, care coordination, and counseling.  Medically Ready for Discharge: Anticipated in 5+ Days       Faculty: Jose Wyatt M.D., Ph.D.  _________________________________________________________________    Interval History: History from patient and/or EMR  Overnight events: No acute events overnight. Reports small BM.    ROS:   A 10-point review of systems was negative except as noted above.    Meds:  Current Facility-Administered Medications   Medication Dose Route Frequency Provider Last Rate Last Admin    acetaminophen (TYLENOL) oral liquid 975 mg  975 mg Oral Q8H Jean-Pierre Gleason MD   975 mg at 05/06/25 0318    aspirin (ASA) chewable tablet 81 mg  81 mg Oral or NG Tube Daily Jean-Pierre Gleason MD   81 mg at 05/06/25 0753    [START ON 5/8/2025] calcium carbonate-vitamin D (OSCAL) 500-5 MG-MCG per tablet 1 tablet  1 tablet Oral BID w/meals Jean-Pierre Gleason MD        carvedilol (COREG) tablet 12.5 mg  12.5 mg Oral Once Kristy Layne PA-C        carvedilol (COREG) tablet 25 mg  25 mg Oral BID Kristy Layne PA-C        [START ON 5/10/2025] clotrimazole (MYCELEX) lozenge 10 mg  10 mg Buccal 4x Daily Rosibel  Jean-Pierre Cowan MD        famotidine (PEPCID) tablet 20 mg  20 mg Oral Daily Jean-Pierre Gleason MD   20 mg at 05/05/25 0757    Or    famotidine (PEPCID) injection 20 mg  20 mg Intravenous Daily Jean-Pierre Gleason MD   20 mg at 05/06/25 0753    furosemide (LASIX) injection 40 mg  40 mg Intravenous Once Kristy Layne PA-C        insulin aspart (NovoLOG) injection (RAPID ACTING)  1-7 Units Subcutaneous Q4H Aishwarya Del Toro APRN CNP        magnesium oxide (MAG-OX) tablet 400 mg  400 mg Oral Q24H Jean-Pierre Gleason MD   400 mg at 05/05/25 1127    micafungin (MYCAMINE) 100 mg in sodium chloride 0.9 % 100 mL intermittent infusion  100 mg Intravenous Q24H Jean-Pierre Gleason  mL/hr at 05/05/25 2356 100 mg at 05/05/25 2356    mycophenolate (CELLCEPT BRAND) suspension 1,000 mg  1,000 mg Oral or NG Tube BID IS Jean-Pierre Gleason MD   1,000 mg at 05/06/25 0800    octreotide (sandoSTATIN) injection 100 mcg  100 mcg Subcutaneous TID Jean-Pierre Gleason MD   100 mcg at 05/06/25 0801    pantoprazole (PROTONIX) 2 mg/mL suspension 40 mg  40 mg Per NG tube Daily Jean-Pierre Gleason MD   40 mg at 05/06/25 0800    polyethylene glycol (MIRALAX) Packet 17 g  17 g Oral Daily Jean-Pierre Gleason MD   17 g at 05/06/25 0754    senna-docusate (SENOKOT-S/PERICOLACE) 8.6-50 MG per tablet 1 tablet  1 tablet Oral BID Jean-Pierre Gleason MD   1 tablet at 05/06/25 0754    sodium bicarbonate tablet 1,300 mg  1,300 mg Oral BID Aishwarya Del Toro APRN CNP   1,300 mg at 05/06/25 0753    sodium chloride (PF) 0.9% PF flush 10 mL  10 mL Intracatheter Q8H Jean-Pierre Gleason MD   10 mL at 05/06/25 0318    sodium chloride (PF) 0.9% PF flush 3 mL  3 mL Intravenous Q8H Jean-Pierre Gleason MD   3 mL at 05/06/25 0800    sulfamethoxazole-trimethoprim (BACTRIM/SEPTRA) suspension 80 mg   10 mL Per NG tube Once per day on Monday Wednesday Friday Jean-Pierre Gleason MD   80 mg at 05/05/25 0801    tacrolimus (GENERIC) suspension 2 mg  2 mg Oral BID IS Jean-Pierre Gleason MD   2 mg at 05/06/25 0800    valGANciclovir (VALCYTE) solution 450 mg  450 mg Oral Once per day on Monday Thursday Jean-Pierre Gleason MD   450 mg at 05/05/25 0758       Physical Exam:     Admit Weight: 62.7 kg (138 lb 3.2 oz)    Current vitals:   BP (!) 150/77 (BP Location: Left arm)   Pulse 88   Temp 97.5  F (36.4  C) (Oral)   Resp 16   Ht 1.524 m (5')   Wt 72.1 kg (158 lb 14.4 oz)   SpO2 98%   BMI 31.03 kg/m      Vital sign ranges:    Temp:  [96.3  F (35.7  C)-97.7  F (36.5  C)] 97.5  F (36.4  C)  Pulse:  [82-89] 88  Resp:  [16] 16  BP: (150-175)/() 150/77  SpO2:  [90 %-100 %] 98 %    General Appearance: in no apparent distress.   Skin: Warm, perfused  Heart: she appears adequately perfused   Lungs: Nonlabored resps on RA  Abdomen: The abdomen is non-distended. JESUS x 1 with serosang drainage, dressing intact.   : velázquez is present.  Urine is clear yellow  Extremities: edema: minimal generalized edema , strength adequate  Neurologic: awake, alert, and oriented. Tremor absent..     Data:   CMP  Recent Labs   Lab 05/06/25  0802 05/06/25  0601 05/05/25  0808 05/05/25  0641 05/03/25  0356 05/03/25  0257 05/03/25  0216 05/02/25  1714 05/02/25  1652   NA  --  140  --  140   < > 134* 135   < > 140   POTASSIUM  --  3.8  --  4.1   < > 3.6 3.6   < > 3.9   CHLORIDE  --  107  --  108*   < >  --   --   --  103   CO2  --  18*  --  18*   < >  --   --   --  25   * 119*   < > 135*   < > 83 106*   < > 105*   BUN  --  59.7*  --  51.8*   < >  --   --   --  45.6*   CR  --  4.86*  --  5.12*   < >  --   --   --  6.11*   GFRESTIMATED  --  10*  --  9*   < >  --   --   --  7*   VIJAY  --  7.6*  --  7.6*   < >  --   --   --  9.7   ICAW  --   --   --   --   --  5.4* 4.2*   < >  --    MAG  --  2.1  --   1.9   < >  --   --   --   --    PHOS  --  7.3*  --  7.2*   < >  --   --   --   --    AMYLASE  --  51  --  42   < >  --   --   --  50   LIPASE  --  65*  --  43   < >  --   --   --  106*   ALBUMIN  --   --   --   --   --   --   --   --  3.7   BILITOTAL  --   --   --   --   --   --   --   --  0.2   ALKPHOS  --   --   --   --   --   --   --   --  84   AST  --   --   --   --   --   --   --   --  29   ALT  --   --   --   --   --   --   --   --  18    < > = values in this interval not displayed.     CBC  Recent Labs   Lab 05/06/25  0601 05/05/25  0641 05/03/25  1430 05/03/25  0948   HGB 6.8* 7.2*   < > 7.8*   WBC 10.2 10.0   < >  --    PLT 34* 43*   < >  --    A1C  --   --   --  5.9*    < > = values in this interval not displayed.

## 2025-05-06 NOTE — PLAN OF CARE
Goal Outcome Evaluation:      Plan of Care Reviewed With: patient, spouse, child    Overall Patient Progress: no changeOverall Patient Progress: no change    Outcome Evaluation: Up and walking in halls with assist of 2    Hypertensive (160-70s/80-90s), PRN IV labetalol given x2. PO Oxycodone given x1 for abdominal pain. 1025 mL urine output via Merlos, passing flatus but no BM this shift. Blood sugars 118, 113, 117. Family at bedside supportive of patient and assisting with cares.

## 2025-05-06 NOTE — TELEPHONE ENCOUNTER
A pharmacist spent 30 minutes providing medication teaching with Wendy Kapoor for discharge with a focus on new medications/dose changes.  Also present were her  Killian and son Kota.  The discharge medication list was reviewed with the patient/family and the following points were discussed, as applicable: Name, description, purpose, dose/strength, duration of medications, common side effects, food/medications to avoid, action to be taken if dose is missed, when to call MD, and how to obtain refills.  The patient and spouse (Killian) will be responsible for managing medications. Additionally, the following transplant related education was covered: Purpose of medication card, Timing of medications and day of lab draw considerations , Prescription Insurance , and Discharge process for receiving meds   Patient will  transplant supplies including 7 day pill organizer, thermometer, and BP monitor at the discharge pharmacy along with medications.  Patient chooses to receive medications from [facility].   Clinical Pharmacy Consult:                                                      Transplant Specific:   Date of Transplant: 5/2/25  Type of Transplant: kidney and pancreas  First Transplant: yes  History of rejection: no    Immunosuppression Regimen   TAC 2mg qAM & 2mg qPM and MMF 1000mg qAM & 1000mg qPM  Patient specific goal: 8-10 micrograms/L  Most recent level: 11.4, date 5/6/25  Immunosuppressant Levels:  Supratherapeutic  Pt adherent to lab draws: yes  Scr:   Lab Results   Component Value Date    CR 4.86 05/06/2025     Side effects: no side effects    Prophylactic Medications  PJP Prophylactic: Bactrim SS three times per week  Scheduled Discontinue Date: Lifelong     Antifungal: IV micafungin x6 days, then topical nystatin/clotrimazole  Scheduled Discontinue Date: 3 months Anticipated date 8/2/25    CMV Prophylactic: CrCl 10 to 24 mL/minute: Valcyte 450 mg twice weekly   Scheduled  Discontinue Date: 3 months Anticipated date 8/2/25    Acid Reducer: Protonix (pantoprazole) and Pepcid (famotidine)  Scheduled Reviewed Date:  TBD    Vascular Prophylactic: Not needed thus far  Scheduled Discontinue Date: N/A    Reminders:  Bring to first clinic appt: med box, med card, bp monitor, all medications being taken, and lab book.  2.   MTM pharmacist visit on first clinic appt and if ok, again in 3 to 4 months during follow up appt.  3.   Avoid Grapefruit and Grapefruit juice.   4.   Avoid herbal supplements. If wish to take other medications or supplements, call your coordinator.   5.   Keep lab appts.   6.   Can use apps on phone like [employer] to help manage medication lists and reminders.   7.   Make sure you are protecting your skin by wearing long sleeves and applying sunscreen to exposed skin, for any significant time in the sun.     Transplant Coordinator is Betty Vogel RN    Thank you,  Marguerite Covington, PharmD, BCACP  [facility]  [phone]

## 2025-05-06 NOTE — PROGRESS NOTES
Care Management Follow Up    Length of Stay (days): 4    Expected Discharge Date: 05/07/2025     Concerns to be Addressed: discharge planning     Patient plan of care discussed at interdisciplinary rounds: Yes    Anticipated Discharge Disposition: Home, Home Care, Outpatient Infusion Services              Anticipated Discharge Services: Home Care  Anticipated Discharge DME: None    Patient/family educated on Medicare website which has current facility and service quality ratings: no  Education Provided on the Discharge Plan: No  Patient/Family in Agreement with the Plan:      Referrals Placed by CM/SW: External Care Coordination  Private pay costs discussed: Not applicable    Discussed  Partnership in Safe Discharge Planning  document with patient/family: No     Handoff Completed: No, handoff not indicated or clinically appropriate    Additional Information:  Patient s/p SPK. Pt will need ATC appointments confirmed prior to discharge.   Noted [facility] accepted for home RN      Home Care Accepted  [facility], [place]  [phone]  Fax [phone]    Next Steps:   ATC  Updated [facility] Liaison, ensure home care order placed  Medical clearance    Maria Luisa Seo, RN BSN, PHN, ACM-RN  May 6, 2025  7A Nurse Coordinator    Phone: [phone]  Available on [employer] 7A SOT RNCC  Weekend/Holiday 7A SOT RNCC    5/6/2025

## 2025-05-07 ENCOUNTER — APPOINTMENT (OUTPATIENT)
Dept: ULTRASOUND IMAGING | Facility: CLINIC | Age: 55
End: 2025-05-07
Attending: STUDENT IN AN ORGANIZED HEALTH CARE EDUCATION/TRAINING PROGRAM
Payer: COMMERCIAL

## 2025-05-07 ENCOUNTER — LAB REQUISITION (OUTPATIENT)
Dept: LAB | Facility: CLINIC | Age: 55
End: 2025-05-07
Payer: COMMERCIAL

## 2025-05-07 LAB
AMYLASE BODY FLUID SOURCE: NORMAL
AMYLASE FLD-CCNC: 161 U/L
AMYLASE SERPL-CCNC: 151 U/L (ref 28–100)
ANION GAP SERPL CALCULATED.3IONS-SCNC: 16 MMOL/L (ref 7–15)
APTT PPP: 29 SECONDS (ref 22–38)
BASOPHILS # BLD AUTO: 0 10E3/UL (ref 0–0.2)
BASOPHILS NFR BLD AUTO: 0 %
BUN SERPL-MCNC: 67.7 MG/DL (ref 6–20)
CALCIUM SERPL-MCNC: 7.9 MG/DL (ref 8.8–10.4)
CHLORIDE SERPL-SCNC: 105 MMOL/L (ref 98–107)
CREAT SERPL-MCNC: 4.66 MG/DL (ref 0.51–0.95)
EGFRCR SERPLBLD CKD-EPI 2021: 10 ML/MIN/1.73M2
EOSINOPHIL # BLD AUTO: 0 10E3/UL (ref 0–0.7)
EOSINOPHIL NFR BLD AUTO: 0 %
ERYTHROCYTE [DISTWIDTH] IN BLOOD BY AUTOMATED COUNT: 16.4 % (ref 10–15)
FIBRINOGEN PPP-MCNC: 297 MG/DL (ref 170–510)
GLUCOSE BLDC GLUCOMTR-MCNC: 102 MG/DL (ref 70–99)
GLUCOSE BLDC GLUCOMTR-MCNC: 108 MG/DL (ref 70–99)
GLUCOSE BLDC GLUCOMTR-MCNC: 79 MG/DL (ref 70–99)
GLUCOSE BLDC GLUCOMTR-MCNC: 87 MG/DL (ref 70–99)
GLUCOSE BLDC GLUCOMTR-MCNC: 90 MG/DL (ref 70–99)
GLUCOSE BLDC GLUCOMTR-MCNC: 98 MG/DL (ref 70–99)
GLUCOSE SERPL-MCNC: 103 MG/DL (ref 70–99)
HBV SURFACE AG SERPL QL IA: NONREACTIVE
HCO3 SERPL-SCNC: 20 MMOL/L (ref 22–29)
HCT VFR BLD AUTO: 24.3 % (ref 35–47)
HGB BLD-MCNC: 8.1 G/DL (ref 11.7–15.7)
HIV 1+2 AB+HIV1 P24 AG SERPL QL IA: NONREACTIVE
IMM GRANULOCYTES # BLD: 0.4 10E3/UL
IMM GRANULOCYTES NFR BLD: 3 %
INR PPP: 0.98 (ref 0.85–1.15)
LIPASE BODY FLUID SOURCE: NORMAL
LIPASE FLD-CCNC: 342 U/L
LIPASE SERPL-CCNC: 225 U/L (ref 13–60)
LYMPHOCYTES # BLD AUTO: 0.1 10E3/UL (ref 0.8–5.3)
LYMPHOCYTES NFR BLD AUTO: 1 %
MAGNESIUM SERPL-MCNC: 2.4 MG/DL (ref 1.7–2.3)
MCH RBC QN AUTO: 29 PG (ref 26.5–33)
MCHC RBC AUTO-ENTMCNC: 33.3 G/DL (ref 31.5–36.5)
MCV RBC AUTO: 87 FL (ref 78–100)
MONOCYTES # BLD AUTO: 0.4 10E3/UL (ref 0–1.3)
MONOCYTES NFR BLD AUTO: 3 %
NEUTROPHILS # BLD AUTO: 10.8 10E3/UL (ref 1.6–8.3)
NEUTROPHILS NFR BLD AUTO: 93 %
NRBC # BLD AUTO: 0.1 10E3/UL
NRBC BLD AUTO-RTO: 1 /100
PF4 HEPARIN CMPLX AB SER QL: NEGATIVE
PHOSPHATE SERPL-MCNC: 6.6 MG/DL (ref 2.5–4.5)
PLATELET # BLD AUTO: 39 10E3/UL (ref 150–450)
POTASSIUM SERPL-SCNC: 3.6 MMOL/L (ref 3.4–5.3)
PROTHROMBIN TIME: 13.3 SECONDS (ref 11.8–14.8)
RBC # BLD AUTO: 2.79 10E6/UL (ref 3.8–5.2)
SODIUM SERPL-SCNC: 141 MMOL/L (ref 135–145)
TACROLIMUS BLD-MCNC: 13.1 UG/L (ref 5–15)
TME LAST DOSE: NORMAL H
TME LAST DOSE: NORMAL H
UFH PPP CHRO-ACNC: <0.1 IU/ML (ref ?–1.1)
WBC # BLD AUTO: 11.6 10E3/UL (ref 4–11)

## 2025-05-07 PROCEDURE — 999N000104 HEPATITIS B SURFACE ANTIGEN: Performed by: INTERNAL MEDICINE

## 2025-05-07 PROCEDURE — 258N000003 HC RX IP 258 OP 636: Performed by: PHYSICIAN ASSISTANT

## 2025-05-07 PROCEDURE — 36592 COLLECT BLOOD FROM PICC: CPT | Performed by: SURGERY

## 2025-05-07 PROCEDURE — 82150 ASSAY OF AMYLASE: CPT | Performed by: SURGERY

## 2025-05-07 PROCEDURE — 80048 BASIC METABOLIC PNL TOTAL CA: CPT | Performed by: SURGERY

## 2025-05-07 PROCEDURE — 84100 ASSAY OF PHOSPHORUS: CPT | Performed by: SURGERY

## 2025-05-07 PROCEDURE — 93975 VASCULAR STUDY: CPT | Mod: 26 | Performed by: RADIOLOGY

## 2025-05-07 PROCEDURE — 80197 ASSAY OF TACROLIMUS: CPT | Performed by: SURGERY

## 2025-05-07 PROCEDURE — 76776 US EXAM K TRANSPL W/DOPPLER: CPT | Mod: 26 | Performed by: RADIOLOGY

## 2025-05-07 PROCEDURE — 83690 ASSAY OF LIPASE: CPT | Performed by: STUDENT IN AN ORGANIZED HEALTH CARE EDUCATION/TRAINING PROGRAM

## 2025-05-07 PROCEDURE — 250N000011 HC RX IP 250 OP 636: Performed by: PHYSICIAN ASSISTANT

## 2025-05-07 PROCEDURE — 83735 ASSAY OF MAGNESIUM: CPT | Performed by: SURGERY

## 2025-05-07 PROCEDURE — 82150 ASSAY OF AMYLASE: CPT | Performed by: STUDENT IN AN ORGANIZED HEALTH CARE EDUCATION/TRAINING PROGRAM

## 2025-05-07 PROCEDURE — 85384 FIBRINOGEN ACTIVITY: CPT | Performed by: STUDENT IN AN ORGANIZED HEALTH CARE EDUCATION/TRAINING PROGRAM

## 2025-05-07 PROCEDURE — 76776 US EXAM K TRANSPL W/DOPPLER: CPT

## 2025-05-07 PROCEDURE — 250N000012 HC RX MED GY IP 250 OP 636 PS 637: Performed by: SURGERY

## 2025-05-07 PROCEDURE — 85730 THROMBOPLASTIN TIME PARTIAL: CPT | Performed by: STUDENT IN AN ORGANIZED HEALTH CARE EDUCATION/TRAINING PROGRAM

## 2025-05-07 PROCEDURE — 83690 ASSAY OF LIPASE: CPT | Performed by: SURGERY

## 2025-05-07 PROCEDURE — 250N000013 HC RX MED GY IP 250 OP 250 PS 637

## 2025-05-07 PROCEDURE — 250N000013 HC RX MED GY IP 250 OP 250 PS 637: Performed by: SURGERY

## 2025-05-07 PROCEDURE — 250N000011 HC RX IP 250 OP 636: Mod: JZ | Performed by: STUDENT IN AN ORGANIZED HEALTH CARE EDUCATION/TRAINING PROGRAM

## 2025-05-07 PROCEDURE — 250N000012 HC RX MED GY IP 250 OP 636 PS 637: Performed by: PHYSICIAN ASSISTANT

## 2025-05-07 PROCEDURE — 85610 PROTHROMBIN TIME: CPT | Performed by: STUDENT IN AN ORGANIZED HEALTH CARE EDUCATION/TRAINING PROGRAM

## 2025-05-07 PROCEDURE — 250N000011 HC RX IP 250 OP 636: Mod: JB | Performed by: SURGERY

## 2025-05-07 PROCEDURE — 85025 COMPLETE CBC W/AUTO DIFF WBC: CPT | Performed by: SURGERY

## 2025-05-07 PROCEDURE — 36591 DRAW BLOOD OFF VENOUS DEVICE: CPT | Performed by: INTERNAL MEDICINE

## 2025-05-07 PROCEDURE — 258N000003 HC RX IP 258 OP 636: Performed by: SURGERY

## 2025-05-07 PROCEDURE — 85520 HEPARIN ASSAY: CPT | Performed by: SURGERY

## 2025-05-07 PROCEDURE — 93975 VASCULAR STUDY: CPT

## 2025-05-07 PROCEDURE — 99233 SBSQ HOSP IP/OBS HIGH 50: CPT | Mod: 24 | Performed by: INTERNAL MEDICINE

## 2025-05-07 PROCEDURE — 250N000013 HC RX MED GY IP 250 OP 250 PS 637: Performed by: PHYSICIAN ASSISTANT

## 2025-05-07 PROCEDURE — 120N000011 HC R&B TRANSPLANT UMMC

## 2025-05-07 RX ORDER — ACETAMINOPHEN 325 MG/1
975 TABLET ORAL EVERY 8 HOURS
Status: DISPENSED | OUTPATIENT
Start: 2025-05-07

## 2025-05-07 RX ORDER — PANTOPRAZOLE SODIUM 40 MG/1
40 TABLET, DELAYED RELEASE ORAL
Status: DISPENSED | OUTPATIENT
Start: 2025-05-08

## 2025-05-07 RX ORDER — SULFAMETHOXAZOLE AND TRIMETHOPRIM 400; 80 MG/1; MG/1
1 TABLET ORAL
Status: DISCONTINUED | OUTPATIENT
Start: 2025-05-07 | End: 2025-05-07

## 2025-05-07 RX ORDER — DIPHENHYDRAMINE HCL 12.5MG/5ML
25-50 LIQUID (ML) ORAL ONCE
Status: COMPLETED | OUTPATIENT
Start: 2025-05-07 | End: 2025-05-07

## 2025-05-07 RX ORDER — DIPHENHYDRAMINE HCL 25 MG
25-50 CAPSULE ORAL ONCE
Status: COMPLETED | OUTPATIENT
Start: 2025-05-07 | End: 2025-05-07

## 2025-05-07 RX ORDER — ACETAMINOPHEN 325 MG/10.15ML
650 LIQUID ORAL ONCE
Status: DISCONTINUED | OUTPATIENT
Start: 2025-05-07 | End: 2025-05-07

## 2025-05-07 RX ORDER — MYCOPHENOLATE MOFETIL 250 MG/1
1000 CAPSULE ORAL
Status: DISPENSED | OUTPATIENT
Start: 2025-05-07

## 2025-05-07 RX ORDER — SODIUM CHLORIDE 9 MG/ML
INJECTION, SOLUTION INTRAVENOUS
Status: COMPLETED
Start: 2025-05-07 | End: 2025-05-07

## 2025-05-07 RX ORDER — OXYCODONE HYDROCHLORIDE 5 MG/1
5 TABLET ORAL EVERY 4 HOURS PRN
Refills: 0 | Status: DISPENSED | OUTPATIENT
Start: 2025-05-07

## 2025-05-07 RX ORDER — VALGANCICLOVIR 450 MG/1
450 TABLET, FILM COATED ORAL
Status: DISPENSED | OUTPATIENT
Start: 2025-05-08

## 2025-05-07 RX ORDER — SULFAMETHOXAZOLE AND TRIMETHOPRIM 400; 80 MG/1; MG/1
1 TABLET ORAL
Status: ACTIVE | OUTPATIENT
Start: 2025-05-09

## 2025-05-07 RX ORDER — NYSTATIN 100000 [USP'U]/ML
500000 SUSPENSION ORAL 4 TIMES DAILY
Status: ACTIVE | OUTPATIENT
Start: 2025-05-10

## 2025-05-07 RX ORDER — ASPIRIN 81 MG/1
324 TABLET, CHEWABLE ORAL DAILY
Status: ACTIVE | OUTPATIENT
Start: 2025-05-08

## 2025-05-07 RX ADMIN — SODIUM CHLORIDE 500 ML: 0.9 INJECTION, SOLUTION INTRAVENOUS at 12:37

## 2025-05-07 RX ADMIN — TACROLIMUS 2 MG: 5 CAPSULE ORAL at 09:31

## 2025-05-07 RX ADMIN — SODIUM BICARBONATE 1300 MG: 650 TABLET ORAL at 09:31

## 2025-05-07 RX ADMIN — CARVEDILOL 25 MG: 25 TABLET, FILM COATED ORAL at 20:13

## 2025-05-07 RX ADMIN — OXYCODONE HYDROCHLORIDE 5 MG: 5 TABLET ORAL at 20:14

## 2025-05-07 RX ADMIN — Medication 40 MG: at 09:31

## 2025-05-07 RX ADMIN — OCTREOTIDE ACETATE 100 MCG: 100 INJECTION, SOLUTION INTRAVENOUS; SUBCUTANEOUS at 09:31

## 2025-05-07 RX ADMIN — LABETALOL HYDROCHLORIDE 10 MG: 5 INJECTION, SOLUTION INTRAVENOUS at 14:31

## 2025-05-07 RX ADMIN — FAMOTIDINE 20 MG: 20 TABLET, FILM COATED ORAL at 09:32

## 2025-05-07 RX ADMIN — OCTREOTIDE ACETATE 100 MCG: 100 INJECTION, SOLUTION INTRAVENOUS; SUBCUTANEOUS at 20:32

## 2025-05-07 RX ADMIN — SODIUM BICARBONATE 1300 MG: 650 TABLET ORAL at 20:14

## 2025-05-07 RX ADMIN — LABETALOL HYDROCHLORIDE 20 MG: 5 INJECTION, SOLUTION INTRAVENOUS at 15:16

## 2025-05-07 RX ADMIN — SULFAMETHOXAZOLE AND TRIMETHOPRIM 80 MG: 200; 40 SUSPENSION ORAL at 09:31

## 2025-05-07 RX ADMIN — CARVEDILOL 25 MG: 25 TABLET, FILM COATED ORAL at 09:31

## 2025-05-07 RX ADMIN — OXYCODONE HYDROCHLORIDE 5 MG: 5 SOLUTION ORAL at 05:14

## 2025-05-07 RX ADMIN — ACETAMINOPHEN 975 MG: 325 TABLET ORAL at 13:02

## 2025-05-07 RX ADMIN — MYCOPHENOLATE MOFETIL 1000 MG: 250 CAPSULE ORAL at 18:30

## 2025-05-07 RX ADMIN — OCTREOTIDE ACETATE 100 MCG: 100 INJECTION, SOLUTION INTRAVENOUS; SUBCUTANEOUS at 14:25

## 2025-05-07 RX ADMIN — DIPHENHYDRAMINE HCL 50 MG: 25 CAPSULE ORAL at 13:02

## 2025-05-07 RX ADMIN — METHOCARBAMOL 500 MG: 500 TABLET ORAL at 05:15

## 2025-05-07 RX ADMIN — ACETAMINOPHEN 975 MG: 325 TABLET ORAL at 20:15

## 2025-05-07 RX ADMIN — HYDRALAZINE HYDROCHLORIDE 10 MG: 20 INJECTION INTRAMUSCULAR; INTRAVENOUS at 04:51

## 2025-05-07 RX ADMIN — ASPIRIN 81 MG CHEWABLE TABLET 81 MG: 81 TABLET CHEWABLE at 09:31

## 2025-05-07 RX ADMIN — TACROLIMUS 1.5 MG: 1 CAPSULE ORAL at 18:29

## 2025-05-07 RX ADMIN — LABETALOL HYDROCHLORIDE 20 MG: 5 INJECTION, SOLUTION INTRAVENOUS at 16:13

## 2025-05-07 RX ADMIN — ANTI-THYMOCYTE GLOBULIN (RABBIT) 75 MG: 5 INJECTION, POWDER, LYOPHILIZED, FOR SOLUTION INTRAVENOUS at 13:32

## 2025-05-07 RX ADMIN — ACETAMINOPHEN 975 MG: 325 SOLUTION ORAL at 04:33

## 2025-05-07 RX ADMIN — MYCOPHENOLATE MOFETIL 1000 MG: 200 POWDER, FOR SUSPENSION ORAL at 09:31

## 2025-05-07 RX ADMIN — OXYCODONE HYDROCHLORIDE 5 MG: 5 SOLUTION ORAL at 09:32

## 2025-05-07 ASSESSMENT — ACTIVITIES OF DAILY LIVING (ADL)
ADLS_ACUITY_SCORE: 65
ADLS_ACUITY_SCORE: 67
ADLS_ACUITY_SCORE: 65
ADLS_ACUITY_SCORE: 67
ADLS_ACUITY_SCORE: 67
ADLS_ACUITY_SCORE: 65
ADLS_ACUITY_SCORE: 66
ADLS_ACUITY_SCORE: 67
ADLS_ACUITY_SCORE: 65
ADLS_ACUITY_SCORE: 67
ADLS_ACUITY_SCORE: 65
ADLS_ACUITY_SCORE: 67
ADLS_ACUITY_SCORE: 65
ADLS_ACUITY_SCORE: 65
ADLS_ACUITY_SCORE: 67
ADLS_ACUITY_SCORE: 65
ADLS_ACUITY_SCORE: 66

## 2025-05-07 NOTE — PLAN OF CARE
Goal Outcome Evaluation:    BP (!) 152/75   Pulse 82   Temp 97.6  F (36.4  C) (Oral)   Resp 16   Ht 1.524 m (5')   Wt 72.1 kg (158 lb 14.4 oz)   SpO2 100%   BMI 31.03 kg/m      Pt endorsed pain where the JESUS drain is located, 8/10. Gave PRN oxycodone and Tylenol with good relief. Ice packs helping too.  Milk of magnesia given along with schedule Senna and Miralax this AM. One medium BM, passing gas, and urine output 1,675 mL's  JESUS drain with serosanguineous ~ 920 mL's. Ambulating with assist of one person (2 people assist when out of bed/chair.  Midline incision stapled LUIS, scant drainage on the bottom of the incision. Blisters on right side of the abdomen, filled with clear fluid. Two dry gauze applied under abdominal folds.       Plan of Care Reviewed With: patient    Overall Patient Progress: improvingOverall Patient Progress: improving    Outcome Evaluation: 's -180's, gave Labetalol twice with minimal results.

## 2025-05-07 NOTE — PROGRESS NOTES
[facility]  Transplant Nephrology Progress Note  Date of Admission:  5/2/2025  Today's Date: 05/07/2025    Recommendations:    - No acute indications for dialysis.  - Would make no changes in immunosuppression.  - Would consider adding amlodipine if BP remains elevated tomorrow.    Assessment & Plan   # DDKT (SPK): Trend down in creatinine, but slowly.  Good urine output.  No acute indications for dialysis.     - Baseline Creatinine: ~ TBD   - Proteinuria: Not checked post transplant   - DSA Hx: No DSA at time of transplant   - Last cPRA: 98%   - BK Viremia: Not checked post transplant   - Kidney Tx Biopsy Hx: No biopsy history.    # Pancreas Tx (SPK): Pancreas transplant ultrasound 5/7 was difficult to visualize.   - Pancreatic Exocrine Drainage: Enteric drained     - Blood glucose: Near euglycemia      On insulin: No   - HbA1c: Last checked at time of transplant      Latest HbA1c: 5.9%   - Pancreatic enzymes: Increased   - DSA Hx: No DSA at time of transplant   - Pancreas Tx Biopsy Hx: No biopsy history    # Immunosuppression: Tacrolimus immediate release (goal 8-10), Mycophenolate mofetil (dose 1000 mg every 12 hours), and Methylprednisolone (dose taper)   - Induction with Recent Transplant:  High Intensity Protocol   - Continue with intensive monitoring of immunosuppression for efficacy and toxicity.   - Historical Changes in Immunosuppression: None   - Changes: Not at this time; Agree with holding rATG at this time due to thrombocytopenia.    # Infection Prevention:   Last CD4 Level: Not checked  - PJP: Sulfa/TMP (Bactrim)  - CMV: Valganciclovir (Valcyte)  - Fungal: Micafungin (Mycamine)      - CMV IgG Ab High Risk Discordance (D+/R-) at time of transplant: No  Present CMV Serostatus: Positive  - EBV IgG Ab High Risk Discordance (D+/R-) at time of transplant: No  Present EBV Serostatus: Positive    # Hypertension: Borderline control;  Goal BP: < 150/90   - Changes:  Not at this time; If BP remains elevated, would add amlodipine.    # Anemia in Chronic Renal Disease/Surgery: Hgb: Increased after blood transfusion      BRIANNA: No   - Iron studies: Unknown at this time, but checked with dialysis    # Thrombocytopenia: Trend down, significantly low platelet level.  Likely secondary to rATG.    # Mineral Bone Disorder:    - Secondary renal hyperparathyroidism; PTH level: Unknown at this time, but checked with dialysis        On treatment: None  - Vitamin D; level: Not checked recently, but was low last check        On supplement: Yes  - Calcium; level: Low normal when corrected for low serum albumin        On supplement: Yes  - Phosphorus; level: High        On supplement: No    # Electrolytes:   - Potassium; level: Normal        On supplement: No  - Magnesium; level: Normal        On supplement: Yes  - Bicarbonate; level: Stable low        On supplement: Yes; Just started sodium bicarbonate supplement.    # Other Significant PMH:   - CAD: Patient has mild non obstructive coronary artery disease on last coronary angiogram 4/2024.  - HFpEF: Last cardiac echo (8/2023) showed normal LVEF ~ 60-65% with grade II diastolic dysfunction.  - Sjogren's: Stable with no recent symptoms.  Followed by Rheumatology.  - IgG Lambda MGUS: Stable with monoclonal peak ~  0.3 and K/L ratio ~ 0.64  in July 2024.   - Complex Native Kidney Cyst: Patient with cyst in mid pole right native kidney with thin and mildly thickened internal septations noted on MRI 6/2024.  Repeat imaging with MRI 2/2025 showed slight increased size of the partly exophytic 2.0 cm cyst in the medial cortex of the interpolar region of the right kidney, previously 1.7 cm. Stable 3 mm septation with T2 dark signal in the septation, which may be related to calcifications.  Recommended follow with ultrasound or MRI in 6 months.    - Recommend bilateral native kidney ultrasound 4 months post transplant (9/2025).  - Positive Lupus  Anticoagulant: Patient is positive, but cardiolipins not elevated, neg for Factor V and Factor 2 absent     # Transplant History:  Etiology of Kidney Failure: Diabetes mellitus type 2  Tx: DDKT (SPK)  Transplant: 5/2/2025 (Kidney / Pancreas)  Significant transplant-related complications: None    Recommendations were communicated to the primary team via this note.    Lj Vizcarra MD  Transplant Nephrology  Contact information via Vocera Web Console or via [facility] Paging/Directory      Interval History  Ms. Kapoor's creatinine is 4.66 (05/07 0558); Trend down.  Good urine output.  Increased lipase.  Other significant labs/tests/vitals: Stable electrolytes.  Increased hemoglobin after blood transfusion.  Trend up in WBC.  Stable, low platelet level.  No new events overnight.  No chest pain or shortness of breath.  No leg swelling.  No nausea and vomiting.  Bowel movements are watery.  No fever, sweats or chills.    Review of Systems   4 point ROS was obtained and negative except as noted in the Interval History.    MEDICATIONS:  Current Facility-Administered Medications   Medication Dose Route Frequency Provider Last Rate Last Admin    acetaminophen (TYLENOL) tablet 975 mg  975 mg Oral Q8H Jean-Pierre Gleason MD   975 mg at 05/07/25 1302    [START ON 5/8/2025] aspirin (ASA) chewable tablet 324 mg  324 mg Oral or NG Tube Daily Kayley Link PA-C        [START ON 5/8/2025] calcium carbonate-vitamin D (OSCAL) 500-5 MG-MCG per tablet 1 tablet  1 tablet Oral BID w/meals Jean-Pierre Gleason MD        carvedilol (COREG) tablet 12.5 mg  12.5 mg Oral Once Kristy Layne PA-C        carvedilol (COREG) tablet 25 mg  25 mg Oral BID Kristy Layne PA-C   25 mg at 05/07/25 0931    insulin aspart (NovoLOG) injection (RAPID ACTING)  1-7 Units Subcutaneous Q4H Aishwarya Del Toro APRN CNP        [Held by provider] magnesium oxide (MAG-OX) tablet 400 mg  400 mg Oral Q24H  Jean-Pierre Gleason MD   400 mg at 05/06/25 1121    micafungin (MYCAMINE) 100 mg in sodium chloride 0.9 % 100 mL intermittent infusion  100 mg Intravenous Q24H Jean-Pierre Gleason  mL/hr at 05/06/25 2327 100 mg at 05/06/25 2327    mycophenolate (GENERIC EQUIVALENT) capsule 1,000 mg  1,000 mg Oral BID IS Jean-Pierre Gleason MD        [START ON 5/10/2025] nystatin (MYCOSTATIN) suspension 500,000 Units  500,000 Units Oral 4x Daily Kayley Link PA-C        octreotide (sandoSTATIN) injection 100 mcg  100 mcg Subcutaneous TID Jean-Pierre Gleason MD   100 mcg at 05/07/25 1425    [START ON 5/8/2025] pantoprazole (PROTONIX) EC tablet 40 mg  40 mg Oral QAM AC Jean-Pierre Gleason MD        polyethylene glycol (MIRALAX) Packet 17 g  17 g Oral Daily Jean-Pierre Gleason MD   17 g at 05/06/25 0754    senna-docusate (SENOKOT-S/PERICOLACE) 8.6-50 MG per tablet 1 tablet  1 tablet Oral BID Jean-Pierre Gleason MD   1 tablet at 05/06/25 2005    sodium bicarbonate tablet 1,300 mg  1,300 mg Oral BID Aishwarya Del Toro, APRN CNP   1,300 mg at 05/07/25 0931    sodium chloride (PF) 0.9% PF flush 10 mL  10 mL Intracatheter Q8H Jean-Pierre Gleason MD   10 mL at 05/07/25 1237    sodium chloride (PF) 0.9% PF flush 3 mL  3 mL Intravenous Q8H Jean-Pierre Gleason MD   3 mL at 05/06/25 1911    [START ON 5/9/2025] sulfamethoxazole-trimethoprim (BACTRIM) 400-80 MG per tablet 1 tablet  1 tablet Oral Once per day on Monday Wednesday Friday Jean-Pierre Gleason MD        tacrolimus (GENERIC EQUIVALENT) capsule 1.5 mg  1.5 mg Oral BID IS Jean-Pierre Gleason MD        [START ON 5/8/2025] valGANciclovir (VALCYTE) tablet 450 mg  450 mg Oral Once per day on Monday Thursday Jean-Pierre Gleason MD         Current Facility-Administered Medications   Medication Dose Route Frequency  Provider Last Rate Last Admin    dextrose 10% infusion   Intravenous Continuous PRN Jean-Pierre Gleason MD           Physical Exam   Temp  Av.8  F (37.1  C)  Min: 98.5  F (36.9  C)  Max: 99  F (37.2  C)  Arterial Line BP  Min: 82/57  Max: 101/55  Arterial Line MAP (mmHg)  Av.8 mmHg  Min: 21 mmHg  Max: 67 mmHg      Pulse  Av.6  Min: 91  Max: 102 Resp  Av.6  Min: 14  Max: 23  SpO2  Av.6 %  Min: 98 %  Max: 100 %    CVP (mmHg): 11 mmHgBP (!) 163/70 (BP Location: Right arm)   Pulse 74   Temp 97.7  F (36.5  C) (Oral)   Resp 18   Ht 1.524 m (5')   Wt 72.1 kg (158 lb 14.4 oz)   SpO2 98%   BMI 31.03 kg/m     Date 25 07 - 25 0659   Shift 1585-5351 3574-7931 0679-1127 24 Hour Total   INTAKE   Shift Total(mL/kg)       OUTPUT   Urine 5   5   Shift Total(mL/kg) 5(0.08)   5(0.08)   Weight (kg) 62.69 62.69 62.69 62.69      Admit Weight: 62.7 kg (138 lb 3.2 oz)     GENERAL APPEARANCE: alert and no distress  HENT: mouth without ulcers or lesions, NG in place  RESP: lungs clear to auscultation - no rales, rhonchi or wheezes  CV: regular rhythm, normal rate, no rub, no murmur  EDEMA: none to trace LE edema bilaterally  ABDOMEN: soft, nondistended, mildly tender, bowel sounds normal  MS: extremities normal - no gross deformities noted, no evidence of inflammation in joints, no muscle tenderness  SKIN: no rash  TX KIDNEY: mild TTP  DIALYSIS ACCESS:  Peritoneal dialysis catheter    Data   All labs reviewed by me.  CMP  Recent Labs   Lab 25  1230 25  0753 25  0558 25  0438 25  0802 25  0601 25  0808 25  0641 25  0850 25  0728 25  0613 25  0426 25  7224 25  7092   NA  --   --  141  --   --  140  --  140  --   --   --  137   < > 140   POTASSIUM  --   --  3.6  --   --  3.8  --  4.1  --  4.6  --  4.6  4.6   < > 3.9   CHLORIDE  --   --  105  --   --  107  --  108*  --   --   --  105   < > 103   CO2  --    --  20*  --   --  18*  --  18*  --   --   --  19*   < > 25   ANIONGAP  --   --  16*  --   --  15  --  14  --   --   --  13   < > 12   * 98 103* 108*   < > 119*   < > 135*   < >  --    < > 107*   < > 105*   BUN  --   --  67.7*  --   --  59.7*  --  51.8*  --   --   --  48.1*   < > 45.6*   CR  --   --  4.66*  --   --  4.86*  --  5.12*  --   --   --  5.51*   < > 6.11*   GFRESTIMATED  --   --  10*  --   --  10*  --  9*  --   --   --  8*   < > 7*   VIJAY  --   --  7.9*  --   --  7.6*  --  7.6*  --   --   --  7.8*   < > 9.7   MAG  --   --  2.4*  --   --  2.1  --  1.9  --   --   --  1.8   < >  --    PHOS  --   --  6.6*  --   --  7.3*  --  7.2*  --   --   --  6.8*   < >  --    PROTTOTAL  --   --   --   --   --   --   --   --   --   --   --   --   --  7.9   ALBUMIN  --   --   --   --   --   --   --   --   --   --   --   --   --  3.7   BILITOTAL  --   --   --   --   --   --   --   --   --   --   --   --   --  0.2   ALKPHOS  --   --   --   --   --   --   --   --   --   --   --   --   --  84   AST  --   --   --   --   --   --   --   --   --   --   --   --   --  29   ALT  --   --   --   --   --   --   --   --   --   --   --   --   --  18    < > = values in this interval not displayed.     CBC  Recent Labs   Lab 05/07/25 0558 05/06/25  0601 05/05/25  0641 05/04/25  1555 05/04/25  0728 05/04/25  0426   HGB 8.1* 6.8* 7.2* 7.6*   < > 7.6*  7.6*   WBC 11.6* 10.2 10.0  --   --  16.9*   RBC 2.79* 2.29* 2.44*  --   --  2.56*   HCT 24.3* 20.5* 22.0*  --   --  23.0*   MCV 87 90 90  --   --  90   MCH 29.0 29.7 29.5  --   --  29.3   MCHC 33.3 33.2 32.7  --   --  33.3   RDW 16.4* 16.0* 16.4*  --   --  16.0*   PLT 39* 34* 43*  --   --  78*    < > = values in this interval not displayed.     INR  Recent Labs   Lab 05/07/25  0558 05/06/25  0601 05/05/25  0641 05/03/25  0356   INR 0.98 0.94 0.91 1.26*   PTT 29 32 31 39*     ABG  Recent Labs   Lab 05/03/25  0257 05/03/25  0216 05/03/25  0107 05/03/25  0001   PH 7.29* 7.29* 7.25* 7.30*    PCO2 35 33* 34* 36   PO2 135* 137* 149* 125*   HCO3 17* 16* 15* 18*   O2PER 52.0 52.0 53.0 52.0      Urine Studies  Recent Labs   Lab Test 05/02/25  1635 08/02/23  1350   COLOR Light Yellow Straw   APPEARANCE Clear Clear   URINEGLC 30* 300*   URINEBILI Negative Negative   URINEKETONE Negative Negative   SG 1.014 1.012   UBLD Negative Negative   URINEPH 6.5 7.0   PROTEIN 300* 300*   NITRITE Negative Negative   LEUKEST Small* Negative   RBCU 3* <1   WBCU 16* 1     No lab results found.  PTH  No lab results found.  Iron Studies  No lab results found.    IMAGING:  All imaging studies reviewed by me.

## 2025-05-07 NOTE — PLAN OF CARE
Goal Outcome Evaluation:      Plan of Care Reviewed With: patient, spouse, child    Overall Patient Progress: improvingOverall Patient Progress: improving    Outcome Evaluation: HTN 150s-170s. Labetalol x3. NGT pulled; advanced to CLD and tolerating well. Thymo this shift.    BP (!) 157/74 (BP Location: Right arm)   Pulse 74   Temp 97.7  F (36.5  C) (Oral)   Resp 18   Ht 1.524 m (5')   Wt 72.1 kg (158 lb 14.4 oz)   SpO2 98%   BMI 31.03 kg/m      Shift: 2639-5267  Isolation Status: None  VS: HTN on RA, afebrile  Neuro: Aox4  Behaviors: Calm, pleasant. Sleeping in between cares  BG: Q4h;   Labs: Cr 4.66; Lipase 225, Plt 39  Respiratory: WDL  Cardiac: WDL  Pain/Nausea: Denies nausea. C/o abd pain  PRN: Oxy x1, Robaxin x1  Diet: Clears; tolerating well  IV Access: R PIV; R IJ; L PD cath  Infusion(s): Thymo this shift; no complications  Lines/Drains: JESUS drain - serosang output.  GI/: Merlos in place, kavin output. BM x2; Loose  Skin: Midline incision, stapled LUIS. R abd fold blister MILIND.  Mobility: SBA  Events/Education: Pancreas US this AM; NGT pulled.  Plan: Continue with POC.

## 2025-05-07 NOTE — PROGRESS NOTES
Transplant Surgery  Inpatient Daily Progress Note  2025    Assessment & Plan: Wendy Kapoor is a 54 year old female who presents with a history of DM2, HTN, ESRD on PD since 2023, IgG Lambda MGUS, non-obstructive CAD, and anemia of chronic disease. On 2025 she was notified as an acceptable  donor organ became available and presented for further pre-operative work-up and on 2025 she underwent DBD SPK with stent, and open appendectomy which was well tolerated as performed by Dr. Gleason. Ms. Kapoor was admitted to 7A post-operatively for ongoing post-operative care.     Today:   - Thymo 75 mg due to PLT 39   - Remove NG and start CLD as tolerated    - Send fluid lipase and amylase with elevated serum lipase    - US panc and kidney                                                                                                                                 Graft function: POD #5  Kidney: Cr 4.7 < 4.9. Post-op US patent Doppler. UOP 2.5L yesterday.   - Transplant Nephrology following     Pancreas: Amylase 151 < 42 < 119, lipase 225<-65 < 43 < 98. Post-op US patient Doppler. Has not required insulin post-operatively.    - Sliding scale insulin, no insulin needed   - ASA 81 mg    - Octreotide subcutaneous x 5 days   - Heparin straight rate 400 units/hour     Immunosuppressed status secondary to medications: cPRA 98  Thymo: 125 mg POD#0, Thymo 125 mg POD#2. Thymo 75 mg 5/7 due to PLT 39.   Steroids:  mg POD#0,  mg POD#2, 100 mg POD 3  MMF: 1000 mg BID  Tacro: Goal level 8-10.    Hematology:   Anemia of chronic disease; Acute blood loss anemia: Hgb 8.1 from 6.8 following 1 unit pRBC . Last transfused 1 unit pRBC on 5/3 and . Continue to monitor.  Thrombocytopenia: Plt 39. Secondary to induction IS. Hit panel pending. Monitor.     Cardiorespiratory:   Hypertension: PTA Cardizem 180 mg daily, Hydralazine 50 mg TID, losartan 100 mg daily, Toprol  mg daily.   - Carvedilol  to 25 mg BID    GI/Nutrition:  Diet: Remove NG today. Start CLD as tolerated.   Bowel regimen: Senna, PEG    Fluid/Electrolytes: MIVF: Discontinued  Low bicarb: start bicarb 1300 mg BID   Hypocalcemia: calcium 500 mg BID  Hyperphosphatemia: monitor  Hypermagnesemia: Hold Mag ox    : Merlos to remain due to new surgical anastomosis x 7 days.    Infectious disease: Afebrile.   Leukocytosis: Secondary to steroids. Improving.    Prophylaxis: DVT, fall, GI (PPI), viral (Valcyte), pneumocystis (Bactrim), fungal (micafungin x 6 doses then clotrimazole)     Disposition: 7A     Medically Ready for Discharge: Anticipated in 2-4 Days     YNES/Fellow/Resident Provider: Kayley Link PA-C pager [phone]     Faculty: Jose Wyatt M.D.  _________________________________________________________________    Interval History: History from patient and/or EMR  Overnight events: No acute events overnight. Positive flatus and bowel function. Pain at JESUS and PD catheter site.     ROS:   A 10-point review of systems was negative except as noted above.    Meds:  Current Facility-Administered Medications   Medication Dose Route Frequency Provider Last Rate Last Admin    acetaminophen (TYLENOL) oral liquid 975 mg  975 mg Oral Q8H Jean-Pierre Gleason MD   975 mg at 05/07/25 0433    anti-thymocyte globulin (THYMOGLOBULIN - Rabbit) 75 mg in sodium chloride 0.9 % 180 mL intermittent infusion  75 mg Intravenous Central line Once Kayley Link PA-C        aspirin (ASA) chewable tablet 81 mg  81 mg Oral or NG Tube Daily Jean-Pierre Gleason MD   81 mg at 05/07/25 0931    [START ON 5/8/2025] calcium carbonate-vitamin D (OSCAL) 500-5 MG-MCG per tablet 1 tablet  1 tablet Oral BID w/meals Jean-Pierre Gleason MD        carvedilol (COREG) tablet 12.5 mg  12.5 mg Oral Once Kristy Layne PA-C        carvedilol (COREG) tablet 25 mg  25 mg Oral BID Kristy Layne PA-C   25 mg at 05/07/25 0931     [START ON 5/10/2025] clotrimazole (MYCELEX) lozenge 10 mg  10 mg Buccal 4x Daily Jean-Pierre Gleason MD        diphenhydrAMINE (BENADRYL) capsule 25-50 mg  25-50 mg Oral Once Kayley Link PA-C        Or    diphenhydrAMINE (BENADRYL) elixir 25-50 mg  25-50 mg Per NG tube Once Kayley Link PA-C        famotidine (PEPCID) tablet 20 mg  20 mg Oral Daily Jean-Pierre Gleason MD   20 mg at 05/07/25 0932    Or    famotidine (PEPCID) injection 20 mg  20 mg Intravenous Daily Jean-Pierre Gleason MD   20 mg at 05/06/25 0753    insulin aspart (NovoLOG) injection (RAPID ACTING)  1-7 Units Subcutaneous Q4H Aishwarya Del Toro APRN CNP        [Held by provider] magnesium oxide (MAG-OX) tablet 400 mg  400 mg Oral Q24H Jean-Pierre Gleason MD   400 mg at 05/06/25 1121    micafungin (MYCAMINE) 100 mg in sodium chloride 0.9 % 100 mL intermittent infusion  100 mg Intravenous Q24H Jean-Pierre Gleason  mL/hr at 05/06/25 2327 100 mg at 05/06/25 2327    mycophenolate (CELLCEPT BRAND) suspension 1,000 mg  1,000 mg Oral or NG Tube BID IS Jean-Pierre Gleason MD   1,000 mg at 05/07/25 0931    octreotide (sandoSTATIN) injection 100 mcg  100 mcg Subcutaneous TID Jean-Pierre Gleason MD   100 mcg at 05/07/25 0931    pantoprazole (PROTONIX) 2 mg/mL suspension 40 mg  40 mg Per NG tube Daily Jean-Pierre Gleason MD   40 mg at 05/07/25 0931    polyethylene glycol (MIRALAX) Packet 17 g  17 g Oral Daily Jean-Pierre Gleason MD   17 g at 05/06/25 0754    senna-docusate (SENOKOT-S/PERICOLACE) 8.6-50 MG per tablet 1 tablet  1 tablet Oral BID Jean-Pierre Gleason MD   1 tablet at 05/06/25 2005    sodium bicarbonate tablet 1,300 mg  1,300 mg Oral BID Aishwarya Del Toro APRN CNP   1,300 mg at 05/07/25 0931    sodium chloride (PF) 0.9% PF flush 10 mL  10 mL Intracatheter Q8H Jean-Pierre Gleason,  MD   10 mL at 05/06/25 2005    sodium chloride (PF) 0.9% PF flush 3 mL  3 mL Intravenous Q8H Jean-Pierre Gleason MD   3 mL at 05/06/25 1911    sulfamethoxazole-trimethoprim (BACTRIM/SEPTRA) suspension 80 mg  10 mL Per NG tube Once per day on Monday Wednesday Friday Jean-Pierre Gleason MD   80 mg at 05/07/25 0931    tacrolimus (GENERIC) suspension 1.5 mg  1.5 mg Oral or NG Tube QPM Kristy Layne PA-C   1.5 mg at 05/06/25 1911    tacrolimus (GENERIC) suspension 2 mg  2 mg Oral QAM Kristy Layne PA-C   2 mg at 05/07/25 0931    valGANciclovir (VALCYTE) solution 450 mg  450 mg Oral Once per day on Monday Thursday Jean-Pierre Gleason MD   450 mg at 05/05/25 0758       Physical Exam:     Admit Weight: 62.7 kg (138 lb 3.2 oz)    Current vitals:   BP (!) 150/68 (BP Location: Right arm)   Pulse 78   Temp 97.7  F (36.5  C) (Oral)   Resp 18   Ht 1.524 m (5')   Wt 72.1 kg (158 lb 14.4 oz)   SpO2 100%   BMI 31.03 kg/m      Vital sign ranges:    Temp:  [97  F (36.1  C)-97.8  F (36.6  C)] 97.7  F (36.5  C)  Pulse:  [78-82] 78  Resp:  [16-18] 18  BP: (149-176)/(68-85) 150/68  SpO2:  [99 %-100 %] 100 %    General Appearance: in no apparent distress.   Skin: Warm, perfused  Heart: she appears adequately perfused   Lungs: Nonlabored resps on RA  Abdomen: The abdomen is non-distended. JESUS x 1 with serosang drainage, dressing intact.   : velázquez is present. Urine is clear yellow  Extremities: edema: minimal generalized edema , strength adequate  Neurologic: awake, alert, and oriented. Tremor absent.    Data:   CMP  Recent Labs   Lab 05/07/25  0753 05/07/25  0558 05/06/25  0802 05/06/25  0601 05/03/25  0356 05/03/25  0257 05/03/25  0216 05/02/25  1714 05/02/25  1652   NA  --  141  --  140   < > 134* 135   < > 140   POTASSIUM  --  3.6  --  3.8   < > 3.6 3.6   < > 3.9   CHLORIDE  --  105  --  107   < >  --   --   --  103   CO2  --  20*  --  18*   < >  --   --   --  25    GLC 98 103*   < > 119*   < > 83 106*   < > 105*   BUN  --  67.7*  --  59.7*   < >  --   --   --  45.6*   CR  --  4.66*  --  4.86*   < >  --   --   --  6.11*   GFRESTIMATED  --  10*  --  10*   < >  --   --   --  7*   VIJAY  --  7.9*  --  7.6*   < >  --   --   --  9.7   ICAW  --   --   --   --   --  5.4* 4.2*   < >  --    MAG  --  2.4*  --  2.1   < >  --   --   --   --    PHOS  --  6.6*  --  7.3*   < >  --   --   --   --    AMYLASE  --  151*  --  51   < >  --   --   --  50   LIPASE  --  225*  --  65*   < >  --   --   --  106*   ALBUMIN  --   --   --   --   --   --   --   --  3.7   BILITOTAL  --   --   --   --   --   --   --   --  0.2   ALKPHOS  --   --   --   --   --   --   --   --  84   AST  --   --   --   --   --   --   --   --  29   ALT  --   --   --   --   --   --   --   --  18    < > = values in this interval not displayed.     CBC  Recent Labs   Lab 05/07/25  0558 05/06/25  0601 05/03/25  1430 05/03/25  0948   HGB 8.1* 6.8*   < > 7.8*   WBC 11.6* 10.2   < >  --    PLT 39* 34*   < >  --    A1C  --   --   --  5.9*    < > = values in this interval not displayed.

## 2025-05-07 NOTE — PLAN OF CARE
Goal Outcome Evaluation:      Plan of Care Reviewed With: patient    Overall Patient Progress: no changeOverall Patient Progress: no change    Outcome Evaluation: HTN 150s - 170s, gave labetalol x3 - BP @ 150/76.    BP (!) 150/76 (BP Location: Left arm)   Pulse 81   Temp 97.8  F (36.6  C) (Oral)   Resp 18   Ht 1.524 m (5')   Wt 72.1 kg (158 lb 14.4 oz)   SpO2 99%   BMI 31.03 kg/m      Shift: 9421-0226  Isolation Status: standard/cytotoxic  VS: HTN on RA, afebrile  Neuro: Aox4  Behaviors: calm, cooperative  BG: q4 (102, 105,108)  Labs: AM labs pending  Respiratory: WDL  Cardiac: hypertensive up to 170s/80s. PRN labetalol x3, hydralazine x1  Pain/Nausea: abdominal pain managed with scheduled tylenol, PRN oxy x2 and robaxin x2. Denies nausea  Diet: NPO except ice chips  LDA: R PIV, R IJ, R JESUS drain - 200mL serosang, NG tube - LIS - 175 mL thin, yellow-brown  GI/: voids via velázquez - 850mL; LBM 05/06  Skin: midline incision - staples/LUIS; blister on RLQ folds  Mobility: Assist of 2 with GB/W  Plan: Continue with POC and notify provider with any changes.

## 2025-05-08 ENCOUNTER — APPOINTMENT (OUTPATIENT)
Dept: CARDIOLOGY | Facility: CLINIC | Age: 55
End: 2025-05-08
Attending: PHYSICIAN ASSISTANT
Payer: COMMERCIAL

## 2025-05-08 ENCOUNTER — APPOINTMENT (OUTPATIENT)
Dept: GENERAL RADIOLOGY | Facility: CLINIC | Age: 55
End: 2025-05-08
Attending: PHYSICIAN ASSISTANT
Payer: COMMERCIAL

## 2025-05-08 VITALS
BODY MASS INDEX: 29.68 KG/M2 | OXYGEN SATURATION: 99 % | DIASTOLIC BLOOD PRESSURE: 61 MMHG | HEIGHT: 60 IN | RESPIRATION RATE: 16 BRPM | WEIGHT: 151.2 LBS | SYSTOLIC BLOOD PRESSURE: 132 MMHG | HEART RATE: 71 BPM | TEMPERATURE: 98.9 F

## 2025-05-08 LAB
AMYLASE SERPL-CCNC: 204 U/L (ref 28–100)
ANION GAP SERPL CALCULATED.3IONS-SCNC: 14 MMOL/L (ref 7–15)
BASOPHILS # BLD AUTO: 0 10E3/UL (ref 0–0.2)
BASOPHILS NFR BLD AUTO: 0 %
BLD PROD TYP BPU: NORMAL
BLOOD COMPONENT TYPE: NORMAL
BUN SERPL-MCNC: 63.6 MG/DL (ref 6–20)
CALCIUM SERPL-MCNC: 7.7 MG/DL (ref 8.8–10.4)
CHLORIDE SERPL-SCNC: 105 MMOL/L (ref 98–107)
CODING SYSTEM: NORMAL
CREAT SERPL-MCNC: 4.16 MG/DL (ref 0.51–0.95)
CROSSMATCH: NORMAL
EGFRCR SERPLBLD CKD-EPI 2021: 12 ML/MIN/1.73M2
EOSINOPHIL # BLD AUTO: 0 10E3/UL (ref 0–0.7)
EOSINOPHIL NFR BLD AUTO: 0 %
ERYTHROCYTE [DISTWIDTH] IN BLOOD BY AUTOMATED COUNT: 16.4 % (ref 10–15)
FIBRINOGEN PPP-MCNC: 406 MG/DL (ref 170–510)
GLUCOSE BLDC GLUCOMTR-MCNC: 67 MG/DL (ref 70–99)
GLUCOSE BLDC GLUCOMTR-MCNC: 84 MG/DL (ref 70–99)
GLUCOSE BLDC GLUCOMTR-MCNC: 84 MG/DL (ref 70–99)
GLUCOSE BLDC GLUCOMTR-MCNC: 86 MG/DL (ref 70–99)
GLUCOSE BLDC GLUCOMTR-MCNC: 89 MG/DL (ref 70–99)
GLUCOSE BLDC GLUCOMTR-MCNC: 89 MG/DL (ref 70–99)
GLUCOSE BLDC GLUCOMTR-MCNC: 92 MG/DL (ref 70–99)
GLUCOSE BLDC GLUCOMTR-MCNC: 94 MG/DL (ref 70–99)
GLUCOSE SERPL-MCNC: 87 MG/DL (ref 70–99)
HCO3 SERPL-SCNC: 21 MMOL/L (ref 22–29)
HCT VFR BLD AUTO: 21 % (ref 35–47)
HCV RNA SERPL NAA+PROBE-ACNC: NOT DETECTED IU/ML
HGB BLD-MCNC: 6.9 G/DL (ref 11.7–15.7)
IMM GRANULOCYTES # BLD: 0.3 10E3/UL
IMM GRANULOCYTES NFR BLD: 2 %
ISSUE DATE AND TIME: NORMAL
LIPASE SERPL-CCNC: 387 U/L (ref 13–60)
LVEF ECHO: NORMAL
LYMPHOCYTES # BLD AUTO: <0.1 10E3/UL (ref 0.8–5.3)
LYMPHOCYTES NFR BLD AUTO: 0 %
MAGNESIUM SERPL-MCNC: 2.2 MG/DL (ref 1.7–2.3)
MCH RBC QN AUTO: 29.6 PG (ref 26.5–33)
MCHC RBC AUTO-ENTMCNC: 32.9 G/DL (ref 31.5–36.5)
MCV RBC AUTO: 90 FL (ref 78–100)
MONOCYTES # BLD AUTO: 0.2 10E3/UL (ref 0–1.3)
MONOCYTES NFR BLD AUTO: 2 %
NEUTROPHILS # BLD AUTO: 10.9 10E3/UL (ref 1.6–8.3)
NEUTROPHILS NFR BLD AUTO: 95 %
NRBC # BLD AUTO: 0 10E3/UL
NRBC BLD AUTO-RTO: 0 /100
PATH REPORT.COMMENTS IMP SPEC: ABNORMAL
PATH REPORT.COMMENTS IMP SPEC: YES
PATH REPORT.FINAL DX SPEC: ABNORMAL
PATH REPORT.GROSS SPEC: ABNORMAL
PATH REPORT.MICROSCOPIC SPEC OTHER STN: ABNORMAL
PATH REPORT.RELEVANT HX SPEC: ABNORMAL
PATHOLOGY SYNOPTIC REPORT: ABNORMAL
PHOSPHATE SERPL-MCNC: 5.3 MG/DL (ref 2.5–4.5)
PHOTO IMAGE: ABNORMAL
PLATELET # BLD AUTO: 35 10E3/UL (ref 150–450)
POTASSIUM SERPL-SCNC: 3.9 MMOL/L (ref 3.4–5.3)
RBC # BLD AUTO: 2.33 10E6/UL (ref 3.8–5.2)
SODIUM SERPL-SCNC: 140 MMOL/L (ref 135–145)
UFH PPP CHRO-ACNC: <0.1 IU/ML (ref ?–1.1)
UNIT ABO/RH: NORMAL
UNIT NUMBER: NORMAL
UNIT STATUS: NORMAL
UNIT TYPE ISBT: 1700
WBC # BLD AUTO: 11.5 10E3/UL (ref 4–11)

## 2025-05-08 PROCEDURE — 250N000011 HC RX IP 250 OP 636: Mod: JZ | Performed by: SURGERY

## 2025-05-08 PROCEDURE — 258N000003 HC RX IP 258 OP 636: Performed by: SURGERY

## 2025-05-08 PROCEDURE — 85520 HEPARIN ASSAY: CPT | Performed by: SURGERY

## 2025-05-08 PROCEDURE — 258N000003 HC RX IP 258 OP 636: Performed by: PHYSICIAN ASSISTANT

## 2025-05-08 PROCEDURE — 74019 RADEX ABDOMEN 2 VIEWS: CPT

## 2025-05-08 PROCEDURE — 82150 ASSAY OF AMYLASE: CPT | Performed by: SURGERY

## 2025-05-08 PROCEDURE — 83735 ASSAY OF MAGNESIUM: CPT | Performed by: SURGERY

## 2025-05-08 PROCEDURE — P9016 RBC LEUKOCYTES REDUCED: HCPCS | Performed by: PHYSICIAN ASSISTANT

## 2025-05-08 PROCEDURE — 85025 COMPLETE CBC W/AUTO DIFF WBC: CPT | Performed by: SURGERY

## 2025-05-08 PROCEDURE — 250N000013 HC RX MED GY IP 250 OP 250 PS 637: Performed by: SURGERY

## 2025-05-08 PROCEDURE — 36592 COLLECT BLOOD FROM PICC: CPT | Performed by: SURGERY

## 2025-05-08 PROCEDURE — 99233 SBSQ HOSP IP/OBS HIGH 50: CPT | Mod: 24 | Performed by: INTERNAL MEDICINE

## 2025-05-08 PROCEDURE — 93306 TTE W/DOPPLER COMPLETE: CPT

## 2025-05-08 PROCEDURE — 250N000013 HC RX MED GY IP 250 OP 250 PS 637: Performed by: PHYSICIAN ASSISTANT

## 2025-05-08 PROCEDURE — 250N000012 HC RX MED GY IP 250 OP 636 PS 637: Mod: JB | Performed by: PHYSICIAN ASSISTANT

## 2025-05-08 PROCEDURE — 74019 RADEX ABDOMEN 2 VIEWS: CPT | Mod: 26 | Performed by: STUDENT IN AN ORGANIZED HEALTH CARE EDUCATION/TRAINING PROGRAM

## 2025-05-08 PROCEDURE — 85384 FIBRINOGEN ACTIVITY: CPT | Performed by: STUDENT IN AN ORGANIZED HEALTH CARE EDUCATION/TRAINING PROGRAM

## 2025-05-08 PROCEDURE — 80048 BASIC METABOLIC PNL TOTAL CA: CPT | Performed by: SURGERY

## 2025-05-08 PROCEDURE — 120N000011 HC R&B TRANSPLANT UMMC

## 2025-05-08 PROCEDURE — G0463 HOSPITAL OUTPT CLINIC VISIT: HCPCS

## 2025-05-08 PROCEDURE — 84100 ASSAY OF PHOSPHORUS: CPT | Performed by: SURGERY

## 2025-05-08 PROCEDURE — 83690 ASSAY OF LIPASE: CPT | Performed by: SURGERY

## 2025-05-08 PROCEDURE — 250N000012 HC RX MED GY IP 250 OP 636 PS 637: Performed by: SURGERY

## 2025-05-08 PROCEDURE — 93306 TTE W/DOPPLER COMPLETE: CPT | Mod: 26 | Performed by: INTERNAL MEDICINE

## 2025-05-08 PROCEDURE — 250N000011 HC RX IP 250 OP 636: Performed by: PHYSICIAN ASSISTANT

## 2025-05-08 PROCEDURE — 250N000013 HC RX MED GY IP 250 OP 250 PS 637

## 2025-05-08 RX ORDER — DIPHENHYDRAMINE HYDROCHLORIDE 50 MG/ML
25 INJECTION, SOLUTION INTRAMUSCULAR; INTRAVENOUS
Start: 2025-05-10

## 2025-05-08 RX ORDER — HEPARIN SODIUM,PORCINE 10 UNIT/ML
5-20 VIAL (ML) INTRAVENOUS DAILY PRN
OUTPATIENT
Start: 2025-05-10

## 2025-05-08 RX ORDER — ALBUTEROL SULFATE 90 UG/1
1-2 INHALANT RESPIRATORY (INHALATION)
Start: 2025-05-10

## 2025-05-08 RX ORDER — DIPHENHYDRAMINE HCL 25 MG
25-50 CAPSULE ORAL ONCE
Status: COMPLETED | OUTPATIENT
Start: 2025-05-08 | End: 2025-05-08

## 2025-05-08 RX ORDER — ALBUTEROL SULFATE 0.83 MG/ML
2.5 SOLUTION RESPIRATORY (INHALATION)
OUTPATIENT
Start: 2025-05-10

## 2025-05-08 RX ORDER — DIPHENHYDRAMINE HCL 12.5MG/5ML
25-50 LIQUID (ML) ORAL ONCE
Status: COMPLETED | OUTPATIENT
Start: 2025-05-08 | End: 2025-05-08

## 2025-05-08 RX ORDER — OCTREOTIDE ACETATE 100 UG/ML
100 INJECTION, SOLUTION INTRAVENOUS; SUBCUTANEOUS 3 TIMES DAILY
Status: DISPENSED | OUTPATIENT
Start: 2025-05-08 | End: 2025-05-13

## 2025-05-08 RX ORDER — ACETAMINOPHEN 325 MG/1
650 TABLET ORAL ONCE
Status: DISCONTINUED | OUTPATIENT
Start: 2025-05-08 | End: 2025-05-08

## 2025-05-08 RX ORDER — MEPERIDINE HYDROCHLORIDE 25 MG/ML
25 INJECTION INTRAMUSCULAR; INTRAVENOUS; SUBCUTANEOUS
OUTPATIENT
Start: 2025-05-10

## 2025-05-08 RX ORDER — AMOXICILLIN 250 MG
2 CAPSULE ORAL 2 TIMES DAILY
Status: DISPENSED | OUTPATIENT
Start: 2025-05-08

## 2025-05-08 RX ORDER — METHYLPREDNISOLONE SODIUM SUCCINATE 40 MG/ML
40 INJECTION INTRAMUSCULAR; INTRAVENOUS
Start: 2025-05-10

## 2025-05-08 RX ORDER — DIPHENHYDRAMINE HYDROCHLORIDE 50 MG/ML
50 INJECTION, SOLUTION INTRAMUSCULAR; INTRAVENOUS
Start: 2025-05-10

## 2025-05-08 RX ORDER — OCTREOTIDE ACETATE 30 MG
30 KIT INTRAMUSCULAR
Start: 2025-05-10

## 2025-05-08 RX ORDER — FUROSEMIDE 10 MG/ML
40 INJECTION INTRAMUSCULAR; INTRAVENOUS ONCE
Status: DISCONTINUED | OUTPATIENT
Start: 2025-05-08 | End: 2025-05-08

## 2025-05-08 RX ORDER — EPINEPHRINE 1 MG/ML
0.3 INJECTION, SOLUTION, CONCENTRATE INTRAVENOUS EVERY 5 MIN PRN
OUTPATIENT
Start: 2025-05-10

## 2025-05-08 RX ORDER — HEPARIN SODIUM (PORCINE) LOCK FLUSH IV SOLN 100 UNIT/ML 100 UNIT/ML
5 SOLUTION INTRAVENOUS
OUTPATIENT
Start: 2025-05-10

## 2025-05-08 RX ADMIN — ACETAMINOPHEN 975 MG: 325 TABLET ORAL at 19:43

## 2025-05-08 RX ADMIN — OXYCODONE HYDROCHLORIDE 5 MG: 5 TABLET ORAL at 00:27

## 2025-05-08 RX ADMIN — CARVEDILOL 25 MG: 25 TABLET, FILM COATED ORAL at 07:59

## 2025-05-08 RX ADMIN — OCTREOTIDE ACETATE 100 MCG: 100 INJECTION, SOLUTION INTRAVENOUS; SUBCUTANEOUS at 11:36

## 2025-05-08 RX ADMIN — SODIUM BICARBONATE 1300 MG: 650 TABLET ORAL at 07:59

## 2025-05-08 RX ADMIN — Medication 1 TABLET: at 18:52

## 2025-05-08 RX ADMIN — MICAFUNGIN 100 MG: 20 INJECTION, POWDER, LYOPHILIZED, FOR SOLUTION INTRAVENOUS at 00:27

## 2025-05-08 RX ADMIN — ANTI-THYMOCYTE GLOBULIN (RABBIT) 100 MG: 5 INJECTION, POWDER, LYOPHILIZED, FOR SOLUTION INTRAVENOUS at 14:39

## 2025-05-08 RX ADMIN — MYCOPHENOLATE MOFETIL 1000 MG: 250 CAPSULE ORAL at 07:59

## 2025-05-08 RX ADMIN — METHOCARBAMOL 500 MG: 500 TABLET ORAL at 10:15

## 2025-05-08 RX ADMIN — PANTOPRAZOLE SODIUM 40 MG: 40 TABLET, DELAYED RELEASE ORAL at 07:59

## 2025-05-08 RX ADMIN — TACROLIMUS 1.5 MG: 1 CAPSULE ORAL at 18:52

## 2025-05-08 RX ADMIN — MYCOPHENOLATE MOFETIL 1000 MG: 250 CAPSULE ORAL at 18:52

## 2025-05-08 RX ADMIN — SODIUM BICARBONATE 1300 MG: 650 TABLET ORAL at 19:43

## 2025-05-08 RX ADMIN — SENNOSIDES AND DOCUSATE SODIUM 2 TABLET: 50; 8.6 TABLET ORAL at 19:43

## 2025-05-08 RX ADMIN — OCTREOTIDE ACETATE 100 MCG: 100 INJECTION, SOLUTION INTRAVENOUS; SUBCUTANEOUS at 19:42

## 2025-05-08 RX ADMIN — TACROLIMUS 1.5 MG: 1 CAPSULE ORAL at 07:59

## 2025-05-08 RX ADMIN — Medication 1 TABLET: at 07:59

## 2025-05-08 RX ADMIN — OCTREOTIDE ACETATE 100 MCG: 100 INJECTION, SOLUTION INTRAVENOUS; SUBCUTANEOUS at 14:46

## 2025-05-08 RX ADMIN — DIPHENHYDRAMINE HYDROCHLORIDE 25 MG: 25 CAPSULE ORAL at 13:54

## 2025-05-08 RX ADMIN — ACETAMINOPHEN 975 MG: 325 TABLET ORAL at 04:28

## 2025-05-08 RX ADMIN — CARVEDILOL 25 MG: 25 TABLET, FILM COATED ORAL at 19:43

## 2025-05-08 RX ADMIN — OXYCODONE HYDROCHLORIDE 5 MG: 5 TABLET ORAL at 19:43

## 2025-05-08 RX ADMIN — VALGANCICLOVIR 450 MG: 450 TABLET, FILM COATED ORAL at 08:01

## 2025-05-08 RX ADMIN — ACETAMINOPHEN 975 MG: 325 TABLET ORAL at 13:54

## 2025-05-08 ASSESSMENT — ACTIVITIES OF DAILY LIVING (ADL)
ADLS_ACUITY_SCORE: 67
ADLS_ACUITY_SCORE: 68
ADLS_ACUITY_SCORE: 67
ADLS_ACUITY_SCORE: 68
ADLS_ACUITY_SCORE: 67
ADLS_ACUITY_SCORE: 68
ADLS_ACUITY_SCORE: 68
ADLS_ACUITY_SCORE: 67
ADLS_ACUITY_SCORE: 68
ADLS_ACUITY_SCORE: 67
ADLS_ACUITY_SCORE: 67
ADLS_ACUITY_SCORE: 68
ADLS_ACUITY_SCORE: 67

## 2025-05-08 NOTE — PROGRESS NOTES
[facility]  Transplant Nephrology Progress Note  Date of Admission:  5/2/2025  Today's Date: 05/08/2025    Recommendations:    - No acute indications for dialysis.  - Would make no changes in immunosuppression.  Agree with last dose of rATG today.  - Agree with blood transfusion.  - Would consider IV fluid bolus later today if still appearing prerenal.  - Recommend increasing bowel regimen.    Assessment & Plan   # DDKT (SPK): Trend down in creatinine, but slowly.  Patient may be a bit prerenal, which receiving blood transfusion should help.  Good urine output.  No acute indications for dialysis.     - Baseline Creatinine: ~ TBD   - Proteinuria: Not checked post transplant   - DSA Hx: No DSA at time of transplant   - Last cPRA: 98%   - BK Viremia: Not checked post transplant   - Kidney Tx Biopsy Hx: No biopsy history.    # Pancreas Tx (SPK): Pancreas transplant ultrasound 5/7 was difficult to visualize.  Mild stool burden on AXR.   - Pancreatic Exocrine Drainage: Enteric drained     - Blood glucose: Euglycemia      On insulin: No   - HbA1c: Last checked at time of transplant      Latest HbA1c: 5.9%   - Pancreatic enzymes: Increased   - DSA Hx: No DSA at time of transplant   - Pancreas Tx Biopsy Hx: No biopsy history    # Immunosuppression: Tacrolimus immediate release (goal 8-10), Mycophenolate mofetil (dose 1000 mg every 12 hours), and Methylprednisolone (dose taper)   - Induction with Recent Transplant:  High Intensity Protocol   - Continue with intensive monitoring of immunosuppression for efficacy and toxicity.   - Historical Changes in Immunosuppression: None   - Changes: Not at this time; Agree with giving last dose of rATG.    # Infection Prevention:   Last CD4 Level: Not checked  - PJP: Sulfa/TMP (Bactrim)  - CMV: Valganciclovir (Valcyte)  - Fungal: Micafungin (Mycamine)      - CMV IgG Ab High Risk Discordance (D+/R-) at time of transplant: No  Present CMV  Serostatus: Positive  - EBV IgG Ab High Risk Discordance (D+/R-) at time of transplant: No  Present EBV Serostatus: Positive    # Hypertension: Controlled;  Goal BP: < 150/90   - Changes: Not at this time    # Anemia in Chronic Renal Disease/Surgery: Hgb: Trend down      BRIANNA: No   - Iron studies: Unknown at this time, but checked with dialysis   - Agree with blood transfusion for Hgb < 7.0 gm/dl    # Thrombocytopenia: Stable, significantly low platelet level.  Likely secondary to rATG.    # Mineral Bone Disorder:    - Secondary renal hyperparathyroidism; PTH level: Unknown at this time, but checked with dialysis        On treatment: None  - Vitamin D; level: Not checked recently, but was low last check        On supplement: Yes  - Calcium; level: Low normal when corrected for low serum albumin        On supplement: Yes  - Phosphorus; level: High        On supplement: No    # Electrolytes:   - Potassium; level: Normal        On supplement: No  - Magnesium; level: Normal        On supplement: Yes  - Bicarbonate; level: Stable low        On supplement: Yes    # Leukocytosis: Stable, mildly elevated WBC.  Likely related to steroids.    # Other Significant PMH:   - CAD: Patient has mild non obstructive coronary artery disease on last coronary angiogram 4/2024.  - HFpEF: Last cardiac echo (8/2023) showed normal LVEF ~ 60-65% with grade II diastolic dysfunction.  - Sjogren's: Stable with no recent symptoms.  Followed by Rheumatology.  - IgG Lambda MGUS: Stable with monoclonal peak ~  0.3 and K/L ratio ~ 0.64  in July 2024.   - Complex Native Kidney Cyst: Patient with cyst in mid pole right native kidney with thin and mildly thickened internal septations noted on MRI 6/2024.  Repeat imaging with MRI 2/2025 showed slight increased size of the partly exophytic 2.0 cm cyst in the medial cortex of the interpolar region of the right kidney, previously 1.7 cm. Stable 3 mm septation with T2 dark signal in the septation, which may  be related to calcifications.  Recommended follow with ultrasound or MRI in 6 months.    - Recommend bilateral native kidney ultrasound 4 months post transplant (9/2025).  - Positive Lupus Anticoagulant: Patient is positive, but cardiolipins not elevated, neg for Factor V and Factor 2 absent     # Transplant History:  Etiology of Kidney Failure: Diabetes mellitus type 2  Tx: DDKT (SPK)  Transplant: 5/2/2025 (Kidney / Pancreas)  Significant transplant-related complications: None    Recommendations were communicated to the primary team via this note.    Lj Vizcarra MD  Transplant Nephrology  Contact information via Vocera Web Console or via [facility] Paging/Directory      Interval History  Ms. Kapoor's creatinine is 4.16 (05/08 0617); Trend down.  Good urine output.  Trend up in lipase.  Other significant labs/tests/vitals: Stable electrolytes.  Trend down in hemoglobin.  Stable, low platelet level.  Mild stool burden on abdominal x-ray.  No new events overnight.  No chest pain or shortness of breath.  No leg swelling.  No nausea and vomiting.  Bowel movements are formed.  No fever, sweats or chills.    Review of Systems   4 point ROS was obtained and negative except as noted in the Interval History.    MEDICATIONS:  Current Facility-Administered Medications   Medication Dose Route Frequency Provider Last Rate Last Admin    acetaminophen (TYLENOL) tablet 975 mg  975 mg Oral Q8H Jean-Pierre Gleason MD   975 mg at 05/08/25 0428    anti-thymocyte globulin (THYMOGLOBULIN - Rabbit) 100 mg in sodium chloride 0.9 % 295 mL intermittent infusion  100 mg Intravenous Central line Once Kayley Link PA-C        [Held by provider] aspirin (ASA) chewable tablet 324 mg  324 mg Oral or NG Tube Daily Kayley Link PA-C        calcium carbonate-vitamin D (OSCAL) 500-5 MG-MCG per tablet 1 tablet  1 tablet Oral BID w/meals Jean-Pierre Gleason MD   1 tablet at 05/08/25 0163    carvedilol  (COREG) tablet 25 mg  25 mg Oral BID Kristy Layne PA-C   25 mg at 05/08/25 0759    diphenhydrAMINE (BENADRYL) capsule 25-50 mg  25-50 mg Oral Once Kayley Link PA-C        Or    diphenhydrAMINE (BENADRYL) elixir 25-50 mg  25-50 mg Per NG tube Once Kayley Link PA-C        furosemide (LASIX) injection 40 mg  40 mg Intravenous Once Kayley Link PA-C        insulin aspart (NovoLOG) injection (RAPID ACTING)  1-7 Units Subcutaneous Q4H Aishwarya Del Toro APRN CNP        [Held by provider] magnesium oxide (MAG-OX) tablet 400 mg  400 mg Oral Q24H Jean-Pierre Gleason MD   400 mg at 05/06/25 1121    micafungin (MYCAMINE) 100 mg in sodium chloride 0.9 % 100 mL intermittent infusion  100 mg Intravenous Q24H Jean-Pierre Gleason  mL/hr at 05/08/25 0027 100 mg at 05/08/25 0027    mycophenolate (GENERIC EQUIVALENT) capsule 1,000 mg  1,000 mg Oral BID IS Jean-Pierre Gleason MD   1,000 mg at 05/08/25 0759    [START ON 5/10/2025] nystatin (MYCOSTATIN) suspension 500,000 Units  500,000 Units Oral 4x Daily Kayley Link PA-C        octreotide (sandoSTATIN) injection 100 mcg  100 mcg Subcutaneous TID Kayley Link PA-C        pantoprazole (PROTONIX) EC tablet 40 mg  40 mg Oral QAM AC Jean-Pierre Gleason MD   40 mg at 05/08/25 0759    polyethylene glycol (MIRALAX) Packet 17 g  17 g Oral Daily Jean-Pierre Gleason MD   17 g at 05/06/25 0754    senna-docusate (SENOKOT-S/PERICOLACE) 8.6-50 MG per tablet 1 tablet  1 tablet Oral BID Jean-Pierre Gleason MD   1 tablet at 05/06/25 2005    sodium bicarbonate tablet 1,300 mg  1,300 mg Oral BID Aishwarya Del Toro APRN CNP   1,300 mg at 05/08/25 0759    sodium chloride (PF) 0.9% PF flush 10 mL  10 mL Intracatheter Q8H Jean-Pierre Gleason MD   10 mL at 05/07/25 1237    sodium chloride (PF) 0.9% PF flush 3 mL  3 mL Intravenous Q8H Jean-Pierre Gleason  MD Chapo   3 mL at 25 191    [START ON 2025] sulfamethoxazole-trimethoprim (BACTRIM) 400-80 MG per tablet 1 tablet  1 tablet Oral Once per day on  Jean-Pierre Gleason MD        tacrolimus (GENERIC EQUIVALENT) capsule 1.5 mg  1.5 mg Oral BID IS Jean-Pierre Gleason MD   1.5 mg at 25 0759    valGANciclovir (VALCYTE) tablet 450 mg  450 mg Oral Once per day on  Jean-Pierre Gleason MD   450 mg at 25 0801     Current Facility-Administered Medications   Medication Dose Route Frequency Provider Last Rate Last Admin    dextrose 10% infusion   Intravenous Continuous PRN Jean-Pierre Gleason MD           Physical Exam   Temp  Av.8  F (37.1  C)  Min: 98.5  F (36.9  C)  Max: 99  F (37.2  C)  Arterial Line BP  Min: 82/57  Max: 101/55  Arterial Line MAP (mmHg)  Av.8 mmHg  Min: 21 mmHg  Max: 67 mmHg      Pulse  Av.6  Min: 91  Max: 102 Resp  Av.6  Min: 14  Max: 23  SpO2  Av.6 %  Min: 98 %  Max: 100 %    CVP (mmHg): 11 mmHgBP (!) 143/66 (BP Location: Right arm)   Pulse 74   Temp 97.6  F (36.4  C) (Oral)   Resp 16   Ht 1.524 m (5')   Wt 68.6 kg (151 lb 3.2 oz)   SpO2 100%   BMI 29.53 kg/m     Date 25 0700 - 25 0659   Shift [id number] 7821-5918 6259-4074 24 Hour Total   INTAKE   Shift Total(mL/kg)       OUTPUT   Urine 5   5   Shift Total(mL/kg) 5(0.08)   5(0.08)   Weight (kg) 62.69 62.69 62.69 62.69      Admit Weight: 62.7 kg (138 lb 3.2 oz)     GENERAL APPEARANCE: alert and no distress  HENT: mouth without ulcers or lesions, NG in place  RESP: lungs clear to auscultation - no rales, rhonchi or wheezes  CV: regular rhythm, normal rate, no rub, no murmur  EDEMA: no LE edema bilaterally  ABDOMEN: soft, nondistended, mildly tender, bowel sounds normal  MS: extremities normal - no gross deformities noted, no evidence of inflammation in joints, no muscle tenderness  SKIN: no  rash  TX KIDNEY: mild TTP  DIALYSIS ACCESS:  Peritoneal dialysis catheter    Data   All labs reviewed by me.  CMP  Recent Labs   Lab 05/08/25  0803 05/08/25  0617 05/08/25  0437 05/08/25  0004 05/07/25  0753 05/07/25  0558 05/06/25  0802 05/06/25  0601 05/05/25  0808 05/05/25  0641 05/02/25  1714 05/02/25  1652   NA  --  140  --   --   --  141  --  140  --  140   < > 140   POTASSIUM  --  3.9  --   --   --  3.6  --  3.8  --  4.1   < > 3.9   CHLORIDE  --  105  --   --   --  105  --  107  --  108*   < > 103   CO2  --  21*  --   --   --  20*  --  18*  --  18*   < > 25   ANIONGAP  --  14  --   --   --  16*  --  15  --  14   < > 12   GLC 86 87 89 92   < > 103*   < > 119*   < > 135*   < > 105*   BUN  --  63.6*  --   --   --  67.7*  --  59.7*  --  51.8*   < > 45.6*   CR  --  4.16*  --   --   --  4.66*  --  4.86*  --  5.12*   < > 6.11*   GFRESTIMATED  --  12*  --   --   --  10*  --  10*  --  9*   < > 7*   VIJAY  --  7.7*  --   --   --  7.9*  --  7.6*  --  7.6*   < > 9.7   MAG  --  2.2  --   --   --  2.4*  --  2.1  --  1.9   < >  --    PHOS  --  5.3*  --   --   --  6.6*  --  7.3*  --  7.2*   < >  --    PROTTOTAL  --   --   --   --   --   --   --   --   --   --   --  7.9   ALBUMIN  --   --   --   --   --   --   --   --   --   --   --  3.7   BILITOTAL  --   --   --   --   --   --   --   --   --   --   --  0.2   ALKPHOS  --   --   --   --   --   --   --   --   --   --   --  84   AST  --   --   --   --   --   --   --   --   --   --   --  29   ALT  --   --   --   --   --   --   --   --   --   --   --  18    < > = values in this interval not displayed.     CBC  Recent Labs   Lab 05/08/25 0617 05/07/25 0558 05/06/25 0601 05/05/25 0641   HGB 6.9* 8.1* 6.8* 7.2*   WBC 11.5* 11.6* 10.2 10.0   RBC 2.33* 2.79* 2.29* 2.44*   HCT 21.0* 24.3* 20.5* 22.0*   MCV 90 87 90 90   MCH 29.6 29.0 29.7 29.5   MCHC 32.9 33.3 33.2 32.7   RDW 16.4* 16.4* 16.0* 16.4*   PLT 35* 39* 34* 43*     INR  Recent Labs   Lab 05/07/25 0558 05/06/25 0601  05/05/25  0641 05/03/25  0356   INR 0.98 0.94 0.91 1.26*   PTT 29 32 31 39*     ABG  Recent Labs   Lab 05/03/25  0257 05/03/25  0216 05/03/25  0107 05/03/25  0001   PH 7.29* 7.29* 7.25* 7.30*   PCO2 35 33* 34* 36   PO2 135* 137* 149* 125*   HCO3 17* 16* 15* 18*   O2PER 52.0 52.0 53.0 52.0      Urine Studies  Recent Labs   Lab Test 05/02/25  1635 08/02/23  1350   COLOR Light Yellow Straw   APPEARANCE Clear Clear   URINEGLC 30* 300*   URINEBILI Negative Negative   URINEKETONE Negative Negative   SG 1.014 1.012   UBLD Negative Negative   URINEPH 6.5 7.0   PROTEIN 300* 300*   NITRITE Negative Negative   LEUKEST Small* Negative   RBCU 3* <1   WBCU 16* 1     No lab results found.  PTH  No lab results found.  Iron Studies  No lab results found.    IMAGING:  All imaging studies reviewed by me.

## 2025-05-08 NOTE — PLAN OF CARE
Goal Outcome Evaluation:      Plan of Care Reviewed With: patient, spouse    Overall Patient Progress: no changeOverall Patient Progress: no change    Outcome Evaluation: 1 U RBCs, started thymo, full  liquid diet, little appetite, dark stool      BP (!) 156/72   Pulse 70   Temp 98.7  F (37.1  C)   Resp 16   Ht 1.524 m (5')   Wt 68.6 kg (151 lb 3.2 oz)   SpO2 100%   BMI 29.53 kg/m      Shift: Assumed care ~1000 to end of shift at 1530  Isolation Status: NA  VS: hypertensive on RA, afebrile  Neuro: Aox4  Behaviors: cooperative with cares, able to make her  needsknown  BG: q4h, 94  Labs/Imaging: echocardiogram  Respiratory: Clear lung sounds, no shortness of breath  Cardiac: Regular rhythm/rate  Pain/Nausea: Tender abdomen and JESUS site  PRN: Robaxin x1  Diet: Full liquid diet, small appetite  LDA: Triple lumen internal jugular, PIV x1, JESUS to bulb suctionwith 120 mL  serosanguinous output.  Infusion(s): Thymo started  GI/: Merlos catheter in place with 300 mL measured urine output. Loose dark BM  Skin: Midline incision, leaky with small amount of oozing blood. Blisters in pannus  Mobility: Independent in room  Events/Education: WOC order place.  Thymo started  Plan: Continue plan of care.

## 2025-05-08 NOTE — PROGRESS NOTES
Care Management Follow Up    Length of Stay (days): 6    Expected Discharge Date: 05/09/2025     Concerns to be Addressed: discharge planning     Patient plan of care discussed at interdisciplinary rounds: Yes    Anticipated Discharge Disposition: Home, Home Care, Outpatient Infusion Services      Anticipated Discharge Services: Home Care  Anticipated Discharge DME: None    Patient/family educated on Medicare website which has current facility and service quality ratings: no  Education Provided on the Discharge Plan: No  Patient/Family in Agreement with the Plan:      Referrals Placed by CM/SW: External Care Coordination  Private pay costs discussed: Not applicable    Discussed  Partnership in Safe Discharge Planning  document with patient/family: not in this encounter     Handoff Completed: No, handoff not indicated or clinically appropriate    Additional Information:    [facility]  [facility] and help with discharge need as needed.   Mayra : [phone]  [facility].     Discharge resources:   [facility]  Phone: [phone]      Next Steps:   ATC  Updated [facility] Liaison, ensure home care order placed  Medical clearance       Adri Hugo RN, PHN, BSN   Float Nurse Care Coordinator  Covering for [address]  Phone [phone]

## 2025-05-08 NOTE — PLAN OF CARE
Goal Outcome Evaluation:      Plan of Care Reviewed With: patient, spouse, child    Overall Patient Progress: no change    Reason for Admission: Pre kidney/pancreas transplant evaluation    Status: POD #6     RN assumed cares at [id number]    Pain: 6-8/10 pain in abd during shift, moderately controlled with PRN oxycodone and scheduled tylenol   Neuro: A/O x4, able to make needs known  Cardiac: VSS denies chest pain, HR 70s-80s  Respiratory: VSS, RA, denies SOB  GI/: velázquez cath in place with moderate output, last BM 5/7  IV/Drains: PIV x1, R internal jugular, L PD cath   Activity: SBA/ Ax1  Skin: Midline incision, stapled LUIS. R abd fold blisters, cleaned x1 this shift  Diet: Clears    Events: Critical Hemoglobin 6.9, Platelets 35, MD informed    Plan of Care: continue with POC

## 2025-05-08 NOTE — PROGRESS NOTES
Transplant Surgery  Inpatient Daily Progress Note  2025    Assessment & Plan: Wendy Kapoor is a 54 year old female who presents with a history of DM2, HTN, ESRD on PD since 2023, IgG Lambda MGUS, non-obstructive CAD, and anemia of chronic disease. On 2025 she was notified as an acceptable  donor organ became available and presented for further pre-operative work-up and on 2025 she underwent DBD SPK with stent, and open appendectomy which was well tolerated as performed by Dr. Gleason. Ms. Kapoor was admitted to  post-operatively for ongoing post-operative care.     Today:   - Thymo 100 mg   - Advance to FLD   - PRBC 1 unit to be transfused   - Lasix 40 mg IV following PRBC transfusion   - Hold ASA with thrombocytopenia   - Restart Octreotide, will look into outpatient depot coverage   - AXR to evaluate stool burden                                                                                                                                 Graft function: POD #6  Kidney: Cr 4.2 < 4.7 < 4.9. Post-op US patent Doppler. UOP 1.2L yesterday.   - Transplant Nephrology following     Pancreas: Amylase 204 < 151 < 42 < 119, lipase 387 < 225<-65 < 43 < 98. Post-op US patient Doppler,  US with pancreas not well visualized. Has not required insulin post-operatively.    - Sliding scale insulin, no insulin needed   -  mg daily-on hold due to thrombocytopenia   - Octreotide subcutaneous x 5 days completed, will restart to extend course and look into outpatient depot coverage   - Heparin straight rate 400 units/hour completed    Immunosuppressed status secondary to medications: cPRA 98  Thymo: 125 mg POD#0, Thymo 125 mg POD#2. Thymo 75 mg 5/7 and 100 mg on 5 to complete course of 6.5 mg/kg  Steroids:  mg POD#0,  mg POD#2, 100 mg POD 3  MMF: 1000 mg BID  Tacro: Goal level 8-10.    Hematology:   Anemia of chronic disease; Acute blood loss anemia: Hgb 6.9 from 8.1. Transfused 1  unit pRBC on 5/3, 5/6 and 5/8. Continue to monitor.  Thrombocytopenia: Plt 35. Secondary to induction IS. Hit panel negative. Monitor.     Cardiorespiratory:   Hypertension: PTA Cardizem 180 mg daily, Hydralazine 50 mg TID, losartan 100 mg daily, Toprol  mg daily.   - Carvedilol to 25 mg BID    GI/Nutrition:  Diet: Removed NG POD 5. Advance to FLD as tolerated.   Bowel regimen: Senna, PEG. BMx2 yesterday. Obtain AXR to evaluate stool burden with increasing lipase.     Fluid/Electrolytes: MIVF: Discontinued  Low bicarb: bicarb 1300 mg BID   Hypocalcemia: calcium 500 mg BID  Hyperphosphatemia: monitor  Hypermagnesemia: Hold Mag ox    : Merlos to remain due to new surgical anastomosis x 7 days.    Infectious disease: Afebrile.   Leukocytosis: Secondary to steroids. Improving.    Prophylaxis: DVT, fall, GI (PPI), viral (Valcyte), pneumocystis (Bactrim), fungal (micafungin x 6 doses then clotrimazole)     Disposition: 7A     Medically Ready for Discharge: Anticipated in 2-4 Days     YNES/Fellow/Resident Provider: Kayley Link PA-C pager [phone]     Faculty: Jose Wyatt M.D.  _________________________________________________________________    Interval History: History from patient and/or EMR  Overnight events: No acute events overnight. Positive flatus and bowel function. Pain at JESUS and PD catheter site resolved.     ROS:   A 10-point review of systems was negative except as noted above.    Meds:  Current Facility-Administered Medications   Medication Dose Route Frequency Provider Last Rate Last Admin    acetaminophen (TYLENOL) tablet 650 mg  650 mg Oral Once Kayley Link PA-C        acetaminophen (TYLENOL) tablet 975 mg  975 mg Oral Q8H Jean-Pierre Gleason MD   975 mg at 05/08/25 0428    anti-thymocyte globulin (THYMOGLOBULIN - Rabbit) 100 mg in sodium chloride 0.9 % 270 mL intermittent infusion  100 mg Intravenous Central line Once Kayley Link PA-C        [Held by provider] aspirin  (ASA) chewable tablet 324 mg  324 mg Oral or NG Tube Daily Kayley Link PA-C        calcium carbonate-vitamin D (OSCAL) 500-5 MG-MCG per tablet 1 tablet  1 tablet Oral BID w/meals Jean-Pierre Gleason MD        carvedilol (COREG) tablet 25 mg  25 mg Oral BID Kristy Layne PA-C   25 mg at 05/07/25 2013    diphenhydrAMINE (BENADRYL) capsule 25-50 mg  25-50 mg Oral Once Kayley Link PA-C        Or    diphenhydrAMINE (BENADRYL) elixir 25-50 mg  25-50 mg Per NG tube Once Kayley Link PA-C        furosemide (LASIX) injection 40 mg  40 mg Intravenous Once Kayley Link PA-C        insulin aspart (NovoLOG) injection (RAPID ACTING)  1-7 Units Subcutaneous Q4H Aishwarya Del Toro APRN CNP        [Held by provider] magnesium oxide (MAG-OX) tablet 400 mg  400 mg Oral Q24H Jean-Pierre Gleason MD   400 mg at 05/06/25 1121    micafungin (MYCAMINE) 100 mg in sodium chloride 0.9 % 100 mL intermittent infusion  100 mg Intravenous Q24H Jean-Pierre Gleason  mL/hr at 05/08/25 0027 100 mg at 05/08/25 0027    mycophenolate (GENERIC EQUIVALENT) capsule 1,000 mg  1,000 mg Oral BID IS Jean-Pierre Gleason MD   1,000 mg at 05/07/25 1830    [START ON 5/10/2025] nystatin (MYCOSTATIN) suspension 500,000 Units  500,000 Units Oral 4x Daily Kayley Link PA-C        pantoprazole (PROTONIX) EC tablet 40 mg  40 mg Oral QAM AC Jean-Pierre Gleason MD        polyethylene glycol (MIRALAX) Packet 17 g  17 g Oral Daily Jean-Pierre Gleason MD   17 g at 05/06/25 0754    senna-docusate (SENOKOT-S/PERICOLACE) 8.6-50 MG per tablet 1 tablet  1 tablet Oral BID Jean-Pierre Gleason MD   1 tablet at 05/06/25 2005    sodium bicarbonate tablet 1,300 mg  1,300 mg Oral BID Aishwarya Del Toro APRN CNP   1,300 mg at 05/07/25 2014    sodium chloride (PF) 0.9% PF flush 10 mL  10 mL Intracatheter Q8H Jean-Pierre Gleason  MD Chapo   10 mL at 05/07/25 1237    sodium chloride (PF) 0.9% PF flush 3 mL  3 mL Intravenous Q8H Jean-Pierre Gleason MD   3 mL at 05/06/25 1911    [START ON 5/9/2025] sulfamethoxazole-trimethoprim (BACTRIM) 400-80 MG per tablet 1 tablet  1 tablet Oral Once per day on Monday Wednesday Friday Jean-Pierre Gleason MD        tacrolimus (GENERIC EQUIVALENT) capsule 1.5 mg  1.5 mg Oral BID IS Jean-Pierre Gleason MD   1.5 mg at 05/07/25 1829    valGANciclovir (VALCYTE) tablet 450 mg  450 mg Oral Once per day on Monday Thursday Jean-Pierre Gleason MD           Physical Exam:     Admit Weight: 62.7 kg (138 lb 3.2 oz)    Current vitals:   BP (!) 143/66 (BP Location: Right arm)   Pulse 74   Temp 97.6  F (36.4  C) (Oral)   Resp 16   Ht 1.524 m (5')   Wt 72.1 kg (158 lb 14.4 oz)   SpO2 100%   BMI 31.03 kg/m      Vital sign ranges:    Temp:  [97.6  F (36.4  C)-98.5  F (36.9  C)] 97.6  F (36.4  C)  Pulse:  [] 74  Resp:  [16-18] 16  BP: (110-172)/(64-77) 143/66  SpO2:  [98 %-100 %] 100 %    General Appearance: in no apparent distress.   Skin: Warm, perfused  Heart: she appears adequately perfused   Lungs: Nonlabored resps on RA  Abdomen: The abdomen is non-distended. JESUS x 1 with serosang drainage. PD catheter in place. Incision c/d/I.   : velázquez is present. Urine is clear yellow  Extremities: edema: 1+ generalized edema  Neurologic: awake, alert, and oriented. Tremor absent.    Data:   CMP  Recent Labs   Lab 05/08/25  0617 05/08/25  0437 05/07/25  1230 05/07/25  0954 05/07/25  0753 05/07/25  0558 05/03/25  0356 05/03/25  0257 05/03/25  0216 05/02/25  1714 05/02/25  1652     --   --   --   --  141   < > 134* 135   < > 140   POTASSIUM 3.9  --   --   --   --  3.6   < > 3.6 3.6   < > 3.9   CHLORIDE 105  --   --   --   --  105   < >  --   --   --  103   CO2 21*  --   --   --   --  20*   < >  --   --   --  25   GLC 87 89   < >  --    < > 103*   < > 83  106*   < > 105*   BUN 63.6*  --   --   --   --  67.7*   < >  --   --   --  45.6*   CR 4.16*  --   --   --   --  4.66*   < >  --   --   --  6.11*   GFRESTIMATED 12*  --   --   --   --  10*   < >  --   --   --  7*   VIJAY 7.7*  --   --   --   --  7.9*   < >  --   --   --  9.7   ICAW  --   --   --   --   --   --   --  5.4* 4.2*   < >  --    MAG 2.2  --   --   --   --  2.4*   < >  --   --   --   --    PHOS 5.3*  --   --   --   --  6.6*   < >  --   --   --   --    AMYLASE 204*  --   --   --   --  151*   < >  --   --   --  50   LIPASE 387*  --   --   --   --  225*   < >  --   --   --  106*   ALBUMIN  --   --   --   --   --   --   --   --   --   --  3.7   BILITOTAL  --   --   --   --   --   --   --   --   --   --  0.2   ALKPHOS  --   --   --   --   --   --   --   --   --   --  84   AST  --   --   --   --   --   --   --   --   --   --  29   ALT  --   --   --   --   --   --   --   --   --   --  18   FAMY  --   --   --  161.0  --   --   --   --   --   --   --    FLIPA  --   --   --  342  --   --   --   --   --   --   --     < > = values in this interval not displayed.     CBC  Recent Labs   Lab 05/08/25  0617 05/07/25  0558 05/03/25  1430 05/03/25  0948   HGB 6.9* 8.1*   < > 7.8*   WBC 11.5* 11.6*   < >  --    PLT 35* 39*   < >  --    A1C  --   --   --  5.9*    < > = values in this interval not displayed.

## 2025-05-09 ENCOUNTER — TELEPHONE (OUTPATIENT)
Dept: TRANSPLANT | Facility: CLINIC | Age: 55
End: 2025-05-09

## 2025-05-09 VITALS
WEIGHT: 151.2 LBS | OXYGEN SATURATION: 97 % | TEMPERATURE: 98.2 F | SYSTOLIC BLOOD PRESSURE: 141 MMHG | HEART RATE: 90 BPM | HEIGHT: 60 IN | RESPIRATION RATE: 16 BRPM | BODY MASS INDEX: 29.68 KG/M2 | DIASTOLIC BLOOD PRESSURE: 69 MMHG

## 2025-05-09 DIAGNOSIS — Z94.0 KIDNEY TRANSPLANT RECIPIENT: ICD-10-CM

## 2025-05-09 PROBLEM — E83.41 HYPERMAGNESEMIA: Status: ACTIVE | Noted: 2025-05-09

## 2025-05-09 PROBLEM — E11.21 TYPE 2 DIABETES MELLITUS WITH DIABETIC NEPHROPATHY (H): Status: ACTIVE | Noted: 2023-08-26

## 2025-05-09 PROBLEM — E87.8 LOW BICARBONATE: Status: ACTIVE | Noted: 2025-05-09

## 2025-05-09 PROBLEM — D63.8 ANEMIA, CHRONIC DISEASE: Status: ACTIVE | Noted: 2025-05-09

## 2025-05-09 PROBLEM — D72.829 LEUKOCYTOSIS: Status: ACTIVE | Noted: 2025-05-09

## 2025-05-09 PROBLEM — D84.9 IMMUNOSUPPRESSED STATUS: Status: ACTIVE | Noted: 2025-05-09

## 2025-05-09 PROBLEM — E83.51 HYPOCALCEMIA: Status: ACTIVE | Noted: 2025-05-09

## 2025-05-09 PROBLEM — D69.6 THROMBOCYTOPENIA: Status: ACTIVE | Noted: 2025-05-09

## 2025-05-09 PROBLEM — E83.39 HYPERPHOSPHATEMIA: Status: ACTIVE | Noted: 2025-05-09

## 2025-05-09 LAB
AMYLASE SERPL-CCNC: 168 U/L (ref 28–100)
ANION GAP SERPL CALCULATED.3IONS-SCNC: 10 MMOL/L (ref 7–15)
BASOPHILS # BLD MANUAL: 0 10E3/UL (ref 0–0.2)
BASOPHILS NFR BLD MANUAL: 0 %
BUN SERPL-MCNC: 56.5 MG/DL (ref 6–20)
CALCIUM SERPL-MCNC: 7.5 MG/DL (ref 8.8–10.4)
CHLORIDE SERPL-SCNC: 104 MMOL/L (ref 98–107)
CREAT SERPL-MCNC: 3.42 MG/DL (ref 0.51–0.95)
EGFRCR SERPLBLD CKD-EPI 2021: 15 ML/MIN/1.73M2
ELLIPTOCYTES BLD QL SMEAR: SLIGHT
EOSINOPHIL # BLD MANUAL: 0.1 10E3/UL (ref 0–0.7)
EOSINOPHIL NFR BLD MANUAL: 1 %
ERYTHROCYTE [DISTWIDTH] IN BLOOD BY AUTOMATED COUNT: 15.4 % (ref 10–15)
GLUCOSE BLDC GLUCOMTR-MCNC: 79 MG/DL (ref 70–99)
GLUCOSE BLDC GLUCOMTR-MCNC: 80 MG/DL (ref 70–99)
GLUCOSE BLDC GLUCOMTR-MCNC: 90 MG/DL (ref 70–99)
GLUCOSE BLDC GLUCOMTR-MCNC: 91 MG/DL (ref 70–99)
GLUCOSE SERPL-MCNC: 88 MG/DL (ref 70–99)
HCO3 SERPL-SCNC: 22 MMOL/L (ref 22–29)
HCT VFR BLD AUTO: 23.3 % (ref 35–47)
HGB BLD-MCNC: 7.8 G/DL (ref 11.7–15.7)
LIPASE SERPL-CCNC: 283 U/L (ref 13–60)
LYMPHOCYTES # BLD MANUAL: 0.1 10E3/UL (ref 0.8–5.3)
LYMPHOCYTES NFR BLD MANUAL: 1 %
MAGNESIUM SERPL-MCNC: 2.1 MG/DL (ref 1.7–2.3)
MCH RBC QN AUTO: 29.5 PG (ref 26.5–33)
MCHC RBC AUTO-ENTMCNC: 33.5 G/DL (ref 31.5–36.5)
MCV RBC AUTO: 88 FL (ref 78–100)
MONOCYTES # BLD MANUAL: 0.1 10E3/UL (ref 0–1.3)
MONOCYTES NFR BLD MANUAL: 1 %
NEUTROPHILS # BLD MANUAL: 7.2 10E3/UL (ref 1.6–8.3)
NEUTROPHILS NFR BLD MANUAL: 98 %
PHOSPHATE SERPL-MCNC: 4.1 MG/DL (ref 2.5–4.5)
PLAT MORPH BLD: ABNORMAL
PLATELET # BLD AUTO: 34 10E3/UL (ref 150–450)
POTASSIUM SERPL-SCNC: 4.1 MMOL/L (ref 3.4–5.3)
RBC # BLD AUTO: 2.64 10E6/UL (ref 3.8–5.2)
RBC MORPH BLD: ABNORMAL
SODIUM SERPL-SCNC: 136 MMOL/L (ref 135–145)
TACROLIMUS BLD-MCNC: 14.7 UG/L (ref 5–15)
TME LAST DOSE: NORMAL H
TME LAST DOSE: NORMAL H
WBC # BLD AUTO: 7.4 10E3/UL (ref 4–11)

## 2025-05-09 PROCEDURE — 85027 COMPLETE CBC AUTOMATED: CPT | Performed by: SURGERY

## 2025-05-09 PROCEDURE — 82150 ASSAY OF AMYLASE: CPT | Performed by: SURGERY

## 2025-05-09 PROCEDURE — 97116 GAIT TRAINING THERAPY: CPT | Mod: GP | Performed by: PHYSICAL THERAPIST

## 2025-05-09 PROCEDURE — 250N000011 HC RX IP 250 OP 636: Mod: JZ | Performed by: SURGERY

## 2025-05-09 PROCEDURE — 84100 ASSAY OF PHOSPHORUS: CPT | Performed by: SURGERY

## 2025-05-09 PROCEDURE — 83735 ASSAY OF MAGNESIUM: CPT | Performed by: SURGERY

## 2025-05-09 PROCEDURE — 85007 BL SMEAR W/DIFF WBC COUNT: CPT | Performed by: SURGERY

## 2025-05-09 PROCEDURE — 250N000013 HC RX MED GY IP 250 OP 250 PS 637: Performed by: PHYSICIAN ASSISTANT

## 2025-05-09 PROCEDURE — 80048 BASIC METABOLIC PNL TOTAL CA: CPT | Performed by: SURGERY

## 2025-05-09 PROCEDURE — 258N000003 HC RX IP 258 OP 636: Performed by: SURGERY

## 2025-05-09 PROCEDURE — 82565 ASSAY OF CREATININE: CPT | Performed by: SURGERY

## 2025-05-09 PROCEDURE — 97530 THERAPEUTIC ACTIVITIES: CPT | Mod: GP | Performed by: PHYSICAL THERAPIST

## 2025-05-09 PROCEDURE — 83690 ASSAY OF LIPASE: CPT | Performed by: SURGERY

## 2025-05-09 PROCEDURE — 99233 SBSQ HOSP IP/OBS HIGH 50: CPT | Mod: 24 | Performed by: INTERNAL MEDICINE

## 2025-05-09 PROCEDURE — 250N000012 HC RX MED GY IP 250 OP 636 PS 637: Performed by: SURGERY

## 2025-05-09 PROCEDURE — 80197 ASSAY OF TACROLIMUS: CPT | Performed by: SURGERY

## 2025-05-09 PROCEDURE — 36592 COLLECT BLOOD FROM PICC: CPT | Performed by: SURGERY

## 2025-05-09 PROCEDURE — 250N000012 HC RX MED GY IP 250 OP 636 PS 637: Mod: JB | Performed by: PHYSICIAN ASSISTANT

## 2025-05-09 PROCEDURE — 250N000013 HC RX MED GY IP 250 OP 250 PS 637

## 2025-05-09 PROCEDURE — 250N000013 HC RX MED GY IP 250 OP 250 PS 637: Performed by: SURGERY

## 2025-05-09 RX ORDER — TACROLIMUS 1 MG/1
1 CAPSULE ORAL 2 TIMES DAILY
Qty: 60 CAPSULE | Refills: 11 | Status: SHIPPED | OUTPATIENT
Start: 2025-05-09 | End: 2025-05-09

## 2025-05-09 RX ORDER — AMOXICILLIN 250 MG
2 CAPSULE ORAL 2 TIMES DAILY
Qty: 60 TABLET | Refills: 1 | Status: SHIPPED | OUTPATIENT
Start: 2025-05-09

## 2025-05-09 RX ORDER — METHOCARBAMOL 500 MG/1
500 TABLET, FILM COATED ORAL EVERY 6 HOURS PRN
Qty: 20 TABLET | Refills: 0 | Status: SHIPPED | OUTPATIENT
Start: 2025-05-09

## 2025-05-09 RX ORDER — TACROLIMUS 1 MG/1
1 CAPSULE ORAL 2 TIMES DAILY
Qty: 60 CAPSULE | Refills: 11 | Status: ACTIVE | OUTPATIENT
Start: 2025-05-09 | End: 2025-05-10

## 2025-05-09 RX ORDER — CARVEDILOL 25 MG/1
25 TABLET ORAL 2 TIMES DAILY
Qty: 60 TABLET | Refills: 2 | Status: SHIPPED | OUTPATIENT
Start: 2025-05-09 | End: 2025-05-15

## 2025-05-09 RX ORDER — TACROLIMUS 1 MG/1
1 CAPSULE ORAL
Status: DISCONTINUED | OUTPATIENT
Start: 2025-05-09 | End: 2025-05-09 | Stop reason: HOSPADM

## 2025-05-09 RX ORDER — PANTOPRAZOLE SODIUM 40 MG/1
40 TABLET, DELAYED RELEASE ORAL
Qty: 30 TABLET | Refills: 1 | Status: SHIPPED | OUTPATIENT
Start: 2025-05-10

## 2025-05-09 RX ORDER — ACETAMINOPHEN 325 MG/1
650 TABLET ORAL EVERY 6 HOURS PRN
Qty: 50 TABLET | Refills: 0 | Status: SHIPPED | OUTPATIENT
Start: 2025-05-09

## 2025-05-09 RX ORDER — SODIUM BICARBONATE 650 MG/1
1300 TABLET ORAL 2 TIMES DAILY
Qty: 120 TABLET | Refills: 2 | Status: SHIPPED | OUTPATIENT
Start: 2025-05-09

## 2025-05-09 RX ORDER — AMLODIPINE BESYLATE 5 MG/1
5 TABLET ORAL EVERY EVENING
Qty: 30 TABLET | Refills: 2 | Status: SHIPPED | OUTPATIENT
Start: 2025-05-09 | End: 2025-05-15

## 2025-05-09 RX ORDER — MYCOPHENOLATE MOFETIL 250 MG/1
1000 CAPSULE ORAL 2 TIMES DAILY
Qty: 240 CAPSULE | Refills: 11 | Status: ACTIVE | OUTPATIENT
Start: 2025-05-09

## 2025-05-09 RX ORDER — POLYETHYLENE GLYCOL 3350 17 G/17G
17 POWDER, FOR SOLUTION ORAL DAILY
Qty: 510 G | Refills: 1 | Status: SHIPPED | OUTPATIENT
Start: 2025-05-09 | End: 2025-05-15

## 2025-05-09 RX ORDER — VALGANCICLOVIR 450 MG/1
450 TABLET, FILM COATED ORAL
Qty: 60 TABLET | Refills: 2 | Status: ACTIVE | OUTPATIENT
Start: 2025-05-12

## 2025-05-09 RX ORDER — ASPIRIN 81 MG/1
324 TABLET, CHEWABLE ORAL DAILY
Qty: 30 TABLET | Refills: 11 | Status: SHIPPED | OUTPATIENT
Start: 2025-05-09

## 2025-05-09 RX ORDER — TACROLIMUS 0.5 MG/1
0.5 CAPSULE ORAL 2 TIMES DAILY
Qty: 60 CAPSULE | Refills: 11 | Status: SHIPPED | OUTPATIENT
Start: 2025-05-09 | End: 2025-05-09

## 2025-05-09 RX ORDER — PREDNISONE 5 MG/1
5 TABLET ORAL DAILY
Qty: 30 TABLET | Refills: 2 | Status: SHIPPED | OUTPATIENT
Start: 2025-05-09

## 2025-05-09 RX ORDER — SULFAMETHOXAZOLE AND TRIMETHOPRIM 400; 80 MG/1; MG/1
1 TABLET ORAL
Qty: 30 TABLET | Refills: 11 | Status: ACTIVE | OUTPATIENT
Start: 2025-05-12

## 2025-05-09 RX ORDER — NYSTATIN 100000 [USP'U]/ML
500000 SUSPENSION ORAL 4 TIMES DAILY
Qty: 600 ML | Refills: 2 | Status: SHIPPED | OUTPATIENT
Start: 2025-05-10

## 2025-05-09 RX ORDER — TACROLIMUS 0.5 MG/1
0.5 CAPSULE ORAL 2 TIMES DAILY PRN
Qty: 60 CAPSULE | Refills: 11 | Status: ACTIVE | OUTPATIENT
Start: 2025-05-09 | End: 2025-05-10

## 2025-05-09 RX ORDER — AMLODIPINE BESYLATE 5 MG/1
5 TABLET ORAL EVERY EVENING
Status: DISCONTINUED | OUTPATIENT
Start: 2025-05-09 | End: 2025-05-09 | Stop reason: HOSPADM

## 2025-05-09 RX ORDER — OXYCODONE HYDROCHLORIDE 5 MG/1
5 TABLET ORAL EVERY 6 HOURS PRN
Qty: 20 TABLET | Refills: 0 | Status: SHIPPED | OUTPATIENT
Start: 2025-05-09

## 2025-05-09 RX ADMIN — SENNOSIDES AND DOCUSATE SODIUM 2 TABLET: 50; 8.6 TABLET ORAL at 07:59

## 2025-05-09 RX ADMIN — CARVEDILOL 25 MG: 25 TABLET, FILM COATED ORAL at 07:59

## 2025-05-09 RX ADMIN — ACETAMINOPHEN 975 MG: 325 TABLET ORAL at 04:55

## 2025-05-09 RX ADMIN — MYCOPHENOLATE MOFETIL 1000 MG: 250 CAPSULE ORAL at 07:58

## 2025-05-09 RX ADMIN — POLYETHYLENE GLYCOL 3350 17 G: 17 POWDER, FOR SOLUTION ORAL at 08:00

## 2025-05-09 RX ADMIN — OXYCODONE HYDROCHLORIDE 5 MG: 5 TABLET ORAL at 11:25

## 2025-05-09 RX ADMIN — ACETAMINOPHEN 975 MG: 325 TABLET ORAL at 11:25

## 2025-05-09 RX ADMIN — MICAFUNGIN 100 MG: 20 INJECTION, POWDER, LYOPHILIZED, FOR SOLUTION INTRAVENOUS at 00:18

## 2025-05-09 RX ADMIN — TACROLIMUS 1.5 MG: 1 CAPSULE ORAL at 07:57

## 2025-05-09 RX ADMIN — SULFAMETHOXAZOLE AND TRIMETHOPRIM 1 TABLET: 400; 80 TABLET ORAL at 07:58

## 2025-05-09 RX ADMIN — PANTOPRAZOLE SODIUM 40 MG: 40 TABLET, DELAYED RELEASE ORAL at 07:59

## 2025-05-09 RX ADMIN — SODIUM BICARBONATE 1300 MG: 650 TABLET ORAL at 07:59

## 2025-05-09 RX ADMIN — Medication 1 TABLET: at 07:59

## 2025-05-09 RX ADMIN — OCTREOTIDE ACETATE 100 MCG: 100 INJECTION, SOLUTION INTRAVENOUS; SUBCUTANEOUS at 08:00

## 2025-05-09 ASSESSMENT — ACTIVITIES OF DAILY LIVING (ADL)
ADLS_ACUITY_SCORE: 68
ADLS_ACUITY_SCORE: 66
ADLS_ACUITY_SCORE: 68
ADLS_ACUITY_SCORE: 66
ADLS_ACUITY_SCORE: 68
ADLS_ACUITY_SCORE: 66
ADLS_ACUITY_SCORE: 68
ADLS_ACUITY_SCORE: 66
ADLS_ACUITY_SCORE: 66
ADLS_ACUITY_SCORE: 68
ADLS_ACUITY_SCORE: 66
ADLS_ACUITY_SCORE: 68
ADLS_ACUITY_SCORE: 66
ADLS_ACUITY_SCORE: 68

## 2025-05-09 NOTE — DISCHARGE SUMMARY
[facility]    Discharge Summary  Transplant Surgery    Date of Admission:  2025  Date of Discharge:  2025  Discharging Provider: Kayley Link PA-C/Jose Wyatt MD    Discharge Diagnoses   Active Problems:    Type 2 diabetes mellitus with diabetic nephropathy (H)    Essential hypertension    Pre-transplant evaluation for kidney and pancreas transplant    Kidney transplant recipient    Pancreas transplant status (H)    Immunosuppressed status    Anemia, chronic disease    Thrombocytopenia    Low bicarbonate    Hypocalcemia    Hyperphosphatemia    Hypermagnesemia    Leukocytosis     History of Present Illness   Wendy Kapoor is a 54 year old female with a history of DM2, HTN, ESRD on PD since 2023, IgG Lambda MGUS, non-obstructive CAD, and anemia of chronic disease. On 2025 she was notified as an acceptable  donor organ became available and presented for further pre-operative work-up and on 2025 she underwent DBD SPK with stent, and open appendectomy which was well tolerated as performed by Dr. Gleason. Ms. Kapoor was admitted to  post-operatively for ongoing post-operative care.     Hospital Course   Kidney transplant: Cr down to 3.4 on discharge. Post-op US patent Doppler. UOP 2.3L over the last 24 hours.    - Transplant Nephrology following    - Merlos removed on POD 7, voiding without urinary retention    Pancreas transplant: Amylase 168 < 204 < 151 < 42 < 119, lipase 283 < 387 < 225 < 65 < 43 < 98.   - Post-op US patient Doppler  -  US with pancreas not well visualized.   - Has not required insulin post-operatively.   -  mg daily-on hold due to thrombocytopenia, plan to restart when PLT 80 or greater  - Octreotide subcutaneous x 7 days, discontinued on discharge. Depot Octreotide coverage pending, will give outpatient if covered and necessary at that point.   - Heparin straight rate 400 units/hour completed on POD 4, stopped  early due to anemia    Immunosuppressed due to medications: cPRA 98   -Induction with Thymo 6.5mg/kg and steroid taper.  -Tacrolimus, goal level 8-10 (12 hour trough).  -Mycophenolate 1000 mg BID.  -Infectious prophylaxis with Bactrim indefinitely and valganciclovir x3 months.    Transplant coordinator Betty Alexandradelmi  [phone]  Donor type:  DBD  DSA at time of transplant: Not resulted  Ureteral stent: Yes  CMV:  Donor + / Recipient +  EBV:  Donor + / Recipient +  Thymoglobulin:  6.5 mg/kg     Hematology:   Anemia of chronic disease; Acute blood loss anemia: Hgb 7.8 on discharge. Transfused 1 unit pRBC on 5/3, 5/6 and 5/8.   Thrombocytopenia: Plt 34 on discharge. Secondary to induction IS. Hit panel negative. Monitor.      Cardiorespiratory:   Hypertension: PTA Cardizem 180 mg daily, Hydralazine 50 mg TID, losartan 100 mg daily, Toprol  mg daily.   - Will discharge on Carvedilol to 25 mg BID and Norvasc 5 mg every evening     GI/Nutrition:  Diet: Removed NG POD 5. Tolerating regular diet on discharge  Bowel regimen: Senna, PEG. Good bowel function.      Fluid/Electrolytes: MIVF: Discontinued, tolerating adequate oral intake.   Low bicarb: bicarb 1300 mg BID, bicarb 22 on discharge  Hypocalcemia: calcium 500 mg BID  Hyperphosphatemia: monitor  Hypermagnesemia: Hold Mag ox, 2.1 on discharge.      : Merlos remained due to new surgical anastomosis x 7 days, removed on 5/9. PVR negative.      Infectious disease: Afebrile.   Leukocytosis: Secondary to steroids. Normalized on discharge.      Prophylaxis: DVT (mechanical), fall, GI (PPI), viral (Valcyte), pneumocystis (Bactrim), fungal (micafungin x 6 doses completed, will start clotrimazole on 5/10)     Significant Results and Procedures    Procedure Date:  Case start 05/02/25, complete 5/3/25    Preoperative Diagnosis:  End Stage renal failure due to diabetes mellitus type 2    Postoperative Diagnosis:  Same    Procedure:  1. Right Kidney Donation after Brain  Death Kidney, Left iliac fossa, with venous reconstruction. A J-J stent was placed.   2. Kidney allograft preparation on Back Table   3. Open appendectomy    4. Whole Pancreas Donation after Brain Death Pancreas Transplant   5. Pancreas allograft preparation on Back Table    Surgeon:  Surgeons and Role:     * Jean-Pierre Gleason MD - Primary     * Oleksandr Warner MD - Resident - Assisting. Dr. Sybil Harvey was a tertiary assistant for the procedure, He participated in the exposure, bowel anastomosis, ureteral anastomosis, and closure.      * Andres Trinh MD - Fellow - Assisting. Dr. Trinh was a secondary assistant for the operation. He participated in the kidney bench and vascular exposure.      * Marquis Marinelli MD - Fellow - Assisting. Dr. Marinelli was the primary assistant for the procedure and participated in all aspects of the case under my supervision. His presence was necessary due to lack of a suitable level surgical resident to assist.     Fellow/Assistant:  As above    Anesthesia:  General    Specimen:  Appendix- permanent    Drains:  Osmani-Zuñiga drain    Urine Output:  150 mls    Estimated Blood Loss:  500mL    Fluids Administered:           Pending Results   These results will be followed up by transplant coordinator  Unresulted Labs Ordered in the Past 30 Days of this Admission       Date and Time Order Name Status Description    5/8/2025 11:30 PM Tacrolimus by Tandem Mass Spectrometry In process     5/3/2025 12:40 AM Prepare red blood cells (unit) Preliminary     5/3/2025 12:40 AM Prepare red blood cells (unit) Preliminary     5/2/2025  4:17 PM BK Virus IgG Antibody In process             Code Status   Full    Primary Care Physician   GERSON BRYANT    Physical Exam   Temp: 98.5  F (36.9  C) Temp src: Oral BP: (!) 157/75 Pulse: 71   Resp: 16 SpO2: 98 % O2 Device: None (Room air)    Vitals:    05/05/25 0851 05/06/25 0951 05/08/25 0804   Weight: 73.4 kg (161 lb 13.1 oz) 72.1 kg  (158 lb 14.4 oz) 68.6 kg (151 lb 3.2 oz)     Vital Signs with Ranges  Temp:  [97.6  F (36.4  C)-98.9  F (37.2  C)] 98.5  F (36.9  C)  Pulse:  [69-87] 71  Resp:  [12-16] 16  BP: (132-177)/(61-76) 157/75  SpO2:  [98 %-100 %] 98 %  I/O last 3 completed shifts:  In: 668 [P.O.:358; I.V.:10]  Out: 2930 [Urine:2250; Drains:680]    General Appearance: in no apparent distress.   Skin: Warm, perfused  Heart: RRR  Lungs: Nonlabored resps on RA  Abdomen: The abdomen is non-distended. JESUS x 1 with serosang drainage. PD catheter in place. Incision c/d/I.   : velázquez is not present. Urine is clear yellow  Extremities: edema: 1+ generalized edema  Neurologic: awake, alert, and oriented. Tremor absent.    Time Spent on this Encounter   I, Kayley Link PA-C, personally saw the patient today and spent greater than 30 minutes discharging this patient.    Discharge Disposition   Discharged to home  Condition at discharge: Stable    Consultations This Hospital Stay   NEPHROLOGY KIDNEY/PANCREAS TRANSPLANT ADULT IP CONSULT  NURSING TO CONSULT FOR VASCULAR ACCESS CARE IP CONSULT  NEPHROLOGY KIDNEY/PANCREAS TRANSPLANT ADULT IP CONSULT  PHARMACY IP CONSULT  CARE MANAGEMENT / SOCIAL WORK IP CONSULT  PHARMACY IP CONSULT  SOT MEDICATION HISTORY IP PHARMACY CONSULT  NUTRITION SERVICES ADULT IP CONSULT  CARE MANAGEMENT / SOCIAL WORK IP CONSULT  PHYSICAL THERAPY ADULT IP CONSULT  WOUND OSTOMY CONTINENCE NURSE  IP CONSULT    Discharge Orders      Home Care Referral      Reason for your hospital stay    Pancreas and kidney transplant.     Activity    Your activity upon discharge: Walk at least four times a day, lift no greater than 10 pounds for 8 weeks from the time of surgery.  No driving while taking narcotics or 3 weeks after surgery.     Tubes and Drains    Current Tubes and Drains:         Peritoneal Dialysis Catheter Left lower abdomen -- days    Drain Closed/Suction 1 Right Abdomen Bulb 19 [place] citizen 6 days. Record JESUS color and amount of  drainage.     ADULT [facility] Specialty Follow-up and recommended labs and tests    [facility] FOLLOW UP:     1. [facility]: Over the next 2 days you will be seen in the [facility] (ph. [phone], option 3). Your labs will be drawn at the beginning of your appointment. DO NOT take your medications prior to having labs drawn. Please bring all your medications with you from home to take after labs are drawn.     2. Follow up with Dr. Gleason in Transplant Clinic in 1-2 weeks.     3. Follow up with Transplant Nephrologist as scheduled.     4. Ureteral stent removal in 4-6 weeks, to be scheduled by Transplant Coordinator. If a  does not contact you for this, please contact your transplant coordinator.     5. Follow up with rheumatology and your primary care provider in ~8 weeks. Discuss need for prednisone. Patient to schedule.     Remember to always bring an updated medication list to all appointments.        Call your Transplant Coordinator ([phone]) with questions about Transplant Center appointment scheduling.     LABS:     CBC, BMP, magnesium, phosphorus, lipase, tacrolimus level to be drawn daily while in ATC, then every Monday and Thursday by home health care nurse if arranged, or at an outpatient lab. PTH, Vitamin D, and iron panel level to be drawn on 1st day at [facility].     Appointments on [place] and/or [facility] (with [facility] or [facility] provider or service). Call [phone] if you haven't heard regarding these appointments within 7 days of discharge.     Monitor and record    Monitor blood pressure and weight daily.     When to contact your care team    WHEN TO CONTACT YOUR  COORDINATOR:     Transplant Coordinator [phone]     Notify your coordinator if you have pain over your kidney or pancreas, increased redness or drainage from your incision, fever greater than 100F, decreased urine output or new or increased amount of blood in urine.      Notify your coordinator immediately if you are ever unable to take your immunosuppressive medications for any reason.     If you have URGENT concerns after office hours, please call the hospital switchboard at [phone] and ask to have the organ transplant nurse on-call paged. If you have a life-threatening emergency, go to the nearest emergency room.     Wound care and dressings    Instructions to care for your wound at home: If you have staples in place, they will be removed about 3 weeks after surgery. Wash incision daily with soap and water. Do not soak or scrub.    RLQ  abdomen blisters:  daily  -spray with microklenz and gently pat dry   -cover with vaseline gauze  -cover the vaseline gauze with primapore or mepilex dressings, avoiding tape over the midline incision and blisters.     Walker Order for DME - ONLY FOR DME     Diet    Follow this diet upon discharge: Diet recommendations post-transplant: Heart healthy dietary habits long term (low saturated/trans fat, low sodium). High protein diet x 8 weeks. Practice food safety precautions.     Discharge Medications   Current Discharge Medication List        START taking these medications    Details   amLODIPine (NORVASC) 5 MG tablet Take 1 tablet (5 mg) by mouth every evening.  Qty: 30 tablet, Refills: 2    Associated Diagnoses: Kidney transplant recipient      aspirin (ASA) 81 MG chewable tablet Take 4 tablets (324 mg) by mouth or NG Tube daily.  Qty: 30 tablet, Refills: 11    Comments: Start when platelets are 80 or higher  Associated Diagnoses: Kidney transplant recipient      calcium carbonate-vitamin D (OSCAL) 500-5 MG-MCG tablet Take 1 tablet by mouth 2 times daily (with meals).  Qty: 60 tablet, Refills: 2    Associated Diagnoses: Kidney transplant recipient      carvedilol (COREG) 25 MG tablet Take 1 tablet (25 mg) by mouth 2 times daily.  Qty: 60 tablet, Refills: 2    Associated Diagnoses: Kidney transplant recipient      methocarbamol (ROBAXIN)  500 MG tablet Take 1 tablet (500 mg) by mouth every 6 hours as needed for muscle spasms.  Qty: 20 tablet, Refills: 0    Associated Diagnoses: Kidney transplant recipient      mycophenolate (GENERIC EQUIVALENT) 250 MG capsule Take 4 capsules (1,000 mg) by mouth 2 times daily.  Qty: 240 capsule, Refills: 11    Associated Diagnoses: Kidney transplant recipient      nystatin (MYCOSTATIN) 718428 UNIT/ML suspension Take 5 mLs (500,000 Units) by mouth 4 times daily.  Qty: 600 mL, Refills: 2    Associated Diagnoses: Kidney transplant recipient      oxyCODONE (ROXICODONE) 5 MG tablet Take 1 tablet (5 mg) by mouth every 6 hours as needed for moderate pain.  Qty: 20 tablet, Refills: 0    Associated Diagnoses: Kidney transplant recipient      pantoprazole (PROTONIX) 40 MG EC tablet Take 1 tablet (40 mg) by mouth every morning (before breakfast).  Qty: 30 tablet, Refills: 1    Associated Diagnoses: Kidney transplant recipient      polyethylene glycol (MIRALAX) 17 GM/Dose powder Take 17 g by mouth daily.  Qty: 510 g, Refills: 1    Associated Diagnoses: Kidney transplant recipient      senna-docusate (SENOKOT-S/PERICOLACE) 8.6-50 MG tablet Take 2 tablets by mouth 2 times daily.  Qty: 60 tablet, Refills: 1    Associated Diagnoses: Kidney transplant recipient      sulfamethoxazole-trimethoprim (BACTRIM) 400-80 MG tablet Take 1 tablet by mouth three times a week.  Qty: 30 tablet, Refills: 11    Comments: Increase dose to daily when kidney function improves as directed by transplant team.  Associated Diagnoses: Kidney transplant recipient      !! tacrolimus (GENERIC EQUIVALENT) 0.5 MG capsule Take 1 capsule (0.5 mg) by mouth 2 times daily.  Qty: 60 capsule, Refills: 11    Comments: Total dose 1.5 mg BID.  Associated Diagnoses: Kidney transplant recipient      !! tacrolimus (GENERIC EQUIVALENT) 1 MG capsule Take 1 capsule (1 mg) by mouth 2 times daily.  Qty: 60 capsule, Refills: 11    Comments: Total dose 1.5 mg BID.  Associated  Diagnoses: Kidney transplant recipient      valGANciclovir (VALCYTE) 450 MG tablet Take 1 tablet (450 mg) by mouth twice a week.  Qty: 60 tablet, Refills: 2    Comments: Increase dose to 900 mg daily as kidney function improves as instructed by transplant team.  Associated Diagnoses: Kidney transplant recipient       !! - Potential duplicate medications found. Please discuss with provider.        CONTINUE these medications which have CHANGED    Details   acetaminophen (TYLENOL) 325 MG tablet Take 2 tablets (650 mg) by mouth every 6 hours as needed for mild pain.  Qty: 50 tablet, Refills: 0    Associated Diagnoses: Kidney transplant recipient      predniSONE (DELTASONE) 5 MG tablet Take 1 tablet (5 mg) by mouth daily.  Qty: 30 tablet, Refills: 2    Associated Diagnoses: Kidney transplant recipient      sodium bicarbonate 650 MG tablet Take 2 tablets (1,300 mg) by mouth 2 times daily.  Qty: 120 tablet, Refills: 2    Associated Diagnoses: Kidney transplant recipient           CONTINUE these medications which have NOT CHANGED    Details   atorvastatin (LIPITOR) 10 MG tablet Take 10 mg by mouth daily.      ezetimibe (ZETIA) 10 MG tablet Take 10 mg by mouth daily.      cholecalciferol 50 MCG (2000 UT) tablet Take 50 mcg by mouth           STOP taking these medications       allopurinol (ZYLOPRIM) 100 MG tablet Comments:   Reason for Stopping:         aspirin 81 MG EC tablet Comments:   Reason for Stopping:         B Complex-C-Folic Acid (DIALYVITE) TABS Comments:   Reason for Stopping:         BD PEN NEEDLE HUBER 2ND GEN 32G X 4 MM miscellaneous Comments:   Reason for Stopping:         blood glucose (FREESTYLE TEST STRIPS) test strip Comments:   Reason for Stopping:         Continuous Glucose Sensor (FREESTYLE ISAAC 3 PLUS SENSOR) MISC Comments:   Reason for Stopping:         DARBEPOETIN DALTON IJ Comments:   Reason for Stopping:         diltiazem ER COATED BEADS (CARDIZEM CD/CARTIA XT) 180 MG 24 hr capsule Comments:    Reason for Stopping:         ferrous sulfate (FE TABS) 325 (65 Fe) MG EC tablet Comments:   Reason for Stopping:         GAVILYTE-G 236 g suspension Comments:   Reason for Stopping:         gentamicin (GARAMYCIN) 0.1 % external cream Comments:   Reason for Stopping:         glimepiride (AMARYL) 4 MG tablet Comments:   Reason for Stopping:         glipiZIDE (GLUCOTROL) 5 MG tablet Comments:   Reason for Stopping:         hydrALAZINE (APRESOLINE) 25 MG tablet Comments:   Reason for Stopping:         Insulin Glargine w/ Trans Port 100 UNIT/ML SOPN Comments:   Reason for Stopping:         insulin lispro (HUMALOG KWIKPEN) 100 UNIT/ML (1 unit dial) KWIKPEN Comments:   Reason for Stopping:         losartan (COZAAR) 50 MG tablet Comments:   Reason for Stopping:         LYCOPENE PO Comments:   Reason for Stopping:         Methoxy PEG-Epoetin Beta (MIRCERA IJ) Comments:   Reason for Stopping:         metoprolol succinate ER (TOPROL XL) 100 MG 24 hr tablet Comments:   Reason for Stopping:         pioglitazone (ACTOS) 30 MG tablet Comments:   Reason for Stopping:         potassium chloride ER (K-TAB/KLOR-CON) 10 MEQ CR tablet Comments:   Reason for Stopping:         sevelamer carbonate (RENVELA) 800 MG tablet Comments:   Reason for Stopping:         torsemide (DEMADEX) 100 MG tablet Comments:   Reason for Stopping:         torsemide (DEMADEX) 20 MG tablet Comments:   Reason for Stopping:         traMADol (ULTRAM) 50 MG tablet Comments:   Reason for Stopping:         Tuberculin PPD (TUBERSOL ID) Comments:   Reason for Stopping:             Allergies   Allergies   Allergen Reactions    Atorvastatin Itching    Extraneal Rash     Data   Most Recent 3 CBC's:  Recent Labs   Lab Test 05/09/25  0628 05/08/25  0617 05/07/25  0558   WBC 7.4 11.5* 11.6*   HGB 7.8* 6.9* 8.1*   MCV 88 90 87   PLT 34* 35* 39*      Most Recent 3 BMP's:  Recent Labs   Lab Test 05/09/25  0811 05/09/25  0628 05/09/25  0458 05/08/25  0803 05/08/25  0617  05/07/25  0753 05/07/25  0558   NA  --  136  --   --  140  --  141   POTASSIUM  --  4.1  --   --  3.9  --  3.6   CHLORIDE  --  104  --   --  105  --  105   CO2  --  22  --   --  21*  --  20*   BUN  --  56.5*  --   --  63.6*  --  67.7*   CR  --  3.42*  --   --  4.16*  --  4.66*   ANIONGAP  --  10  --   --  14  --  16*   VIJAY  --  7.5*  --   --  7.7*  --  7.9*   GLC 79 88 90   < > 87   < > 103*    < > = values in this interval not displayed.     Most Recent 2 LFT's:  Recent Labs   Lab Test 05/02/25  1652 03/13/25  1554   AST 29 22   ALT 18 14   ALKPHOS 84 80   BILITOTAL 0.2 0.2     Most Recent INR's and Anticoagulation Dosing History:  Anticoagulation Dose History  More data exists         Latest Ref Rng & Units 9/13/2024 10/17/2024 5/2/2025 5/3/2025 5/5/2025 5/6/2025 5/7/2025   Recent Dosing and Labs   INR 0.85 - 1.15 0.98  0.87  0.95  1.26  0.91  0.94  0.98      Most Recent 3 Troponin's:No lab results found.  Most Recent Cholesterol Panel:  Recent Labs   Lab Test 04/12/24  1110   CHOL 115   LDL 25   HDL 23*   TRIG 334*     Most Recent 6 Bacteria Isolates From Any Culture (See EPIC Reports for Culture Details):No lab results found.  Most Recent TSH, T4 and A1c Labs:  Recent Labs   Lab Test 05/03/25  0948   A1C 5.9*

## 2025-05-09 NOTE — PROGRESS NOTES
Patient's Name: Wendy Kapoor    Procedure Date: 05/09/25  Procedure Time 1040    Procedure Performed: Central line removal    The Central Venous Catheter (CVC) was removed per provider order.     The central venous catheter was located: Right Internal Jugular vein, with a total of 3 lumens.     The patient was placed in Supine position with Insertion site lower than heart.     Valsalva response achieved by: Patient instructed to take a deep breath and bear down while the CVC was removed with a constant steady motion.     Firm pressure was applied at the access site for 5 minutes until bleeding stopped.     Post removal site assessment; Clean and dry without bleeding. Occlusive dressing applied: Yes    CVC tip was inspected and intact: Yes    Tip was sent to lab for culture per provider order: N/A    Patient tolerated the procedure: well    2nd RN at bedside during removal (if applicable): Gloria Ospina RN   5/9/2025   11:04 AM

## 2025-05-09 NOTE — PLAN OF CARE
DISCHARGE:  D: Patient with orders to discharge to home.   I: Discharge instructions, medications, & follow ups reviewed with patient and family. Copy of discharge summary given to patient.  PIV removed. All belongings packed & sent with patient. Medications filled & picked up at discharge pharmacy. Paperwork faxed.   A: Patient in stable condition. AVSS. Pt had no further questions regarding discharge instructions and medications. Patient transferred out by transport & left with family.   P: Plan for [facility] tomorrow

## 2025-05-09 NOTE — PLAN OF CARE
Goal Outcome Evaluation:     Plan of Care Reviewed With: patient, spouse     Overall Patient Progress: no change     Reason for Admission: Pre kidney/pancreas transplant evaluation     Status: POD #7      RN assumed cares at [phone]     Pain: 7/10 pain in abd during shift, moderately controlled with PRN oxycodone and scheduled tylenol   Neuro: A/O x4, able to make needs known  Cardiac: hypertensive, within parameters, denies chest pain  Respiratory: VSS, RA, denies SOB  GI/: velázquez cath in place with moderate output, last BM 5/8  IV/Drains: PIV x1, R I.J, L PD cath   Activity: SBA/ Ax1  Skin: Midline incision, stapled LUIS. R abd fold blisters  Diet: Full liquid    Event: during dressing change for internal jugular noted sutures pulling/I.J  pulling out from site. Informed care team. No new orders at this time       Plan of Care: continue with POC

## 2025-05-09 NOTE — PROGRESS NOTES
[facility]  Transplant Nephrology Progress Note  Date of Admission:  5/2/2025  Today's Date: 05/09/2025    Recommendations:    - No acute indications for dialysis.  - Would make no changes in immunosuppression.  - Would start amlodipine 5 mg nightly.    Assessment & Plan   # DDKT (SPK): Trend down in creatinine, but slowly.  Good urine output.  No acute indications for dialysis.     - Baseline Creatinine: ~ TBD   - Proteinuria: Not checked post transplant   - DSA Hx: No DSA at time of transplant   - Last cPRA: 98%   - BK Viremia: Not checked post transplant   - Kidney Tx Biopsy Hx: No biopsy history.    # Pancreas Tx (SPK): Pancreas transplant ultrasound 5/7 was difficult to visualize.  Mild stool burden on AXR.   - Pancreatic Exocrine Drainage: Enteric drained     - Blood glucose: Euglycemia      On insulin: No   - HbA1c: Last checked at time of transplant      Latest HbA1c: 5.9%   - Pancreatic enzymes: Trend down   - DSA Hx: No DSA at time of transplant   - Pancreas Tx Biopsy Hx: No biopsy history    # Immunosuppression: Tacrolimus immediate release (goal 8-10), Mycophenolate mofetil (dose 1000 mg every 12 hours), and Prednisone (dose 5 mg daily)   - Induction with Recent Transplant:  High Intensity Protocol   - Continue with intensive monitoring of immunosuppression for efficacy and toxicity.   - Historical Changes in Immunosuppression: Patient came in on prednisone 10 mg daily for possible inflammatory arthritis.  Lowered to prednisone 5 mg daily after transplant.   - Changes: Not at this time    # Infection Prevention:   Last CD4 Level: Not checked  - PJP: Sulfa/TMP (Bactrim)  - CMV: Valganciclovir (Valcyte)  - Fungal: Micafungin (Mycamine)      - CMV IgG Ab High Risk Discordance (D+/R-) at time of transplant: No  Present CMV Serostatus: Positive  - EBV IgG Ab High Risk Discordance (D+/R-) at time of transplant: No  Present EBV Serostatus: Positive    #  Hypertension: Borderline control;  Goal BP: < 150/90   - Changes: Yes - Recommend starting amlodipine 5 mg nightly.    # Anemia in Chronic Renal Disease/Surgery: Hgb: Stable      BRIANNA: No   - Iron studies: Unknown at this time, but checked with dialysis   - Agree with blood transfusion for Hgb < 7.0 gm/dl    # Thrombocytopenia: Stable, significantly low platelet level.  Likely secondary to rATG.    # Mineral Bone Disorder:    - Secondary renal hyperparathyroidism; PTH level: Unknown at this time, but checked with dialysis        On treatment: None  - Vitamin D; level: Not checked recently, but was low last check        On supplement: Yes  - Calcium; level: Low normal when corrected for low serum albumin        On supplement: Yes  - Phosphorus; level: Normal        On supplement: No    # Electrolytes:   - Potassium; level: Normal        On supplement: No  - Magnesium; level: Normal        On supplement: Yes  - Bicarbonate; level: Normal        On supplement: Yes    # Other Significant PMH:   - CAD: Patient has mild non obstructive coronary artery disease on last coronary angiogram 4/2024.  - HFpEF: Last cardiac echo (8/2023) showed normal LVEF ~ 60-65% with grade II diastolic dysfunction.  - Sjogren's: Stable with no recent symptoms.  Has been on prednisone 10 mg daily for possible inflammatory arthritis.  Followed by Rheumatology.  - IgG Lambda MGUS: Stable with monoclonal peak ~  0.3 and K/L ratio ~ 0.64  in July 2024.   - Complex Native Kidney Cyst: Patient with cyst in mid pole right native kidney with thin and mildly thickened internal septations noted on MRI 6/2024.  Repeat imaging with MRI 2/2025 showed slight increased size of the partly exophytic 2.0 cm cyst in the medial cortex of the interpolar region of the right kidney, previously 1.7 cm. Stable 3 mm septation with T2 dark signal in the septation, which may be related to calcifications.  Recommended follow with ultrasound or MRI in 6 months.    -  Recommend bilateral native kidney ultrasound 4 months post transplant (9/2025).  - Positive Lupus Anticoagulant: Patient is positive, but cardiolipins not elevated, neg for Factor V and Factor 2 absent     # Transplant History:  Etiology of Kidney Failure: Diabetes mellitus type 2  Tx: DDKT (SPK)  Transplant: 5/2/2025 (Kidney / Pancreas)  Significant transplant-related complications: None    Recommendations were communicated to the primary team via this note.    Lj Vizcarra MD  Transplant Nephrology  Contact information via Voc[employer] Web Console or via [facility] Paging/Directory      Interval History  Ms. Kapoor's creatinine is 3.42 (05/09 0628); Trend down.  Good urine output.  Trend down in lipase.  Remains off insulin.  Other significant labs/tests/vitals: Stable electrolytes.  Stable hemoglobin.  No new events overnight.  No chest pain or shortness of breath.  No leg swelling.  No nausea and vomiting.  Bowel movements are loose.  No fever, sweats or chills.    Review of Systems   4 point ROS was obtained and negative except as noted in the Interval History.    MEDICATIONS:  Current Facility-Administered Medications   Medication Dose Route Frequency Provider Last Rate Last Admin    acetaminophen (TYLENOL) tablet 975 mg  975 mg Oral Q8H Jean-Pierre Gleason MD   975 mg at 05/09/25 1125    amLODIPine (NORVASC) tablet 5 mg  5 mg Oral QPM Kayley Link PA-C        [Held by provider] aspirin (ASA) chewable tablet 324 mg  324 mg Oral or NG Tube Daily Kayley Link PA-C        calcium carbonate-vitamin D (OSCAL) 500-5 MG-MCG per tablet 1 tablet  1 tablet Oral BID w/meals Jean-Pierre Gleason MD   1 tablet at 05/09/25 0759    carvedilol (COREG) tablet 25 mg  25 mg Oral BID Kristy Layne PA-C   25 mg at 05/09/25 0759    mycophenolate (GENERIC EQUIVALENT) capsule 1,000 mg  1,000 mg Oral BID IS Jean-Pierre Gleason MD   1,000 mg at 05/09/25 0758    [START  ON 5/10/2025] nystatin (MYCOSTATIN) suspension 500,000 Units  500,000 Units Oral 4x Daily Kayley Link PA-C        octreotide (sandoSTATIN) injection 100 mcg  100 mcg Subcutaneous TID Kayley Link PA-C   100 mcg at 25 0800    pantoprazole (PROTONIX) EC tablet 40 mg  40 mg Oral QAM AC Jean-Pierre Gleason MD   40 mg at 25 0759    polyethylene glycol (MIRALAX) Packet 17 g  17 g Oral Daily Jean-Pierre Gleason MD   17 g at 25 0800    senna-docusate (SENOKOT-S/PERICOLACE) 8.6-50 MG per tablet 2 tablet  2 tablet Oral BID Kayley Link PA-C   2 tablet at 25 0759    sodium bicarbonate tablet 1,300 mg  1,300 mg Oral BID Aishwarya Del Toro APRN CNP   1,300 mg at 25 0759    sodium chloride (PF) 0.9% PF flush 10 mL  10 mL Intracatheter Q8H Jean-Pierre Gleason MD   10 mL at 25 0756    sodium chloride (PF) 0.9% PF flush 3 mL  3 mL Intravenous Q8H Jean-Pierre Gleason MD   3 mL at 25 1135    sulfamethoxazole-trimethoprim (BACTRIM) 400-80 MG per tablet 1 tablet  1 tablet Oral Once per day on  Jean-Pierre Gleason MD   1 tablet at 25 0758    tacrolimus (GENERIC EQUIVALENT) capsule 1 mg  1 mg Oral BID IS Kayley Link PA-C        valGANciclovir (VALCYTE) tablet 450 mg  450 mg Oral Once per day on  Jean-Pierre Gleason MD   450 mg at 25 0801     Current Facility-Administered Medications   Medication Dose Route Frequency Provider Last Rate Last Admin    dextrose 10% infusion   Intravenous Continuous PRN Jean-Pierre Gleason MD           Physical Exam   Temp  Av.8  F (37.1  C)  Min: 98.5  F (36.9  C)  Max: 99  F (37.2  C)  Arterial Line BP  Min: 82/57  Max: 101/55  Arterial Line MAP (mmHg)  Av.8 mmHg  Min: 21 mmHg  Max: 67 mmHg      Pulse  Av.6  Min: 91  Max: 102 Resp  Av.6  Min: 14  Max: 23  SpO2  Avg:  99.6 %  Min: 98 %  Max: 100 %    CVP (mmHg): 11 mmHgBP (!) 146/68 (BP Location: Right arm)   Pulse 75   Temp 98.3  F (36.8  C) (Oral)   Resp 16   Ht 1.524 m (5')   Wt 68.6 kg (151 lb 3.2 oz)   SpO2 97%   BMI 29.53 kg/m     Date 05/03/25 0700 - 05/04/25 0659   Shift 3529-2832 0775-5980 7441-3284 24 Hour Total   INTAKE   Shift Total(mL/kg)       OUTPUT   Urine 5   5   Shift Total(mL/kg) 5(0.08)   5(0.08)   Weight (kg) 62.69 62.69 62.69 62.69      Admit Weight: 62.7 kg (138 lb 3.2 oz)     GENERAL APPEARANCE: alert and no distress  HENT: mouth without ulcers or lesions  RESP: lungs clear to auscultation - no rales, rhonchi or wheezes  CV: regular rhythm, normal rate, no rub, no murmur  EDEMA: no LE edema bilaterally  ABDOMEN: soft, nondistended, mildly tender, bowel sounds normal  MS: extremities normal - no gross deformities noted, no evidence of inflammation in joints, no muscle tenderness  SKIN: no rash  TX KIDNEY: mild TTP  DIALYSIS ACCESS:  Peritoneal dialysis catheter    Data   All labs reviewed by me.  CMP  Recent Labs   Lab 05/09/25  0811 05/09/25  0628 05/09/25  0458 05/09/25  0027 05/08/25  0803 05/08/25  0617 05/07/25  0753 05/07/25  0558 05/06/25  0802 05/06/25  0601 05/02/25  1714 05/02/25  1652   NA  --  136  --   --   --  140  --  141  --  140   < > 140   POTASSIUM  --  4.1  --   --   --  3.9  --  3.6  --  3.8   < > 3.9   CHLORIDE  --  104  --   --   --  105  --  105  --  107   < > 103   CO2  --  22  --   --   --  21*  --  20*  --  18*   < > 25   ANIONGAP  --  10  --   --   --  14  --  16*  --  15   < > 12   GLC 79 88 90 80   < > 87   < > 103*   < > 119*   < > 105*   BUN  --  56.5*  --   --   --  63.6*  --  67.7*  --  59.7*   < > 45.6*   CR  --  3.42*  --   --   --  4.16*  --  4.66*  --  4.86*   < > 6.11*   GFRESTIMATED  --  15*  --   --   --  12*  --  10*  --  10*   < > 7*   VIJAY  --  7.5*  --   --   --  7.7*  --  7.9*  --  7.6*   < > 9.7   MAG  --  2.1  --   --   --  2.2  --  2.4*  --  2.1   < >   --    PHOS  --  4.1  --   --   --  5.3*  --  6.6*  --  7.3*   < >  --    PROTTOTAL  --   --   --   --   --   --   --   --   --   --   --  7.9   ALBUMIN  --   --   --   --   --   --   --   --   --   --   --  3.7   BILITOTAL  --   --   --   --   --   --   --   --   --   --   --  0.2   ALKPHOS  --   --   --   --   --   --   --   --   --   --   --  84   AST  --   --   --   --   --   --   --   --   --   --   --  29   ALT  --   --   --   --   --   --   --   --   --   --   --  18    < > = values in this interval not displayed.     CBC  Recent Labs   Lab 05/09/25  0628 05/08/25  0617 05/07/25  0558 05/06/25  0601   HGB 7.8* 6.9* 8.1* 6.8*   WBC 7.4 11.5* 11.6* 10.2   RBC 2.64* 2.33* 2.79* 2.29*   HCT 23.3* 21.0* 24.3* 20.5*   MCV 88 90 87 90   MCH 29.5 29.6 29.0 29.7   MCHC 33.5 32.9 33.3 33.2   RDW 15.4* 16.4* 16.4* 16.0*   PLT 34* 35* 39* 34*     INR  Recent Labs   Lab 05/07/25  0558 05/06/25  0601 05/05/25  0641 05/03/25  0356   INR 0.98 0.94 0.91 1.26*   PTT 29 32 31 39*     ABG  Recent Labs   Lab 05/03/25  0257 05/03/25  0216 05/03/25  0107 05/03/25  0001   PH 7.29* 7.29* 7.25* 7.30*   PCO2 35 33* 34* 36   PO2 135* 137* 149* 125*   HCO3 17* 16* 15* 18*   O2PER 52.0 52.0 53.0 52.0      Urine Studies  Recent Labs   Lab Test 05/02/25  1635 08/02/23  1350   COLOR Light Yellow Straw   APPEARANCE Clear Clear   URINEGLC 30* 300*   URINEBILI Negative Negative   URINEKETONE Negative Negative   SG 1.014 1.012   UBLD Negative Negative   URINEPH 6.5 7.0   PROTEIN 300* 300*   NITRITE Negative Negative   LEUKEST Small* Negative   RBCU 3* <1   WBCU 16* 1     No lab results found.  PTH  No lab results found.  Iron Studies  No lab results found.    IMAGING:  All imaging studies reviewed by me.

## 2025-05-09 NOTE — PROGRESS NOTES
"  Care Management Discharge Note    Discharge Date: 05/09/2025       Discharge Disposition: Home Care, Home, Other (Comments) (ATC first 2 days post discharge)    Discharge Services: Home Care    Discharge DME: None    Discharge Transportation: car, drives self, family or friend will provide    Private pay costs discussed: Not applicable    Does the patient's insurance plan have a 3 day qualifying hospital stay waiver?  No    PAS Confirmation Code:    Patient/family educated on Medicare website which has current facility and service quality ratings: no    Education Provided on the Discharge Plan: Yes  Persons Notified of Discharge Plans: patient, family, dennis Escudero  Patient/Family in Agreement with the Plan: yes    Handoff Referral Completed: Yes, non-[facility] PCP: External handoff communication completed    Additional Information:     Benefit check for octreotide depot requested by provider.    The transplant team- the said they don't do that   [facility] inquiry - also does not follow   Sent to the scheduling team -   Received a message form scheduling team \"Pt's need to be scheduled before prior auth will take a look at a plan.   Adri, when does the pt need to be scheduled?  We should push out at least a week for finance. \"   Writer confirmed they can schedule patient as soon as possible and updated them that patient is discharging today.      Son left a voicemail requesting renal medicare form [phone]. Kidney transplant team notified.       Met with patient and family at bedside for discharge education. Son was present as well.  Writer provided education on final discharge plan to home with home care and the importance of attending ATC appointments. Patient and family acknowledged teaching. Dennis Escudero mentioned if home care will do the labs since they live closer to [facility]. Writer called [facility] liaison and was confirmed that home care will be drawing the labs starting on Monday May 12 th. Home care was updated " of patient having ATC on Saturday 10 ans Sunday 11th .    Home care order already placed. And it is ok for home care to start service on 5/12/2025. Provider updated.      Discharge resources  Home Care Accepted  [facility] [place] [place]  Phone:[phone]  Fax [phone]      Adri Hugo RN, PHN, BSN   Float Nurse Care Coordinator  Covering for Unit [address]  Phone [phone]

## 2025-05-09 NOTE — CONSULTS
[facility]  WOC Nurse Inpatient Assessment     Consulted for: abdominal blisters    WOC nurse follow-up plan: weekly    Patient History (according to provider note(s):      54 year old female who presents with a history of DM2, HTN, ESRD on PD since 2023, IgG Lambda MGUS, non-obstructive CAD, and anemia of chronic disease. On 2025 she was notified as an acceptable  donor organ became available and presented for further pre-operative work-up and on 2025 she underwent DBD SPK with stent, and open appendectomy     Assessment:      Wound location: Midline and R pannus     Last photo: 25  Wound due to: unknown, suspect edema and Friction  Wound history/plan of care: chart review: clear serum filled blisters noted .  Dry gauze applied.   Wounds are mirror images on each side of the pannus fold  Wound base: 100 % intact clear serum filled blisters     Palpation of the wound bed: normal      Drainage: scant     Description of drainage: none     Measurements (length x width x depth, in cm): 6  x 5.5  x  raised     Tunneling: N/A     Undermining: N/A  Periwound skin: Intact      Color: normal and consistent with surrounding tissue      Temperature: normal   Odor: none  Pain: mild,   Pain interventions prior to dressing change: patient tolerated well  Treatment goal: Heal  and Protection  STATUS: initial assessment  Supplies ordered: gathered     Treatment Plan:     RLQ  abdomen blisters:  daily  -spray with microklenz and gently pat dry   -cover with vaseline gauze  -cover the vaseline gauze with primapore or mepilex dressings, avoiding tape over the midline incision and blisters.       Orders: Written    RECOMMEND PRIMARY TEAM ORDER: None, at this time  Education provided: plan of care and wound progress  Discussed plan of care with: Patient  Notify WOC if wound(s) deteriorate.  Nursing to notify the Provider(s) and re-consult the WOC Nurse if new skin  concern.    DATA:     Current support surface: Standard  Standard gel mattress (Isoflex)    BMI: Body mass index is 29.53 kg/m .   Active diet order: Orders Placed This Encounter      Full Liquid Diet     Output: I/O last 3 completed shifts:  In: 1128 [P.O.:818; I.V.:10]  Out: 2030 [Urine:1250; Drains:780]     Labs:   Recent Labs   Lab 05/08/25  0617 05/07/25  0558 05/03/25  1430 05/03/25  0948 05/02/25 2050 05/02/25  1652   ALBUMIN  --   --   --   --   --  3.7   HGB 6.9* 8.1*   < > 7.8*   < > 10.4*   INR  --  0.98   < >  --    < > 0.95   WBC 11.5* 11.6*   < >  --    < > 7.1   A1C  --   --   --  5.9*  --  6.0*    < > = values in this interval not displayed.     Pressure injury risk assessment:   Sensory Perception: 4-->no impairment  Moisture: 4-->rarely moist  Activity: 3-->walks occasionally  Mobility: 3-->slightly limited  Nutrition: 2-->probably inadequate  Friction and Shear: 3-->no apparent problem  Martin Score: 19    Pager no longer is use, please contact through [employer]   [facility] group: [facility] Nurse [place]  Dept. Office Number: [phone]

## 2025-05-09 NOTE — CARE CONFERENCE
BP (!) 146/69   Pulse 87   Temp 97.6  F (36.4  C) (Oral)   Resp 16   Ht 1.524 m (5')   Wt 68.6 kg (151 lb 3.2 oz)   SpO2 100%   BMI 29.53 kg/m      Shift: 6099-3872  Isolation Status: none  VS: stable on room air, afebrile  Neuro: Aox4  Behaviors: calm, cooperative, somnolent   BG: Q4  Labs: Hgb=6.9 (RBC infused during day shift)  Respiratory: WDL  Cardiac: WDLx murmer detected  Pain/Nausea: denies both  PRN: none needed  Diet: Full liquid. Poor appetite  IV Access: Right triple lumen internal jugular Saline locked  Infusion(s): None  Lines/Drains: Right internal jugular saline locked  GI/: BM 5/8, velázquez with adequate output. Yellow in color  Skin: Midline incision stapled and LUIS with dry sanguinous drainage. Right JESUS to bulb suction.   Mobility: Ax1 + GB + walker  Events/Education: RBC infused during day shift. Med card and lab book updated  Plan: Continue POC

## 2025-05-09 NOTE — PROGRESS NOTES
CLINICAL NUTRITION SERVICES - EDUCATION/DISCHARGE NOTE    Reason for Assessment:  Nutrition education regarding post transplant diet.   Diet History: Wendy reports her appetite is fair to good.   Nutrition Diagnosis:  Food- and nutrition-related knowledge deficit r/t no previous knowledge of post transplant diet guidelines AEB patient verbal report.   Interventions:   Provided instruction on post-transplant diet with discussion regarding protein sources and high protein needs in acute post-tx phase. Patient's spouse and son were present during education.   -- Reviewed recommendations to follow low fat/low sodium diet long term and discussed heart healthy diet tips.    -- Reviewed need for food safety precautions to prevent food borne illness.  Provided handouts: Post Transplant Diet Guidelines and USDA food safety booklet  Pt verbalized understanding of diet following education and denied further nutritional questions.    Goals:   Patient will verbalize understanding of education provided.  Patient s discharge needs assessed and discharge planning has been conducted with the multidisciplinary transplant care team including physicians, pharmacy, social work and transplant coordinator.  Follow up/Monitoring:  Once discharged, place outpatient nutrition consult via the transplant team if nutrition concerns arise.  Marguerite Perry MS/RD/LD/CNSC  Available on [employer]   M-F (7am-3:30pm) - 7A/7B Clinical Dietitian  Weekend/Holiday Dietitian (7am-3:30pm)    ** Clinical Dietitians no longer available on pager

## 2025-05-09 NOTE — PHARMACY-TRANSPLANT NOTE
Solid Organ Transplant Recipient Prior to Discharge Note    54 year old female s/p kidney and pancreas transplant on 5/2/2025.    Immunosuppression plan at time of discharge:  Mycophenolate 1000 mg by mouth twice daily about 12 hours apart  Tacrolimus 1 mg by mouth twice daily about 12 hours apart, titrated to goal trough of 8-10 mcg/L   5/7 tacrolimus level 13. 1 mcg/L (10.9 hour trough) after 2 mg x 4 days; dose decreased to 1.5 mg BID   5/9 tacrolimus level 14.7 mcg/L (11.6 hour trough); dose decreased to 1 mg BID for discharge                    Opportunistic prophylaxis:  Sulfamethoxazole-trimethoprim 400-80mg by mouth THREE TIMES WEEKLY (Monday, Wednesday and Friday) for PJP prophylaxis  When CrCl > 30 ml/min increase to 400-80 mg by mouth DAILY  Valganciclovir 450 mg by mouth TWICE WEEKLY (Monday and Thursday) for CMV prophylaxis (CMV IgG: Donor POSITIVE  / Recipient POSITIVE; EBV IgG: Donor POSITIVE / Recipient POSITIVE ). Continue for 3 months post transplant.  Increase dose to 450 mg every other day when CrCl 25-39 ml/min  Increase dose to 450 mg every day when CrCl 40-59 ml/min  Increase dose to 900 mg daily when CrCl > 60 ml/min  Nystatin 500,000 units swish and spit or swallow four times daily for oral fungal prophylaxis    Sjogren's, Gout  Prior to admission patient was on prednisone 10 mg by mouth daily. Patient unsure indication for prednisone. Dose decreased to 5 mg by mouth daily at time of discharge. Recommend patient follow up with rheumatology and primary care provider to discuss if needs to continue.    Pharmacy has monitored for medication interactions and immunosuppression levels in conjunction with the multidisciplinary team. In anticipation for discharge, medication therapy needs have been addressed daily throughout the current admission via multidisciplinary rounds and/or discussions, order verification, daily clinical pharmacy review, and communication with prescribers.  Nga  Laura, Pharm.D., BCPS, BCTXP  [facility] 7a pharmacist

## 2025-05-09 NOTE — PLAN OF CARE
Physical Therapy Discharge Summary    Reason for therapy discharge:    Discharged to home with home therapy.    Progress towards therapy goal(s). See goals on Care Plan in [facility] electronic health record for goal details.  Goals partially met.  Barriers to achieving goals:   discharge from facility.    Therapy recommendation(s):    Continued therapy is recommended.  Rationale/Recommendations:  patient will benefit from continued skilled PT in the home environment to address mobility deficits, improve strength & improve activity tolerance.

## 2025-05-09 NOTE — PROGRESS NOTES
CARE MANAGEMENT FOLLOW UP    Additional Information:   05/09: CHW delegated by RNCC, has scheduled ATC appts for this Pt for tomorrow Saturday 05/10/2025 and Sunday 05/11/2025.    Юлия Fitzgerald   Community Health Worker, 7A&7B [facility].  [place], [place]   P [phone]   Fax: [phone]  Email: [email or web address]

## 2025-05-10 ENCOUNTER — TELEPHONE (OUTPATIENT)
Dept: TRANSPLANT | Facility: CLINIC | Age: 55
End: 2025-05-10

## 2025-05-10 ENCOUNTER — DOCUMENTATION ONLY (OUTPATIENT)
Dept: TRANSPLANT | Facility: CLINIC | Age: 55
End: 2025-05-10
Payer: COMMERCIAL

## 2025-05-10 ENCOUNTER — ANCILLARY PROCEDURE (OUTPATIENT)
Dept: CT IMAGING | Facility: CLINIC | Age: 55
End: 2025-05-10
Attending: NURSE PRACTITIONER
Payer: COMMERCIAL

## 2025-05-10 ENCOUNTER — INFUSION THERAPY VISIT (OUTPATIENT)
Dept: INFUSION THERAPY | Facility: CLINIC | Age: 55
End: 2025-05-10
Attending: NURSE PRACTITIONER
Payer: COMMERCIAL

## 2025-05-10 VITALS
OXYGEN SATURATION: 99 % | DIASTOLIC BLOOD PRESSURE: 78 MMHG | BODY MASS INDEX: 28.85 KG/M2 | TEMPERATURE: 98.1 F | WEIGHT: 147.7 LBS | RESPIRATION RATE: 16 BRPM | SYSTOLIC BLOOD PRESSURE: 147 MMHG | HEART RATE: 83 BPM

## 2025-05-10 DIAGNOSIS — Z94.0 KIDNEY TRANSPLANT RECIPIENT: Primary | ICD-10-CM

## 2025-05-10 DIAGNOSIS — Z94.83 PANCREAS TRANSPLANT STATUS (H): Primary | ICD-10-CM

## 2025-05-10 DIAGNOSIS — Z94.0 KIDNEY TRANSPLANT RECIPIENT: ICD-10-CM

## 2025-05-10 LAB
AMYLASE SERPL-CCNC: 161 U/L (ref 28–100)
ANION GAP SERPL CALCULATED.3IONS-SCNC: 13 MMOL/L (ref 7–15)
BASOPHILS # BLD MANUAL: 0 10E3/UL (ref 0–0.2)
BASOPHILS NFR BLD MANUAL: 0 %
BUN SERPL-MCNC: 44.5 MG/DL (ref 6–20)
CALCIUM SERPL-MCNC: 7.8 MG/DL (ref 8.8–10.4)
CHLORIDE SERPL-SCNC: 104 MMOL/L (ref 98–107)
CREAT SERPL-MCNC: 2.67 MG/DL (ref 0.51–0.95)
EGFRCR SERPLBLD CKD-EPI 2021: 20 ML/MIN/1.73M2
EOSINOPHIL # BLD MANUAL: 0 10E3/UL (ref 0–0.7)
EOSINOPHIL NFR BLD MANUAL: 0 %
ERYTHROCYTE [DISTWIDTH] IN BLOOD BY AUTOMATED COUNT: 15.4 % (ref 10–15)
GLUCOSE SERPL-MCNC: 96 MG/DL (ref 70–99)
HCO3 SERPL-SCNC: 19 MMOL/L (ref 22–29)
HCT VFR BLD AUTO: 24.6 % (ref 35–47)
HGB BLD-MCNC: 8.3 G/DL (ref 11.7–15.7)
LIPASE SERPL-CCNC: 271 U/L (ref 13–60)
LYMPHOCYTES # BLD MANUAL: 0 10E3/UL (ref 0.8–5.3)
LYMPHOCYTES NFR BLD MANUAL: 0 %
MAGNESIUM SERPL-MCNC: 1.9 MG/DL (ref 1.7–2.3)
MCH RBC QN AUTO: 29.4 PG (ref 26.5–33)
MCHC RBC AUTO-ENTMCNC: 33.7 G/DL (ref 31.5–36.5)
MCV RBC AUTO: 87 FL (ref 78–100)
METAMYELOCYTES # BLD MANUAL: 0.1 10E3/UL
METAMYELOCYTES NFR BLD MANUAL: 2 %
MONOCYTES # BLD MANUAL: 0.1 10E3/UL (ref 0–1.3)
MONOCYTES NFR BLD MANUAL: 2 %
NEUTROPHILS # BLD MANUAL: 7.2 10E3/UL (ref 1.6–8.3)
NEUTROPHILS NFR BLD MANUAL: 96 %
NRBC # BLD AUTO: 0.1 10E3/UL
NRBC BLD MANUAL-RTO: 1 %
PHOSPHATE SERPL-MCNC: 3.4 MG/DL (ref 2.5–4.5)
PLAT MORPH BLD: NORMAL
PLATELET # BLD AUTO: 56 10E3/UL (ref 150–450)
POTASSIUM SERPL-SCNC: 4.2 MMOL/L (ref 3.4–5.3)
RBC # BLD AUTO: 2.82 10E6/UL (ref 3.8–5.2)
RBC MORPH BLD: NORMAL
SODIUM SERPL-SCNC: 136 MMOL/L (ref 135–145)
TACROLIMUS BLD-MCNC: 7.3 UG/L (ref 5–15)
TME LAST DOSE: NORMAL H
TME LAST DOSE: NORMAL H
WBC # BLD AUTO: 7.4 10E3/UL (ref 4–11)

## 2025-05-10 PROCEDURE — 85007 BL SMEAR W/DIFF WBC COUNT: CPT

## 2025-05-10 PROCEDURE — 82150 ASSAY OF AMYLASE: CPT

## 2025-05-10 PROCEDURE — 74176 CT ABD & PELVIS W/O CONTRAST: CPT | Mod: GC | Performed by: RADIOLOGY

## 2025-05-10 PROCEDURE — 84100 ASSAY OF PHOSPHORUS: CPT

## 2025-05-10 PROCEDURE — 80048 BASIC METABOLIC PNL TOTAL CA: CPT

## 2025-05-10 PROCEDURE — 80197 ASSAY OF TACROLIMUS: CPT

## 2025-05-10 PROCEDURE — 83690 ASSAY OF LIPASE: CPT

## 2025-05-10 PROCEDURE — 36415 COLL VENOUS BLD VENIPUNCTURE: CPT

## 2025-05-10 PROCEDURE — 83735 ASSAY OF MAGNESIUM: CPT

## 2025-05-10 PROCEDURE — 85041 AUTOMATED RBC COUNT: CPT

## 2025-05-10 RX ORDER — TACROLIMUS 1 MG/1
2 CAPSULE ORAL 2 TIMES DAILY
Qty: 60 CAPSULE | Refills: 11 | Status: SHIPPED | OUTPATIENT
Start: 2025-05-10 | End: 2025-05-11

## 2025-05-10 RX ORDER — TACROLIMUS 0.5 MG/1
0.5 CAPSULE ORAL 2 TIMES DAILY PRN
Qty: 60 CAPSULE | Refills: 11 | Status: SHIPPED | OUTPATIENT
Start: 2025-05-10 | End: 2025-05-11

## 2025-05-10 RX ORDER — ATORVASTATIN CALCIUM 10 MG/1
10 TABLET, FILM COATED ORAL DAILY
Qty: 30 TABLET | Refills: 0 | Status: SHIPPED | OUTPATIENT
Start: 2025-05-10

## 2025-05-10 RX ORDER — CHOLECALCIFEROL (VITAMIN D3) 50 MCG
50 TABLET ORAL DAILY
Qty: 30 TABLET | Refills: 0 | Status: SHIPPED | OUTPATIENT
Start: 2025-05-10

## 2025-05-10 RX ORDER — EZETIMIBE 10 MG/1
10 TABLET ORAL DAILY
Qty: 30 TABLET | Refills: 1 | Status: SHIPPED | OUTPATIENT
Start: 2025-05-10

## 2025-05-10 NOTE — CONFIDENTIAL NOTE
Transplant coordinator on call received page.  Reason for call: Wendy discharged earlier today. Did not have famotidine or zofran ordered at discharge.   Outcome: Added orders   Coordinator: ***

## 2025-05-10 NOTE — PROGRESS NOTES
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      Discharge date: 5/9/25  Transplant coordinator: Betty Vogel    Physical Assessment:  See physical assessment located under \"Document Flowsheets\".  Incision site: Some areas of dried blood in superior and midline area of incision, but no signs of infection. Serosanguinous scant drainage noted to inferior area of incision. Area cleansed and ABD pad applied. Staples present and intact.  Lines: JESUS drain--Upon arrival to clinic only 40 ml noted in JESUS bulb reservoir. Patient reported pain around JESUS drain site. When line stripped and drainage collected, 400 ml removed from JESUS drain. Bright red fluid. When bulb compressed and capped, patient reported intense pain. Patient needed bulb opened and decompressed in order for pain to subside. CT scan of JESUS drain area ordered by Allyssa Rosen NP to further investigate the cause of the pain. Patient sent to CT scan and Allyssa will follow up with patient regarding plan moving forward after results are in if intervention needed.  Urine clarity: light yellow in color, clear in consistency, no blood present  Hydration: Encouraged 2-3 liters per day, patient reports drinking bottles of water, no amount could be given  Nutrition: appetite good per patient, no N/V   Last BM: last evening, no issues  Pain: 6/10, JESUS drain site   Labs drawn by [facility] staff Yes  -Assessment completed, medications reviewed and medication box checked for proper medication fill. Checklist reviewed with patient and family. Son &  will be helping care for patient at home. Son will also be assisting patient with medications. Son knowledgeable and had filled pill box last evening.    Medication changes: none    Medications administered: none      Patient education:    The following teaching topics were addressed: Importance of drinking 2L of non-caffeinated fluids daily, Incisional care, " Signs/symptoms of infection, Good handwashing, Medications (purposes, doses and times of administration), Phone numbers to call with concenrs (Transplant coordinator, [facility]), Plan of care, and Drain care   Patient and son verbalized understanding and all questions answered.    Drug level:  Patient took 1mg of Tacrolimus last evening at 2000.  Care coordinator to follow up with the result.    Face to face time: 180 minutes  Discharge Plan    [facility]  Discharge instructions reviewed with patient: YES  Patient/Representative verbalized understanding, all questions answered: YES    Discharged from unit at 1015 with whom: family to Imaging in [facility] for CT scan.    Carley Thomason, RN, RN

## 2025-05-10 NOTE — TELEPHONE ENCOUNTER
Transplant coordinator on call received page.  Reason for call: Wendy discharged earlier today. Did not have famotidine or zofran ordered at discharge.   Outcome: Sent message to provider and tx coordinator to review and order.  Coordinator: Betty Vogel

## 2025-05-10 NOTE — TELEPHONE ENCOUNTER
ISSUE:   Tacrolimus IR level 7.3 on 05/10/25, goal 10, dose 1.5 mg BID.    PLAN:   Call Patient and confirm this was an accurate 12-hour trough.   Verify Tacrolimus IR dose 1.5 mg BID.   Confirm no new medications or illness.   Confirm no missed doses.     If accurate trough and accurate dose, increase Tacrolimus IR dose to 2 mg BID  *Recommended dose rounded from calculated dose 2.05 mg  BID.      Repeat labs in 1 days.   For any dose change <50%, recheck labs per guideline or within 1 month.  For any dose change of more than 50%, recheck drug level based on timing to reach steady state:   Immediate release tacrolimus: 2-3 days  Extended-release tacrolimus: 7 days  Cyclosporine: 2 days  Sirolimus: 12 days  Everolimus: 8 days      OUTCOME:   Spoke with Patient, they confirm accurate trough level and current dose 1.5 mg BID.   Other friend Benny confirmed dose change to 2 mg BID.  Alex Zapata agreed to repeat labs in 1 days.   Orders sent to preferred pharmacy for dose change and lab for repeat labs.   Other Benny voiced understanding of plan.

## 2025-05-11 ENCOUNTER — TELEPHONE (OUTPATIENT)
Dept: TRANSPLANT | Facility: CLINIC | Age: 55
End: 2025-05-11
Payer: COMMERCIAL

## 2025-05-11 ENCOUNTER — INFUSION THERAPY VISIT (OUTPATIENT)
Dept: INFUSION THERAPY | Facility: CLINIC | Age: 55
End: 2025-05-11
Attending: NURSE PRACTITIONER
Payer: COMMERCIAL

## 2025-05-11 ENCOUNTER — DOCUMENTATION ONLY (OUTPATIENT)
Dept: TRANSPLANT | Facility: CLINIC | Age: 55
End: 2025-05-11
Payer: COMMERCIAL

## 2025-05-11 VITALS
DIASTOLIC BLOOD PRESSURE: 78 MMHG | RESPIRATION RATE: 16 BRPM | OXYGEN SATURATION: 100 % | TEMPERATURE: 97.6 F | HEART RATE: 86 BPM | BODY MASS INDEX: 28.22 KG/M2 | WEIGHT: 144.5 LBS | SYSTOLIC BLOOD PRESSURE: 160 MMHG

## 2025-05-11 DIAGNOSIS — Z94.0 KIDNEY TRANSPLANT RECIPIENT: ICD-10-CM

## 2025-05-11 DIAGNOSIS — Z94.0 KIDNEY TRANSPLANT RECIPIENT: Primary | ICD-10-CM

## 2025-05-11 LAB
AMYLASE SERPL-CCNC: 137 U/L (ref 28–100)
ANION GAP SERPL CALCULATED.3IONS-SCNC: 11 MMOL/L (ref 7–15)
BASOPHILS # BLD MANUAL: 0 10E3/UL (ref 0–0.2)
BASOPHILS NFR BLD MANUAL: 0 %
BUN SERPL-MCNC: 35.3 MG/DL (ref 6–20)
CALCIUM SERPL-MCNC: 8.3 MG/DL (ref 8.8–10.4)
CHLORIDE SERPL-SCNC: 105 MMOL/L (ref 98–107)
CREAT SERPL-MCNC: 2.26 MG/DL (ref 0.51–0.95)
EGFRCR SERPLBLD CKD-EPI 2021: 25 ML/MIN/1.73M2
EOSINOPHIL # BLD MANUAL: 0 10E3/UL (ref 0–0.7)
EOSINOPHIL NFR BLD MANUAL: 0 %
ERYTHROCYTE [DISTWIDTH] IN BLOOD BY AUTOMATED COUNT: 15.2 % (ref 10–15)
GLUCOSE SERPL-MCNC: 115 MG/DL (ref 70–99)
HCO3 SERPL-SCNC: 23 MMOL/L (ref 22–29)
HCT VFR BLD AUTO: 25 % (ref 35–47)
HGB BLD-MCNC: 8.4 G/DL (ref 11.7–15.7)
LIPASE SERPL-CCNC: 186 U/L (ref 13–60)
LYMPHOCYTES # BLD MANUAL: 0.1 10E3/UL (ref 0.8–5.3)
LYMPHOCYTES NFR BLD MANUAL: 1 %
MAGNESIUM SERPL-MCNC: 1.8 MG/DL (ref 1.7–2.3)
MCH RBC QN AUTO: 29.5 PG (ref 26.5–33)
MCHC RBC AUTO-ENTMCNC: 33.6 G/DL (ref 31.5–36.5)
MCV RBC AUTO: 88 FL (ref 78–100)
MONOCYTES # BLD MANUAL: 0.1 10E3/UL (ref 0–1.3)
MONOCYTES NFR BLD MANUAL: 2 %
NEUTROPHILS # BLD MANUAL: 8.2 10E3/UL (ref 1.6–8.3)
NEUTROPHILS NFR BLD MANUAL: 97 %
PHOSPHATE SERPL-MCNC: 2.7 MG/DL (ref 2.5–4.5)
PLAT MORPH BLD: NORMAL
PLATELET # BLD AUTO: 80 10E3/UL (ref 150–450)
POTASSIUM SERPL-SCNC: 4.3 MMOL/L (ref 3.4–5.3)
RBC # BLD AUTO: 2.85 10E6/UL (ref 3.8–5.2)
RBC MORPH BLD: NORMAL
SODIUM SERPL-SCNC: 139 MMOL/L (ref 135–145)
TACROLIMUS BLD-MCNC: 12.8 UG/L (ref 5–15)
TME LAST DOSE: NORMAL H
TME LAST DOSE: NORMAL H
WBC # BLD AUTO: 8.4 10E3/UL (ref 4–11)

## 2025-05-11 PROCEDURE — 80197 ASSAY OF TACROLIMUS: CPT

## 2025-05-11 PROCEDURE — 80180 DRUG SCRN QUAN MYCOPHENOLATE: CPT

## 2025-05-11 PROCEDURE — 83690 ASSAY OF LIPASE: CPT

## 2025-05-11 PROCEDURE — 85014 HEMATOCRIT: CPT

## 2025-05-11 PROCEDURE — 83735 ASSAY OF MAGNESIUM: CPT

## 2025-05-11 PROCEDURE — 84100 ASSAY OF PHOSPHORUS: CPT

## 2025-05-11 PROCEDURE — 36415 COLL VENOUS BLD VENIPUNCTURE: CPT

## 2025-05-11 PROCEDURE — 82565 ASSAY OF CREATININE: CPT

## 2025-05-11 PROCEDURE — 82150 ASSAY OF AMYLASE: CPT

## 2025-05-11 PROCEDURE — 85007 BL SMEAR W/DIFF WBC COUNT: CPT

## 2025-05-11 RX ORDER — TACROLIMUS 0.5 MG/1
0.5 CAPSULE ORAL 2 TIMES DAILY PRN
Qty: 60 CAPSULE | Refills: 11 | Status: SHIPPED | OUTPATIENT
Start: 2025-05-11 | End: 2025-05-14

## 2025-05-11 RX ORDER — TACROLIMUS 1 MG/1
1 CAPSULE ORAL 2 TIMES DAILY
Qty: 60 CAPSULE | Refills: 11 | Status: SHIPPED | OUTPATIENT
Start: 2025-05-11 | End: 2025-05-14

## 2025-05-11 NOTE — TELEPHONE ENCOUNTER
ISSUE:   Tacrolimus IR level 12.8 on 05/11/25, goal 10, dose 2 mg BID.    PLAN:   Call Patient and confirm this was an accurate 12-hour trough.   Verify Tacrolimus IR dose 2 mg BID.   Confirm no new medications or illness.   Confirm no missed doses.     If accurate trough and accurate dose, decrease Tacrolimus IR dose to 1.5 mg BID  *Recommended dose rounded from calculated dose 1.56 mg  BID.      Repeat labs in 1 days.   For any dose change <50%, recheck labs per guideline or within 1 month.  For any dose change of more than 50%, recheck drug level based on timing to reach steady state:   Immediate release tacrolimus: 2-3 days  Extended-release tacrolimus: 7 days  Cyclosporine: 2 days  Sirolimus: 12 days  Everolimus: 8 days      OUTCOME:   Spoke with Patient, they confirm accurate trough level and current dose 2 mg BID.   Patient confirmed dose change to 1.5 mg BID.  Patient agreed to repeat labs in 1 days.   Orders sent to preferred pharmacy for dose change and lab for repeat labs.   Patient voiced understanding of plan.

## 2025-05-11 NOTE — PROGRESS NOTES
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      Discharge date: 5/9/25  Transplant coordinator: Betty Vogel    Physical Assessment:  See physical assessment located under \"Document Flowsheets\".  Incision site: Some areas of dried blood in superior and midline area of incision, but no signs of infection. Serosanguinous scant drainage noted to inferior area of incision. Staples present and intact.  Lines: JESUS drain--200 mls empted  Urine clarity: light yellow in color, clear in consistency, no blood present per pt  Hydration: Encouraged 2-3 liters per day, patient reports drinking 3 bottles of water.  Nutrition: appetite good per patient, no N/V   Last BM: last evening, no issues  Pain: 3/10, JESUS drain site   Labs drawn by [facility] staff Yes  -Assessment completed, medications reviewed and medication box checked for proper medication fill. Checklist reviewed with patient and family. Son &  will be helping care for patient at home. Son will also be assisting patient with medications. Son knowledgeable and had filled pill box last evening.    Medication changes: none    Medications administered: none      Patient education:    The following teaching topics were addressed: Importance of drinking 2L of non-caffeinated fluids daily, Incisional care, Signs/symptoms of infection, Good handwashing, Medications (purposes, doses and times of administration), Phone numbers to call with concenrs (Transplant coordinator, [facility] and [facility]), Plan of care, and Drain care   Patient and son verbalized understanding and all questions answered.    Drug level:  Patient took 1mg of Tacrolimus last evening at 2000.  Care coordinator to follow up with the result.    Discharge Plan    Pt " will follow up with home care tomorrow.  In basket sent to coordinator to follow up with future appts.  Discharge instructions reviewed with patient: YES  Patient/Representative verbalized understanding, all questions answered: YES        BP (!) 160/78 (Patient Position: Sitting)   Pulse 86   Temp 97.6  F (36.4  C) (Oral)   Resp 16   Wt 65.5 kg (144 lb 8 oz)   SpO2 100%   BMI 28.22 kg/m

## 2025-05-12 ENCOUNTER — RESULTS FOLLOW-UP (OUTPATIENT)
Dept: TRANSPLANT | Facility: CLINIC | Age: 55
End: 2025-05-12

## 2025-05-12 ENCOUNTER — TELEPHONE (OUTPATIENT)
Dept: TRANSPLANT | Facility: CLINIC | Age: 55
End: 2025-05-12
Payer: COMMERCIAL

## 2025-05-12 ENCOUNTER — TELEPHONE (OUTPATIENT)
Dept: PHARMACY | Facility: CLINIC | Age: 55
End: 2025-05-12
Payer: COMMERCIAL

## 2025-05-12 DIAGNOSIS — Z94.0 KIDNEY REPLACED BY TRANSPLANT: Primary | ICD-10-CM

## 2025-05-12 DIAGNOSIS — Z09 HOSPITAL DISCHARGE FOLLOW-UP: ICD-10-CM

## 2025-05-12 DIAGNOSIS — Z79.899 ENCOUNTER FOR LONG-TERM CURRENT USE OF MEDICATION: ICD-10-CM

## 2025-05-12 DIAGNOSIS — Z48.298 AFTERCARE FOLLOWING ORGAN TRANSPLANT: ICD-10-CM

## 2025-05-12 DIAGNOSIS — Z48.298 AFTERCARE FOLLOWING ORGAN TRANSPLANT: Primary | ICD-10-CM

## 2025-05-12 DIAGNOSIS — Z20.828 CONTACT WITH AND (SUSPECTED) EXPOSURE TO OTHER VIRAL COMMUNICABLE DISEASES: ICD-10-CM

## 2025-05-12 DIAGNOSIS — Z98.890 OTHER SPECIFIED POSTPROCEDURAL STATES: ICD-10-CM

## 2025-05-12 LAB
BK VIRUS IGG ANTIBODY: ABNORMAL TITER
CELL TYPE ALLO: NORMAL
CELL TYPE AUTO: NORMAL
CHANNELSHIFTALLOB1: -26
CHANNELSHIFTALLOB2: -55
CHANNELSHIFTALLOT1: -39
CHANNELSHIFTALLOT2: -46
CHANNELSHIFTAUTOB1: -65
CHANNELSHIFTAUTOB2: -120
CHANNELSHIFTAUTOT1: -71
CHANNELSHIFTAUTOT2: -81
COMMENT ALLOB2: NORMAL
CROSSMATCHDATEALLO: NORMAL
CROSSMATCHDATEAUTO: NORMAL
DONOR ALLO: NORMAL
DONOR AUTO: NORMAL
DONORCELLDATE ALLO: NORMAL
DONORCELLDATE AUTO: NORMAL
MYCOPHENOLATE SERPL LC/MS/MS-MCNC: 2.04 MG/L (ref 1–3.5)
MYCOPHENOLATE-G SERPL LC/MS/MS-MCNC: 103 MG/L (ref 30–95)
POS CUT OFF ALLO B: >69
POS CUT OFF ALLO T: >53
POS CUT OFF AUTO B: >69
POS CUT OFF AUTO T: >53
RESULT ALLO B1: NORMAL
RESULT ALLO B2: NORMAL
RESULT ALLO T1: NORMAL
RESULT ALLO T2: NORMAL
RESULT AUTO B1: NORMAL
RESULT AUTO B2: NORMAL
RESULT AUTO T1: NORMAL
RESULT AUTO T2: NORMAL
SERUM DATE ALLO B1: NORMAL
SERUM DATE ALLO B2: NORMAL
SERUM DATE ALLO T1: NORMAL
SERUM DATE ALLO T2: NORMAL
SERUM DATE AUTO B1: NORMAL
SERUM DATE AUTO B2: NORMAL
SERUM DATE AUTO T1: NORMAL
SERUM DATE AUTO T2: NORMAL
TESTMETHODALLO: NORMAL
TESTMETHODAUTO: NORMAL
TME LAST DOSE: ABNORMAL H
TME LAST DOSE: ABNORMAL H
TREATMENT ALLO B1: NORMAL
TREATMENT ALLO B2: NORMAL
TREATMENT ALLO T1: NORMAL
TREATMENT ALLO T2: NORMAL
TREATMENT AUTO B1: NORMAL
TREATMENT AUTO B2: NORMAL
TREATMENT AUTO T1: NORMAL
TREATMENT AUTO T2: NORMAL

## 2025-05-12 NOTE — TELEPHONE ENCOUNTER
Kidney and Pancreas Transplant Coordinator Transition to Outpatient Discharge Note    Surgeon (updated in Transplant Information tab): Rosibel  Nephrologist (updated in Transplant Information tab): Zackery  Transplant Coordinator: Betty Vogel RN    Lines / drains in place at time of discharge:  Stent: Yes.  Other: armaan  If yes, review of parameters to track and when to contact RNCC: Yes..    Immunosuppression:   CNI: tac  Antimetabolite: mmf  Prednisone taper: No.    Induction: high    Prophylaxis:   CMV: D+/R+ : Valcyte 3 months; renal dosing  EBV: D+/R+  Toxoplasma  D -/R -( mismatch life time of bactrim )  Antibiotic: bactrim    High Risk DSA: Yes.    Pharmacy after discharge: fv  Lab after discharge: fv  Lab letter faxed to outside lab, if needed: Yes.    Education:  Writer spoke with Wendy Kapoor.   We discussed immunosuppression medications, indications, side effects, dose adjustments, and lab monitoring.   Lab draw schedule was provided via MyChart/mail to the patient and fully explained.    DSA  kits needed:  Yes.    If so, request submitted to centralized team for send-out.  Living Donor:  No.    If yes, discussed with patient data collection through [facility] for K[facility] in effort  to improve eplet matching. This will include additional, non-clinical lab   draw organized by an external organization, [employer] [facility].   Consent to Communicate on File: Yes.    If no, discussed inability for team to communicate with any person other than the patient regarding PHI, including scheduling.   If no, consent to communicate was offered: No.     Further education was provided on typical post transplant course, labs examined with thresholds, biopsy, infection prevention, office contact numbers, and when to call.     Discharge Transition Plan:   Pt will transition home, with assistance from family. Pt will have home care.   Pt states having working BP monitor, scale, and thermometer at home.    Follow  Up:  Orders for post-transplant follow-up appointments placed: Yes.  Patient Status Checklist Task updated: Yes.    Special/Additional needs:   Pt questions for follow up:     Betty Vogel RN  Post-Kidney Transplant Coordinator  (ph) [phone]

## 2025-05-12 NOTE — TELEPHONE ENCOUNTER
Wendy Kapoor is a post-kidney/pancreas transplant patient who discharged on 5/9/25. Patient chooses to receive medications from [place] specialty pharmacy. The patient and spouse (Killian) will be responsible for managing medications.    PRIMARY HEALTH BENEFIT: [employer] [facility]  ID# [id number] [facility]# [id number] (EFFECTIVE (01/01/2024) )  PROCESSING INFO: ID# [id number]  GRP#  [id number] BIN# [id number]  SECONDARY MEDICARE HEALTH BENEFIT: (MEDICARE PART B)  ID# [id number] (PART A EFFECTIVE (09/01/2023) , PART B EFFECTIVE (08/01/2024) )   PROCESSING INFO: (MDCR SECONDARY) ID# [id number] GRP# OTHER BIN#[id number]  PT WILL PAY $0 AT TIME OF SERVICE  FOR IMMUNOS     PHARMACY BENEFIT: ([facility])  PROCESSING INFO: ID# [id number] GRP# CRRA N# [id number]  (EFFECTIVE (01/01/2024) ).   DEDUCTIBLE ($1,800) & MAX OUT-OF-POCKET ($7,600)  COPAY STRUCTURE:  % (20) FOR GENERIC  % (20) FOR BRAND  % (20) FOR NON-FORMULARY MEDICATIONS      TEST CLAIM SPECIALTY #28  MYCOPHENOLATE 250MG (#240/30DS) $0 AT TIME OF SERVICE FOR IMMUNOS  PROGRAF 1MG (#120/30DS) $0 AT TIME OF SERVICE FOR IMMUNOS  TACROLIMUS 1MG (#120/30DS) $0 AT TIME OF SERVICE FOR IMMUNOS  CYCLOSPORINE 100MG (#60/30DS) $0 AT TIME OF SERVICE FOR IMMUNOS  VALGANCICLOVIR 450MG (#60/30DS) $0  VALACYCLOVIR 1GM (#90/30DS) PA REQUIRED    Arti Multani [facility]

## 2025-05-12 NOTE — TELEPHONE ENCOUNTER
Home care called needing lab orders. She took her Tacrolimus at 8 pm last night.    Advised to draw Tacrolimus level, CBC, BMP, Amylase and Lipase.    Okay to place orders per Betty.    Home care nurse called back as she was unable to draw the patient. She is going to give her her medication since it is getting to be too late to draw.    Please advise on plan for redraw.    Home care nurseYoli. [facility]  Ph: [phone]  Fax: [phone]

## 2025-05-12 NOTE — TELEPHONE ENCOUNTER
Spoke with home care RN. Able to add patient on for lab schedule tomorrow AM. She will draw labs at [place]. Home care will attempt labs again on Thursday and follow up with RNCC if they are unable to draw.

## 2025-05-13 ENCOUNTER — LAB (OUTPATIENT)
Dept: LAB | Facility: OTHER | Age: 55
End: 2025-05-13
Payer: COMMERCIAL

## 2025-05-13 ENCOUNTER — RESULTS FOLLOW-UP (OUTPATIENT)
Dept: TRANSPLANT | Facility: CLINIC | Age: 55
End: 2025-05-13

## 2025-05-13 ENCOUNTER — TELEPHONE (OUTPATIENT)
Dept: TRANSPLANT | Facility: CLINIC | Age: 55
End: 2025-05-13

## 2025-05-13 DIAGNOSIS — Z48.298 AFTERCARE FOLLOWING ORGAN TRANSPLANT: ICD-10-CM

## 2025-05-13 DIAGNOSIS — Z20.828 CONTACT WITH AND (SUSPECTED) EXPOSURE TO OTHER VIRAL COMMUNICABLE DISEASES: ICD-10-CM

## 2025-05-13 DIAGNOSIS — Z94.0 KIDNEY REPLACED BY TRANSPLANT: ICD-10-CM

## 2025-05-13 DIAGNOSIS — Z98.890 OTHER SPECIFIED POSTPROCEDURAL STATES: ICD-10-CM

## 2025-05-13 DIAGNOSIS — Z79.899 ENCOUNTER FOR LONG-TERM CURRENT USE OF MEDICATION: ICD-10-CM

## 2025-05-13 DIAGNOSIS — Z48.298 AFTERCARE FOLLOWING ORGAN TRANSPLANT: Primary | ICD-10-CM

## 2025-05-13 LAB
AMYLASE SERPL-CCNC: 123 U/L (ref 28–100)
ANION GAP SERPL CALCULATED.3IONS-SCNC: 11 MMOL/L (ref 7–15)
BUN SERPL-MCNC: 26.9 MG/DL (ref 6–20)
CALCIUM SERPL-MCNC: 8.5 MG/DL (ref 8.8–10.4)
CHLORIDE SERPL-SCNC: 101 MMOL/L (ref 98–107)
CREAT SERPL-MCNC: 1.95 MG/DL (ref 0.51–0.95)
EGFRCR SERPLBLD CKD-EPI 2021: 30 ML/MIN/1.73M2
ERYTHROCYTE [DISTWIDTH] IN BLOOD BY AUTOMATED COUNT: 15.3 % (ref 10–15)
GLUCOSE SERPL-MCNC: 109 MG/DL (ref 70–99)
HCO3 SERPL-SCNC: 24 MMOL/L (ref 22–29)
HCT VFR BLD AUTO: 25.3 % (ref 35–47)
HGB BLD-MCNC: 8.4 G/DL (ref 11.7–15.7)
LIPASE SERPL-CCNC: 170 U/L (ref 13–60)
MCH RBC QN AUTO: 29.9 PG (ref 26.5–33)
MCHC RBC AUTO-ENTMCNC: 33.2 G/DL (ref 31.5–36.5)
MCV RBC AUTO: 90 FL (ref 78–100)
PLATELET # BLD AUTO: 139 10E3/UL (ref 150–450)
POTASSIUM SERPL-SCNC: 4.7 MMOL/L (ref 3.4–5.3)
RBC # BLD AUTO: 2.81 10E6/UL (ref 3.8–5.2)
SODIUM SERPL-SCNC: 136 MMOL/L (ref 135–145)
TACROLIMUS BLD-MCNC: 14.9 UG/L (ref 5–15)
TME LAST DOSE: NORMAL H
TME LAST DOSE: NORMAL H
WBC # BLD AUTO: 18.7 10E3/UL (ref 4–11)

## 2025-05-13 PROCEDURE — 80048 BASIC METABOLIC PNL TOTAL CA: CPT

## 2025-05-13 PROCEDURE — 80197 ASSAY OF TACROLIMUS: CPT

## 2025-05-13 PROCEDURE — 36415 COLL VENOUS BLD VENIPUNCTURE: CPT

## 2025-05-13 PROCEDURE — 83690 ASSAY OF LIPASE: CPT

## 2025-05-13 PROCEDURE — 82150 ASSAY OF AMYLASE: CPT

## 2025-05-13 PROCEDURE — 85027 COMPLETE CBC AUTOMATED: CPT

## 2025-05-13 NOTE — TELEPHONE ENCOUNTER
----- Message from Allyssa Rosen sent at 5/13/2025 12:13 PM CDT -----  Regarding: WBC  Hello! Can Wendy see me tomorrow? 9:30? I see her WBC is up. Jean-Pierre said I could take her drain out. -Allyssa

## 2025-05-14 ENCOUNTER — TELEPHONE (OUTPATIENT)
Dept: TRANSPLANT | Facility: CLINIC | Age: 55
End: 2025-05-14
Payer: COMMERCIAL

## 2025-05-14 DIAGNOSIS — Z94.0 KIDNEY TRANSPLANT RECIPIENT: ICD-10-CM

## 2025-05-14 RX ORDER — TACROLIMUS 0.5 MG/1
CAPSULE ORAL
Status: SHIPPED
Start: 2025-05-14 | End: 2025-05-15

## 2025-05-14 RX ORDER — TACROLIMUS 1 MG/1
1 CAPSULE ORAL 2 TIMES DAILY
Qty: 180 CAPSULE | Refills: 3 | Status: SHIPPED | OUTPATIENT
Start: 2025-05-14 | End: 2025-05-15

## 2025-05-14 NOTE — TELEPHONE ENCOUNTER
ISSUE:   Tacrolimus IR level 14.9 on 05/13/25, goal 9, dose 1.5 mg BID.    PLAN:   Call Patient and confirm this was an accurate 12-hour trough.   Verify Tacrolimus IR dose 1.5 mg BID.   Confirm no new medications or illness.   Confirm no missed doses.     If accurate trough and accurate dose, decrease Tacrolimus IR dose to 1 mg BID  *Recommended dose rounded from calculated dose 0.91 mg  BID.      Repeat labs in 1 day as scheduled.    Cindy Vallejo RN, BSN  Post-Kidney/Pancreas Transplant Coordinator   [phone]    OUTCOME:   Spoke with Patient, they confirm accurate trough level and current dose 1.5 mg BID.   Patient confirmed dose change to 1 mg BID.  Patient agreed to repeat labs in 1 days.   Orders sent to preferred pharmacy for dose change and lab for repeat labs.   Patient voiced understanding of plan.

## 2025-05-14 NOTE — TELEPHONE ENCOUNTER
Called Wendy  confirmed no fevers    Wendy  stated no  ride today to the clinic to follow up with Allyssa Rosen YNES   Confirmed has a ride tomorrow -    Reviewed with Wendy  that I will the schedules will contact her with  time of appointment with Allyssa Rosen -

## 2025-05-14 NOTE — TELEPHONE ENCOUNTER
Benny and pt calling in and asking if they can come to the [facility] in the morning tomorrow instead of today? She can be there by 8am on 5/15 to see Silas for the drain, but today does not work. They do not have a ride. Please call Wendy back today.

## 2025-05-15 ENCOUNTER — RESULTS FOLLOW-UP (OUTPATIENT)
Dept: TRANSPLANT | Facility: CLINIC | Age: 55
End: 2025-05-15

## 2025-05-15 ENCOUNTER — LAB (OUTPATIENT)
Dept: LAB | Facility: CLINIC | Age: 55
End: 2025-05-15
Payer: COMMERCIAL

## 2025-05-15 ENCOUNTER — INFUSION THERAPY VISIT (OUTPATIENT)
Dept: INFUSION THERAPY | Facility: CLINIC | Age: 55
End: 2025-05-15
Attending: NURSE PRACTITIONER
Payer: COMMERCIAL

## 2025-05-15 ENCOUNTER — TELEPHONE (OUTPATIENT)
Dept: TRANSPLANT | Facility: CLINIC | Age: 55
End: 2025-05-15

## 2025-05-15 ENCOUNTER — OFFICE VISIT (OUTPATIENT)
Dept: TRANSPLANT | Facility: CLINIC | Age: 55
End: 2025-05-15
Attending: NURSE PRACTITIONER
Payer: COMMERCIAL

## 2025-05-15 VITALS
SYSTOLIC BLOOD PRESSURE: 106 MMHG | RESPIRATION RATE: 16 BRPM | BODY MASS INDEX: 26.48 KG/M2 | DIASTOLIC BLOOD PRESSURE: 62 MMHG | WEIGHT: 135.6 LBS | HEART RATE: 77 BPM | TEMPERATURE: 98 F | OXYGEN SATURATION: 100 %

## 2025-05-15 DIAGNOSIS — Z79.899 ENCOUNTER FOR LONG-TERM CURRENT USE OF MEDICATION: ICD-10-CM

## 2025-05-15 DIAGNOSIS — Z94.0 KIDNEY TRANSPLANT RECIPIENT: ICD-10-CM

## 2025-05-15 DIAGNOSIS — Z48.298 AFTERCARE FOLLOWING ORGAN TRANSPLANT: ICD-10-CM

## 2025-05-15 DIAGNOSIS — Z94.0 KIDNEY REPLACED BY TRANSPLANT: ICD-10-CM

## 2025-05-15 DIAGNOSIS — Z20.828 CONTACT WITH AND (SUSPECTED) EXPOSURE TO OTHER VIRAL COMMUNICABLE DISEASES: ICD-10-CM

## 2025-05-15 DIAGNOSIS — Z98.890 OTHER SPECIFIED POSTPROCEDURAL STATES: ICD-10-CM

## 2025-05-15 DIAGNOSIS — N18.6 END STAGE RENAL DISEASE (H): ICD-10-CM

## 2025-05-15 DIAGNOSIS — Z48.298 AFTERCARE FOLLOWING ORGAN TRANSPLANT: Primary | ICD-10-CM

## 2025-05-15 LAB
ALBUMIN UR-MCNC: 50 MG/DL
AMYLASE SERPL-CCNC: 68 U/L (ref 28–100)
ANION GAP SERPL CALCULATED.3IONS-SCNC: 8 MMOL/L (ref 7–15)
APPEARANCE UR: ABNORMAL
BACTERIA #/AREA URNS HPF: ABNORMAL /HPF
BILIRUB UR QL STRIP: NEGATIVE
BUN SERPL-MCNC: 24.8 MG/DL (ref 6–20)
CALCIUM SERPL-MCNC: 8.9 MG/DL (ref 8.8–10.4)
CHLORIDE SERPL-SCNC: 105 MMOL/L (ref 98–107)
COLOR UR AUTO: YELLOW
CREAT SERPL-MCNC: 2.31 MG/DL (ref 0.51–0.95)
EGFRCR SERPLBLD CKD-EPI 2021: 24 ML/MIN/1.73M2
ERYTHROCYTE [DISTWIDTH] IN BLOOD BY AUTOMATED COUNT: 16.5 % (ref 10–15)
GLUCOSE SERPL-MCNC: 101 MG/DL (ref 70–99)
GLUCOSE UR STRIP-MCNC: NEGATIVE MG/DL
HCO3 SERPL-SCNC: 25 MMOL/L (ref 22–29)
HCT VFR BLD AUTO: 24.6 % (ref 35–47)
HGB BLD-MCNC: 7.8 G/DL (ref 11.7–15.7)
HGB UR QL STRIP: ABNORMAL
KETONES UR STRIP-MCNC: NEGATIVE MG/DL
LEUKOCYTE ESTERASE UR QL STRIP: ABNORMAL
LIPASE SERPL-CCNC: 84 U/L (ref 13–60)
MAGNESIUM SERPL-MCNC: 1.3 MG/DL (ref 1.7–2.3)
MCH RBC QN AUTO: 29.4 PG (ref 26.5–33)
MCHC RBC AUTO-ENTMCNC: 31.7 G/DL (ref 31.5–36.5)
MCV RBC AUTO: 93 FL (ref 78–100)
MUCOUS THREADS #/AREA URNS LPF: PRESENT /LPF
NITRATE UR QL: NEGATIVE
PH UR STRIP: 6 [PH] (ref 5–7)
PHOSPHATE SERPL-MCNC: 2 MG/DL (ref 2.5–4.5)
PLATELET # BLD AUTO: 170 10E3/UL (ref 150–450)
POTASSIUM SERPL-SCNC: 4.8 MMOL/L (ref 3.4–5.3)
RBC # BLD AUTO: 2.65 10E6/UL (ref 3.8–5.2)
RBC URINE: 89 /HPF
SODIUM SERPL-SCNC: 138 MMOL/L (ref 135–145)
SP GR UR STRIP: 1.02 (ref 1–1.03)
TACROLIMUS BLD-MCNC: 15.2 UG/L (ref 5–15)
TME LAST DOSE: ABNORMAL H
TME LAST DOSE: ABNORMAL H
UROBILINOGEN UR STRIP-MCNC: NORMAL MG/DL
WBC # BLD AUTO: 16.9 10E3/UL (ref 4–11)
WBC CLUMPS #/AREA URNS HPF: PRESENT /HPF
WBC URINE: >182 /HPF

## 2025-05-15 PROCEDURE — 99000 SPECIMEN HANDLING OFFICE-LAB: CPT | Performed by: PATHOLOGY

## 2025-05-15 PROCEDURE — 81001 URINALYSIS AUTO W/SCOPE: CPT | Performed by: NURSE PRACTITIONER

## 2025-05-15 PROCEDURE — 258N000003 HC RX IP 258 OP 636: Performed by: NURSE PRACTITIONER

## 2025-05-15 PROCEDURE — 80197 ASSAY OF TACROLIMUS: CPT | Performed by: INTERNAL MEDICINE

## 2025-05-15 RX ORDER — DIPHENHYDRAMINE HYDROCHLORIDE 50 MG/ML
25 INJECTION, SOLUTION INTRAMUSCULAR; INTRAVENOUS
Start: 2025-05-15

## 2025-05-15 RX ORDER — MEPERIDINE HYDROCHLORIDE 25 MG/ML
25 INJECTION INTRAMUSCULAR; INTRAVENOUS; SUBCUTANEOUS
OUTPATIENT
Start: 2025-05-15

## 2025-05-15 RX ORDER — DIPHENHYDRAMINE HYDROCHLORIDE 50 MG/ML
50 INJECTION, SOLUTION INTRAMUSCULAR; INTRAVENOUS
Start: 2025-05-15

## 2025-05-15 RX ORDER — ALBUTEROL SULFATE 0.83 MG/ML
2.5 SOLUTION RESPIRATORY (INHALATION)
OUTPATIENT
Start: 2025-05-15

## 2025-05-15 RX ORDER — TACROLIMUS 0.5 MG/1
0.5 CAPSULE ORAL 2 TIMES DAILY
Qty: 60 CAPSULE | Refills: 11 | Status: SHIPPED | OUTPATIENT
Start: 2025-05-15

## 2025-05-15 RX ORDER — ALBUTEROL SULFATE 90 UG/1
1-2 INHALANT RESPIRATORY (INHALATION)
Start: 2025-05-15

## 2025-05-15 RX ORDER — HEPARIN SODIUM,PORCINE 10 UNIT/ML
5-20 VIAL (ML) INTRAVENOUS DAILY PRN
OUTPATIENT
Start: 2025-05-15

## 2025-05-15 RX ORDER — MAGNESIUM OXIDE 400 MG/1
400 TABLET ORAL 2 TIMES DAILY
Qty: 60 TABLET | Refills: 0 | Status: SHIPPED | OUTPATIENT
Start: 2025-05-15

## 2025-05-15 RX ORDER — TACROLIMUS 1 MG/1
CAPSULE ORAL
Qty: 180 CAPSULE | Refills: 3 | Status: SHIPPED | OUTPATIENT
Start: 2025-05-15

## 2025-05-15 RX ORDER — CARVEDILOL 25 MG/1
12.5 TABLET ORAL 2 TIMES DAILY
COMMUNITY
Start: 2025-05-15

## 2025-05-15 RX ORDER — METHYLPREDNISOLONE SODIUM SUCCINATE 40 MG/ML
40 INJECTION INTRAMUSCULAR; INTRAVENOUS
Start: 2025-05-15

## 2025-05-15 RX ORDER — HEPARIN SODIUM (PORCINE) LOCK FLUSH IV SOLN 100 UNIT/ML 100 UNIT/ML
5 SOLUTION INTRAVENOUS
OUTPATIENT
Start: 2025-05-15

## 2025-05-15 RX ORDER — EPINEPHRINE 1 MG/ML
0.3 INJECTION, SOLUTION INTRAMUSCULAR; SUBCUTANEOUS EVERY 5 MIN PRN
OUTPATIENT
Start: 2025-05-15

## 2025-05-15 RX ORDER — EPINEPHRINE 1 MG/ML
0.3 INJECTION, SOLUTION, CONCENTRATE INTRAVENOUS EVERY 5 MIN PRN
OUTPATIENT
Start: 2025-05-15

## 2025-05-15 RX ADMIN — SODIUM CHLORIDE 1000 ML: 0.9 INJECTION, SOLUTION INTRAVENOUS at 11:07

## 2025-05-15 NOTE — TELEPHONE ENCOUNTER
Spoke to patients daughter, Frida, and confirmed the following changes made in clinic today:  I also started her on mag and phos already today in clinic.   BP low. Stopped amlodipine and cut coreg in half.   Frida verbalizes understanding and does not have any further questions at this time.

## 2025-05-15 NOTE — PROGRESS NOTES
"Transplant Surgery Progress Note    Transplants:  5/2/2025 (Kidney / Pancreas); Postoperative day:  13  S:   Doing well   SBP at home 120-140s. Lower today but asymptomatic.     NO infectious symptoms   No s/s bleeding.         Drain 70 out overnight  220 and 80 yesterday   180 x2 day prior       BP recheck 94//62 HR 74   90/59       Transplant History:    Transplant Type:  {[facility] [place] STATUS POST:[id number]}  Donor Type: Donation after Brain Death   Transplant Date:  5/2/2025 (Kidney / Pancreas)   Ureteral Stent:  {YES / NO:706221::\"Yes\"}   Crossmatch:  {NEGATIVE:569702}   DSA at Tx:  {YES (EXPLAIN)/NO:303473}  Baseline Cr: ***   [place]ovo DSA: {YES (EXPLAIN)/NO:829041}    Acute Rejection Hx:  {YES (EXPLAIN)/NO:507978}    Present Maintenance Immunosuppression:  {[facility] IMMUNOSUPPRESSION:214039691}    CMV IgG Ab Discordance:  {YES / NO:821864::\"Yes\"}  EBV IgG Ab Discordance:  {YES / NO:302611::\"Yes\"}    BK Viremia:  {YES (EXPLAIN)/NO:158530}  EBV Viremia:  {YES (EXPLAIN)/NO:460048}    Transplant Coordinator: Betty Vogel     Transplant Office Phone Number: [phone]     Immunosuppressant Medications       Immunosuppressive Agents Disp Start End     mycophenolate (GENERIC EQUIVALENT) 250 MG capsule 240 capsule 5/9/2025 --    Sig - Route: Take 4 capsules (1,000 mg) by mouth 2 times daily. - Oral    Class: E-Prescribe     tacrolimus (GENERIC EQUIVALENT) 0.5 MG capsule -- 5/14/2025 --    Sig: Profile Rx: patient will contact pharmacy when needed    Class: No Print Out    Notes to Pharmacy: TXP DT 5/2/2025 (Kidney / Pancreas) TXP Dischg DT 5/9/2025 DX Kidney replaced by transplant Z94.0 [facility] ([place], [place])     tacrolimus (GENERIC EQUIVALENT) 1 MG capsule 180 capsule 5/14/2025 --    Sig - Route: Take 1 capsule (1 mg) by mouth 2 times daily. - Oral    Class: E-Prescribe    Notes to Pharmacy: TXP DT 5/2/2025 (Kidney / Pancreas) TXP Dischg DT 5/9/2025 DX Kidney replaced by " transplant Z94.0 [facility] ([place], [place])            Possible Immunosuppression-related side effects:   []             headache  []             vivid dreams  []             irritability  []             cognitive difficuties  []             fine tremor  []             nausea  []             diarrhea  []             neuropathy      []             edema  []             renal calcineurin toxicity  []             hyperkalemia  []             post-transplant diabetes  []             decreased appetite  []             increased appetite  []             other:  []             none    Prescription Medications as of 5/15/2025         Rx Number Disp Refills Start End Last Dispensed Date Next Fill Date Owning Pharmacy    acetaminophen (TYLENOL) 325 MG tablet  50 tablet 0 5/9/2025 --   [address]    Sig: Take 2 tablets (650 mg) by mouth every 6 hours as needed for mild pain.    Class: E-Prescribe    Route: Oral    amLODIPine (NORVASC) 5 MG tablet  30 tablet 2 5/9/2025 --   [address]    Sig: Take 1 tablet (5 mg) by mouth every evening.    Class: E-Prescribe    Route: Oral    aspirin (ASA) 81 MG chewable tablet  30 tablet 11 5/9/2025 --   [place], [place] - [address] [place] [address]    Sig: Take 4 tablets (324 mg) by mouth or NG Tube daily.    Class: E-Prescribe    Notes to Pharmacy: Start when platelets are 80 or higher    Route: Oral or NG Tube    atorvastatin (LIPITOR) 10 MG tablet  30 tablet 0 5/10/2025 --   [place], [place] - [address]    Sig: Take 1 tablet (10 mg) by mouth daily.    Class: E-Prescribe    Route: Oral    calcium carbonate-vitamin D (OSCAL) 500-5 MG-MCG tablet  60 tablet 2 5/9/2025 --   [place], [place] - [address] [place] [address]     Sig: Take 1 tablet by mouth 2 times daily (with meals).    Class: E-Prescribe    Route: Oral    carvedilol (COREG) 25 MG tablet  60 tablet 2 5/9/2025 --   [address]    Sig: Take 1 tablet (25 mg) by mouth 2 times daily.    Class: E-Prescribe    Route: Oral    cholecalciferol 50 MCG (2000 UT) tablet  30 tablet 0 5/10/2025 --   [address]    Sig: Take 1 tablet (50 mcg) by mouth daily.    Class: E-Prescribe    Route: Oral    ezetimibe (ZETIA) 10 MG tablet  30 tablet 1 5/10/2025 --   [address]    Sig: Take 1 tablet (10 mg) by mouth daily.    Class: E-Prescribe    Route: Oral    methocarbamol (ROBAXIN) 500 MG tablet  20 tablet 0 5/9/2025 --   [address]    Sig: Take 1 tablet (500 mg) by mouth every 6 hours as needed for muscle spasms.    Class: E-Prescribe    Route: Oral    mycophenolate (GENERIC EQUIVALENT) 250 MG capsule  240 capsule 11 5/9/2025 --   [address]    Sig: Take 4 capsules (1,000 mg) by mouth 2 times daily.    Class: E-Prescribe    Route: Oral    nystatin (MYCOSTATIN) 948305 UNIT/ML suspension  600 mL 2 5/10/2025 --   [address]    Sig: Take 5 mLs (500,000 Units) by mouth 4 times daily.    Class: E-Prescribe    Route: Oral    oxyCODONE (ROXICODONE) 5 MG tablet  20 tablet 0 5/9/2025 --   [address]    Sig: Take 1 tablet (5 mg) by mouth every 6 hours as needed for moderate pain.    Class: E-Prescribe    Earliest Fill Date: 5/9/2025    Route: Oral    pantoprazole (PROTONIX) 40 MG EC tablet  30 tablet 1 5/10/2025 --   [place] [address]  [facility]    Sig: Take 1 tablet (40 mg) by mouth every morning (before breakfast).    Class: E-Prescribe    Route: Oral    polyethylene glycol (MIRALAX) 17 GM/Dose powder  510 g 1 5/9/2025 --   [address]    Sig: Take 17 g by mouth daily.    Class: E-Prescribe    Route: Oral    predniSONE (DELTASONE) 5 MG tablet  30 tablet 2 5/9/2025 --   [address]    Sig: Take 1 tablet (5 mg) by mouth daily.    Class: E-Prescribe    Route: Oral    senna-docusate (SENOKOT-S/PERICOLACE) 8.6-50 MG tablet  60 tablet 1 5/9/2025 --   [address]    Sig: Take 2 tablets by mouth 2 times daily.    Class: E-Prescribe    Route: Oral    sodium bicarbonate 650 MG tablet  120 tablet 2 5/9/2025 --   [address]    Sig: Take 2 tablets (1,300 mg) by mouth 2 times daily.    Class: E-Prescribe    Route: Oral    sulfamethoxazole-trimethoprim (BACTRIM) 400-80 MG tablet  30 tablet 11 5/12/2025 --   [address]    Sig: Take 1 tablet by mouth three times a week.    Class: E-Prescribe    Notes to Pharmacy: Increase dose to daily when kidney function improves as directed by transplant team.    Route: Oral    tacrolimus (GENERIC EQUIVALENT) 0.5 MG capsule  -- -- 5/14/2025 --       Sig: Profile Rx: patient will contact pharmacy when needed    Class: No Print Out    Notes to Pharmacy: TXP DT 5/2/2025 (Kidney / Pancreas) TXP Dischg DT 5/9/2025 DX Kidney replaced by transplant Z94.0 [facility] ([place], [place])    tacrolimus (GENERIC EQUIVALENT) 1 MG capsule  180 capsule 3 5/14/2025 --   [facility] [place] [place] [facility] [address] [facility]    Sig: Take 1 capsule (1 mg) by mouth 2 times daily.     "Class: E-Prescribe    Notes to Pharmacy: TXP DT 5/2/2025 (Kidney / Pancreas) TXP Dischg DT 5/9/2025 DX Kidney replaced by transplant Z94.0 TX Center [facility] ([place], [place])    Route: Oral    valGANciclovir (VALCYTE) 450 MG tablet  60 tablet 2 5/12/2025 --   [facility] - [place], [place] - [address] [facility]    Sig: Take 1 tablet (450 mg) by mouth twice a week.    Class: E-Prescribe    Notes to Pharmacy: Increase dose to 900 mg daily as kidney function improves as instructed by transplant team.    Route: Oral            O:   Temp:  [98  F (36.7  C)] 98  F (36.7  C)  Pulse:  [77] 77  Resp:  [16] 16  BP: (106)/(62) 106/62  SpO2:  [100 %] 100 %  General Appearance: {Distress levels:651303::\"in no apparent distress.\"}   Skin: {JAUNDICE:630796:s}  Heart: {CARDIACTVE:571106}  Lungs: easy respirations, no audible wheezing.  Abdomen: {ABDOMEN INSPECTION:602}, The wound is {INCISION APPEARANCE:409269}, {WITH:097508} hernia. The abdomen is {TENDER/NON-TENDER (NO DEFAULT):433099}. The {ORGAN:079240} graft {is/is not:187502} tender.  There {IS / IS NO:991779} ascites.  Extremities: Tremor {ABSENT:320274}   Edema: {ABSENT:343014} {EDEMA SEVERITY:512792}        Latest Ref Rng & Units 5/15/2025     8:21 AM 5/13/2025     8:29 AM 5/11/2025     7:21 AM 5/10/2025     8:02 AM 5/10/2025     7:23 AM   Transplant Immunosuppression Labs   Creat 0.51 - 0.95 mg/dL 2.31  1.95  2.26   2.67    Urea Nitrogen 6.0 - 20.0 mg/dL 24.8  26.9  35.3   44.5    WBC 4.0 - 11.0 10e3/uL 16.9  18.7  8.4  7.4     Neutrophil %   97  96     ANEU 1.6 - 8.3 10e3/uL   8.2  7.2         Chemistries:   Recent Labs   Lab Test 05/15/25  0821   BUN 24.8*   CR 2.31*   GFRESTIMATED 24*   *     Lab Results   Component Value Date    A1C 5.9 05/03/2025    CPEPT 10.1 08/02/2023     Recent Labs   Lab Test 05/02/25  1652   ALBUMIN 3.7   BILITOTAL 0.2   ALKPHOS 84   AST 29   ALT 18     Urine Studies:  Recent " Labs   Lab Test 05/02/25  1635   COLOR Light Yellow   APPEARANCE Clear   URINEGLC 30*   URINEBILI Negative   URINEKETONE Negative   SG 1.014   UBLD Negative   URINEPH 6.5   PROTEIN 300*   NITRITE Negative   LEUKEST Small*   RBCU 3*   WBCU 16*     No lab results found.  Hematology:   Recent Labs   Lab Test 05/15/25  0821 05/13/25  0829 05/11/25  0721   HGB 7.8* 8.4* 8.4*    139* 80*   WBC 16.9* 18.7* 8.4     Coags:   Recent Labs   Lab Test 05/07/25  0558 05/06/25  0601   INR 0.98 0.94     HLA antibodies:   SA1 HI RISK ERICA   Date Value Ref Range Status   05/01/2025 None  Final     SA1 MOD RISK ERICA   Date Value Ref Range Status   05/01/2025   Final    A:3B:7 39 41 42 45 50 51 54 55 56 61 67 71 78 82Cw:14 15     SA2 HI RISK ERICA   Date Value Ref Range Status   05/01/2025 Invalid  Final     SA2 MOD RISK ERICA   Date Value Ref Range Status   05/01/2025 Invalid  Final       Assessment: Wendy Kapoor is doing { :3058957} s/p {[facility] [place] STATUS POST:104959169}:  Issues we addressed during {HIS / HER:847097} visit include:    Plan:    1. Graft function: ***  2. Immunosuppression Management: {CHANGED/UNCHANGED/DC'D:127847} ***.  Complexity of management:{LOW, MEDIUM, HIGH:847209}.  Contributing factors: {CONTRIBUTING FACTORS:102609}  3. :restart asprin     Followup: ***    Total Time: *** min,   Counselling Time: *** min.    {[facility] TRANSPLANT MD SIGNATURE:[id number]}

## 2025-05-15 NOTE — PROGRESS NOTES
Nursing Note  Wendy Kapoor presents today [facility] for:   Chief Complaint   Patient presents with    Infusion     IVF     During today's [facility] appointment, orders from Allyssa Rosen were completed.  Frequency: once    Progress note:  Patient identification verified by name and date of birth.  Assessment completed.  Vitals recorded in Doc Flowsheets.  Patient was provided with education regarding medication/procedure and possible side effects.  Patient verbalized understanding.     present during visit today: Not Applicable.    Treatment Conditions: Non-applicable.    Infusion length and rate:  infusion given over approximately 1 hours    Labs: were drawn prior to appointment.    Vascular access: peripheral IV placed today.    Is the next appt scheduled? None planned for [facility]    Post Infusion Assessment:  Patient tolerated infusion without incident.     Discharge Plan:   Follow up plan of care with: transplant coordinator. and ordering provider as scheduled.  Discharge instructions were reviewed with patient.  Patient/representative verbalized understanding of discharge instructions and all questions answered.  Patient discharged from [facility] in stable condition.    Halley Catherine RN    Administrations This Visit       sodium chloride 0.9% BOLUS 1,000 mL       Admin Date  05/15/2025 Action  $New Bag Dose  1,000 mL Route  Intravenous Documented By  Halley Catherine, RN

## 2025-05-15 NOTE — PATIENT INSTRUCTIONS
Decrease coreg to 12.5mg twice a day. HOLD for  or less.   STOP amlodipine.     Start magnesium and phosphorus supplements.   Start aspirin 325mg daily.

## 2025-05-15 NOTE — TELEPHONE ENCOUNTER
Home care called and needs a verbal for a missed visit today due to the Pt having an appt with Transplant. A message can be left on the confidential line if not answered.

## 2025-05-15 NOTE — NURSING NOTE
Chief Complaint   Patient presents with    Follow Up     K/P txp f-u     Vital signs:  Temp: 98  F (36.7  C) Temp src: Oral BP: 106/62 Pulse: 77   Resp: 16 SpO2: 100 %       Weight: 61.5 kg (135 lb 9.6 oz)  Estimated body mass index is 26.48 kg/m  as calculated from the following:    Height as of 5/2/25: 1.524 m (5').    Weight as of this encounter: 61.5 kg (135 lb 9.6 oz).      Misael Landa RN on 5/15/2025 at 9:12 AM

## 2025-05-15 NOTE — TELEPHONE ENCOUNTER
Patient Call: General  Route to LPN    Reason for call: Pt's daughter called, she stated that pt does not remember any instructions she discussed with RN[facility] earlier today. Pt's daughter requested call back today instead of transfer to RN[facility]    Call back needed? Yes    Return Call Needed  Same as documented in contacts section  When to return call?: Same day: Route High Priority

## 2025-05-16 NOTE — TELEPHONE ENCOUNTER
On-call RNCC spoke with pt's dtr to clarify tac dose.     Most recent level was 15.2, pt currently taking 1 mg BID.     Dose was reduced on 5/14 from 1.5 mg BID to 1 mg BID, tac level increased.     RNCC instructed pt to decrease Tacrolimus dose to 0.5 mg BID and repeat labs Monday. Pt voiced understanding.

## 2025-05-19 ENCOUNTER — PATIENT OUTREACH (OUTPATIENT)
Dept: ONCOLOGY | Facility: CLINIC | Age: 55
End: 2025-05-19

## 2025-05-19 ENCOUNTER — LAB REQUISITION (OUTPATIENT)
Dept: LAB | Facility: CLINIC | Age: 55
End: 2025-05-19
Payer: COMMERCIAL

## 2025-05-19 ENCOUNTER — OFFICE VISIT (OUTPATIENT)
Dept: TRANSPLANT | Facility: CLINIC | Age: 55
End: 2025-05-19
Attending: INTERNAL MEDICINE
Payer: MEDICARE

## 2025-05-19 VITALS
TEMPERATURE: 99 F | BODY MASS INDEX: 25.47 KG/M2 | DIASTOLIC BLOOD PRESSURE: 75 MMHG | SYSTOLIC BLOOD PRESSURE: 129 MMHG | HEART RATE: 85 BPM | OXYGEN SATURATION: 100 % | WEIGHT: 130.4 LBS

## 2025-05-19 DIAGNOSIS — Z48.298 AFTERCARE FOLLOWING ORGAN TRANSPLANT: ICD-10-CM

## 2025-05-19 DIAGNOSIS — Z94.83 PANCREAS TRANSPLANT STATUS (H): ICD-10-CM

## 2025-05-19 DIAGNOSIS — Z94.0 KIDNEY TRANSPLANT STATUS: ICD-10-CM

## 2025-05-19 DIAGNOSIS — Z48.288 ENCOUNTER FOR AFTERCARE FOLLOWING MULTIPLE ORGAN TRANSPLANT: ICD-10-CM

## 2025-05-19 DIAGNOSIS — R91.8 PULMONARY NODULES: Primary | ICD-10-CM

## 2025-05-19 LAB
AMYLASE SERPL-CCNC: 83 U/L (ref 28–100)
ANION GAP SERPL CALCULATED.3IONS-SCNC: 11 MMOL/L (ref 7–15)
BASOPHILS # BLD AUTO: 0 10E3/UL (ref 0–0.2)
BASOPHILS NFR BLD AUTO: 0 %
BUN SERPL-MCNC: 18.2 MG/DL (ref 6–20)
CALCIUM SERPL-MCNC: 8.7 MG/DL (ref 8.8–10.4)
CHLORIDE SERPL-SCNC: 105 MMOL/L (ref 98–107)
CREAT SERPL-MCNC: 2 MG/DL (ref 0.51–0.95)
EGFRCR SERPLBLD CKD-EPI 2021: 29 ML/MIN/1.73M2
EOSINOPHIL # BLD AUTO: 0.1 10E3/UL (ref 0–0.7)
EOSINOPHIL NFR BLD AUTO: 1 %
ERYTHROCYTE [DISTWIDTH] IN BLOOD BY AUTOMATED COUNT: 15.9 % (ref 10–15)
GLUCOSE SERPL-MCNC: 110 MG/DL (ref 70–99)
HCO3 SERPL-SCNC: 23 MMOL/L (ref 22–29)
HCT VFR BLD AUTO: 22.8 % (ref 35–47)
HGB BLD-MCNC: 7.2 G/DL (ref 11.7–15.7)
IMM GRANULOCYTES # BLD: 0.1 10E3/UL
IMM GRANULOCYTES NFR BLD: 1 %
LIPASE SERPL-CCNC: 159 U/L (ref 13–60)
LYMPHOCYTES # BLD AUTO: 0.1 10E3/UL (ref 0.8–5.3)
LYMPHOCYTES NFR BLD AUTO: 1 %
MAGNESIUM SERPL-MCNC: 1.4 MG/DL (ref 1.7–2.3)
MCH RBC QN AUTO: 29.9 PG (ref 26.5–33)
MCHC RBC AUTO-ENTMCNC: 31.6 G/DL (ref 31.5–36.5)
MCV RBC AUTO: 95 FL (ref 78–100)
MONOCYTES # BLD AUTO: 0.2 10E3/UL (ref 0–1.3)
MONOCYTES NFR BLD AUTO: 3 %
NEUTROPHILS # BLD AUTO: 8.4 10E3/UL (ref 1.6–8.3)
NEUTROPHILS NFR BLD AUTO: 95 %
NRBC # BLD AUTO: 0 10E3/UL
NRBC BLD AUTO-RTO: 0 /100
PHOSPHATE SERPL-MCNC: 3.2 MG/DL (ref 2.5–4.5)
PLATELET # BLD AUTO: 137 10E3/UL (ref 150–450)
POTASSIUM SERPL-SCNC: 5.6 MMOL/L (ref 3.4–5.3)
RBC # BLD AUTO: 2.41 10E6/UL (ref 3.8–5.2)
SODIUM SERPL-SCNC: 139 MMOL/L (ref 135–145)
WBC # BLD AUTO: 8.8 10E3/UL (ref 4–11)

## 2025-05-19 PROCEDURE — 83735 ASSAY OF MAGNESIUM: CPT | Mod: ORL | Performed by: INTERNAL MEDICINE

## 2025-05-19 PROCEDURE — 82150 ASSAY OF AMYLASE: CPT | Mod: ORL | Performed by: INTERNAL MEDICINE

## 2025-05-19 PROCEDURE — 83690 ASSAY OF LIPASE: CPT | Mod: ORL | Performed by: INTERNAL MEDICINE

## 2025-05-19 PROCEDURE — 80048 BASIC METABOLIC PNL TOTAL CA: CPT | Mod: ORL | Performed by: INTERNAL MEDICINE

## 2025-05-19 PROCEDURE — 84100 ASSAY OF PHOSPHORUS: CPT | Mod: ORL | Performed by: INTERNAL MEDICINE

## 2025-05-19 PROCEDURE — 80197 ASSAY OF TACROLIMUS: CPT | Mod: ORL | Performed by: INTERNAL MEDICINE

## 2025-05-19 PROCEDURE — 85025 COMPLETE CBC W/AUTO DIFF WBC: CPT | Mod: ORL | Performed by: INTERNAL MEDICINE

## 2025-05-19 PROCEDURE — G0463 HOSPITAL OUTPT CLINIC VISIT: HCPCS

## 2025-05-19 RX ORDER — OXYCODONE HYDROCHLORIDE 5 MG/1
5 TABLET ORAL EVERY 6 HOURS PRN
Qty: 12 TABLET | Refills: 0 | Status: SHIPPED | OUTPATIENT
Start: 2025-05-19 | End: 2025-05-22

## 2025-05-19 ASSESSMENT — PAIN SCALES - GENERAL: PAINLEVEL_OUTOF10: MILD PAIN (3)

## 2025-05-19 NOTE — PROGRESS NOTES
New IP (Interventional Pulmonology) referral rec'd.  Chart reviewed.        New Patient: Interventional Pulmonary (Lung nodule) Nurse Navigator Note    Referring provider: [facility]    Referred to (specialty): Interventional Pulmonary (Lung nodule)    Requested provider (if applicable): n/a    Date Referral Received: 5/19/2025    Evaluation for:  incidental 1.5cm lung nodule found on imaging post kidney pancreas transplant. pls assist in eval and management    Clinical History (per Nurse review of records provided):    **BOOK MARKED**    Exam: Abdomen/pelvis CT without contrast 5/10/2025 10:48 AM       History: increased drain output with acute pain; Kidney transplant  recipient     Comparison: Abdomen radiograph 5/18/2025 renal and pancreas transplant  ultrasounds 5/7/2025. Renal MRI without contrast 2/12/2025,  12/26/2023. Abdomen MRI without contrast 6/17/2024. Abdomen/pelvis CT  without contrast 8/21/2023.     Technique: Helical CT from the lung bases through the symphysis pubis  was performed without intravenous contrast.     Findings:     Liver: No suspicious liver mass.     Gallbladder: No gallstones or biliary ductal dilatation.     Pancreas: Pancreas transplant in the right upper quadrant extending  inferiorly into the right lower quadrant, oriented vertically with a  small amount of peripancreatic fluid. Atrophic native pancreas.     Spleen: Normal size.     Adrenals: No nodule.     Kidneys/bladder: Left lower quadrant renal transplant with double-J nephroureteral stent in place and a small amount of air in the urinary bladder and renal pelvis, presumably postoperative. Small amount of  peritransplant fluid without discrete collection. Numerous simple cortical cysts. Mildly dilated right proximal ureter with abrupt cut off at the level of the transplant pancreas. Atrophic left kidney.  Unremarkable urinary bladder.      Reproductive: No suspicious pelvic mass.     Bowel: Appendectomy. Normal caliber small and large bowel. Colon is filled with fluid.     Mesentery/peritoneum: Small amount of postoperative free air in the right upper quadrant. Right-sided surgical drain terminates in the right paracolic gutter.     Lymph nodes: No adenopathy.     Vascular: Infrarenal aorta is not aneurysmal. Moderate vascular  calcifications.     Bones/soft tissues: No aggressive osseous lesion. Anterior abdominal  wall midline incisional changes with overlying percutaneous surgical  staples. Small amount of reticular subcutaneous edema overlying the  flanks and thighs.     Lung bases: Multiple thin-walled pulmonary cysts possibly in a broncho vascular distribution. Bibasilar linear scars. Left lower lobe pulmonary nodule just above the diaphragm, 1.5 x 1.2 cm (series 2 image 9). Mitral annulus and aortic root calcifications.                                                                      Impression:   1. Postoperative changes of right-sided pancreas and left lower quadrant kidney transplants with surrounding peritransplant fluid.  Hyperdense layering fluid suggestive of hematoma posterior to the transplant pancreas. Nephroureteral stent and surgical drain in place.  Small volume expected postsurgical pneumoperitoneum.     2. New mild hydronephrosis of the native right kidney with dilated right proximal ureter with abrupt cut off at the level of the  transplant pancreas. Recommend attention on follow up.      3. Left lower lobe pulmonary nodule, 1.5 cm. Recommend outpatient follow-up per Fleischner Society guidelines.     4. Partially visualized multiple thin-walled pulmonary cysts, can be seen in the setting of lymphoid interstitial pneumonia (LIP) which is associated with Sjogren's syndrome.     I have personally reviewed the examination and initial interpretation  and I agree with the findings.     KIA BARTON MD     Records Location:  [facility]     Records Needed: none    Additional testing needed prior to consult: PFT's

## 2025-05-19 NOTE — PROGRESS NOTES
-Transplant Surgery -OUTPATIENT IMMUNOSUPPRESSION PROGRESS NOTE    Date of Visit: 2025    Transplants:  2025 (Kidney / Pancreas); Postoperative day:  17  ASSESMENT AND PLAN:    Wendy Kapoor is a 54 year old female with a history of DM2, HTN, ESRD on PD since 2023, IgG Lambda MGUS, non-obstructive CAD, and anemia of chronic disease. On 2025 she was notified as an acceptable  donor organ became available and presented for further pre-operative work-up and on 2025 she underwent DBD SPK with stent, and open appendectomy which was well tolerated as performed by Dr. Gleason.     1.Graft Function: pancreas is doing well. Kidney cr seems to be plateau'ing around 2  2.Immunosuppression Management: tac level pending today, but was titrated down after it was a bit high.    Tac 0.5 bid, MMF 1000 bid  3.Hypertension: well controlled  4.Renal Function:   Creatinine   Date Value Ref Range Status   05/15/2025 2.31 (H) 0.51 - 0.95 mg/dL Final       5.Lab frequency:  6.Other: staples removed, 6 weeks post op will need PD catheter and stent removed.    Date: May 19, 2025    Transplant:  [x]                             Liver []                              Kidney []                             Pancreas []                              Other:             Chief Complaint:Post-op Visit (Pancreas txp f/up)    History of Present Illness:  Patient Active Problem List   Diagnosis    Type 2 diabetes mellitus with diabetic nephropathy (H)    Sjogren's syndrome    MGUS (monoclonal gammopathy of unknown significance)    Chronic kidney disease, stage V (H)    Essential hypertension    Pre-operative cardiovascular examination    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)    Status post coronary angiogram    Pre-transplant evaluation for kidney transplant    Encounter for pre-transplant evaluation for kidney and pancreas transplant    Pre-transplant evaluation for kidney and pancreas  transplant    Kidney transplant recipient    Pancreas transplant status (H)    Immunosuppressed status    Anemia, chronic disease    Thrombocytopenia    Low bicarbonate    Hypocalcemia    Hyperphosphatemia    Hypermagnesemia    Leukocytosis    Aftercare following organ transplant     SOCIAL /FAMILY HISTORY: [x]                  No recent change    Past Medical History:   Diagnosis Date    Chronic kidney disease     Diabetes (H)     DM Type 2    History of blood transfusion     Hypertension     MGUS (monoclonal gammopathy of unknown significance)     Pericardial effusion     Sjogren's syndrome     Type 2 diabetes mellitus with diabetic nephropathy (H)      Past Surgical History:   Procedure Laterality Date    APPENDECTOMY OPEN N/A 2025    Procedure: Appendectomy open, incidental;  Surgeon: Jean-Pierre Gleason MD;  Location: [facility] OR    BENCH KIDNEY  2025    Procedure: Bench kidney;  Surgeon: Jean-Pierre Gleason MD;  Location: [facility]    BENCH PANCREAS N/A 2025    Procedure: Bench whole pancreas;  Surgeon: Jean-Pierre Gleason MD;  Location: [facility] OR    BIOPSY       SECTION      CV CORONARY ANGIOGRAM N/A 2024    Procedure: Coronary Angiogram;  Surgeon: Cody Mckenzie MD;  [facility]    CV PCI N/A 2024    Procedure: Percutaneous Coronary Intervention;  Surgeon: Cody Mckenzie MD;  Location:  [facility]    TRANSPLANT PANCREAS, KIDNEY  DONOR, COMBINED Right 2025    Procedure: Transplant pancreas, kidney  donor, with ureteral stent placement.;  Surgeon: Jean-Pierre Gleason MD;  Location:  OR     Social History     Socioeconomic History    Marital status:      Spouse name: Not on file    Number of children: Not on file    Years of education: Not on file    Highest education level: Not on file   Occupational History    Not on file   Tobacco Use    Smoking  status: Never    Smokeless tobacco: Never   Substance and Sexual Activity    Alcohol use: Never    Drug use: Never    Sexual activity: Not on file   Other Topics Concern    Not on file   Social History Narrative    Not on file     Social Drivers of Health     Financial Resource Strain: Not on file   Food Insecurity: Not on file   Transportation Needs: Not on file   Physical Activity: Not on file   Stress: Not on file   Social Connections: Not on file   Interpersonal Safety: Not At Risk (6/26/2024)    [facility]    Intimate Partner Violence     Are you in a relationship where you are physically hurt, threatened and/or made to feel afraid?: No   Housing Stability: Not on file     Prescription Medications as of 5/19/2025         Rx Number Disp Refills Start End Last Dispensed Date Next Fill Date Owning Pharmacy    acetaminophen (TYLENOL) 325 MG tablet  50 tablet 0 5/9/2025 --   [address]    Sig: Take 2 tablets (650 mg) by mouth every 6 hours as needed for mild pain.    Class: E-Prescribe    Route: Oral    aspirin (ASA) 81 MG chewable tablet  30 tablet 11 5/9/2025 --   [address]    Sig: Take 4 tablets (324 mg) by mouth or NG Tube daily.    Class: E-Prescribe    Notes to Pharmacy: Start when platelets are 80 or higher    Route: Oral or NG Tube    atorvastatin (LIPITOR) 10 MG tablet  30 tablet 0 5/10/2025 --   [place], [place] - [phone]    Sig: Take 1 tablet (10 mg) by mouth daily.    Class: E-Prescribe    Route: Oral    calcium carbonate-vitamin D (OSCAL) 500-5 MG-MCG tablet  60 tablet 2 5/9/2025 --   [address]    Sig: Take 1 tablet by mouth 2 times daily (with meals).    Class: E-Prescribe    Route: Oral    carvedilol (COREG) 25 MG tablet  -- --  5/15/2025 --       Sig: Take 0.5 tablets (12.5 mg) by mouth 2 times daily.    Class: Historical    Route: Oral    ciprofloxacin (CIPRO) 500 MG tablet  7 tablet 0 5/16/2025 5/23/2025   [id number] [place], [place] - [address]    Sig: Take 1 tablet (500 mg) by mouth daily for 7 days.    Class: E-Prescribe    Route: Oral    ezetimibe (ZETIA) 10 MG tablet  30 tablet 1 5/10/2025 --   [phone]    Sig: Take 1 tablet (10 mg) by mouth daily.    Class: E-Prescribe    Route: Oral    magnesium oxide (MAG-OX) 400 MG tablet  60 tablet 0 5/15/2025 --   [place], [place] - [address] [facility] [phone]    Sig: Take 1 tablet (400 mg) by mouth 2 times daily.    Class: E-Prescribe    Route: Oral    methocarbamol (ROBAXIN) 500 MG tablet  20 tablet 0 5/9/2025 --   [place], [place] - [address] [facility]    Sig: Take 1 tablet (500 mg) by mouth every 6 hours as needed for muscle spasms.    Class: E-Prescribe    Route: Oral    mycophenolate (GENERIC EQUIVALENT) 250 MG capsule  240 capsule 11 5/9/2025 --   [place], [place] - [address] [facility] [facility] [address]    Sig: Take 4 capsules (1,000 mg) by mouth 2 times daily.    Class: E-Prescribe    Route: Oral    nystatin (MYCOSTATIN) 985568 UNIT/ML suspension  600 mL 2 5/10/2025 --   [place], [place] - [address] [facility]    Sig: Take 5 mLs (500,000 Units) by mouth 4 times daily.    Class: E-Prescribe    Route: Oral    oxyCODONE (ROXICODONE) 5 MG tablet  12 tablet 0 5/19/2025 5/22/2025   [place], [place] - [address] [place] [address] [phone]    Sig: Take 1 tablet (5 mg) by mouth every 6 hours as needed for breakthrough pain.    Class: E-Prescribe    Earliest Fill Date: 5/19/2025    Route: Oral    oxyCODONE (ROXICODONE) 5 MG tablet  20 tablet 0 5/9/2025 --   [facility] [address]    Sig: Take 1 tablet (5 mg) by mouth every 6 hours as needed for moderate pain.    Class: E-Prescribe    Earliest Fill Date: 5/9/2025    Route: Oral    pantoprazole (PROTONIX) 40 MG EC tablet  30 tablet 1 5/10/2025 --   [address]    Sig: Take 1 tablet (40 mg) by mouth every morning (before breakfast).    Class: E-Prescribe    Route: Oral    phosphorus tablet 250 mg 250 MG per tablet  60 tablet 1 5/15/2025 --   [place], [place] - [phone]    Sig: Take 1 tablet (250 mg) by mouth 2 times daily.    Class: E-Prescribe    Route: Oral    predniSONE (DELTASONE) 5 MG tablet  30 tablet 2 5/9/2025 --   [address]    Sig: Take 1 tablet (5 mg) by mouth daily.    Class: E-Prescribe    Route: Oral    sodium bicarbonate 650 MG tablet  120 tablet 2 5/9/2025 --   [address]    Sig: Take 2 tablets (1,300 mg) by mouth 2 times daily.    Class: E-Prescribe    Route: Oral    sulfamethoxazole-trimethoprim (BACTRIM) 400-80 MG tablet  30 tablet 11 5/12/2025 --   [address]    Sig: Take 1 tablet by mouth three times a week.    Class: E-Prescribe    Notes to Pharmacy: Increase dose to daily when kidney function improves as directed by transplant team.    Route: Oral    tacrolimus (GENERIC EQUIVALENT) 0.5 MG capsule  60 capsule 11 5/15/2025 --   [facility] - [place], [place] - [address] [place] [address]    Sig: Take 1 capsule (0.5 mg) by mouth 2 times daily.    Class: E-Prescribe    Notes to Pharmacy: TXP DT 5/2/2025 (Kidney / Pancreas) TXP Dischg DT 5/9/2025 DX Kidney replaced by transplant Z94.0 [facility] ([place], [place])     Route: Oral    tacrolimus (GENERIC EQUIVALENT) 1 MG capsule  180 capsule 3 5/15/2025 --   [facility] [place] [place] [facility] [address] [facility] [facility]    Sig: Profile Rx: patient will contact pharmacy when needed    Class: E-Prescribe    Notes to Pharmacy: TXP DT 5/2/2025 (Kidney / Pancreas) TXP Dischg DT 5/9/2025 DX Kidney replaced by transplant Z94.0 [facility] ([place], [place])    valGANciclovir (VALCYTE) 450 MG tablet  60 tablet 2 5/12/2025 --   [facility] [place], [place] - [address] [facility]    Sig: Take 1 tablet (450 mg) by mouth twice a week.    Class: E-Prescribe    Notes to Pharmacy: Increase dose to 900 mg daily as kidney function improves as instructed by transplant team.    Route: Oral    cholecalciferol 50 MCG (2000 UT) tablet  30 tablet 0 5/10/2025 --   [place], [place] - [phone]    Sig: Take 1 tablet (50 mcg) by mouth daily.    Class: E-Prescribe    Route: Oral    senna-docusate (SENOKOT-S/PERICOLACE) 8.6-50 MG tablet  60 tablet 1 5/9/2025 --   [facility] - [address]    Sig: Take 2 tablets by mouth 2 times daily.    Class: E-Prescribe    Route: Oral          Atorvastatin and Extraneal   REVIEW OF SYSTEMS (check box if normal)  [x]               GENERAL  [x]                 PULMONARY [x]                GENITOURINARY  [x]                CNS                 [x]                 CARDIAC  [x]                 ENDOCRINE  [x]                EARS,NOSE,THROAT [x]                 GASTROINTESTINAL [x]                 NEUROLOGIC    [x]                MUSCLOSKELTAL  [x]                  HEMATOLOGY      PHYSICAL EXAM (check box if normal)/75   Pulse 85   Temp 99  F (37.2  C) (Oral)   Wt 59.1 kg (130 lb 6.4 oz)   SpO2 100%   BMI 25.47 kg/m          [x]            GENERAL:    [x]       EYES:  ICTERIC   []        YES  []                     NO  [x]           EXTREMITIES: Clubbing []       Y     [x]           N    [x]           EARS, NOSE, THROAT: Membranes Moist    YES   [x]                   NO []                  [x]           LUNGS:  CLEAR    YES       [x]                  NO    []                                [x]           SKIN: Jaundice           YES       []                  NO    [x]                   Rash: YES       []                  NO    [x]                                     [x]             HEART: Regular Rate          YES       [x]                  NO    []                   Incision Clean:  YES       [x]                  NO    []                                [x]                    ABDOMEN: Organomegaly YES       []                  NO    [x]              PD catheter in place  Incision c/d/i             [x]                    NEUROLOGICAL:  Nonfocal  YES       [x]                  NO    []                       [x]                    Hernia YES       []                  NO    [x]                   PSYCHIATRIC:  Appropriate  YES       [x]                  NO    []                       OTHER:                                                                                                   PAIN SCALE:: 1

## 2025-05-19 NOTE — LETTER
2025      Wendy Kapoor  [address]  [place] [address]      Dear Colleague,    Thank you for referring your patient, Wendy Kapoor, to the [facility] TRANSPLANT [facility]. Please see a copy of my visit note below.    -Transplant Surgery -OUTPATIENT IMMUNOSUPPRESSION PROGRESS NOTE    Date of Visit: 2025    Transplants:  2025 (Kidney / Pancreas); Postoperative day:  17  ASSESMENT AND PLAN:    Wendy Kapoor is a 54 year old female with a history of DM2, HTN, ESRD on PD since 2023, IgG Lambda MGUS, non-obstructive CAD, and anemia of chronic disease. On 2025 she was notified as an acceptable  donor organ became available and presented for further pre-operative work-up and on 2025 she underwent DBD SPK with stent, and open appendectomy which was well tolerated as performed by Dr. Gleason.     1.Graft Function: pancreas is doing well. Kidney cr seems to be plateau'ing around 2  2.Immunosuppression Management: tac level pending today, but was titrated down after it was a bit high.    Tac 0.5 bid, MMF 1000 bid  3.Hypertension: well controlled  4.Renal Function:   Creatinine   Date Value Ref Range Status   05/15/2025 2.31 (H) 0.51 - 0.95 mg/dL Final       5.Lab frequency:  6.Other: staples removed, 6 weeks post op will need PD catheter and stent removed.    Date: May 19, 2025    Transplant:  [x]                             Liver []                              Kidney []                             Pancreas []                              Other:             Chief Complaint:Post-op Visit (Pancreas txp f/up)    History of Present Illness:  Patient Active Problem List   Diagnosis     Type 2 diabetes mellitus with diabetic nephropathy (H)     Sjogren's syndrome     MGUS (monoclonal gammopathy of unknown significance)     Chronic kidney disease, stage V (H)     Essential hypertension     Pre-operative cardiovascular examination     Benign hypertensive heart and renal disease  with heart failure (H)     Status post coronary angiogram     Pre-transplant evaluation for kidney transplant     Encounter for pre-transplant evaluation for kidney and pancreas transplant     Pre-transplant evaluation for kidney and pancreas transplant     Kidney transplant recipient     Pancreas transplant status (H)     Immunosuppressed status     Anemia, chronic disease     Thrombocytopenia     Low bicarbonate     Hypocalcemia     Hyperphosphatemia     Hypermagnesemia     Leukocytosis     Aftercare following organ transplant     SOCIAL /FAMILY HISTORY: [x]                  No recent change    Past Medical History:   Diagnosis Date     Chronic kidney disease      Diabetes (H)     DM Type 2     History of blood transfusion      Hypertension      MGUS (monoclonal gammopathy of unknown significance)      Pericardial effusion      Sjogren's syndrome      Type 2 diabetes mellitus with diabetic nephropathy (H)      Past Surgical History:   Procedure Laterality Date     APPENDECTOMY OPEN N/A 2025    Procedure: Appendectomy open, incidental;  Surgeon: Jean-Pierre Gleason MD;  Location:  OR     BENCH KIDNEY  2025    Procedure: Bench kidney;  Surgeon: Jean-Pierre Gleason MD;  Location:  OR     BENCH PANCREAS N/A 2025    Procedure: Bench whole pancreas;  Surgeon: Jean-Pierre Gleason MD;  Location: [facility] OR     BIOPSY        SECTION       CV CORONARY ANGIOGRAM N/A 2024    Procedure: Coronary Angiogram;  Surgeon: Cody Mckenzie MD;  Location:  HEART CARDIAC CATH LAB     CV PCI N/A 2024    Procedure: Percutaneous Coronary Intervention;  Surgeon: Cody Mckenzie MD;  Location:  HEART CARDIAC CATH LAB     TRANSPLANT PANCREAS, KIDNEY  DONOR, COMBINED Right 2025    Procedure: Transplant pancreas, kidney  donor, with ureteral stent placement.;  Surgeon: Jean-Pierre Gleason MD;  Location:  OR     Critical access hospital  History     Socioeconomic History     Marital status:      Spouse name: Not on file     Number of children: Not on file     Years of education: Not on file     Highest education level: Not on file   Occupational History     Not on file   Tobacco Use     Smoking status: Never     Smokeless tobacco: Never   Substance and Sexual Activity     Alcohol use: Never     Drug use: Never     Sexual activity: Not on file   Other Topics Concern     Not on file   Social History Narrative     Not on file     Social Drivers of Health     Financial Resource Strain: Not on file   Food Insecurity: Not on file   Transportation Needs: Not on file   Physical Activity: Not on file   Stress: Not on file   Social Connections: Not on file   Interpersonal Safety: Not At Risk (6/26/2024)    [facility]    Intimate Partner Violence      Are you in a relationship where you are physically hurt, threatened and/or made to feel afraid?: No   Housing Stability: Not on file     Prescription Medications as of 5/19/2025         Rx Number Disp Refills Start End Last Dispensed Date Next Fill Date Owning Pharmacy    acetaminophen (TYLENOL) 325 MG tablet  50 tablet 0 5/9/2025 --   [facility] [address]    Sig: Take 2 tablets (650 mg) by mouth every 6 hours as needed for mild pain.    Class: E-Prescribe    Route: Oral    aspirin (ASA) 81 MG chewable tablet  30 tablet 11 5/9/2025 --   [address]    Sig: Take 4 tablets (324 mg) by mouth or NG Tube daily.    Class: E-Prescribe    Notes to Pharmacy: Start when platelets are 80 or higher    Route: Oral or NG Tube    atorvastatin (LIPITOR) 10 MG tablet  30 tablet 0 5/10/2025 --   [facility] [phone]    Sig: Take 1 tablet (10 mg) by mouth daily.    Class: E-Prescribe    Route: Oral    calcium  carbonate-vitamin D (OSCAL) 500-5 MG-MCG tablet  60 tablet 2 5/9/2025 --   [address]    Sig: Take 1 tablet by mouth 2 times daily (with meals).    Class: E-Prescribe    Route: Oral    carvedilol (COREG) 25 MG tablet  -- -- 5/15/2025 --       Sig: Take 0.5 tablets (12.5 mg) by mouth 2 times daily.    Class: Historical    Route: Oral    ciprofloxacin (CIPRO) 500 MG tablet  7 tablet 0 5/16/2025 5/23/2025   [id number] [place] [facility] [address]    Sig: Take 1 tablet (500 mg) by mouth daily for 7 days.    Class: E-Prescribe    Route: Oral    ezetimibe (ZETIA) 10 MG tablet  30 tablet 1 5/10/2025 --   [place], [place] - [address] [place] [address] [address]    Sig: Take 1 tablet (10 mg) by mouth daily.    Class: E-Prescribe    Route: Oral    magnesium oxide (MAG-OX) 400 MG tablet  60 tablet 0 5/15/2025 --   [place], [place] - [address] [place] [address] [address]    Sig: Take 1 tablet (400 mg) by mouth 2 times daily.    Class: E-Prescribe    Route: Oral    methocarbamol (ROBAXIN) 500 MG tablet  20 tablet 0 5/9/2025 --   [place], [place] - [address] [place] [address]    Sig: Take 1 tablet (500 mg) by mouth every 6 hours as needed for muscle spasms.    Class: E-Prescribe    Route: Oral    mycophenolate (GENERIC EQUIVALENT) 250 MG capsule  240 capsule 11 5/9/2025 --   [place], [place] - [address] [place] [address]    Sig: Take 4 capsules (1,000 mg) by mouth 2 times daily.    Class: E-Prescribe    Route: Oral    nystatin (MYCOSTATIN) 400379 UNIT/ML suspension  600 mL 2 5/10/2025 --   [address]    Sig: Take 5 mLs (500,000 Units) by mouth 4 times daily.    Class: E-Prescribe    Route: Oral    oxyCODONE (ROXICODONE) 5 MG tablet  12 tablet 0 5/19/2025 5/22/2025   [facility]  [address]    Sig: Take 1 tablet (5 mg) by mouth every 6 hours as needed for breakthrough pain.    Class: E-Prescribe    Earliest Fill Date: 5/19/2025    Route: Oral    oxyCODONE (ROXICODONE) 5 MG tablet  20 tablet 0 5/9/2025 --   [address]    Sig: Take 1 tablet (5 mg) by mouth every 6 hours as needed for moderate pain.    Class: E-Prescribe    Earliest Fill Date: 5/9/2025    Route: Oral    pantoprazole (PROTONIX) 40 MG EC tablet  30 tablet 1 5/10/2025 --   [address]    Sig: Take 1 tablet (40 mg) by mouth every morning (before breakfast).    Class: E-Prescribe    Route: Oral    phosphorus tablet 250 mg 250 MG per tablet  60 tablet 1 5/15/2025 --   [address]    Sig: Take 1 tablet (250 mg) by mouth 2 times daily.    Class: E-Prescribe    Route: Oral    predniSONE (DELTASONE) 5 MG tablet  30 tablet 2 5/9/2025 --   [address]    Sig: Take 1 tablet (5 mg) by mouth daily.    Class: E-Prescribe    Route: Oral    sodium bicarbonate 650 MG tablet  120 tablet 2 5/9/2025 --   [address]    Sig: Take 2 tablets (1,300 mg) by mouth 2 times daily.    Class: E-Prescribe    Route: Oral    sulfamethoxazole-trimethoprim (BACTRIM) 400-80 MG tablet  30 tablet 11 5/12/2025 --   [address]    Sig: Take 1 tablet by mouth three times a week.    Class: E-Prescribe    Notes to Pharmacy: Increase dose to daily when kidney function improves as directed by transplant team.    Route: Oral    tacrolimus (GENERIC EQUIVALENT) 0.5 MG capsule  60 capsule 11 5/15/2025 --   [facility] - [place] [address]     Sig: Take 1 capsule (0.5 mg) by mouth 2 times daily.    Class: E-Prescribe    Notes to Pharmacy: TXP DT 5/2/2025 (Kidney / Pancreas) TXP Dischg DT 5/9/2025 DX Kidney replaced by transplant Z94.0 TX [facility] ([place], [place])    Route: Oral    tacrolimus (GENERIC EQUIVALENT) 1 MG capsule  180 capsule 3 5/15/2025 --   [facility] [address] [place] [address]    Sig: Profile Rx: patient will contact pharmacy when needed    Class: E-Prescribe    Notes to Pharmacy: TXP DT 5/2/2025 (Kidney / Pancreas) TXP Dischg DT 5/9/2025 DX Kidney replaced by transplant Z94.0 TX [facility] ([place], [place])    valGANciclovir (VALCYTE) 450 MG tablet  60 tablet 2 5/12/2025 --   [address]    Sig: Take 1 tablet (450 mg) by mouth twice a week.    Class: E-Prescribe    Notes to Pharmacy: Increase dose to 900 mg daily as kidney function improves as instructed by transplant team.    Route: Oral    cholecalciferol 50 MCG (2000 UT) tablet  30 tablet 0 5/10/2025 --   [place], [place] - [phone]    Sig: Take 1 tablet (50 mcg) by mouth daily.    Class: E-Prescribe    Route: Oral    senna-docusate (SENOKOT-S/PERICOLACE) 8.6-50 MG tablet  60 tablet 1 5/9/2025 --   [address]    Sig: Take 2 tablets by mouth 2 times daily.    Class: E-Prescribe    Route: Oral          Atorvastatin and Extraneal   REVIEW OF SYSTEMS (check box if normal)  [x]               GENERAL  [x]                 PULMONARY [x]                GENITOURINARY  [x]                CNS                 [x]                 CARDIAC  [x]                 ENDOCRINE  [x]                EARS,NOSE,THROAT [x]                 GASTROINTESTINAL [x]                 NEUROLOGIC    [x]                MUSCLOSKELTAL   [x]                  HEMATOLOGY      PHYSICAL EXAM (check box if normal)/75   Pulse 85   Temp 99  F (37.2  C) (Oral)   Wt 59.1 kg (130 lb 6.4 oz)   SpO2 100%   BMI 25.47 kg/m          [x]            GENERAL:    [x]       EYES:  ICTERIC   []        YES  []                    NO  [x]           EXTREMITIES: Clubbing []       Y     [x]           N    [x]           EARS, NOSE, THROAT: Membranes Moist    YES   [x]                   NO []                  [x]           LUNGS:  CLEAR    YES       [x]                  NO    []                                [x]           SKIN: Jaundice           YES       []                  NO    [x]                   Rash: YES       []                  NO    [x]                                     [x]             HEART: Regular Rate          YES       [x]                  NO    []                   Incision Clean:  YES       [x]                  NO    []                                [x]                    ABDOMEN: Organomegaly YES       []                  NO    [x]              PD catheter in place  Incision c/d/i             [x]                    NEUROLOGICAL:  Nonfocal  YES       [x]                  NO    []                       [x]                    Hernia YES       []                  NO    [x]                   PSYCHIATRIC:  Appropriate  YES       [x]                  NO    []                       OTHER:                                                                                                   PAIN SCALE:: 1      Again, thank you for allowing me to participate in the care of your patient.        Sincerely,         [facility] SURG FELLOW 1    Electronically signed

## 2025-05-20 ENCOUNTER — RESULTS FOLLOW-UP (OUTPATIENT)
Dept: TRANSPLANT | Facility: CLINIC | Age: 55
End: 2025-05-20

## 2025-05-20 ENCOUNTER — TELEPHONE (OUTPATIENT)
Dept: TRANSPLANT | Facility: CLINIC | Age: 55
End: 2025-05-20

## 2025-05-20 DIAGNOSIS — Z94.0 KIDNEY TRANSPLANT RECIPIENT: ICD-10-CM

## 2025-05-20 LAB
TACROLIMUS BLD-MCNC: 5.4 UG/L (ref 5–15)
TME LAST DOSE: NORMAL H
TME LAST DOSE: NORMAL H

## 2025-05-20 RX ORDER — TACROLIMUS 0.5 MG/1
1 CAPSULE ORAL 2 TIMES DAILY
Qty: 120 CAPSULE | Refills: 11 | Status: SHIPPED | OUTPATIENT
Start: 2025-05-20 | End: 2025-05-22

## 2025-05-20 NOTE — PROGRESS NOTES
Increased patient's tacrolimus to 1mg twice daily. Discussed tomorrow's appointments with patient and she has a ride and will be available.

## 2025-05-20 NOTE — PROGRESS NOTES
[facility] Outpatient Rheumatology Follow-up  Date of service: May 21, 2025    Patient name: Wendy Kapoor  YOB: 1970  MRN: [id number]    Reason for f/up: prednisone dose with joint pains  Last visit: 01/10/2024    ---------------------------------------------------    HISTORY OF PRESENT ILLNESS:  Wendy is a 53yoF w/ PMHx of DM, HTN, nodular diabetic glomerulosclerosis, severe arteriolar hyalinosis with ESRD on PD (started in 09/2023) and component of chronic tubulointerstitial nephritis who presents for evaluation of Sjogrens syndrome.     She has been managed for her renal disease by nephrology and back in 03/2023 obtained labs that were significant for elevated ARANZA 9.9 via KEVIN, negative dsDNA, elevated anti-centromere 2.3, highly elevated SSA and SSB both > 8.0, negative anti-nolasco, RNP, scl-70, Jo1 and anti-ribosome. She was also found to have hypergammaglobulinemia and elevated M spike. With concern for Sjogrens the past was given prednisone but that resulted in untoward effects on her DM and HTN. Patient underwent kidney and pancrease transplant in 05/2025.     Interval history:  Pt states she is here for follow-up about her prednisone. Before her transplant she had swelling in both wrists. Last time joints hurt her was two weeks prior to transplant. She would develop pain lasting a day about once a week. She would notice swelling and pain. No redness, warmth or stiffness. She would take prednisone and it would go away quickly within hours. She was diagnosed clinically with gout but unclear by who and was put on allopurinol until transplant but now this was discontinued. Since her transplant she hasn't had flares of pain in her wrists.     Prior to transplant she was taking 10 mg prednisone as needed when wrists hurt. Has not been on any amount of prednisone for the past month, not even the 5 mg prednisone daily as recommended by her transplant team.     Rheumatology ROS: Negative,  otherwise as above.    Past Medical History:  Past Medical History:   Diagnosis Date    Chronic kidney disease     Diabetes (H)     DM Type 2    History of blood transfusion 2019    Hypertension     MGUS (monoclonal gammopathy of unknown significance)     Pericardial effusion     Sjogren's syndrome     Type 2 diabetes mellitus with diabetic nephropathy (H)        Past Surgical History:  Past Surgical History:   Procedure Laterality Date    APPENDECTOMY OPEN N/A 2025    Procedure: Appendectomy open, incidental;  Surgeon: Jean-Pierre Gleason MD;  Location: [facility] OR    BENCH KIDNEY  2025    Procedure: Bench kidney;  Surgeon: Jean-Pierre Gleason MD;  Location: [facility] OR    BENCH PANCREAS N/A 2025    Procedure: Bench whole pancreas;  Surgeon: Jean-Pierre Gleason MD;  Location: [facility] OR    BIOPSY       SECTION      CV CORONARY ANGIOGRAM N/A 2024    Procedure: Coronary Angiogram;  Surgeon: Cody Mckenzie MD;  Location:  HEART CARDIAC CATH LAB    CV PCI N/A 2024    Procedure: Percutaneous Coronary Intervention;  Surgeon: Cody Mckenzie MD;  Location:  HEART CARDIAC CATH LAB    TRANSPLANT PANCREAS, KIDNEY  DONOR, COMBINED Right 2025    Procedure: Transplant pancreas, kidney  donor, with ureteral stent placement.;  Surgeon: Jean-Pierre Gleason MD;  Location:  OR       Medications:  Current Outpatient Medications   Medication Sig Dispense Refill    acetaminophen (TYLENOL) 325 MG tablet Take 2 tablets (650 mg) by mouth every 6 hours as needed for mild pain. 50 tablet 0    aspirin (ASA) 81 MG chewable tablet Take 4 tablets (324 mg) by mouth or NG Tube daily. 30 tablet 11    atorvastatin (LIPITOR) 10 MG tablet Take 1 tablet (10 mg) by mouth daily. 30 tablet 0    calcium carbonate-vitamin D (OSCAL) 500-5 MG-MCG tablet Take 1 tablet by mouth 2 times daily (with meals). 60 tablet 2    carvedilol (COREG) 25 MG tablet  Take 0.5 tablets (12.5 mg) by mouth 2 times daily.      cholecalciferol 50 MCG (2000 UT) tablet Take 1 tablet (50 mcg) by mouth daily. 30 tablet 0    ciprofloxacin (CIPRO) 500 MG tablet Take 1 tablet (500 mg) by mouth daily for 7 days. 7 tablet 0    ezetimibe (ZETIA) 10 MG tablet Take 1 tablet (10 mg) by mouth daily. 30 tablet 1    magnesium oxide (MAG-OX) 400 MG tablet Take 1 tablet (400 mg) by mouth 2 times daily. 60 tablet 0    methocarbamol (ROBAXIN) 500 MG tablet Take 1 tablet (500 mg) by mouth every 6 hours as needed for muscle spasms. 20 tablet 0    mycophenolate (GENERIC EQUIVALENT) 250 MG capsule Take 4 capsules (1,000 mg) by mouth 2 times daily. 240 capsule 11    nystatin (MYCOSTATIN) 111598 UNIT/ML suspension Take 5 mLs (500,000 Units) by mouth 4 times daily. 600 mL 2    oxyCODONE (ROXICODONE) 5 MG tablet Take 1 tablet (5 mg) by mouth every 6 hours as needed for breakthrough pain. 12 tablet 0    oxyCODONE (ROXICODONE) 5 MG tablet Take 1 tablet (5 mg) by mouth every 6 hours as needed for moderate pain. 20 tablet 0    pantoprazole (PROTONIX) 40 MG EC tablet Take 1 tablet (40 mg) by mouth every morning (before breakfast). 30 tablet 1    phosphorus tablet 250 mg 250 MG per tablet Take 1 tablet (250 mg) by mouth 2 times daily. 60 tablet 1    predniSONE (DELTASONE) 5 MG tablet Take 1 tablet (5 mg) by mouth daily. 30 tablet 2    senna-docusate (SENOKOT-S/PERICOLACE) 8.6-50 MG tablet Take 2 tablets by mouth 2 times daily. 60 tablet 1    sodium bicarbonate 650 MG tablet Take 2 tablets (1,300 mg) by mouth 2 times daily. 120 tablet 2    sulfamethoxazole-trimethoprim (BACTRIM) 400-80 MG tablet Take 1 tablet by mouth three times a week. 30 tablet 11    tacrolimus (GENERIC EQUIVALENT) 0.5 MG capsule Take 2 capsules (1 mg) by mouth 2 times daily. 120 capsule 11    tacrolimus (GENERIC EQUIVALENT) 1 MG capsule Profile Rx: patient will contact pharmacy when needed 180 capsule 3    valGANciclovir (VALCYTE) 450 MG tablet  "Take 1 tablet (450 mg) by mouth twice a week. 60 tablet 2     No current facility-administered medications for this visit.       Allergies:  Allergies   Allergen Reactions    Atorvastatin Itching    Extraneal Rash       ---------------------------------------------------    OBJECTIVE:  /74 (BP Location: Right arm, Cuff Size: Adult Regular)   Pulse 87   Temp 97.7  F (36.5  C) (Oral)   Ht 1.524 m (5')   Wt 59.1 kg (130 lb 5 oz)   SpO2 100%   BMI 25.45 kg/m      GEN: NAD, well-appearing  HEENT: normal sclera, normal tear meniscus, dentures in place, normal oral mucosa and salivary pooling  MSK: Full active and passive ROM of b/l shoulders, elbows, wrists, MCPs, PIPs, DIPs, hips, knees, ankles. Makes full fists b/l with good strength. No synovitis of any joints. No dactylitis.  Skin: No subcutaneous nodules over hands, elbows, lower extremities  Neuro: Gait normal.    Labs:  WBC Count   Date Value Ref Range Status   05/19/2025 8.8 4.0 - 11.0 10e3/uL Final     Hemoglobin   Date Value Ref Range Status   05/19/2025 7.2 (L) 11.7 - 15.7 g/dL Final     Platelet Count   Date Value Ref Range Status   05/19/2025 137 (L) 150 - 450 10e3/uL Final     Creatinine   Date Value Ref Range Status   05/19/2025 2.00 (H) 0.51 - 0.95 mg/dL Final     Lab Results   Component Value Date    ALKPHOS 84 05/02/2025     AST   Date Value Ref Range Status   05/02/2025 29 0 - 45 U/L Final     Lab Results   Component Value Date    ALT 18 05/02/2025     No results found for: \"SED\"  No results found for: \"CRP\"  UA RESULTS:  Recent Labs   Lab Test 05/15/25  1034   COLOR Yellow   APPEARANCE Slightly Cloudy*   URINEGLC Negative   URINEBILI Negative   URINEKETONE Negative   SG 1.016   UBLD Large*   URINEPH 6.0   PROTEIN 50*   NITRITE Negative   LEUKEST Large*   RBCU 89*   WBCU >182*      Rheumatoid Factor   Date Value Ref Range Status   04/24/2024 <10 <14 IU/mL Final     ---------------------------------------------------    ASSESSMENT & " ED Kapoor is a 54yoF w/ PMHx of HTN, DM, ESRD s/p SPK who is presenting to rheumatology clinic for evaluation of Sjogrens syndrome.     Elevated ARANZA, SSA, SSB without keratoconjunctivitis sicca - While she does have positive serology she is asymptomatic so not consistent with Sjogren's. In the future if symptomatic could confirm with additional testing such as OSS, salivary flow or salivary gland biopsy. Previously checked cryos which were negative along with unremarkable complement and RF levels. Most recent SPEP with small monoclonal protein in gamma fraction. Won't repeat testing as right now not Sjogren's. She underwent SPK transplant in 05/2025 and has been doing well since then. She is currently being treated with tacrolimus and MMF.    Wrist pains - Pt reported pain in the 1st wrist extensor compartment. Possibly dequervain's tenosynovitis from mechanical explanations. No evidence of inflammatory arthritis today on examination. Episodes were previously once a week and lasting for less than a day and quickly responsive to prednisone. This seems less likely with gout based on the characteristics but should this recur we recommend repeat evaluation.     Plan (discussed with patient):  - No indications for prednisone from rheumatology perspective, okay with prednisone 5 mg daily as per transplant team  - If swollen, warm, red joints then return for re-evaluation     Follow-up: PRN     Patient seen and staffed with Dr. Marlo Redman,   Rheumatology Fellow  [id number]    20 min was spent on date of the encounter doing chart review, history and exam, documentation and further activities as noted above. Any prior notes, outside records, laboratory results, and imaging studies were reviewed if relevant.

## 2025-05-20 NOTE — TELEPHONE ENCOUNTER
Provider Call: General  Route to LPN    Reason for call: [facility] from [facility] called on behalf of pt. Pt is concerned about SOT being able to see lab results; results from yesterday 5/19 are visible in laboratory tab but will not be visible to patient until 5:00 on 5/26. Nurse requested earlier release of lab results to patient if possible.    Tamiko also asked for order to draw labs on Tuesday 5/27 instead of Mon 5/26 as Mon is a holiday and no nurses will be working. Please fax order to [phone]    Call back needed? No

## 2025-05-20 NOTE — LETTER
PHYSICIAN ORDERS      DATE & TIME ISSUED: May 20, 2025 3:26 PM  PATIENT NAME: Wendy Kapoor   : 1970     [facility] MR# [if applicable]: [id number]     DIAGNOSIS:  Kidney/Pancreas Transplant  ICD-10 CODE: Z94.0/Z94.83     OK to draw routine transplant lab orders on 25 instead of 25 due to the holiday.    Any questions please call: [phone]    Please fax these results to [phone].    .

## 2025-05-21 ENCOUNTER — ANCILLARY PROCEDURE (OUTPATIENT)
Dept: GENERAL RADIOLOGY | Facility: CLINIC | Age: 55
End: 2025-05-21
Attending: INTERNAL MEDICINE
Payer: COMMERCIAL

## 2025-05-21 ENCOUNTER — OFFICE VISIT (OUTPATIENT)
Dept: TRANSPLANT | Facility: CLINIC | Age: 55
End: 2025-05-21
Attending: STUDENT IN AN ORGANIZED HEALTH CARE EDUCATION/TRAINING PROGRAM
Payer: MEDICARE

## 2025-05-21 ENCOUNTER — OFFICE VISIT (OUTPATIENT)
Dept: RHEUMATOLOGY | Facility: CLINIC | Age: 55
End: 2025-05-21
Attending: STUDENT IN AN ORGANIZED HEALTH CARE EDUCATION/TRAINING PROGRAM
Payer: MEDICARE

## 2025-05-21 ENCOUNTER — RESULTS FOLLOW-UP (OUTPATIENT)
Dept: TRANSPLANT | Facility: CLINIC | Age: 55
End: 2025-05-21

## 2025-05-21 ENCOUNTER — TELEPHONE (OUTPATIENT)
Dept: TRANSPLANT | Facility: CLINIC | Age: 55
End: 2025-05-21

## 2025-05-21 VITALS
DIASTOLIC BLOOD PRESSURE: 74 MMHG | BODY MASS INDEX: 25.58 KG/M2 | HEART RATE: 87 BPM | SYSTOLIC BLOOD PRESSURE: 126 MMHG | HEIGHT: 60 IN | WEIGHT: 130.31 LBS | TEMPERATURE: 97.7 F | OXYGEN SATURATION: 100 %

## 2025-05-21 DIAGNOSIS — Z48.298 AFTERCARE FOLLOWING ORGAN TRANSPLANT: ICD-10-CM

## 2025-05-21 DIAGNOSIS — M25.532 PAIN IN BOTH WRISTS: Primary | ICD-10-CM

## 2025-05-21 DIAGNOSIS — M25.531 PAIN IN BOTH WRISTS: Primary | ICD-10-CM

## 2025-05-21 PROCEDURE — G0463 HOSPITAL OUTPT CLINIC VISIT: HCPCS | Performed by: STUDENT IN AN ORGANIZED HEALTH CARE EDUCATION/TRAINING PROGRAM

## 2025-05-21 PROCEDURE — 74018 RADEX ABDOMEN 1 VIEW: CPT | Performed by: RADIOLOGY

## 2025-05-21 PROCEDURE — 250N000009 HC RX 250: Performed by: NURSE PRACTITIONER

## 2025-05-21 RX ORDER — LIDOCAINE HYDROCHLORIDE 20 MG/ML
JELLY TOPICAL ONCE
Status: COMPLETED | OUTPATIENT
Start: 2025-05-21 | End: 2025-05-21

## 2025-05-21 RX ORDER — LEVOFLOXACIN 250 MG/1
500 TABLET, FILM COATED ORAL ONCE
Status: COMPLETED | OUTPATIENT
Start: 2025-05-21 | End: 2025-05-21

## 2025-05-21 RX ADMIN — LIDOCAINE HYDROCHLORIDE: 20 JELLY TOPICAL at 13:11

## 2025-05-21 ASSESSMENT — PAIN SCALES - GENERAL: PAINLEVEL_OUTOF10: NO PAIN (0)

## 2025-05-21 NOTE — LETTER
5/21/2025       RE: Wendy Kapoor  [address]  [place] [address]     Dear Colleague,    Thank you for referring your patient, Wendy Kapoor, to the [facility] [place] at [facility]. Please see a copy of my visit note below.    [facility] Outpatient Rheumatology Follow-up  Date of service: May 21, 2025    Patient name: Wendy Kapoor  YOB: 1970  MRN: [id number]    Reason for f/up: prednisone dose with joint pains  Last visit: 01/10/2024    ---------------------------------------------------    HISTORY OF PRESENT ILLNESS:  Wendy is a 53yoF w/ PMHx of DM, HTN, nodular diabetic glomerulosclerosis, severe arteriolar hyalinosis with ESRD on PD (started in 09/2023) and component of chronic tubulointerstitial nephritis who presents for evaluation of Sjogrens syndrome.     She has been managed for her renal disease by nephrology and back in 03/2023 obtained labs that were significant for elevated ARANZA 9.9 via KEVIN, negative dsDNA, elevated anti-centromere 2.3, highly elevated SSA and SSB both > 8.0, negative anti-nolasco, RNP, scl-70, Jo1 and anti-ribosome. She was also found to have hypergammaglobulinemia and elevated M spike. With concern for Sjogrens the past was given prednisone but that resulted in untoward effects on her DM and HTN. Patient underwent kidney and pancrease transplant in 05/2025.     Interval history:  Pt states she is here for follow-up about her prednisone. Before her transplant she had swelling in both wrists. Last time joints hurt her was two weeks prior to transplant. She would develop pain lasting a day about once a week. She would notice swelling and pain. No redness, warmth or stiffness. She would take prednisone and it would go away quickly within hours. She was diagnosed clinically with gout but unclear by who and was put on allopurinol until transplant but now this was discontinued.  Since her transplant she hasn't had flares of pain in her wrists.     Prior to transplant she was taking 10 mg prednisone as needed when wrists hurt. Has not been on any amount of prednisone for the past month, not even the 5 mg prednisone daily as recommended by her transplant team.     Rheumatology ROS: Negative, otherwise as above.    Past Medical History:  Past Medical History:   Diagnosis Date     Chronic kidney disease      Diabetes (H)     DM Type 2     History of blood transfusion 2019     Hypertension      MGUS (monoclonal gammopathy of unknown significance)      Pericardial effusion      Sjogren's syndrome      Type 2 diabetes mellitus with diabetic nephropathy (H)        Past Surgical History:  Past Surgical History:   Procedure Laterality Date     APPENDECTOMY OPEN N/A 2025    Procedure: Appendectomy open, incidental;  Surgeon: Jean-Pierre Gleason MD;  Location: [facility] OR     BENCH KIDNEY  2025    Procedure: Bench kidney;  Surgeon: Jean-Pierre Gleason MD;  Location: [facility] OR     BENCH PANCREAS N/A 2025    Procedure: Bench whole pancreas;  Surgeon: Jean-Pierre Gleason MD;  Location: [facility] OR     BIOPSY        SECTION       CV CORONARY ANGIOGRAM N/A 2024    Procedure: Coronary Angiogram;  Surgeon: Cody Mckenzie MD;  Location:  HEART CARDIAC CATH LAB     CV PCI N/A 2024    Procedure: Percutaneous Coronary Intervention;  Surgeon: Cody Mkcenzie MD;  Location:  HEART CARDIAC CATH LAB     TRANSPLANT PANCREAS, KIDNEY  DONOR, COMBINED Right 2025    Procedure: Transplant pancreas, kidney  donor, with ureteral stent placement.;  Surgeon: Jean-Pierre Gleason MD;  [facility]       Medications:  Current Outpatient Medications   Medication Sig Dispense Refill     acetaminophen (TYLENOL) 325 MG tablet Take 2 tablets (650 mg) by mouth every 6 hours as needed for mild pain. 50 tablet 0     aspirin  (ASA) 81 MG chewable tablet Take 4 tablets (324 mg) by mouth or NG Tube daily. 30 tablet 11     atorvastatin (LIPITOR) 10 MG tablet Take 1 tablet (10 mg) by mouth daily. 30 tablet 0     calcium carbonate-vitamin D (OSCAL) 500-5 MG-MCG tablet Take 1 tablet by mouth 2 times daily (with meals). 60 tablet 2     carvedilol (COREG) 25 MG tablet Take 0.5 tablets (12.5 mg) by mouth 2 times daily.       cholecalciferol 50 MCG (2000 UT) tablet Take 1 tablet (50 mcg) by mouth daily. 30 tablet 0     ciprofloxacin (CIPRO) 500 MG tablet Take 1 tablet (500 mg) by mouth daily for 7 days. 7 tablet 0     ezetimibe (ZETIA) 10 MG tablet Take 1 tablet (10 mg) by mouth daily. 30 tablet 1     magnesium oxide (MAG-OX) 400 MG tablet Take 1 tablet (400 mg) by mouth 2 times daily. 60 tablet 0     methocarbamol (ROBAXIN) 500 MG tablet Take 1 tablet (500 mg) by mouth every 6 hours as needed for muscle spasms. 20 tablet 0     mycophenolate (GENERIC EQUIVALENT) 250 MG capsule Take 4 capsules (1,000 mg) by mouth 2 times daily. 240 capsule 11     nystatin (MYCOSTATIN) 704810 UNIT/ML suspension Take 5 mLs (500,000 Units) by mouth 4 times daily. 600 mL 2     oxyCODONE (ROXICODONE) 5 MG tablet Take 1 tablet (5 mg) by mouth every 6 hours as needed for breakthrough pain. 12 tablet 0     oxyCODONE (ROXICODONE) 5 MG tablet Take 1 tablet (5 mg) by mouth every 6 hours as needed for moderate pain. 20 tablet 0     pantoprazole (PROTONIX) 40 MG EC tablet Take 1 tablet (40 mg) by mouth every morning (before breakfast). 30 tablet 1     phosphorus tablet 250 mg 250 MG per tablet Take 1 tablet (250 mg) by mouth 2 times daily. 60 tablet 1     predniSONE (DELTASONE) 5 MG tablet Take 1 tablet (5 mg) by mouth daily. 30 tablet 2     senna-docusate (SENOKOT-S/PERICOLACE) 8.6-50 MG tablet Take 2 tablets by mouth 2 times daily. 60 tablet 1     sodium bicarbonate 650 MG tablet Take 2 tablets (1,300 mg) by mouth 2 times daily. 120 tablet 2     sulfamethoxazole-trimethoprim  "(BACTRIM) 400-80 MG tablet Take 1 tablet by mouth three times a week. 30 tablet 11     tacrolimus (GENERIC EQUIVALENT) 0.5 MG capsule Take 2 capsules (1 mg) by mouth 2 times daily. 120 capsule 11     tacrolimus (GENERIC EQUIVALENT) 1 MG capsule Profile Rx: patient will contact pharmacy when needed 180 capsule 3     valGANciclovir (VALCYTE) 450 MG tablet Take 1 tablet (450 mg) by mouth twice a week. 60 tablet 2     No current facility-administered medications for this visit.       Allergies:  Allergies   Allergen Reactions     Atorvastatin Itching     Extraneal Rash       ---------------------------------------------------    OBJECTIVE:  /74 (BP Location: Right arm, Cuff Size: Adult Regular)   Pulse 87   Temp 97.7  F (36.5  C) (Oral)   Ht 1.524 m (5')   Wt 59.1 kg (130 lb 5 oz)   SpO2 100%   BMI 25.45 kg/m      GEN: NAD, well-appearing  HEENT: normal sclera, normal tear meniscus, dentures in place, normal oral mucosa and salivary pooling  MSK: Full active and passive ROM of b/l shoulders, elbows, wrists, MCPs, PIPs, DIPs, hips, knees, ankles. Makes full fists b/l with good strength. No synovitis of any joints. No dactylitis.  Skin: No subcutaneous nodules over hands, elbows, lower extremities  Neuro: Gait normal.    Labs:  WBC Count   Date Value Ref Range Status   05/19/2025 8.8 4.0 - 11.0 10e3/uL Final     Hemoglobin   Date Value Ref Range Status   05/19/2025 7.2 (L) 11.7 - 15.7 g/dL Final     Platelet Count   Date Value Ref Range Status   05/19/2025 137 (L) 150 - 450 10e3/uL Final     Creatinine   Date Value Ref Range Status   05/19/2025 2.00 (H) 0.51 - 0.95 mg/dL Final     Lab Results   Component Value Date    ALKPHOS 84 05/02/2025     AST   Date Value Ref Range Status   05/02/2025 29 0 - 45 U/L Final     Lab Results   Component Value Date    ALT 18 05/02/2025     No results found for: \"SED\"  No results found for: \"CRP\"  UA RESULTS:  Recent Labs   Lab Test 05/15/25  1034   COLOR Yellow   APPEARANCE " Slightly Cloudy*   URINEGLC Negative   URINEBILI Negative   URINEKETONE Negative   SG 1.016   UBLD Large*   URINEPH 6.0   PROTEIN 50*   NITRITE Negative   LEUKEST Large*   RBCU 89*   WBCU >182*      Rheumatoid Factor   Date Value Ref Range Status   04/24/2024 <10 <14 IU/mL Final     ---------------------------------------------------    ASSESSMENT & PLAN    Wendy Kapoor is a 54yoF w/ PMHx of HTN, DM, ESRD s/p SPK who is presenting to rheumatology clinic for evaluation of Sjogrens syndrome.     Elevated ARANZA, SSA, SSB without keratoconjunctivitis sicca - While she does have positive serology she is asymptomatic so not consistent with Sjogren's. In the future if symptomatic could confirm with additional testing such as OSS, salivary flow or salivary gland biopsy. Previously checked cryos which were negative along with unremarkable complement and RF levels. Most recent SPEP with small monoclonal protein in gamma fraction. Won't repeat testing as right now not Sjogren's. She underwent SPK transplant in 05/2025 and has been doing well since then. She is currently being treated with tacrolimus and MMF.    Wrist pains - Pt reported pain in the 1st wrist extensor compartment. Possibly dequervain's tenosynovitis from mechanical explanations. No evidence of inflammatory arthritis today on examination. Episodes were previously once a week and lasting for less than a day and quickly responsive to prednisone. This seems less likely with gout based on the characteristics but should this recur we recommend repeat evaluation.     Plan (discussed with patient):  - No indications for prednisone from rheumatology perspective, okay with prednisone 5 mg daily as per transplant team  - If swollen, warm, red joints then return for re-evaluation     Follow-up: PRN     Patient seen and staffed with Dr. Marlo Redman,   Rheumatology Fellow  PGY5    20 min was spent on date of the encounter doing chart  review, history and exam, documentation and further activities as noted above. Any prior notes, outside records, laboratory results, and imaging studies were reviewed if relevant.          Attestation signed by Marlo, Gaby Lee MD at 5/21/2025  9:21 PM:  Staff addendum    I confirmed key aspects of history and physical examination  and discussed the management with the fellow/resident. I reviewed the available lab and imaging studies. I was directly involved in medical decision making and management of this patient, and I agree with the documentation, findings, and plan and outlined below.     The longitudinal plan of care for the diagnosis(es)/condition(s) as documented were addressed during this visit. Due to the added complexity in care, I will continue to support Wendy in the subsequent management and with ongoing continuity of care.      Gaby Sellers MD  Rheumatology         Again, thank you for allowing me to participate in the care of your patient.      Sincerely,    Hardy Redman, DO

## 2025-05-21 NOTE — PATIENT INSTRUCTIONS
Okay to decrease prednisone to 5 mg as recommended by your transplant team. No signs or evidence of inflamed joints today.    Follow-up as needed if you develop red, hot, swollen joint pain suddenly.

## 2025-05-21 NOTE — NURSING NOTE
Cystoscopy - Ureteral stent removal    Prepped patient for procedure with no complications.   2% lidocaine gel injected into urethra via urojet.   Assisted Allyssa Rosen NP with stent removal.  Administered 500mg Levaquin PO after procedure.  Patient was discharged in stable condition.       Allergies   Allergen Reactions    Atorvastatin Itching    Extraneal Rash         TIANA PHAM RN on 5/21/2025 at 1:09 PM

## 2025-05-21 NOTE — TELEPHONE ENCOUNTER
RECORDS STATUS - ALL OTHER DIAGNOSIS      RECORDS RECEIVED FROM: [facility]   NOTES STATUS DETAILS   OFFICE NOTE from referring provider Epic 5/15/2025 - MIA Veronica CNP   MEDICATION LIST [facility]    LABS     PATHOLOGY REPORTS     ANYTHING RELATED TO DIAGNOSIS Epic 5/19/2025   IMAGING (NEED IMAGES & REPORT)     CT SCANS PACS CT Abdomen Pelvis: 5/0/2025   XRAYS PACS Xray Chest: 5/3/2025-8/2/2023

## 2025-05-21 NOTE — NURSING NOTE
Chief Complaint   Patient presents with    RECHECK     Follow up      /74 (BP Location: Right arm, Cuff Size: Adult Regular)   Pulse 87   Temp 97.7  F (36.5  C) (Oral)   Ht 1.524 m (5')   Wt 59.1 kg (130 lb 5 oz)   SpO2 100%   BMI 25.45 kg/m    Addis Fleming on 5/21/2025 at 9:10 AM

## 2025-05-21 NOTE — TELEPHONE ENCOUNTER
Patient Call: General  Route to LPN    Reason for call: [facility] OT called and is needing a verbal order to see the Pt next week instead of this week. A VM can be left if not answered     Call back needed? Yes    Return Call Needed  Same as documented in contacts section  When to return call?: Greater than one day: Route standard priority

## 2025-05-22 ENCOUNTER — HOSPITAL ENCOUNTER (EMERGENCY)
Facility: CLINIC | Age: 55
Discharge: HOME | End: 2025-05-22
Attending: PHYSICIAN ASSISTANT
Payer: MEDICARE

## 2025-05-22 ENCOUNTER — RESULTS FOLLOW-UP (OUTPATIENT)
Dept: TRANSPLANT | Facility: CLINIC | Age: 55
End: 2025-05-22

## 2025-05-22 ENCOUNTER — TELEPHONE (OUTPATIENT)
Dept: TRANSPLANT | Facility: CLINIC | Age: 55
End: 2025-05-22

## 2025-05-22 ENCOUNTER — LAB REQUISITION (OUTPATIENT)
Dept: LAB | Facility: CLINIC | Age: 55
End: 2025-05-22
Payer: COMMERCIAL

## 2025-05-22 VITALS
OXYGEN SATURATION: 99 % | SYSTOLIC BLOOD PRESSURE: 141 MMHG | HEART RATE: 86 BPM | RESPIRATION RATE: 18 BRPM | DIASTOLIC BLOOD PRESSURE: 76 MMHG | TEMPERATURE: 98.5 F

## 2025-05-22 DIAGNOSIS — Z94.0 KIDNEY TRANSPLANT STATUS: ICD-10-CM

## 2025-05-22 DIAGNOSIS — D63.8 ANEMIA IN OTHER CHRONIC DISEASES CLASSIFIED ELSEWHERE: ICD-10-CM

## 2025-05-22 LAB
ABO + RH BLD: NORMAL
AMYLASE SERPL-CCNC: 95 U/L (ref 28–100)
BASOPHILS # BLD AUTO: 0 10E3/UL (ref 0–0.2)
BASOPHILS NFR BLD AUTO: 0 %
BLD GP AB SCN SERPL QL: NEGATIVE
BLD PROD TYP BPU: NORMAL
BLOOD COMPONENT TYPE: NORMAL
CODING SYSTEM: NORMAL
CROSSMATCH: NORMAL
DACRYOCYTES BLD QL SMEAR: SLIGHT
EOSINOPHIL # BLD AUTO: 0.1 10E3/UL (ref 0–0.7)
EOSINOPHIL NFR BLD AUTO: 1 %
ERYTHROCYTE [DISTWIDTH] IN BLOOD BY AUTOMATED COUNT: 15.9 % (ref 10–15)
HCT VFR BLD AUTO: 21.5 % (ref 35–47)
HGB BLD-MCNC: 6.8 G/DL (ref 11.7–15.7)
IMM GRANULOCYTES # BLD: 0 10E3/UL
IMM GRANULOCYTES NFR BLD: 1 %
ISSUE DATE AND TIME: NORMAL
LIPASE SERPL-CCNC: 166 U/L (ref 13–60)
LYMPHOCYTES # BLD AUTO: 0.1 10E3/UL (ref 0.8–5.3)
LYMPHOCYTES NFR BLD AUTO: 2 %
MCH RBC QN AUTO: 29.8 PG (ref 26.5–33)
MCHC RBC AUTO-ENTMCNC: 31.6 G/DL (ref 31.5–36.5)
MCV RBC AUTO: 94 FL (ref 78–100)
MONOCYTES # BLD AUTO: 0.2 10E3/UL (ref 0–1.3)
MONOCYTES NFR BLD AUTO: 3 %
NEUTROPHILS # BLD AUTO: 5.6 10E3/UL (ref 1.6–8.3)
NEUTROPHILS NFR BLD AUTO: 93 %
NRBC # BLD AUTO: 0 10E3/UL
NRBC BLD AUTO-RTO: 0 /100
PLAT MORPH BLD: ABNORMAL
PLATELET # BLD AUTO: 188 10E3/UL (ref 150–450)
POLYCHROMASIA BLD QL SMEAR: SLIGHT
RBC # BLD AUTO: 2.28 10E6/UL (ref 3.8–5.2)
RBC MORPH BLD: ABNORMAL
SPECIMEN EXP DATE BLD: NORMAL
TACROLIMUS BLD-MCNC: 6.2 UG/L (ref 5–15)
TME LAST DOSE: NORMAL H
TME LAST DOSE: NORMAL H
UNIT ABO/RH: NORMAL
UNIT NUMBER: NORMAL
UNIT STATUS: NORMAL
UNIT TYPE ISBT: 1700
WBC # BLD AUTO: 6 10E3/UL (ref 4–11)

## 2025-05-22 PROCEDURE — 36430 TRANSFUSION BLD/BLD COMPNT: CPT | Performed by: PHYSICIAN ASSISTANT

## 2025-05-22 PROCEDURE — 99285 EMERGENCY DEPT VISIT HI MDM: CPT | Mod: 25 | Performed by: PHYSICIAN ASSISTANT

## 2025-05-22 PROCEDURE — 86923 COMPATIBILITY TEST ELECTRIC: CPT | Performed by: PHYSICIAN ASSISTANT

## 2025-05-22 PROCEDURE — 83690 ASSAY OF LIPASE: CPT | Mod: ORL | Performed by: INTERNAL MEDICINE

## 2025-05-22 PROCEDURE — 85025 COMPLETE CBC W/AUTO DIFF WBC: CPT | Mod: ORL | Performed by: INTERNAL MEDICINE

## 2025-05-22 PROCEDURE — 86850 RBC ANTIBODY SCREEN: CPT | Performed by: PHYSICIAN ASSISTANT

## 2025-05-22 PROCEDURE — 80197 ASSAY OF TACROLIMUS: CPT | Mod: ORL | Performed by: INTERNAL MEDICINE

## 2025-05-22 PROCEDURE — 82150 ASSAY OF AMYLASE: CPT | Mod: ORL | Performed by: INTERNAL MEDICINE

## 2025-05-22 PROCEDURE — P9016 RBC LEUKOCYTES REDUCED: HCPCS | Performed by: PHYSICIAN ASSISTANT

## 2025-05-22 PROCEDURE — 99283 EMERGENCY DEPT VISIT LOW MDM: CPT | Performed by: PHYSICIAN ASSISTANT

## 2025-05-22 PROCEDURE — 36415 COLL VENOUS BLD VENIPUNCTURE: CPT | Performed by: PHYSICIAN ASSISTANT

## 2025-05-22 RX ORDER — TACROLIMUS 1 MG/1
1 CAPSULE ORAL 2 TIMES DAILY
COMMUNITY
End: 2025-05-30

## 2025-05-22 RX ORDER — AMLODIPINE BESYLATE 5 MG/1
5 TABLET ORAL AT BEDTIME
COMMUNITY
End: 2025-05-30

## 2025-05-22 RX ORDER — SULFAMETHOXAZOLE AND TRIMETHOPRIM 400; 80 MG/1; MG/1
1 TABLET ORAL
COMMUNITY
End: 2025-05-30

## 2025-05-22 RX ORDER — VALGANCICLOVIR 450 MG/1
450 TABLET, FILM COATED ORAL
COMMUNITY
End: 2025-05-30

## 2025-05-22 RX ORDER — TACROLIMUS 1 MG/1
1 CAPSULE, GELATIN COATED ORAL 2 TIMES DAILY
COMMUNITY
End: 2025-05-30

## 2025-05-22 RX ORDER — EZETIMIBE 10 MG/1
10 TABLET ORAL AT BEDTIME
COMMUNITY

## 2025-05-22 RX ORDER — MAGNESIUM OXIDE 400 MG/1
400 TABLET ORAL 2 TIMES DAILY
COMMUNITY
End: 2025-05-30

## 2025-05-22 ASSESSMENT — ACTIVITIES OF DAILY LIVING (ADL)
ADLS_ACUITY_SCORE: 58
ADLS_ACUITY_SCORE: 58
ADLS_ACUITY_SCORE: 61

## 2025-05-22 ASSESSMENT — COLUMBIA-SUICIDE SEVERITY RATING SCALE - C-SSRS
6. HAVE YOU EVER DONE ANYTHING, STARTED TO DO ANYTHING, OR PREPARED TO DO ANYTHING TO END YOUR LIFE?: NO
1. IN THE PAST MONTH, HAVE YOU WISHED YOU WERE DEAD OR WISHED YOU COULD GO TO SLEEP AND NOT WAKE UP?: NO
2. HAVE YOU ACTUALLY HAD ANY THOUGHTS OF KILLING YOURSELF IN THE PAST MONTH?: NO

## 2025-05-22 NOTE — MEDICATION SCRIBE - ADMISSION MEDICATION HISTORY
Medication Scribe Admission Medication History    Admission medication history is complete. The information provided in this note is only as accurate as the sources available at the time of the update.    Information Source(s): Patient and CareEverywhere/SureScripts via in-person    Pertinent Information: Patient has excellent medication log.     Changes made to PTA medication list:  Added: amlodipine  Deleted: robaxin, tacrolimus 0.5mg  Changed: None    Allergies reviewed with patient and updates made in EHR: yes    Medication History Completed By: PALLAVI DORSEY 5/22/2025 6:54 PM    PTA Med List   Medication Sig Note Last Dose/Taking    acetaminophen (TYLENOL) 325 MG tablet Take 2 tablets (650 mg) by mouth every 6 hours as needed for mild pain.  Taking As Needed    amLODIPine (NORVASC) 5 MG tablet Take 5 mg by mouth at bedtime.  5/21/2025 at  8:00 PM    aspirin (ASA) 81 MG chewable tablet Take 4 tablets (324 mg) by mouth or NG Tube daily.  5/22/2025 at  8:00 AM    atorvastatin (LIPITOR) 10 MG tablet Take 1 tablet (10 mg) by mouth daily.  5/22/2025 at  8:00 AM    calcium carbonate-vitamin D (OSCAL) 500-5 MG-MCG tablet Take 1 tablet by mouth 2 times daily (with meals).  5/22/2025 at  8:00 AM    carvedilol (COREG) 25 MG tablet Take 0.5 tablets (12.5 mg) by mouth 2 times daily.  5/22/2025 at  8:00 AM    ezetimibe (ZETIA) 10 MG tablet Take 10 mg by mouth at bedtime.  5/21/2025 at  8:00 PM    magnesium oxide (MAG-OX) 400 MG tablet Take 400 mg by mouth 2 times daily. 1100 & 1700  5/22/2025 at 11:00 AM    mycophenolate (GENERIC EQUIVALENT) 250 MG capsule Take 4 capsules (1,000 mg) by mouth 2 times daily.  5/22/2025 at  8:00 AM    nystatin (MYCOSTATIN) 745257 UNIT/ML suspension Take 5 mLs (500,000 Units) by mouth 4 times daily.  5/22/2025 at 12:00 PM    oxyCODONE (ROXICODONE) 5 MG tablet Take 1 tablet (5 mg) by mouth every 6 hours as needed for breakthrough pain.  5/21/2025    pantoprazole (PROTONIX) 40 MG EC tablet Take 1  tablet (40 mg) by mouth every morning (before breakfast).  5/22/2025 at  8:00 AM    phosphorus tablet 250 mg 250 MG per tablet Take 1 tablet (250 mg) by mouth 2 times daily.  5/22/2025 at  8:00 AM    predniSONE (DELTASONE) 5 MG tablet Take 1 tablet (5 mg) by mouth daily. 5/22/2025: Not started Taking    PROGRAF (BRAND) 1 MG capsule Take 1 mg by mouth 2 times daily.  5/22/2025 at  8:00 AM    senna-docusate (SENOKOT-S/PERICOLACE) 8.6-50 MG tablet Take 2 tablets by mouth 2 times daily.  5/22/2025 at  8:00 AM    sodium bicarbonate 650 MG tablet Take 2 tablets (1,300 mg) by mouth 2 times daily.  5/22/2025 at  8:00 AM    sulfamethoxazole-trimethoprim (BACTRIM) 400-80 MG tablet Take 1 tablet by mouth Every Mon, Wed, Fri Morning.  5/21/2025 at  8:00 AM    tacrolimus (GENERIC EQUIVALENT) 1 MG capsule Take 1 mg by mouth 2 times daily.  5/22/2025 at  8:00 AM    valGANciclovir (VALCYTE) 450 MG tablet Take 450 mg by mouth twice a week. Mondays and Thursdays 5/22/2025 at  8:00 AM

## 2025-05-22 NOTE — TELEPHONE ENCOUNTER
General  Route to LPN    Reason for call: Pts son said his mom is supposed to have a blood transfusion and wants to know if there is a closer place than the [facility]     Call back needed? Yes    Return Call Needed  Same as documented in contacts section  When to return call?: Greater than one day: Route standard priority

## 2025-05-22 NOTE — TELEPHONE ENCOUNTER
Allyssa Rosen APRN CNP  Betty Vogel, RN  Reviewed with Rosibel. Ok to trend.          Previous Messages       ----- Message -----  From: Betty Vogel RN  Sent: 5/22/2025  10:20 AM CDT  To: MIA Veronica CNP  Subject: RE: Benefit chec                                Allyssa,  Having trouble securing Octreotide outpatient. Is Jean-Pierre ok monitoring lipase trends through next week or does he want her admitted for another dose?  ----- Message -----  From: Lj Vizcarra MD  Sent: 5/21/2025  11:24 AM CDT  To: Betty Vogel RN  Subject: FW: Benefit chec                                Betty,    Can you review with Dr. Gleason on his thoughts.  We could admit her to give another dose, but I would probably just follow for now if she doesn't have coverage for octreotide.    Roe  ----- Message -----  From: Kathy Durbin RN  Sent: 5/20/2025  10:39 AM CDT  To: Lj Vizcarra MD; JOANIE Espinosa  Subject: RE: Benefit chec                                Adding in the provider and care coordinator.    SOT team, please advise on below.    Thank you,    Kathy Durbin RN  [facility]  [facility]  [phone]  ----- Message -----  From: Neha Singletary  Sent: 5/20/2025   9:07 AM CDT  To: Adri Hugo RN; Kathy Durbin RN; Halley*  Subject: RE: Benefit chec                                Hello,    We have exhausted all efforts to obtain coverage for Octreotide through the patient's medical benefits.    Our last option to move forward is to hand this case off to the Pharmacy operations team to investigate alternative funding through either the patients pharmacy benefit or through  assistance (DRP).      We will make every effort to secure coverage prior to the appointment. If we are still unable to guarantee coverage 24 hours prior to appointment, can treatment be postponed, or is treatment emergent?    **Note:  down payment will be required from patient if coverage is not secure by time of appt.  ----- Message -----  From: Adri Hugo RN  Sent: 5/9/2025   4:31 PM CDT  To: Kathy Durbin RN; Halley Catherine, RN; Cent[facility]*  Subject: RE: Benefit chec                                Hi Abrahan,  I'm leaving for the day and patient is leaving today too. Hoping the appointment will be scheduled. Thank you.  ----- Message -----  From: Kathy Durbin RN  Sent: 5/9/2025  11:37 AM CDT  To: Adri Hugo RN; Halley Catherine, RN; Ce*  Subject: RE: Benefit chec                                Pt's need to be scheduled before prior auth will take a look at a plan.  Adri, when does the pt need to be scheduled?  We should push out at least a week for finance.    Thank you,    Kathy Durbin RN  [facility]  [facility]  [phone]  ----- Message -----  From: Halley Catherine RN  Sent: 5/9/2025  10:21 AM CDT  To: Adri Hugo RN; Kathy Durbin RN; [facility]*  Subject: RE: Benefit chec                                Adding in the Prior auth pool  ----- Message -----  From: Adri Hugo RN  Sent: 5/9/2025   9:57 AM CDT  To: Kathy Durbin RN; [facility] Scheduling [facility]; [facility]  Subject: Benefit chec                                    Hi everyone,  I'm wondering if anyone can do the benefit check for octreotide depot for this patient. Or who will be the right person to reach out to? Thanks.    Adri Hugo RN, PHN, BSN  Float Nurse Care Coordinator  Covering for [address]  Phone [phone]

## 2025-05-22 NOTE — ED PROVIDER NOTES
History     Chief Complaint   Patient presents with    Abnormal Labs       HPI  Wendy Kapoor is a 54 year old female who presents to the emergency department for a blood transfusion.  On 5/2/2025 the patient underwent a kidney and pancreas transplant.  Overall she has been doing well postoperatively.  She has been having labs drawn routinely for follow-up.  Her hemoglobin has been low and today when it was checked it was down to 6.8 so she was advised by her transplant coordinator to come to the ER for a blood transfusion.  The patient reports she has been feeling really well.  Denies fevers, increased abdominal pain.  Denies any chest tightness, shortness of breath, weakness or fatigue.  Overall reports healing process is going fine and she was surprised to hear she needed a blood transfusion.        Allergies:  Allergies   Allergen Reactions    Atorvastatin Itching    Extraneal Rash       Problem List:    Patient Active Problem List    Diagnosis Date Noted    Aftercare following organ transplant 05/15/2025     Priority: Medium    Immunosuppressed status 05/09/2025     Priority: Medium    Anemia, chronic disease 05/09/2025     Priority: Medium    Thrombocytopenia 05/09/2025     Priority: Medium    Low bicarbonate 05/09/2025     Priority: Medium    Hypocalcemia 05/09/2025     Priority: Medium    Hyperphosphatemia 05/09/2025     Priority: Medium    Hypermagnesemia 05/09/2025     Priority: Medium    Leukocytosis 05/09/2025     Priority: Medium    Kidney transplant recipient 05/03/2025     Priority: Medium    Pancreas transplant status (H) 05/03/2025     Priority: Medium    Pre-transplant evaluation for kidney and pancreas transplant 05/02/2025     Priority: Medium    Encounter for pre-transplant evaluation for kidney and pancreas transplant 10/16/2024     Priority: Medium    Pre-operative cardiovascular examination 04/12/2024     Priority: Medium    Benign hypertensive heart and renal disease with heart failure  (H) 2024     Priority: Medium    Status post coronary angiogram 2024     Priority: Medium    Pre-transplant evaluation for kidney transplant 2024     Priority: Medium    Type 2 diabetes mellitus with diabetic nephropathy (H) 2023     Priority: Medium    Sjogren's syndrome 2023     Priority: Medium    MGUS (monoclonal gammopathy of unknown significance) 2023     Priority: Medium    Chronic kidney disease, stage V (H) 2023     Priority: Medium    Essential hypertension 2023     Priority: Medium        Past Medical History:    Past Medical History:   Diagnosis Date    Chronic kidney disease     Diabetes (H)     History of blood transfusion 2019    Hypertension     MGUS (monoclonal gammopathy of unknown significance)     Pericardial effusion     Sjogren's syndrome     Type 2 diabetes mellitus with diabetic nephropathy (H)        Past Surgical History:    Past Surgical History:   Procedure Laterality Date    APPENDECTOMY OPEN N/A 2025    Procedure: Appendectomy open, incidental;  Surgeon: Jean-Pierre Gleason MD;  Location:  OR    BENCH KIDNEY  2025    Procedure: Bench kidney;  Surgeon: Jean-Pierre Gleason MD;  Location:  OR    BENCH PANCREAS N/A 2025    Procedure: Bench whole pancreas;  Surgeon: Jean-Pierre Gleason MD;  Location: [facility] OR    BIOPSY       SECTION      CV CORONARY ANGIOGRAM N/A 2024    Procedure: Coronary Angiogram;  Surgeon: Coyd Mckenzie MD;  Location:  HEART CARDIAC CATH LAB    CV PCI N/A 2024    Procedure: Percutaneous Coronary Intervention;  Surgeon: Cody Mckenzie MD;  Location:  HEART CARDIAC CATH LAB    TRANSPLANT PANCREAS, KIDNEY  DONOR, COMBINED Right 2025    Procedure: Transplant pancreas, kidney  donor, with ureteral stent placement.;  Surgeon: Jean-Pierre Gleason MD;  Location:  OR       Family History:    Family  History   Problem Relation Age of Onset    Kidney Disease No family hx of        Social History:  Marital Status:   [2]  Social History     Tobacco Use    Smoking status: Never    Smokeless tobacco: Never   Substance Use Topics    Alcohol use: Never    Drug use: Never        Medications:    acetaminophen (TYLENOL) 325 MG tablet  amLODIPine (NORVASC) 5 MG tablet  aspirin (ASA) 81 MG chewable tablet  atorvastatin (LIPITOR) 10 MG tablet  calcium carbonate-vitamin D (OSCAL) 500-5 MG-MCG tablet  carvedilol (COREG) 25 MG tablet  ezetimibe (ZETIA) 10 MG tablet  magnesium oxide (MAG-OX) 400 MG tablet  mycophenolate (GENERIC EQUIVALENT) 250 MG capsule  nystatin (MYCOSTATIN) 284319 UNIT/ML suspension  oxyCODONE (ROXICODONE) 5 MG tablet  pantoprazole (PROTONIX) 40 MG EC tablet  phosphorus tablet 250 mg 250 MG per tablet  predniSONE (DELTASONE) 5 MG tablet  PROGRAF (BRAND) 1 MG capsule  senna-docusate (SENOKOT-S/PERICOLACE) 8.6-50 MG tablet  sodium bicarbonate 650 MG tablet  sulfamethoxazole-trimethoprim (BACTRIM) 400-80 MG tablet  tacrolimus (GENERIC EQUIVALENT) 1 MG capsule  valGANciclovir (VALCYTE) 450 MG tablet          Review of Systems   All other systems reviewed and are negative.          Physical Exam   BP: (!) 141/74  Pulse: 94  Temp: 97.8  F (36.6  C)  Resp: 18  SpO2: 100 %      Physical Exam  Vitals and nursing note reviewed.   Constitutional:       General: She is not in acute distress.     Appearance: Normal appearance. She is well-developed. She is not ill-appearing, toxic-appearing or diaphoretic.   HENT:      Head: Normocephalic and atraumatic.      Nose: Nose normal.   Eyes:      Conjunctiva/sclera: Conjunctivae normal.      Pupils: Pupils are equal, round, and reactive to light.   Cardiovascular:      Rate and Rhythm: Normal rate and regular rhythm.      Heart sounds: Normal heart sounds.   Pulmonary:      Effort: Pulmonary effort is normal. No respiratory distress.      Breath sounds: Normal breath  sounds.   Abdominal:      General: There is no distension.      Palpations: Abdomen is soft.      Tenderness: There is no abdominal tenderness.      Comments: Peritoneal dialysis catheter on the left upper abdomen.  Surgical incisions are covered with clean dry bandages with no signs of drainage.  Abdomen is soft without significant tenderness.   Musculoskeletal:         General: No deformity.      Cervical back: Neck supple.   Skin:     General: Skin is warm and dry.   Neurological:      General: No focal deficit present.      Mental Status: She is alert and oriented to person, place, and time. Mental status is at baseline.      Coordination: Coordination normal.   Psychiatric:         Mood and Affect: Mood normal.             ED Course        Procedures      Results for orders placed or performed during the hospital encounter of 05/22/25 (from the past 24 hours)   Prepare red blood cells (unit)   Result Value Ref Range    Blood Component Type Red Blood Cells     Product Code [id number]     Unit Status Transfused     Unit Number [id number]     CROSSMATCH Compatible     CODING SYSTEM [id number]     ISSUE DATE AND TIME [id number]     UNIT ABO/RH B-     UNIT TYPE ISBT 1700    ABO/Rh type and screen    Narrative    The following orders were created for panel order ABO/Rh type and screen.  Procedure                               Abnormality         Status                     ---------                               -----------         ------                     Adult Type and Screen[[id number]]                           Final result                 Please view results for these tests on the individual orders.   Adult Type and Screen   Result Value Ref Range    ABO/RH(D) B POS     Antibody Screen Negative Negative    SPECIMEN EXPIRATION DATE [id number]        Medications - No data to display      Assessments & Plan (with Medical Decision Making)  Wendy Kapoor is a 54 year old female who presented to the ED for low  hemoglobin.  She is about 3 weeks out from a renal and pancreatic transplant and has been having routine labs drawn.  Her hemoglobin had been downtrending and today it was 6.8 so her transplant team recommended a blood transfusion.  Otherwise patient reports feeling well without acute symptoms of anemia, no fevers, no concerns on arrival.  Reports she was surprised her hemoglobin was low.  Here today she was mildly hypertensive but otherwise had normal vital signs.  Did not appear acutely ill or toxic and had a reassuring exam, no significant tenderness or rigidity in the abdomen which was soft.  Lung sounds were clear.  No significant lower extremity edema.  Labs from clinic reviewed, she did have a hemoglobin of 6.8.  1 unit of packed red blood cells was given here in the ED with no adverse effects.  She remained hemodynamically stable.  I do not think further workup or interventions is warranted in the emergency setting since she is stable.  She plans to follow-up with her transplant team in the morning.  She was provided instructions on when to return to the ED. All questions answered and patient discharged home in stable condition.     I have reviewed the nursing notes.    I have reviewed the findings, diagnosis, plan and need for follow up with the patient.      Discharge Medication List as of 5/22/2025 10:16 PM          Final diagnoses:   Anemia in other chronic diseases classified elsewhere     Note: Chart documentation done in part with Dragon Voice Recognition software. Although reviewed after completion, some word and grammatical errors may remain.     5/22/2025   [facility] EMERGENCY DEPT       Frida Kendall PA-C  05/22/25 4739

## 2025-05-22 NOTE — TELEPHONE ENCOUNTER
Spoke to son to clarify they are able to go to their local ER for blood transfusion, and does not have to drive to [facility]. Patient and son happy about this, as it is about 1 hour closer

## 2025-05-23 NOTE — DISCHARGE INSTRUCTIONS
Please follow-up with your transplant team tomorrow regarding this ER visit.  If you develop any new or worsening symptoms please not hesitate to return to the emergency department.    Thank you for choosing [facility]'s Emergency Department. It was a pleasure taking care of you today. If you have any questions, please call [phone].    Lu Reeder, ANURADHA

## 2025-05-23 NOTE — ED NOTES
Updated pt lab delay with blood. Pt anxious for transfusion to start. Stated she is getting hungry. Pt offered bag lunch and pt declined.

## 2025-05-27 ENCOUNTER — MYC MEDICAL ADVICE (OUTPATIENT)
Dept: INTERVENTIONAL RADIOLOGY/VASCULAR | Facility: CLINIC | Age: 55
End: 2025-05-27

## 2025-05-27 ENCOUNTER — LAB REQUISITION (OUTPATIENT)
Dept: LAB | Facility: CLINIC | Age: 55
End: 2025-05-27
Payer: COMMERCIAL

## 2025-05-27 ENCOUNTER — RESULTS FOLLOW-UP (OUTPATIENT)
Dept: TRANSPLANT | Facility: CLINIC | Age: 55
End: 2025-05-27

## 2025-05-27 DIAGNOSIS — Z94.0 KIDNEY TRANSPLANT STATUS: ICD-10-CM

## 2025-05-27 DIAGNOSIS — Z94.83 PANCREAS TRANSPLANT STATUS (H): ICD-10-CM

## 2025-05-27 LAB
AMYLASE SERPL-CCNC: 80 U/L (ref 28–100)
ANION GAP SERPL CALCULATED.3IONS-SCNC: 9 MMOL/L (ref 7–15)
BASOPHILS # BLD AUTO: 0 10E3/UL (ref 0–0.2)
BASOPHILS NFR BLD AUTO: 0 %
BUN SERPL-MCNC: 23.7 MG/DL (ref 6–20)
CALCIUM SERPL-MCNC: 9.1 MG/DL (ref 8.8–10.4)
CHLORIDE SERPL-SCNC: 102 MMOL/L (ref 98–107)
CREAT SERPL-MCNC: 1.99 MG/DL (ref 0.51–0.95)
EGFRCR SERPLBLD CKD-EPI 2021: 29 ML/MIN/1.73M2
EOSINOPHIL # BLD AUTO: 0.1 10E3/UL (ref 0–0.7)
EOSINOPHIL NFR BLD AUTO: 1 %
ERYTHROCYTE [DISTWIDTH] IN BLOOD BY AUTOMATED COUNT: 14.7 % (ref 10–15)
GLUCOSE SERPL-MCNC: 110 MG/DL (ref 70–99)
HCO3 SERPL-SCNC: 25 MMOL/L (ref 22–29)
HCT VFR BLD AUTO: 25.6 % (ref 35–47)
HGB BLD-MCNC: 8.2 G/DL (ref 11.7–15.7)
IMM GRANULOCYTES # BLD: 0.1 10E3/UL
IMM GRANULOCYTES NFR BLD: 1 %
LIPASE SERPL-CCNC: 109 U/L (ref 13–60)
LYMPHOCYTES # BLD AUTO: 0.1 10E3/UL (ref 0.8–5.3)
LYMPHOCYTES NFR BLD AUTO: 1 %
MAGNESIUM SERPL-MCNC: 1.7 MG/DL (ref 1.7–2.3)
MCH RBC QN AUTO: 29.8 PG (ref 26.5–33)
MCHC RBC AUTO-ENTMCNC: 32 G/DL (ref 31.5–36.5)
MCV RBC AUTO: 93 FL (ref 78–100)
MONOCYTES # BLD AUTO: 0.2 10E3/UL (ref 0–1.3)
MONOCYTES NFR BLD AUTO: 2 %
NEUTROPHILS # BLD AUTO: 10.2 10E3/UL (ref 1.6–8.3)
NEUTROPHILS NFR BLD AUTO: 95 %
NRBC # BLD AUTO: 0 10E3/UL
NRBC BLD AUTO-RTO: 0 /100
PHOSPHATE SERPL-MCNC: 2.6 MG/DL (ref 2.5–4.5)
PLATELET # BLD AUTO: 201 10E3/UL (ref 150–450)
POTASSIUM SERPL-SCNC: 4.5 MMOL/L (ref 3.4–5.3)
RBC # BLD AUTO: 2.75 10E6/UL (ref 3.8–5.2)
SODIUM SERPL-SCNC: 136 MMOL/L (ref 135–145)
TACROLIMUS BLD-MCNC: 11 UG/L (ref 5–15)
TME LAST DOSE: NORMAL H
TME LAST DOSE: NORMAL H
WBC # BLD AUTO: 10.7 10E3/UL (ref 4–11)

## 2025-05-27 PROCEDURE — 84100 ASSAY OF PHOSPHORUS: CPT | Mod: ORL | Performed by: INTERNAL MEDICINE

## 2025-05-27 PROCEDURE — 85025 COMPLETE CBC W/AUTO DIFF WBC: CPT | Mod: ORL | Performed by: INTERNAL MEDICINE

## 2025-05-27 PROCEDURE — 80048 BASIC METABOLIC PNL TOTAL CA: CPT | Mod: ORL | Performed by: INTERNAL MEDICINE

## 2025-05-27 PROCEDURE — 83735 ASSAY OF MAGNESIUM: CPT | Mod: ORL | Performed by: INTERNAL MEDICINE

## 2025-05-27 PROCEDURE — 83690 ASSAY OF LIPASE: CPT | Mod: ORL | Performed by: INTERNAL MEDICINE

## 2025-05-27 PROCEDURE — 80197 ASSAY OF TACROLIMUS: CPT | Mod: ORL | Performed by: INTERNAL MEDICINE

## 2025-05-27 PROCEDURE — 82150 ASSAY OF AMYLASE: CPT | Mod: ORL | Performed by: INTERNAL MEDICINE

## 2025-05-28 ENCOUNTER — TELEPHONE (OUTPATIENT)
Dept: TRANSPLANT | Facility: CLINIC | Age: 55
End: 2025-05-28
Payer: COMMERCIAL

## 2025-05-28 NOTE — TELEPHONE ENCOUNTER
Post Kidney and Pancreas Transplant Team Conference  Date: 5/28/2025  Transplant Coordinator: Betty     Attendees:  [x]  Dr. Vizcarra [] Tg Hernández, RN [x] Rosie Gracia LPN     []  Dr. Little [x] Betty Vogel, CHIRAG    [x] Dr. Andrade [] Cindy Vallejo RN    [x] Dr. Linder [x] Chen Dillon, RN [] Suzan Castillo RN   [] Dr. Mckeon [x] Perri Lima, CHIRAG    [] Dr. Wyatt [x] Irene Abernathy, CHIRAG [] Shan Cherry, PharmD   []  Dr. Gleason [] Briana Whitten, RN    [] Dr. Torres [] Morro Ayala RN     [x] Belkys Al RN    [x] Allyssa Rosen, [place] [] Halley Davis RN        Verbal Plan Read Back:   Proceed with biopsy as scheduled.    Routed to RN Coordinator   Rosie Gracia LPN

## 2025-05-29 ENCOUNTER — LAB REQUISITION (OUTPATIENT)
Dept: LAB | Facility: CLINIC | Age: 55
End: 2025-05-29
Payer: COMMERCIAL

## 2025-05-29 ENCOUNTER — TELEPHONE (OUTPATIENT)
Dept: PULMONOLOGY | Facility: CLINIC | Age: 55
End: 2025-05-29
Payer: COMMERCIAL

## 2025-05-29 DIAGNOSIS — Z94.83 PANCREAS TRANSPLANT STATUS (H): ICD-10-CM

## 2025-05-29 DIAGNOSIS — Z94.0 KIDNEY TRANSPLANT STATUS: ICD-10-CM

## 2025-05-29 LAB
BASOPHILS # BLD AUTO: 0 10E3/UL (ref 0–0.2)
BASOPHILS NFR BLD AUTO: 0 %
EOSINOPHIL # BLD AUTO: 0.1 10E3/UL (ref 0–0.7)
EOSINOPHIL NFR BLD AUTO: 2 %
ERYTHROCYTE [DISTWIDTH] IN BLOOD BY AUTOMATED COUNT: 14.2 % (ref 10–15)
HCT VFR BLD AUTO: 27.7 % (ref 35–47)
HGB BLD-MCNC: 8.6 G/DL (ref 11.7–15.7)
IMM GRANULOCYTES # BLD: 0.1 10E3/UL
IMM GRANULOCYTES NFR BLD: 1 %
LYMPHOCYTES # BLD AUTO: 0.2 10E3/UL (ref 0.8–5.3)
LYMPHOCYTES NFR BLD AUTO: 3 %
MCH RBC QN AUTO: 29.8 PG (ref 26.5–33)
MCHC RBC AUTO-ENTMCNC: 31 G/DL (ref 31.5–36.5)
MCV RBC AUTO: 96 FL (ref 78–100)
MONOCYTES # BLD AUTO: 0.3 10E3/UL (ref 0–1.3)
MONOCYTES NFR BLD AUTO: 4 %
NEUTROPHILS # BLD AUTO: 6.2 10E3/UL (ref 1.6–8.3)
NEUTROPHILS NFR BLD AUTO: 90 %
NRBC # BLD AUTO: 0 10E3/UL
NRBC BLD AUTO-RTO: 0 /100
PLATELET # BLD AUTO: 225 10E3/UL (ref 150–450)
RBC # BLD AUTO: 2.89 10E6/UL (ref 3.8–5.2)
WBC # BLD AUTO: 6.8 10E3/UL (ref 4–11)

## 2025-05-29 PROCEDURE — 82150 ASSAY OF AMYLASE: CPT | Mod: ORL | Performed by: INTERNAL MEDICINE

## 2025-05-29 PROCEDURE — 83690 ASSAY OF LIPASE: CPT | Mod: ORL | Performed by: INTERNAL MEDICINE

## 2025-05-29 PROCEDURE — 80048 BASIC METABOLIC PNL TOTAL CA: CPT | Mod: ORL | Performed by: INTERNAL MEDICINE

## 2025-05-29 PROCEDURE — 80197 ASSAY OF TACROLIMUS: CPT | Mod: ORL | Performed by: INTERNAL MEDICINE

## 2025-05-29 PROCEDURE — 85025 COMPLETE CBC W/AUTO DIFF WBC: CPT | Mod: ORL | Performed by: INTERNAL MEDICINE

## 2025-05-30 ENCOUNTER — RESULTS FOLLOW-UP (OUTPATIENT)
Dept: TRANSPLANT | Facility: CLINIC | Age: 55
End: 2025-05-30

## 2025-05-30 LAB
AMYLASE SERPL-CCNC: 80 U/L (ref 28–100)
ANION GAP SERPL CALCULATED.3IONS-SCNC: 12 MMOL/L (ref 7–15)
BUN SERPL-MCNC: 25.7 MG/DL (ref 6–20)
CALCIUM SERPL-MCNC: 9.2 MG/DL (ref 8.8–10.4)
CHLORIDE SERPL-SCNC: 100 MMOL/L (ref 98–107)
CREAT SERPL-MCNC: 2.07 MG/DL (ref 0.51–0.95)
EGFRCR SERPLBLD CKD-EPI 2021: 27 ML/MIN/1.73M2
GLUCOSE SERPL-MCNC: 99 MG/DL (ref 70–99)
HCO3 SERPL-SCNC: 26 MMOL/L (ref 22–29)
LIPASE SERPL-CCNC: 133 U/L (ref 13–60)
POTASSIUM SERPL-SCNC: 4.6 MMOL/L (ref 3.4–5.3)
SODIUM SERPL-SCNC: 138 MMOL/L (ref 135–145)
TACROLIMUS BLD-MCNC: 16.9 UG/L (ref 5–15)
TME LAST DOSE: ABNORMAL H
TME LAST DOSE: ABNORMAL H

## 2025-06-02 ENCOUNTER — LAB REQUISITION (OUTPATIENT)
Dept: LAB | Facility: CLINIC | Age: 55
End: 2025-06-02
Payer: COMMERCIAL

## 2025-06-02 ENCOUNTER — PRE VISIT (OUTPATIENT)
Dept: PULMONOLOGY | Facility: CLINIC | Age: 55
End: 2025-06-02

## 2025-06-02 DIAGNOSIS — Z94.0 KIDNEY TRANSPLANT STATUS: ICD-10-CM

## 2025-06-02 DIAGNOSIS — Z94.83 PANCREAS TRANSPLANT STATUS (H): ICD-10-CM

## 2025-06-02 LAB
AMYLASE SERPL-CCNC: 131 U/L (ref 28–100)
ANION GAP SERPL CALCULATED.3IONS-SCNC: 14 MMOL/L (ref 7–15)
BASOPHILS # BLD AUTO: 0 10E3/UL (ref 0–0.2)
BASOPHILS NFR BLD AUTO: 0 %
BUN SERPL-MCNC: 25.2 MG/DL (ref 6–20)
CALCIUM SERPL-MCNC: 9.8 MG/DL (ref 8.8–10.4)
CHLORIDE SERPL-SCNC: 99 MMOL/L (ref 98–107)
CREAT SERPL-MCNC: 2.12 MG/DL (ref 0.51–0.95)
EGFRCR SERPLBLD CKD-EPI 2021: 27 ML/MIN/1.73M2
EOSINOPHIL # BLD AUTO: 0 10E3/UL (ref 0–0.7)
EOSINOPHIL NFR BLD AUTO: 0 %
ERYTHROCYTE [DISTWIDTH] IN BLOOD BY AUTOMATED COUNT: 14.2 % (ref 10–15)
GLUCOSE SERPL-MCNC: 96 MG/DL (ref 70–99)
HCO3 SERPL-SCNC: 23 MMOL/L (ref 22–29)
HCT VFR BLD AUTO: 27.7 % (ref 35–47)
HGB BLD-MCNC: 8.8 G/DL (ref 11.7–15.7)
IMM GRANULOCYTES # BLD: 0.1 10E3/UL
IMM GRANULOCYTES NFR BLD: 1 %
LIPASE SERPL-CCNC: 227 U/L (ref 13–60)
LYMPHOCYTES # BLD AUTO: 0.2 10E3/UL (ref 0.8–5.3)
LYMPHOCYTES NFR BLD AUTO: 2 %
MAGNESIUM SERPL-MCNC: 1.7 MG/DL (ref 1.7–2.3)
MCH RBC QN AUTO: 30 PG (ref 26.5–33)
MCHC RBC AUTO-ENTMCNC: 31.8 G/DL (ref 31.5–36.5)
MCV RBC AUTO: 95 FL (ref 78–100)
MONOCYTES # BLD AUTO: 0.3 10E3/UL (ref 0–1.3)
MONOCYTES NFR BLD AUTO: 2 %
NEUTROPHILS # BLD AUTO: 11.4 10E3/UL (ref 1.6–8.3)
NEUTROPHILS NFR BLD AUTO: 94 %
NRBC # BLD AUTO: 0 10E3/UL
NRBC BLD AUTO-RTO: 0 /100
PHOSPHATE SERPL-MCNC: 3.4 MG/DL (ref 2.5–4.5)
PLATELET # BLD AUTO: 219 10E3/UL (ref 150–450)
POTASSIUM SERPL-SCNC: 4.5 MMOL/L (ref 3.4–5.3)
RBC # BLD AUTO: 2.93 10E6/UL (ref 3.8–5.2)
SODIUM SERPL-SCNC: 136 MMOL/L (ref 135–145)
TACROLIMUS BLD-MCNC: 6.4 UG/L (ref 5–15)
TME LAST DOSE: NORMAL H
TME LAST DOSE: NORMAL H
WBC # BLD AUTO: 12.1 10E3/UL (ref 4–11)

## 2025-06-02 PROCEDURE — 85025 COMPLETE CBC W/AUTO DIFF WBC: CPT | Mod: ORL | Performed by: INTERNAL MEDICINE

## 2025-06-02 PROCEDURE — 80048 BASIC METABOLIC PNL TOTAL CA: CPT | Mod: ORL | Performed by: INTERNAL MEDICINE

## 2025-06-02 PROCEDURE — 84100 ASSAY OF PHOSPHORUS: CPT | Mod: ORL | Performed by: INTERNAL MEDICINE

## 2025-06-02 PROCEDURE — 83735 ASSAY OF MAGNESIUM: CPT | Mod: ORL | Performed by: INTERNAL MEDICINE

## 2025-06-02 PROCEDURE — 80180 DRUG SCRN QUAN MYCOPHENOLATE: CPT | Mod: ORL | Performed by: INTERNAL MEDICINE

## 2025-06-02 PROCEDURE — 83690 ASSAY OF LIPASE: CPT | Mod: ORL | Performed by: INTERNAL MEDICINE

## 2025-06-02 PROCEDURE — 82150 ASSAY OF AMYLASE: CPT | Mod: ORL | Performed by: INTERNAL MEDICINE

## 2025-06-02 PROCEDURE — 80197 ASSAY OF TACROLIMUS: CPT | Mod: ORL | Performed by: INTERNAL MEDICINE

## 2025-06-03 ENCOUNTER — TELEPHONE (OUTPATIENT)
Dept: TRANSPLANT | Facility: CLINIC | Age: 55
End: 2025-06-03

## 2025-06-03 ENCOUNTER — RESULTS FOLLOW-UP (OUTPATIENT)
Dept: MULTI SPECIALTY CLINIC | Facility: CLINIC | Age: 55
End: 2025-06-03

## 2025-06-03 ENCOUNTER — ANCILLARY PROCEDURE (OUTPATIENT)
Dept: ULTRASOUND IMAGING | Facility: CLINIC | Age: 55
End: 2025-06-03
Attending: INTERNAL MEDICINE
Payer: COMMERCIAL

## 2025-06-03 DIAGNOSIS — R79.89 ELEVATED SERUM CREATININE: ICD-10-CM

## 2025-06-03 DIAGNOSIS — Z48.298 AFTERCARE FOLLOWING ORGAN TRANSPLANT: ICD-10-CM

## 2025-06-03 LAB
MYCOPHENOLATE SERPL LC/MS/MS-MCNC: 3.17 MG/L (ref 1–3.5)
MYCOPHENOLATE-G SERPL LC/MS/MS-MCNC: 102.8 MG/L (ref 30–95)
TME LAST DOSE: ABNORMAL H
TME LAST DOSE: ABNORMAL H

## 2025-06-03 PROCEDURE — 76776 US EXAM K TRANSPL W/DOPPLER: CPT | Mod: GC | Performed by: RADIOLOGY

## 2025-06-03 NOTE — TELEPHONE ENCOUNTER
Pt is scheduled for kidney biopsy tomorrow- She takes 325 mg of ASA  a day  should she hold it or re-schedule biopsy

## 2025-06-03 NOTE — TELEPHONE ENCOUNTER
RNCC gave patient the nurse line number, to give them a call to ensure this is ok to proceed with the biopsy

## 2025-06-04 ENCOUNTER — RESULTS FOLLOW-UP (OUTPATIENT)
Dept: TRANSPLANT | Facility: CLINIC | Age: 55
End: 2025-06-04

## 2025-06-04 ENCOUNTER — APPOINTMENT (OUTPATIENT)
Dept: MEDSURG UNIT | Facility: CLINIC | Age: 55
End: 2025-06-04
Attending: RADIOLOGY
Payer: COMMERCIAL

## 2025-06-04 ENCOUNTER — TELEPHONE (OUTPATIENT)
Dept: TRANSPLANT | Facility: CLINIC | Age: 55
End: 2025-06-04

## 2025-06-04 ENCOUNTER — APPOINTMENT (OUTPATIENT)
Dept: INTERVENTIONAL RADIOLOGY/VASCULAR | Facility: CLINIC | Age: 55
End: 2025-06-04
Attending: STUDENT IN AN ORGANIZED HEALTH CARE EDUCATION/TRAINING PROGRAM
Payer: COMMERCIAL

## 2025-06-04 ENCOUNTER — HOSPITAL ENCOUNTER (OUTPATIENT)
Facility: CLINIC | Age: 55
Discharge: HOME | End: 2025-06-04
Attending: RADIOLOGY | Admitting: RADIOLOGY
Payer: COMMERCIAL

## 2025-06-04 VITALS
HEART RATE: 87 BPM | DIASTOLIC BLOOD PRESSURE: 66 MMHG | BODY MASS INDEX: 24.97 KG/M2 | RESPIRATION RATE: 16 BRPM | WEIGHT: 127.87 LBS | TEMPERATURE: 98.2 F | SYSTOLIC BLOOD PRESSURE: 126 MMHG | OXYGEN SATURATION: 100 %

## 2025-06-04 DIAGNOSIS — Z48.298 AFTERCARE FOLLOWING ORGAN TRANSPLANT: ICD-10-CM

## 2025-06-04 DIAGNOSIS — R79.89 ELEVATED SERUM CREATININE: ICD-10-CM

## 2025-06-04 LAB
ALBUMIN MFR UR ELPH: 27 MG/DL
ALBUMIN UR-MCNC: 20 MG/DL
ANION GAP SERPL CALCULATED.3IONS-SCNC: 13 MMOL/L (ref 7–15)
APPEARANCE UR: ABNORMAL
BASOPHILS # BLD AUTO: 0 10E3/UL (ref 0–0.2)
BASOPHILS NFR BLD AUTO: 0 %
BILIRUB UR QL STRIP: NEGATIVE
BUN SERPL-MCNC: 34.7 MG/DL (ref 6–20)
CALCIUM SERPL-MCNC: 9.6 MG/DL (ref 8.8–10.4)
CHLORIDE SERPL-SCNC: 101 MMOL/L (ref 98–107)
COLOR UR AUTO: ABNORMAL
CREAT SERPL-MCNC: 1.95 MG/DL (ref 0.51–0.95)
CREAT UR-MCNC: 63.8 MG/DL
EGFRCR SERPLBLD CKD-EPI 2021: 30 ML/MIN/1.73M2
EOSINOPHIL # BLD AUTO: 0 10E3/UL (ref 0–0.7)
EOSINOPHIL NFR BLD AUTO: 0 %
ERYTHROCYTE [DISTWIDTH] IN BLOOD BY AUTOMATED COUNT: 14.4 % (ref 10–15)
GLUCOSE SERPL-MCNC: 111 MG/DL (ref 70–99)
GLUCOSE UR STRIP-MCNC: NEGATIVE MG/DL
HCO3 SERPL-SCNC: 25 MMOL/L (ref 22–29)
HCT VFR BLD AUTO: 27.8 % (ref 35–47)
HGB BLD-MCNC: 9 G/DL (ref 11.7–15.7)
HGB UR QL STRIP: NEGATIVE
IMM GRANULOCYTES # BLD: 0.2 10E3/UL
IMM GRANULOCYTES NFR BLD: 2 %
KETONES UR STRIP-MCNC: NEGATIVE MG/DL
LEUKOCYTE ESTERASE UR QL STRIP: ABNORMAL
LYMPHOCYTES # BLD AUTO: 0.1 10E3/UL (ref 0.8–5.3)
LYMPHOCYTES NFR BLD AUTO: 2 %
MCH RBC QN AUTO: 29.9 PG (ref 26.5–33)
MCHC RBC AUTO-ENTMCNC: 32.4 G/DL (ref 31.5–36.5)
MCV RBC AUTO: 92 FL (ref 78–100)
MONOCYTES # BLD AUTO: 0.3 10E3/UL (ref 0–1.3)
MONOCYTES NFR BLD AUTO: 3 %
NEUTROPHILS # BLD AUTO: 8.6 10E3/UL (ref 1.6–8.3)
NEUTROPHILS NFR BLD AUTO: 93 %
NITRATE UR QL: NEGATIVE
NRBC # BLD AUTO: 0 10E3/UL
NRBC BLD AUTO-RTO: 0 /100
PH UR STRIP: 6.5 [PH] (ref 5–7)
PLATELET # BLD AUTO: 175 10E3/UL (ref 150–450)
POTASSIUM SERPL-SCNC: 4.2 MMOL/L (ref 3.4–5.3)
PROT/CREAT 24H UR: 0.42 MG/MG CR (ref 0–0.2)
RBC # BLD AUTO: 3.01 10E6/UL (ref 3.8–5.2)
SODIUM SERPL-SCNC: 139 MMOL/L (ref 135–145)
SP GR UR STRIP: 1.01 (ref 1–1.03)
UROBILINOGEN UR STRIP-MCNC: NORMAL MG/DL
WBC # BLD AUTO: 9.2 10E3/UL (ref 4–11)

## 2025-06-04 PROCEDURE — 85025 COMPLETE CBC W/AUTO DIFF WBC: CPT | Performed by: STUDENT IN AN ORGANIZED HEALTH CARE EDUCATION/TRAINING PROGRAM

## 2025-06-04 PROCEDURE — 76942 ECHO GUIDE FOR BIOPSY: CPT | Mod: 26 | Performed by: PHYSICIAN ASSISTANT

## 2025-06-04 PROCEDURE — 250N000011 HC RX IP 250 OP 636: Mod: JZ | Performed by: PHYSICIAN ASSISTANT

## 2025-06-04 PROCEDURE — 99152 MOD SED SAME PHYS/QHP 5/>YRS: CPT | Performed by: PHYSICIAN ASSISTANT

## 2025-06-04 PROCEDURE — 86828 HLA CLASS I&II ANTIBODY QUAL: CPT | Performed by: STUDENT IN AN ORGANIZED HEALTH CARE EDUCATION/TRAINING PROGRAM

## 2025-06-04 PROCEDURE — 81003 URINALYSIS AUTO W/O SCOPE: CPT | Performed by: STUDENT IN AN ORGANIZED HEALTH CARE EDUCATION/TRAINING PROGRAM

## 2025-06-04 PROCEDURE — 88348 ELECTRON MICROSCOPY DX: CPT | Mod: 26 | Performed by: PATHOLOGY

## 2025-06-04 PROCEDURE — 50200 RENAL BIOPSY PERQ: CPT

## 2025-06-04 PROCEDURE — 50200 RENAL BIOPSY PERQ: CPT | Mod: LT | Performed by: PHYSICIAN ASSISTANT

## 2025-06-04 PROCEDURE — 86832 HLA CLASS I HIGH DEFIN QUAL: CPT | Performed by: STUDENT IN AN ORGANIZED HEALTH CARE EDUCATION/TRAINING PROGRAM

## 2025-06-04 PROCEDURE — 82310 ASSAY OF CALCIUM: CPT | Performed by: STUDENT IN AN ORGANIZED HEALTH CARE EDUCATION/TRAINING PROGRAM

## 2025-06-04 PROCEDURE — 84156 ASSAY OF PROTEIN URINE: CPT | Performed by: STUDENT IN AN ORGANIZED HEALTH CARE EDUCATION/TRAINING PROGRAM

## 2025-06-04 PROCEDURE — 87799 DETECT AGENT NOS DNA QUANT: CPT | Performed by: STUDENT IN AN ORGANIZED HEALTH CARE EDUCATION/TRAINING PROGRAM

## 2025-06-04 PROCEDURE — 88313 SPECIAL STAINS GROUP 2: CPT | Mod: 26 | Performed by: PATHOLOGY

## 2025-06-04 PROCEDURE — 86833 HLA CLASS II HIGH DEFIN QUAL: CPT | Performed by: STUDENT IN AN ORGANIZED HEALTH CARE EDUCATION/TRAINING PROGRAM

## 2025-06-04 PROCEDURE — 88346 IMFLUOR 1ST 1ANTB STAIN PX: CPT | Mod: 26 | Performed by: PATHOLOGY

## 2025-06-04 PROCEDURE — 999N000142 HC STATISTIC PROCEDURE PREP ONLY

## 2025-06-04 PROCEDURE — 999N000133 HC STATISTIC PP CARE STAGE 2

## 2025-06-04 PROCEDURE — 88350 IMFLUOR EA ADDL 1ANTB STN PX: CPT | Mod: 26 | Performed by: PATHOLOGY

## 2025-06-04 PROCEDURE — 88305 TISSUE EXAM BY PATHOLOGIST: CPT | Mod: 26 | Performed by: PATHOLOGY

## 2025-06-04 PROCEDURE — 36415 COLL VENOUS BLD VENIPUNCTURE: CPT | Performed by: STUDENT IN AN ORGANIZED HEALTH CARE EDUCATION/TRAINING PROGRAM

## 2025-06-04 PROCEDURE — 99152 MOD SED SAME PHYS/QHP 5/>YRS: CPT

## 2025-06-04 PROCEDURE — 80048 BASIC METABOLIC PNL TOTAL CA: CPT | Performed by: STUDENT IN AN ORGANIZED HEALTH CARE EDUCATION/TRAINING PROGRAM

## 2025-06-04 PROCEDURE — 88346 IMFLUOR 1ST 1ANTB STAIN PX: CPT | Mod: TC | Performed by: STUDENT IN AN ORGANIZED HEALTH CARE EDUCATION/TRAINING PROGRAM

## 2025-06-04 PROCEDURE — 87086 URINE CULTURE/COLONY COUNT: CPT | Performed by: STUDENT IN AN ORGANIZED HEALTH CARE EDUCATION/TRAINING PROGRAM

## 2025-06-04 RX ORDER — FENTANYL CITRATE 50 UG/ML
25-50 INJECTION, SOLUTION INTRAMUSCULAR; INTRAVENOUS EVERY 5 MIN PRN
Refills: 0 | Status: DISCONTINUED | OUTPATIENT
Start: 2025-06-04 | End: 2025-06-04 | Stop reason: HOSPADM

## 2025-06-04 RX ORDER — FLUMAZENIL 0.1 MG/ML
0.2 INJECTION, SOLUTION INTRAVENOUS
Status: DISCONTINUED | OUTPATIENT
Start: 2025-06-04 | End: 2025-06-04 | Stop reason: HOSPADM

## 2025-06-04 RX ORDER — NALOXONE HYDROCHLORIDE 0.4 MG/ML
0.2 INJECTION, SOLUTION INTRAMUSCULAR; INTRAVENOUS; SUBCUTANEOUS
Status: DISCONTINUED | OUTPATIENT
Start: 2025-06-04 | End: 2025-06-04 | Stop reason: HOSPADM

## 2025-06-04 RX ORDER — NALOXONE HYDROCHLORIDE 0.4 MG/ML
0.4 INJECTION, SOLUTION INTRAMUSCULAR; INTRAVENOUS; SUBCUTANEOUS
Status: DISCONTINUED | OUTPATIENT
Start: 2025-06-04 | End: 2025-06-04 | Stop reason: HOSPADM

## 2025-06-04 RX ORDER — LIDOCAINE 40 MG/G
CREAM TOPICAL
Status: DISCONTINUED | OUTPATIENT
Start: 2025-06-04 | End: 2025-06-04 | Stop reason: HOSPADM

## 2025-06-04 RX ADMIN — FENTANYL CITRATE 50 MCG: 50 INJECTION INTRAMUSCULAR; INTRAVENOUS at 11:26

## 2025-06-04 RX ADMIN — MIDAZOLAM 1 MG: 1 INJECTION INTRAMUSCULAR; INTRAVENOUS at 11:26

## 2025-06-04 ASSESSMENT — ACTIVITIES OF DAILY LIVING (ADL)
ADLS_ACUITY_SCORE: 58

## 2025-06-04 NOTE — PROGRESS NOTES
Patient Name: Wendy Kapoor  Medical Record Number: [id number]  Today's Date: 6/4/2025    Procedure: Left transplanted kidney biopsy  Proceduralist: Ralph Evans PA-C  Pathology present: yes    Procedure Start: 1118  Procedure end: 1128  Sedation medications administered: 50 mcg fentanyl and 1 mg versed     Report given to: Morro BEARD  : no    Other Notes: Pt arrived to IR room 6 from . Consent reviewed. Pt denies any questions or concerns regarding procedure. Pt positioned supine and monitored per protocol. Pt tolerated procedure without any noted complications. Pt transferred back to .

## 2025-06-04 NOTE — DISCHARGE INSTRUCTIONS
[facility]    Interventional Radiology  Patient Instructions Following Biopsy    AFTER YOU GO HOME  If you were given sedation, for the first 24 hours: we recommend that an adult stay with you, DO NOT drive or operate machinery at home or at work, DO NOT smoke, DO NOT make any important or legal decisions.  DO NOT drink alcoholic beverages the day of your procedure  Drink plenty of fluids   Resume your regular diet, unless otherwise instructed by your Primary Physician  Relax and take it easy for 48 hours  DO NOT do any strenuous exercise or lifting (> 10 lbs) for at least 3 days following your procedure  Keep the dressing dry and in place for 24 hours. Replace with Band aid for 2 days.  Never leave a wet dressing in place.  Do not take a shower for at least 12 hours following your procedure. No tub bath, hot tub, or swimming for 5 days.  There should be minimum drainage from the biopsy site    CALL THE PHYSICIAN IF:  You start bleeding from the procedure site.  If you do start to bleed from that site, lie down flat and hold pressure on the site for a minimum of 10 minutes.  Your physician will tell you if you need to return to the hospital  You develop nausea or vomiting  You have excessive swelling, redness, or tenderness at the site  You have drainage that looks like it is infected.  You experience severe pain  You develop hives or a rash or unexplained itching  You develop shortness of breath  You develop a temperature of 101 degrees F or greater  You develop bloody clots or red urine after you are discharged    [facility] INTERVENTIONAL RADIOLOGY DEPARTMENT  Procedure Physician: MAGGIE Escalante                                     Date of procedure: June 4, 2025  Telephone Numbers: [phone]      Monday-Friday 7:30 am to 4:00 pm  [phone] After 4:00 pm Monday-Friday, Weekends & Holidays. Option #4 for the , and then ask for the Interventional Radiologist on call.  Someone is on call  24 hrs/day  [facility] toll free number: [phone] Monday-Friday 8:00 am to 4:30 pm  [facility] Emergency Dept: [phone]

## 2025-06-04 NOTE — PROGRESS NOTES
Pt returned from transplant kidney biopsy to 2A room 5. LLQ site CDI, VSS, denies pain. Eating and drinking without issue.  at bedside.

## 2025-06-04 NOTE — PROGRESS NOTES
Pt prepped for transplant renal biopsy. Labs in process, PIV SL, and consent completed.  at bedside will be ride home.

## 2025-06-04 NOTE — PROGRESS NOTES
Pt discharging after transplant rental biopsy. LLQ site CDI with no hematoma. Pt eating, drinking, ambulating and voiding. Pt  is at bedside and daughter coming to drive them home. Pt demonstrated understanding of discharge teaching. PIV intact upon removal.

## 2025-06-04 NOTE — PROCEDURES
[facility]    Procedure: IR Procedure Note    Date/Time: 6/4/2025 11:34 AM    Performed by: Ralph Evans PA-C  Authorized by: Ralph Evans PA-C  IR Fellow Physician:    Pre Procedure Diagnosis: transplant kidney biopsy  Post Procedure Diagnosis: transplant kidney biopsy    UNIVERSAL PROTOCOL   Site Marked: NA  Prior Images Obtained and Reviewed:  Yes  Required items: Required blood products, implants, devices and special equipment available    Patient identity confirmed:  Arm band, provided demographic data, hospital-assigned identification number and verbally with patient  Patient was reevaluated immediately before administering moderate or deep sedation or anesthesia  Confirmation Checklist:  Correct equipment/implants were available, procedure was appropriate and matched the consent or emergent situation, relevant allergies and patient's identity using two indicators  Time out: Immediately prior to the procedure a time out was called    Universal Protocol: the Joint Commission Universal Protocol was followed    Preparation: Patient was prepped and draped in usual sterile fashion       ANESTHESIA    Anesthesia:  Local infiltration  Local Anesthetic:  Lidocaine 1% without epinephrine  Anesthetic Total (mL):  6      SEDATION  Patient Sedated: Yes    Sedation Type:  Moderate (conscious) sedation  Sedation:  Fentanyl and midazolam  Vital signs: Vital signs monitored during sedation    See dictated procedure note for full details.  Findings: Moderate sedation for transplant kidney biopsy - 1 mg of midazolam and 50 mcg of fentanyl given over 10 minutes. Patient tolerated procedure well.     Specimens: core needle biopsy specimens sent for pathological analysis    Procedural Complications: None    Condition: Stable    Plan: 2 hours bedrest      PROCEDURE  Describe Procedure: Completed ultrasound-guided left transplant kidney biopsy. 3 passes yielded 3 cores sent  to pathology in preservative. Gelfoam in tract. No immediate complication.      Patient Tolerance:  Patient tolerated the procedure well with no immediate complications  Length of time physician/provider present for 1:1 monitoring during sedation:  0-22 min

## 2025-06-05 ENCOUNTER — LAB REQUISITION (OUTPATIENT)
Dept: LAB | Facility: CLINIC | Age: 55
End: 2025-06-05
Payer: COMMERCIAL

## 2025-06-05 ENCOUNTER — TELEPHONE (OUTPATIENT)
Dept: TRANSPLANT | Facility: CLINIC | Age: 55
End: 2025-06-05

## 2025-06-05 ENCOUNTER — RESULTS FOLLOW-UP (OUTPATIENT)
Dept: MULTI SPECIALTY CLINIC | Facility: CLINIC | Age: 55
End: 2025-06-05

## 2025-06-05 DIAGNOSIS — Z94.83 PANCREAS TRANSPLANT STATUS (H): ICD-10-CM

## 2025-06-05 DIAGNOSIS — Z94.0 KIDNEY TRANSPLANT STATUS: ICD-10-CM

## 2025-06-05 LAB
AMYLASE SERPL-CCNC: 166 U/L (ref 28–100)
ANION GAP SERPL CALCULATED.3IONS-SCNC: 13 MMOL/L (ref 7–15)
BACTERIA UR CULT: NORMAL
BASOPHILS # BLD AUTO: 0 10E3/UL (ref 0–0.2)
BASOPHILS NFR BLD AUTO: 0 %
BK VIRUS SPECIMEN TYPE: NORMAL
BKV DNA # SPEC NAA+PROBE: NOT DETECTED IU/ML
BUN SERPL-MCNC: 38.6 MG/DL (ref 6–20)
CALCIUM SERPL-MCNC: 9.7 MG/DL (ref 8.8–10.4)
CHLORIDE SERPL-SCNC: 101 MMOL/L (ref 98–107)
CREAT SERPL-MCNC: 2.07 MG/DL (ref 0.51–0.95)
EGFRCR SERPLBLD CKD-EPI 2021: 27 ML/MIN/1.73M2
EOSINOPHIL # BLD AUTO: 0 10E3/UL (ref 0–0.7)
EOSINOPHIL NFR BLD AUTO: 0 %
ERYTHROCYTE [DISTWIDTH] IN BLOOD BY AUTOMATED COUNT: 14.3 % (ref 10–15)
GLUCOSE SERPL-MCNC: 95 MG/DL (ref 70–99)
HCO3 SERPL-SCNC: 24 MMOL/L (ref 22–29)
HCT VFR BLD AUTO: 24.9 % (ref 35–47)
HGB BLD-MCNC: 8.1 G/DL (ref 11.7–15.7)
IMM GRANULOCYTES # BLD: 0.1 10E3/UL
IMM GRANULOCYTES NFR BLD: 2 %
LIPASE SERPL-CCNC: 385 U/L (ref 13–60)
LYMPHOCYTES # BLD AUTO: 0.2 10E3/UL (ref 0.8–5.3)
LYMPHOCYTES NFR BLD AUTO: 3 %
MCH RBC QN AUTO: 30.8 PG (ref 26.5–33)
MCHC RBC AUTO-ENTMCNC: 32.5 G/DL (ref 31.5–36.5)
MCV RBC AUTO: 95 FL (ref 78–100)
MONOCYTES # BLD AUTO: 0.2 10E3/UL (ref 0–1.3)
MONOCYTES NFR BLD AUTO: 3 %
NEUTROPHILS # BLD AUTO: 5.9 10E3/UL (ref 1.6–8.3)
NEUTROPHILS NFR BLD AUTO: 91 %
NRBC # BLD AUTO: 0 10E3/UL
NRBC BLD AUTO-RTO: 0 /100
PLATELET # BLD AUTO: 190 10E3/UL (ref 150–450)
POTASSIUM SERPL-SCNC: 4.2 MMOL/L (ref 3.4–5.3)
RBC # BLD AUTO: 2.63 10E6/UL (ref 3.8–5.2)
SODIUM SERPL-SCNC: 138 MMOL/L (ref 135–145)
TACROLIMUS BLD-MCNC: 6.4 UG/L (ref 5–15)
TME LAST DOSE: NORMAL H
TME LAST DOSE: NORMAL H
WBC # BLD AUTO: 6.5 10E3/UL (ref 4–11)

## 2025-06-05 PROCEDURE — 80197 ASSAY OF TACROLIMUS: CPT | Mod: ORL | Performed by: INTERNAL MEDICINE

## 2025-06-05 NOTE — TELEPHONE ENCOUNTER
Provider Call: General  Route to LPN    Reason for call: Call from N-they are doscharging pt early-  pt is no longer home bound- they are closing her case on Mon 6/9/25  they will make sure she has lab appt where she will be getting her labs done before they are done     Call back needed? No

## 2025-06-06 ENCOUNTER — RESULTS FOLLOW-UP (OUTPATIENT)
Dept: TRANSPLANT | Facility: CLINIC | Age: 55
End: 2025-06-06

## 2025-06-06 LAB
PATH REPORT.COMMENTS IMP SPEC: NORMAL
PATH REPORT.COMMENTS IMP SPEC: NORMAL
PATH REPORT.FINAL DX SPEC: NORMAL
PATH REPORT.GROSS SPEC: NORMAL
PATH REPORT.MICROSCOPIC SPEC OTHER STN: NORMAL
PATH REPORT.RELEVANT HX SPEC: NORMAL
PHOTO IMAGE: NORMAL

## 2025-06-09 ENCOUNTER — TELEPHONE (OUTPATIENT)
Dept: TRANSPLANT | Facility: CLINIC | Age: 55
End: 2025-06-09
Payer: COMMERCIAL

## 2025-06-09 ENCOUNTER — LAB REQUISITION (OUTPATIENT)
Dept: LAB | Facility: CLINIC | Age: 55
End: 2025-06-09
Payer: COMMERCIAL

## 2025-06-09 DIAGNOSIS — Z48.298 AFTERCARE FOLLOWING ORGAN TRANSPLANT: ICD-10-CM

## 2025-06-09 DIAGNOSIS — Z94.0 KIDNEY TRANSPLANT STATUS: ICD-10-CM

## 2025-06-09 DIAGNOSIS — R74.8 ELEVATED LIPASE: Primary | ICD-10-CM

## 2025-06-09 DIAGNOSIS — Z94.83 PANCREAS TRANSPLANT STATUS (H): ICD-10-CM

## 2025-06-09 LAB
AMYLASE SERPL-CCNC: 144 U/L (ref 28–100)
ANION GAP SERPL CALCULATED.3IONS-SCNC: 13 MMOL/L (ref 7–15)
BASOPHILS # BLD AUTO: 0 10E3/UL (ref 0–0.2)
BASOPHILS NFR BLD AUTO: 0 %
BUN SERPL-MCNC: 23.8 MG/DL (ref 6–20)
CALCIUM SERPL-MCNC: 9.6 MG/DL (ref 8.8–10.4)
CHLORIDE SERPL-SCNC: 104 MMOL/L (ref 98–107)
CREAT SERPL-MCNC: 1.54 MG/DL (ref 0.51–0.95)
EGFRCR SERPLBLD CKD-EPI 2021: 39 ML/MIN/1.73M2
EOSINOPHIL # BLD AUTO: 0.1 10E3/UL (ref 0–0.7)
EOSINOPHIL NFR BLD AUTO: 1 %
ERYTHROCYTE [DISTWIDTH] IN BLOOD BY AUTOMATED COUNT: 14.4 % (ref 10–15)
GLUCOSE SERPL-MCNC: 89 MG/DL (ref 70–99)
HCO3 SERPL-SCNC: 24 MMOL/L (ref 22–29)
HCT VFR BLD AUTO: 24.4 % (ref 35–47)
HGB BLD-MCNC: 7.9 G/DL (ref 11.7–15.7)
IMM GRANULOCYTES # BLD: 0.1 10E3/UL
IMM GRANULOCYTES NFR BLD: 1 %
LIPASE SERPL-CCNC: 270 U/L (ref 13–60)
LYMPHOCYTES # BLD AUTO: 0.2 10E3/UL (ref 0.8–5.3)
LYMPHOCYTES NFR BLD AUTO: 3 %
MAGNESIUM SERPL-MCNC: 1.7 MG/DL (ref 1.7–2.3)
MCH RBC QN AUTO: 31.5 PG (ref 26.5–33)
MCHC RBC AUTO-ENTMCNC: 32.4 G/DL (ref 31.5–36.5)
MCV RBC AUTO: 97 FL (ref 78–100)
MONOCYTES # BLD AUTO: 0.2 10E3/UL (ref 0–1.3)
MONOCYTES NFR BLD AUTO: 3 %
NEUTROPHILS # BLD AUTO: 6.2 10E3/UL (ref 1.6–8.3)
NEUTROPHILS NFR BLD AUTO: 92 %
NRBC # BLD AUTO: 0 10E3/UL
NRBC BLD AUTO-RTO: 0 /100
PHOSPHATE SERPL-MCNC: 2.7 MG/DL (ref 2.5–4.5)
PLATELET # BLD AUTO: 209 10E3/UL (ref 150–450)
POTASSIUM SERPL-SCNC: 4.4 MMOL/L (ref 3.4–5.3)
RBC # BLD AUTO: 2.51 10E6/UL (ref 3.8–5.2)
SODIUM SERPL-SCNC: 141 MMOL/L (ref 135–145)
TACROLIMUS BLD-MCNC: 8.5 UG/L (ref 5–15)
TME LAST DOSE: NORMAL H
TME LAST DOSE: NORMAL H
WBC # BLD AUTO: 6.7 10E3/UL (ref 4–11)

## 2025-06-09 PROCEDURE — 83690 ASSAY OF LIPASE: CPT | Mod: ORL | Performed by: INTERNAL MEDICINE

## 2025-06-09 PROCEDURE — 80180 DRUG SCRN QUAN MYCOPHENOLATE: CPT | Mod: ORL | Performed by: INTERNAL MEDICINE

## 2025-06-09 PROCEDURE — 82150 ASSAY OF AMYLASE: CPT | Mod: ORL | Performed by: INTERNAL MEDICINE

## 2025-06-09 PROCEDURE — 83735 ASSAY OF MAGNESIUM: CPT | Mod: ORL | Performed by: INTERNAL MEDICINE

## 2025-06-09 PROCEDURE — 84100 ASSAY OF PHOSPHORUS: CPT | Mod: ORL | Performed by: INTERNAL MEDICINE

## 2025-06-09 PROCEDURE — 80197 ASSAY OF TACROLIMUS: CPT | Mod: ORL | Performed by: INTERNAL MEDICINE

## 2025-06-09 PROCEDURE — 85025 COMPLETE CBC W/AUTO DIFF WBC: CPT | Mod: ORL | Performed by: INTERNAL MEDICINE

## 2025-06-09 PROCEDURE — 80048 BASIC METABOLIC PNL TOTAL CA: CPT | Mod: ORL | Performed by: INTERNAL MEDICINE

## 2025-06-09 NOTE — TELEPHONE ENCOUNTER
Spoke to patient to discuss the next steps of getting a CT scan to rule out any pancreatitis or other things happening with transplant, given elevated lipase levels. She will schedule this right away. Will bring up her case for review on Wednesday

## 2025-06-09 NOTE — TELEPHONE ENCOUNTER
Sergio Sena MD to Me  (Selected Message)      6/6/25  3:46 PM  Result Note  Kidney biopsy negative for rejection but lipase continues to up-trend. Past pancreas US without good visibility. Please obtain CT abdomen and pelvis with contrast given fast rise in lipase.

## 2025-06-10 ENCOUNTER — LAB REQUISITION (OUTPATIENT)
Dept: LAB | Facility: CLINIC | Age: 55
End: 2025-06-10
Payer: COMMERCIAL

## 2025-06-10 ENCOUNTER — HOSPITAL ENCOUNTER (OUTPATIENT)
Dept: RESPIRATORY THERAPY | Facility: CLINIC | Age: 55
Discharge: HOME | End: 2025-06-10
Attending: PHYSICIAN ASSISTANT
Payer: MEDICARE

## 2025-06-10 ENCOUNTER — RESULTS FOLLOW-UP (OUTPATIENT)
Dept: TRANSPLANT | Facility: CLINIC | Age: 55
End: 2025-06-10

## 2025-06-10 DIAGNOSIS — R91.8 PULMONARY NODULES: ICD-10-CM

## 2025-06-10 LAB
MYCOPHENOLATE SERPL LC/MS/MS-MCNC: 2.65 MG/L (ref 1–3.5)
MYCOPHENOLATE-G SERPL LC/MS/MS-MCNC: 158.3 MG/L (ref 30–95)
TME LAST DOSE: ABNORMAL H
TME LAST DOSE: ABNORMAL H

## 2025-06-10 PROCEDURE — 999N000157 HC STATISTIC RCP TIME EA 10 MIN

## 2025-06-10 NOTE — PROGRESS NOTES
PT arrived for PFT today however, Pt just had a kidney transplant in May 2025. She still has pain and a catheter in place. I suggested pt reschedule this appointment for a later date when she feels at her best. She was not prepared for what this test involved and I do not feel comfortable perform this test when the pt is not feeling her best and continues to be in pain. Labs have shown low RBC, hematocrit, and the need for further testing. Pt was given the number to call when she is ready for this test.   Thank you.

## 2025-06-11 ENCOUNTER — RESULTS FOLLOW-UP (OUTPATIENT)
Dept: TRANSPLANT | Facility: CLINIC | Age: 55
End: 2025-06-11

## 2025-06-11 ENCOUNTER — HOSPITAL ENCOUNTER (OUTPATIENT)
Dept: CT IMAGING | Facility: CLINIC | Age: 55
Discharge: HOME | End: 2025-06-11
Attending: STUDENT IN AN ORGANIZED HEALTH CARE EDUCATION/TRAINING PROGRAM
Payer: COMMERCIAL

## 2025-06-11 DIAGNOSIS — R74.8 ELEVATED LIPASE: ICD-10-CM

## 2025-06-11 DIAGNOSIS — Z48.298 AFTERCARE FOLLOWING ORGAN TRANSPLANT: ICD-10-CM

## 2025-06-11 LAB — RADIOLOGIST FLAGS: ABNORMAL

## 2025-06-11 PROCEDURE — 250N000011 HC RX IP 250 OP 636: Performed by: STUDENT IN AN ORGANIZED HEALTH CARE EDUCATION/TRAINING PROGRAM

## 2025-06-11 PROCEDURE — 250N000009 HC RX 250: Performed by: STUDENT IN AN ORGANIZED HEALTH CARE EDUCATION/TRAINING PROGRAM

## 2025-06-11 PROCEDURE — 74177 CT ABD & PELVIS W/CONTRAST: CPT

## 2025-06-11 RX ORDER — IOPAMIDOL 755 MG/ML
500 INJECTION, SOLUTION INTRAVASCULAR ONCE
Status: COMPLETED | OUTPATIENT
Start: 2025-06-11 | End: 2025-06-11

## 2025-06-11 RX ADMIN — SODIUM CHLORIDE 60 ML: 9 INJECTION, SOLUTION INTRAVENOUS at 10:26

## 2025-06-11 RX ADMIN — IOPAMIDOL 63 ML: 755 INJECTION, SOLUTION INTRAVENOUS at 10:26

## 2025-06-12 ENCOUNTER — RESULTS FOLLOW-UP (OUTPATIENT)
Dept: TRANSPLANT | Facility: CLINIC | Age: 55
End: 2025-06-12

## 2025-06-12 ENCOUNTER — VIRTUAL VISIT (OUTPATIENT)
Dept: TRANSPLANT | Facility: CLINIC | Age: 55
End: 2025-06-12
Attending: INTERNAL MEDICINE
Payer: COMMERCIAL

## 2025-06-12 ENCOUNTER — LAB (OUTPATIENT)
Dept: LAB | Facility: CLINIC | Age: 55
End: 2025-06-12
Attending: INTERNAL MEDICINE
Payer: COMMERCIAL

## 2025-06-12 VITALS
SYSTOLIC BLOOD PRESSURE: 131 MMHG | DIASTOLIC BLOOD PRESSURE: 79 MMHG | BODY MASS INDEX: 25.03 KG/M2 | WEIGHT: 128.19 LBS

## 2025-06-12 DIAGNOSIS — Z79.899 ENCOUNTER FOR LONG-TERM CURRENT USE OF MEDICATION: ICD-10-CM

## 2025-06-12 DIAGNOSIS — Z94.0 KIDNEY REPLACED BY TRANSPLANT: ICD-10-CM

## 2025-06-12 DIAGNOSIS — D47.2 MGUS (MONOCLONAL GAMMOPATHY OF UNKNOWN SIGNIFICANCE): ICD-10-CM

## 2025-06-12 DIAGNOSIS — Z48.298 AFTERCARE FOLLOWING ORGAN TRANSPLANT: ICD-10-CM

## 2025-06-12 DIAGNOSIS — I13.0 BENIGN HYPERTENSIVE HEART AND RENAL DISEASE WITH HEART FAILURE (H): ICD-10-CM

## 2025-06-12 DIAGNOSIS — R79.89 ELEVATED SERUM CREATININE: ICD-10-CM

## 2025-06-12 DIAGNOSIS — Z94.83 PANCREAS TRANSPLANT STATUS (H): ICD-10-CM

## 2025-06-12 DIAGNOSIS — Z98.890 OTHER SPECIFIED POSTPROCEDURAL STATES: ICD-10-CM

## 2025-06-12 DIAGNOSIS — Z20.828 CONTACT WITH AND (SUSPECTED) EXPOSURE TO OTHER VIRAL COMMUNICABLE DISEASES: ICD-10-CM

## 2025-06-12 DIAGNOSIS — N17.9 AKI (ACUTE KIDNEY INJURY): ICD-10-CM

## 2025-06-12 DIAGNOSIS — E83.39 HYPOPHOSPHATEMIA: ICD-10-CM

## 2025-06-12 DIAGNOSIS — E11.21 TYPE 2 DIABETES MELLITUS WITH DIABETIC NEPHROPATHY, WITH LONG-TERM CURRENT USE OF INSULIN (H): Primary | ICD-10-CM

## 2025-06-12 DIAGNOSIS — D84.9 IMMUNOSUPPRESSED STATUS: ICD-10-CM

## 2025-06-12 DIAGNOSIS — E83.42 HYPOMAGNESEMIA: ICD-10-CM

## 2025-06-12 DIAGNOSIS — I15.1 HTN, KIDNEY TRANSPLANT RELATED: ICD-10-CM

## 2025-06-12 DIAGNOSIS — Z94.0 KIDNEY TRANSPLANT RECIPIENT: ICD-10-CM

## 2025-06-12 DIAGNOSIS — N13.30 HYDRONEPHROSIS, UNSPECIFIED HYDRONEPHROSIS TYPE: ICD-10-CM

## 2025-06-12 DIAGNOSIS — Z94.0 HTN, KIDNEY TRANSPLANT RELATED: ICD-10-CM

## 2025-06-12 DIAGNOSIS — M35.00 SJOGREN'S SYNDROME, WITH UNSPECIFIED ORGAN INVOLVEMENT: ICD-10-CM

## 2025-06-12 DIAGNOSIS — Z79.4 TYPE 2 DIABETES MELLITUS WITH DIABETIC NEPHROPATHY, WITH LONG-TERM CURRENT USE OF INSULIN (H): Primary | ICD-10-CM

## 2025-06-12 DIAGNOSIS — E87.8 LOW BICARBONATE: ICD-10-CM

## 2025-06-12 PROBLEM — E83.51 HYPOCALCEMIA: Status: RESOLVED | Noted: 2025-05-09 | Resolved: 2025-06-12

## 2025-06-12 PROBLEM — E83.41 HYPERMAGNESEMIA: Status: RESOLVED | Noted: 2025-05-09 | Resolved: 2025-06-12

## 2025-06-12 LAB
ALBUMIN MFR UR ELPH: 18.4 MG/DL
ALBUMIN UR-MCNC: NEGATIVE MG/DL
AMYLASE SERPL-CCNC: 135 U/L (ref 28–100)
ANION GAP SERPL CALCULATED.3IONS-SCNC: 13 MMOL/L (ref 7–15)
APPEARANCE UR: CLEAR
BACTERIA #/AREA URNS HPF: ABNORMAL /HPF
BILIRUB UR QL STRIP: NEGATIVE
BUN SERPL-MCNC: 27.2 MG/DL (ref 6–20)
CALCIUM SERPL-MCNC: 10.1 MG/DL (ref 8.8–10.4)
CHLORIDE SERPL-SCNC: 101 MMOL/L (ref 98–107)
COLOR UR AUTO: ABNORMAL
CREAT SERPL-MCNC: 1.73 MG/DL (ref 0.51–0.95)
CREAT UR-MCNC: 31.5 MG/DL
EGFRCR SERPLBLD CKD-EPI 2021: 34 ML/MIN/1.73M2
ERYTHROCYTE [DISTWIDTH] IN BLOOD BY AUTOMATED COUNT: 14.7 % (ref 10–15)
FERRITIN SERPL-MCNC: 1102 NG/ML (ref 11–328)
GLUCOSE SERPL-MCNC: 104 MG/DL (ref 70–99)
GLUCOSE UR STRIP-MCNC: NEGATIVE MG/DL
HCO3 SERPL-SCNC: 24 MMOL/L (ref 22–29)
HCT VFR BLD AUTO: 24 % (ref 35–47)
HGB BLD-MCNC: 7.8 G/DL (ref 11.7–15.7)
HGB UR QL STRIP: NEGATIVE
IRON BINDING CAPACITY (ROCHE): 221 UG/DL (ref 240–430)
IRON SATN MFR SERPL: 27 % (ref 15–46)
IRON SERPL-MCNC: 60 UG/DL (ref 37–145)
KETONES UR STRIP-MCNC: NEGATIVE MG/DL
LEUKOCYTE ESTERASE UR QL STRIP: ABNORMAL
LIPASE SERPL-CCNC: 207 U/L (ref 13–60)
MAGNESIUM SERPL-MCNC: 1.8 MG/DL (ref 1.7–2.3)
MCH RBC QN AUTO: 30.2 PG (ref 26.5–33)
MCHC RBC AUTO-ENTMCNC: 32.5 G/DL (ref 31.5–36.5)
MCV RBC AUTO: 93 FL (ref 78–100)
MYCOPHENOLATE SERPL LC/MS/MS-MCNC: 2.82 MG/L (ref 1–3.5)
MYCOPHENOLATE-G SERPL LC/MS/MS-MCNC: 157.2 MG/L (ref 30–95)
NITRATE UR QL: NEGATIVE
PH UR STRIP: 6 [PH] (ref 5–7)
PHOSPHATE SERPL-MCNC: 3.3 MG/DL (ref 2.5–4.5)
PLATELET # BLD AUTO: 196 10E3/UL (ref 150–450)
POTASSIUM SERPL-SCNC: 4.4 MMOL/L (ref 3.4–5.3)
PROT/CREAT 24H UR: 0.58 MG/MG CR (ref 0–0.2)
PTH-INTACT SERPL-MCNC: 28 PG/ML (ref 15–65)
RBC # BLD AUTO: 2.58 10E6/UL (ref 3.8–5.2)
RBC URINE: <1 /HPF
SODIUM SERPL-SCNC: 138 MMOL/L (ref 135–145)
SP GR UR STRIP: 1.01 (ref 1–1.03)
SQUAMOUS EPITHELIAL: <1 /HPF
TACROLIMUS BLD-MCNC: 7.6 UG/L (ref 5–15)
TME LAST DOSE: ABNORMAL H
TME LAST DOSE: ABNORMAL H
TME LAST DOSE: NORMAL H
TME LAST DOSE: NORMAL H
UROBILINOGEN UR STRIP-MCNC: NORMAL MG/DL
VIT D+METAB SERPL-MCNC: 43 NG/ML (ref 20–50)
WBC # BLD AUTO: 6.5 10E3/UL (ref 4–11)
WBC CLUMPS #/AREA URNS HPF: PRESENT /HPF
WBC URINE: 26 /HPF

## 2025-06-12 PROCEDURE — 80197 ASSAY OF TACROLIMUS: CPT | Performed by: INTERNAL MEDICINE

## 2025-06-12 PROCEDURE — 99000 SPECIMEN HANDLING OFFICE-LAB: CPT | Performed by: PATHOLOGY

## 2025-06-12 PROCEDURE — 80180 DRUG SCRN QUAN MYCOPHENOLATE: CPT | Performed by: INTERNAL MEDICINE

## 2025-06-12 PROCEDURE — 82306 VITAMIN D 25 HYDROXY: CPT | Performed by: INTERNAL MEDICINE

## 2025-06-12 PROCEDURE — 87521 HEPATITIS C PROBE&RVRS TRNSC: CPT | Performed by: INTERNAL MEDICINE

## 2025-06-12 PROCEDURE — 87799 DETECT AGENT NOS DNA QUANT: CPT | Performed by: INTERNAL MEDICINE

## 2025-06-12 PROCEDURE — 87186 SC STD MICRODIL/AGAR DIL: CPT | Performed by: STUDENT IN AN ORGANIZED HEALTH CARE EDUCATION/TRAINING PROGRAM

## 2025-06-12 PROCEDURE — 86833 HLA CLASS II HIGH DEFIN QUAL: CPT | Performed by: INTERNAL MEDICINE

## 2025-06-12 ASSESSMENT — PAIN SCALES - GENERAL: PAINLEVEL_OUTOF10: NO PAIN (0)

## 2025-06-12 NOTE — PROGRESS NOTES
TRANSPLANT NEPHROLOGY CLINIC VISIT     Assessment & Plan   # DDKT (SPK): CKD Stage 3b - Trend up to 1.7 today. UA with WBC, culture pending. Also noted to have hydronephrosis in transplanted kidney and right native kidney. Will monitor for now. Will plan repeat imaging in 1-2 weeks based on labs.    - Baseline Creatinine: ~ TBD, kedar was 1.5   - Proteinuria: Mild (0.5-1.0 grams)   - DSA Hx: Low level DSA (<1000 mfi) to B51 and CW15 ~500 MFI   - Last cPRA: 98%   - BK Viremia: No   - Kidney Tx Biopsy Hx: No biopsy history.   - Primary Nephrologist: Dr. Giles.    # Pancreas Tx (SPK):    - Pancreatic Exocrine Drainage: Enteric drained     - Blood glucose: Euglycemia      On insulin: No   - HbA1c: not checked post transplant      Latest HbA1c: 5.9%   - Pancreatic enzymes: Trend down   - DSA Hx: Low level DSA (<1000 mfi) to B51 and CW15   - Pancreas Tx Biopsy Hx: No biopsy history    Recent up trend in lipase and amylase. Lipase peaked at ~300 and trending down but still high. CT scam showed tuan pancreatic fluid collection. As there is no pain or signs of infection and down trending enzymes, will follow up closely with out intervention (discussed briefly with her surgeon).    # Immunosuppression: Tacrolimus immediate release (goal 8-10), Mycophenolate mofetil (dose 750 mg every 12 hours), and Prednisone (dose 5 mg daily)   - Induction with Recent Transplant:  High Intensity Protocol   - Continue with intensive monitoring of immunosuppression for efficacy and toxicity.   - Historical Changes in Immunosuppression: Patient came in on prednisone 10 mg daily for possible inflammatory arthritis.  Lowered to prednisone 5 mg daily after transplant.   - Changes: Not at this time    # Infection Prevention:   Last CD4 Level: Not checked  - PJP: Sulfa/TMP (Bactrim)  - CMV: Valganciclovir (Valcyte)  - Thrush: Nystatin (Mycostatin) swish and swallow      - CMV IgG Ab High Risk Discordance (D+/R-) at time of transplant: No  Present  CMV Serostatus: Positive  - EBV IgG Ab High Risk Discordance (D+/R-) at time of transplant: No  Present EBV Serostatus: Positive    # Hypertension: Controlled;  Goal BP: < 130/80   - Changes: Not at this time    # Anemia in Chronic Renal Disease: Hgb: Stable, low      BRIANNA: No   - Iron studies: Replete    # Mineral Bone Disorder:    - Secondary renal hyperparathyroidism; PTH level: Normal (15-65 pg/ml)        On treatment: None  - Vitamin D; level: Normal        On supplement: Yes  - Calcium; level: High normal        On supplement: Yes  - Phosphorus; level: Normal        On supplement: Yes,discontinued today.    # Electrolytes:  - Potassium; level: Normal        On supplement: No  - Magnesium; level: Normal        On supplement: Yes  - Bicarbonate; level: Normal        On supplement: Yes  - Sodium; level: Normal    # Other Significant PMH:   -CAD: Patient has mild non obstructive coronary artery disease on last coronary angiogram 4/2024.  - HFpEF: Last cardiac echo (8/2023) showed normal LVEF ~ 60-65% with grade II diastolic dysfunction.  - Sjogren's: Stable with no recent symptoms.  Has been on prednisone 10 mg daily for possible inflammatory arthritis.  Followed by Rheumatology.  - IgG Lambda MGUS: Stable with monoclonal peak ~  0.3 and K/L ratio ~ 0.64  in July 2024.              - Complex Native Kidney Cyst: Patient with cyst in mid pole right native kidney with thin and mildly thickened internal septations noted on MRI 6/2024.  Repeat imaging with MRI 2/2025 showed slight increased size of the partly exophytic 2.0 cm cyst in the medial cortex of the interpolar region of the right kidney, previously 1.7 cm. Stable 3 mm septation with T2 dark signal in the septation, which may be related to calcifications.  Recommended follow with ultrasound or MRI in 6 months.                          - Recommend bilateral native kidney ultrasound 4 months post transplant (9/2025).  - Positive Lupus Anticoagulant: Patient is  positive, but cardiolipins not elevated, neg for Factor V and Factor 2 absent.     # Skin Cancer Risk:    - Discussed sun protection and recommend regular follow up with Dermatology.    # Transplant History:  Etiology of Kidney Failure: Diabetes mellitus type 2  Tx: DDKT (SPK)  Transplant: 5/2/2025 (Kidney / Pancreas)  Significant transplant-related complications: None    Transplant Office Phone Number: [phone]    Assessment and plan was discussed with the patient and she voiced her understanding and agreement.    Return visit: Return in about 4 weeks (around 7/10/2025).    Donna Linder MD    The longitudinal plan of care for the diagnosis(es)/condition(s) as documented were addressed during this visit. Due to the added complexity in care, I will continue to support Wendy in the subsequent management and with ongoing continuity of care.      Chief Complaint   Ms. Kapoor is a 54 year old here for kidney transplant, pancreas transplant, immunosuppression management, and SENDY.     History of Present Illness     Ms. Kapoor reports feeling stable overall.  Since last clinic visit:   Hospitalizations: No   New Medical Issues: No  Chest pain or shortness of breath: No  Lower extremity swelling: No  Weight change: Yes, lost weight but appetite is good and energy is good  Nausea and vomiting: No  Diarrhea: No  Heartburn symptoms: No  Fever, sweats or chills: No  Urinary complaints: No    Home BP: 120's -130's systolic      Problem List   Patient Active Problem List   Diagnosis    Type 2 diabetes mellitus with diabetic nephropathy (H)    Sjogren's syndrome    MGUS (monoclonal gammopathy of unknown significance)    Chronic kidney disease, stage V (H)    Essential hypertension    Pre-operative cardiovascular examination    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)    Status post coronary angiogram    Pre-transplant evaluation for kidney transplant    Encounter for pre-transplant evaluation for  kidney and pancreas transplant    Pre-transplant evaluation for kidney and pancreas transplant    Kidney transplant recipient    Pancreas transplant status (H)    Immunosuppressed status    Anemia, chronic disease    Thrombocytopenia    Low bicarbonate    Hypocalcemia    Hyperphosphatemia    Hypermagnesemia    Leukocytosis    Aftercare following organ transplant       Allergies   Allergies   Allergen Reactions    Atorvastatin Itching    Extraneal Rash       Medications   Current Outpatient Medications   Medication Sig Dispense Refill    acetaminophen (TYLENOL) 325 MG tablet Take 2 tablets (650 mg) by mouth every 6 hours as needed for mild pain. 50 tablet 0    aspirin (ASA) 81 MG chewable tablet Take 4 tablets (324 mg) by mouth or NG Tube daily. 60 tablet 3    atorvastatin (LIPITOR) 10 MG tablet Take 1 tablet (10 mg) by mouth daily. 30 tablet 11    calcium carbonate-vitamin D (OSCAL) 500-5 MG-MCG tablet Take 1 tablet by mouth 2 times daily (with meals). 60 tablet 2    carvedilol (COREG) 12.5 MG tablet Take 1 tablet (12.5 mg) by mouth 2 times daily. 60 tablet 11    ezetimibe (ZETIA) 10 MG tablet Take 10 mg by mouth at bedtime.      magnesium oxide (MAG-OX) 400 MG tablet Take 1 tablet (400 mg) by mouth 2 times daily. 1100 & 1700 60 tablet 3    mycophenolate (GENERIC EQUIVALENT) 250 MG capsule Take 4 capsules (1,000 mg) by mouth 2 times daily. 240 capsule 11    nystatin (MYCOSTATIN) 568853 UNIT/ML suspension Take 5 mLs (500,000 Units) by mouth 4 times daily. 600 mL 2    pantoprazole (PROTONIX) 40 MG EC tablet Take 1 tablet (40 mg) by mouth every morning (before breakfast). 30 tablet 1    phosphorus tablet 250 mg 250 MG per tablet Take 1 tablet (250 mg) by mouth 2 times daily. 60 tablet 1    predniSONE (DELTASONE) 5 MG tablet Take 1 tablet (5 mg) by mouth daily. 30 tablet 11    senna-docusate (SENOKOT-S/PERICOLACE) 8.6-50 MG tablet Take 2 tablets by mouth 2 times daily. 60 tablet 1    sodium bicarbonate 650 MG tablet  Take 2 tablets (1,300 mg) by mouth 2 times daily. 120 tablet 2    sulfamethoxazole-trimethoprim (BACTRIM) 400-80 MG tablet Take 1 tablet by mouth Every Mon, Wed, Fri Morning. 15 tablet 11    tacrolimus (GENERIC EQUIVALENT) 0.5 MG capsule Take 1 capsule (0.5 mg) by mouth 2 times daily. Total dose: 1.5mg twice daily 60 capsule 11    tacrolimus (GENERIC EQUIVALENT) 1 MG capsule Take 1 capsule (1 mg) by mouth 2 times daily. Total dose: 1.5mg twice daily 60 capsule 11    valGANciclovir (VALCYTE) 450 MG tablet Take 1 tablet (450 mg) by mouth twice a week. Mondays and Thursdays 60 tablet 0     No current facility-administered medications for this visit.     There are no discontinued medications.    Physical Exam   Vital Signs: /79 (BP Location: Right arm, Cuff Size: Adult Regular)   Wt 58.1 kg (128 lb 3 oz)   BMI 25.03 kg/m      GENERAL APPEARANCE: alert and no distress  EYES: eyes grossly normal to inspection  HENT: normal cephalic/atraumatic  RESP: able to speak in full sentences, no audible wheezing and no accessory muscle usage  EDEMA: denied LE edema bilaterally  MS: extremities normal - no gross deformities noted  SKIN: no visible facial rash  NEURO: mentation intact and speech normal  PSYCH: mentation appears normal and affect normal/bright    Data         Latest Ref Rng & Units 6/12/2025     8:05 AM 6/9/2025     8:15 AM 6/5/2025     8:06 AM   Renal   Sodium 135 - 145 mmol/L 138  141  138    K 3.4 - 5.3 mmol/L 4.4  4.4  4.2    Cl 98 - 107 mmol/L 101  104  101    Cl (external) 98 - 107 mmol/L 101  104  101    CO2 22 - 29 mmol/L 24  24  24    Urea Nitrogen 6.0 - 20.0 mg/dL 27.2  23.8  38.6    Creatinine 0.51 - 0.95 mg/dL 1.73  1.54  2.07    Glucose 70 - 99 mg/dL 104  89  95    Calcium 8.8 - 10.4 mg/dL 10.1  9.6  9.7    Magnesium 1.7 - 2.3 mg/dL 1.8  1.7           Latest Ref Rng & Units 6/12/2025     8:05 AM 6/9/2025     8:15 AM 6/2/2025     8:20 AM   Bone Health   Phosphorus 2.5 - 4.5 mg/dL 3.3  2.7  3.4     Parathyroid Hormone Intact 15 - 65 pg/mL 28            Latest Ref Rng & Units 6/12/2025     8:05 AM 6/9/2025     8:15 AM 6/5/2025     8:06 AM   Heme   WBC 4.0 - 11.0 10e3/uL 6.5  6.7  6.5    Hgb 11.7 - 15.7 g/dL 7.8  7.9  8.1    Plt 150 - 450 10e3/uL 196  209  190    ABSOLUTE NEUTROPHIL 1.6 - 8.3 10e3/uL  6.2  5.9    ABSOLUTE LYMPHOCYTES 0.8 - 5.3 10e3/uL  0.2  0.2    ABSOLUTE MONOCYTES 0.0 - 1.3 10e3/uL  0.2  0.2    ABSOLUTE EOSINOPHILS 0.0 - 0.7 10e3/uL  0.1  0.0    ABSOLUTE BASOPHILS 0.0 - 0.2 10e3/uL  0.0  0.0          Latest Ref Rng & Units 5/2/2025     4:52 PM 3/13/2025     3:54 PM 10/17/2024     2:34 AM   Liver   AP 40 - 150 U/L 84  80  79    TBili <=1.2 mg/dL 0.2  0.2  0.3    ALT 0 - 50 U/L 18  14  19    AST 0 - 45 U/L 29  22  21    Tot Protein 6.4 - 8.3 g/dL 7.9  7.4  7.6    Albumin 3.5 - 5.2 g/dL 3.7  3.4  3.6          Latest Ref Rng & Units 6/12/2025     8:05 AM 6/9/2025     8:15 AM 6/5/2025     8:06 AM   Pancreas   Amylase 28 - 100 U/L 135  144  166    Lipase (Roche) 13 - 60 U/L 207  270  385          Latest Ref Rng & Units 6/12/2025     8:05 AM   Iron studies   Iron 37 - 145 ug/dL 60    Iron Sat Index 15 - 46 % 27    Ferritin 11 - 328 ng/mL 1,102          Latest Ref Rng & Units 5/2/2025     4:52 PM 10/17/2024     2:30 AM 8/2/2023     1:52 PM   [facility]   EBV CAPSID ANTIBODY IGG No detectable antibody. Positive  Positive  Positive      Failed to redirect to the Timeline version of the REVFS SmartLink.  Recent Labs   Lab Test 05/27/25  0813 05/29/25  0806 06/02/25  0820 06/05/25  0806 06/09/25  0815   DOSTAC 5/26/2025  --   --  6/4/2025 6/8/2025   TACROL 11.0   < > 6.4 6.4 8.5    < > = values in this interval not displayed.     Recent Labs   Lab Test 05/11/25  0721 06/02/25  0820 06/09/25  0815   MPACID 2.04 3.17 2.65   MPAG 103.0* 102.8* 158.3*    Prescription drug management  55 minutes spent by me on the date of the encounter doing chart review, history and exam, documentation and further  activities per the note

## 2025-06-12 NOTE — LETTER
6/12/2025      Wendy Kapoor  [address]  [place] [address]      Dear Colleague,    Thank you for referring your patient, Wendy Kapoor, to the [facility]. Please see a copy of my visit note below.    TRANSPLANT NEPHROLOGY CLINIC VISIT     Assessment & Plan  # DDKT (SPK): CKD Stage 3b - Trend up to 1.7 today. UA with WBC, culture pending. Also noted to have hydronephrosis in transplanted kidney and right native kidney. Will monitor for now. Will plan repeat imaging in 1-2 weeks based on labs.    - Baseline Creatinine: ~ TBD, kedar was 1.5   - Proteinuria: Mild (0.5-1.0 grams)   - DSA Hx: Low level DSA (<1000 mfi) to B51 and CW15 ~500 MFI   - Last cPRA: 98%   - BK Viremia: No   - Kidney Tx Biopsy Hx: No biopsy history.   - Primary Nephrologist: Dr. Giles.    # Pancreas Tx (SPK):    - Pancreatic Exocrine Drainage: Enteric drained     - Blood glucose: Euglycemia      On insulin: No   - HbA1c: not checked post transplant      Latest HbA1c: 5.9%   - Pancreatic enzymes: Trend down   - DSA Hx: Low level DSA (<1000 mfi) to B51 and CW15   - Pancreas Tx Biopsy Hx: No biopsy history    Recent up trend in lipase and amylase. Lipase peaked at ~300 and trending down but still high. CT scam showed tuan pancreatic fluid collection. As there is no pain or signs of infection and down trending enzymes, will follow up closely with out intervention (discussed briefly with her surgeon).    # Immunosuppression: Tacrolimus immediate release (goal 8-10), Mycophenolate mofetil (dose 750 mg every 12 hours), and Prednisone (dose 5 mg daily)   - Induction with Recent Transplant:  High Intensity Protocol   - Continue with intensive monitoring of immunosuppression for efficacy and toxicity.   - Historical Changes in Immunosuppression: Patient came in on prednisone 10 mg daily for possible inflammatory arthritis.  Lowered to prednisone 5 mg daily after transplant.   - Changes: Not at this time    # Infection  Prevention:   Last CD4 Level: Not checked  - PJP: Sulfa/TMP (Bactrim)  - CMV: Valganciclovir (Valcyte)  - Thrush: Nystatin (Mycostatin) swish and swallow      - CMV IgG Ab High Risk Discordance (D+/R-) at time of transplant: No  Present CMV Serostatus: Positive  - EBV IgG Ab High Risk Discordance (D+/R-) at time of transplant: No  Present EBV Serostatus: Positive    # Hypertension: Controlled;  Goal BP: < 130/80   - Changes: Not at this time    # Anemia in Chronic Renal Disease: Hgb: Stable, low      BRIANNA: No   - Iron studies: Replete    # Mineral Bone Disorder:    - Secondary renal hyperparathyroidism; PTH level: Normal (15-65 pg/ml)        On treatment: None  - Vitamin D; level: Normal        On supplement: Yes  - Calcium; level: High normal        On supplement: Yes  - Phosphorus; level: Normal        On supplement: Yes,discontinued today.    # Electrolytes:  - Potassium; level: Normal        On supplement: No  - Magnesium; level: Normal        On supplement: Yes  - Bicarbonate; level: Normal        On supplement: Yes  - Sodium; level: Normal    # Other Significant PMH:   -CAD: Patient has mild non obstructive coronary artery disease on last coronary angiogram 4/2024.  - HFpEF: Last cardiac echo (8/2023) showed normal LVEF ~ 60-65% with grade II diastolic dysfunction.  - Sjogren's: Stable with no recent symptoms.  Has been on prednisone 10 mg daily for possible inflammatory arthritis.  Followed by Rheumatology.  - IgG Lambda MGUS: Stable with monoclonal peak ~  0.3 and K/L ratio ~ 0.64  in July 2024.              - Complex Native Kidney Cyst: Patient with cyst in mid pole right native kidney with thin and mildly thickened internal septations noted on MRI 6/2024.  Repeat imaging with MRI 2/2025 showed slight increased size of the partly exophytic 2.0 cm cyst in the medial cortex of the interpolar region of the right kidney, previously 1.7 cm. Stable 3 mm septation with T2 dark signal in the septation, which may be  related to calcifications.  Recommended follow with ultrasound or MRI in 6 months.                          - Recommend bilateral native kidney ultrasound 4 months post transplant (9/2025).  - Positive Lupus Anticoagulant: Patient is positive, but cardiolipins not elevated, neg for Factor V and Factor 2 absent.     # Skin Cancer Risk:    - Discussed sun protection and recommend regular follow up with Dermatology.    # Transplant History:  Etiology of Kidney Failure: Diabetes mellitus type 2  Tx: DDKT (SPK)  Transplant: 5/2/2025 (Kidney / Pancreas)  Significant transplant-related complications: None    Transplant Office Phone Number: [phone]    Assessment and plan was discussed with the patient and she voiced her understanding and agreement.    Return visit: Return in about 4 weeks (around 7/10/2025).    Donna Linder MD    The longitudinal plan of care for the diagnosis(es)/condition(s) as documented were addressed during this visit. Due to the added complexity in care, I will continue to support Wendy in the subsequent management and with ongoing continuity of care.      Chief Complaint  Ms. Kapoor is a 54 year old here for kidney transplant, pancreas transplant, immunosuppression management, and SENDY.     History of Present Illness    Ms. Kapoor reports feeling stable overall.  Since last clinic visit:   Hospitalizations: No   New Medical Issues: No  Chest pain or shortness of breath: No  Lower extremity swelling: No  Weight change: Yes, lost weight but appetite is good and energy is good  Nausea and vomiting: No  Diarrhea: No  Heartburn symptoms: No  Fever, sweats or chills: No  Urinary complaints: No    Home BP: 120's -130's systolic      Problem List  Patient Active Problem List   Diagnosis     Type 2 diabetes mellitus with diabetic nephropathy (H)     Sjogren's syndrome     MGUS (monoclonal gammopathy of unknown significance)     Chronic kidney disease, stage V (H)     Essential hypertension      Pre-operative cardiovascular examination     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)     Status post coronary angiogram     Pre-transplant evaluation for kidney transplant     Encounter for pre-transplant evaluation for kidney and pancreas transplant     Pre-transplant evaluation for kidney and pancreas transplant     Kidney transplant recipient     Pancreas transplant status (H)     Immunosuppressed status     Anemia, chronic disease     Thrombocytopenia     Low bicarbonate     Hypocalcemia     Hyperphosphatemia     Hypermagnesemia     Leukocytosis     Aftercare following organ transplant       Allergies  Allergies   Allergen Reactions     Atorvastatin Itching     Extraneal Rash       Medications  Current Outpatient Medications   Medication Sig Dispense Refill     acetaminophen (TYLENOL) 325 MG tablet Take 2 tablets (650 mg) by mouth every 6 hours as needed for mild pain. 50 tablet 0     aspirin (ASA) 81 MG chewable tablet Take 4 tablets (324 mg) by mouth or NG Tube daily. 60 tablet 3     atorvastatin (LIPITOR) 10 MG tablet Take 1 tablet (10 mg) by mouth daily. 30 tablet 11     calcium carbonate-vitamin D (OSCAL) 500-5 MG-MCG tablet Take 1 tablet by mouth 2 times daily (with meals). 60 tablet 2     carvedilol (COREG) 12.5 MG tablet Take 1 tablet (12.5 mg) by mouth 2 times daily. 60 tablet 11     ezetimibe (ZETIA) 10 MG tablet Take 10 mg by mouth at bedtime.       magnesium oxide (MAG-OX) 400 MG tablet Take 1 tablet (400 mg) by mouth 2 times daily. 1100 & 1700 60 tablet 3     mycophenolate (GENERIC EQUIVALENT) 250 MG capsule Take 4 capsules (1,000 mg) by mouth 2 times daily. 240 capsule 11     nystatin (MYCOSTATIN) 559831 UNIT/ML suspension Take 5 mLs (500,000 Units) by mouth 4 times daily. 600 mL 2     pantoprazole (PROTONIX) 40 MG EC tablet Take 1 tablet (40 mg) by mouth every morning (before breakfast). 30 tablet 1     phosphorus tablet 250 mg 250 MG per tablet Take 1 tablet (250 mg) by  mouth 2 times daily. 60 tablet 1     predniSONE (DELTASONE) 5 MG tablet Take 1 tablet (5 mg) by mouth daily. 30 tablet 11     senna-docusate (SENOKOT-S/PERICOLACE) 8.6-50 MG tablet Take 2 tablets by mouth 2 times daily. 60 tablet 1     sodium bicarbonate 650 MG tablet Take 2 tablets (1,300 mg) by mouth 2 times daily. 120 tablet 2     sulfamethoxazole-trimethoprim (BACTRIM) 400-80 MG tablet Take 1 tablet by mouth Every Mon, Wed, Fri Morning. 15 tablet 11     tacrolimus (GENERIC EQUIVALENT) 0.5 MG capsule Take 1 capsule (0.5 mg) by mouth 2 times daily. Total dose: 1.5mg twice daily 60 capsule 11     tacrolimus (GENERIC EQUIVALENT) 1 MG capsule Take 1 capsule (1 mg) by mouth 2 times daily. Total dose: 1.5mg twice daily 60 capsule 11     valGANciclovir (VALCYTE) 450 MG tablet Take 1 tablet (450 mg) by mouth twice a week. Mondays and Thursdays 60 tablet 0     No current facility-administered medications for this visit.     There are no discontinued medications.    Physical Exam  Vital Signs: /79 (BP Location: Right arm, Cuff Size: Adult Regular)   Wt 58.1 kg (128 lb 3 oz)   BMI 25.03 kg/m      GENERAL APPEARANCE: alert and no distress  EYES: eyes grossly normal to inspection  HENT: normal cephalic/atraumatic  RESP: able to speak in full sentences, no audible wheezing and no accessory muscle usage  EDEMA: denied LE edema bilaterally  MS: extremities normal - no gross deformities noted  SKIN: no visible facial rash  NEURO: mentation intact and speech normal  PSYCH: mentation appears normal and affect normal/bright    Data        Latest Ref Rng & Units 6/12/2025     8:05 AM 6/9/2025     8:15 AM 6/5/2025     8:06 AM   Renal   Sodium 135 - 145 mmol/L 138  141  138    K 3.4 - 5.3 mmol/L 4.4  4.4  4.2    Cl 98 - 107 mmol/L 101  104  101    Cl (external) 98 - 107 mmol/L 101  104  101    CO2 22 - 29 mmol/L 24  24  24    Urea Nitrogen 6.0 - 20.0 mg/dL 27.2  23.8  38.6    Creatinine 0.51 - 0.95 mg/dL 1.73  1.54  2.07     Glucose 70 - 99 mg/dL 104  89  95    Calcium 8.8 - 10.4 mg/dL 10.1  9.6  9.7    Magnesium 1.7 - 2.3 mg/dL 1.8  1.7           Latest Ref Rng & Units 6/12/2025     8:05 AM 6/9/2025     8:15 AM 6/2/2025     8:20 AM   Bone Health   Phosphorus 2.5 - 4.5 mg/dL 3.3  2.7  3.4    Parathyroid Hormone Intact 15 - 65 pg/mL 28            Latest Ref Rng & Units 6/12/2025     8:05 AM 6/9/2025     8:15 AM 6/5/2025     8:06 AM   Heme   WBC 4.0 - 11.0 10e3/uL 6.5  6.7  6.5    Hgb 11.7 - 15.7 g/dL 7.8  7.9  8.1    Plt 150 - 450 10e3/uL 196  209  190    ABSOLUTE NEUTROPHIL 1.6 - 8.3 10e3/uL  6.2  5.9    ABSOLUTE LYMPHOCYTES 0.8 - 5.3 10e3/uL  0.2  0.2    ABSOLUTE MONOCYTES 0.0 - 1.3 10e3/uL  0.2  0.2    ABSOLUTE EOSINOPHILS 0.0 - 0.7 10e3/uL  0.1  0.0    ABSOLUTE BASOPHILS 0.0 - 0.2 10e3/uL  0.0  0.0          Latest Ref Rng & Units 5/2/2025     4:52 PM 3/13/2025     3:54 PM 10/17/2024     2:34 AM   Liver   AP 40 - 150 U/L 84  80  79    TBili <=1.2 mg/dL 0.2  0.2  0.3    ALT 0 - 50 U/L 18  14  19    AST 0 - 45 U/L 29  22  21    Tot Protein 6.4 - 8.3 g/dL 7.9  7.4  7.6    Albumin 3.5 - 5.2 g/dL 3.7  3.4  3.6          Latest Ref Rng & Units 6/12/2025     8:05 AM 6/9/2025     8:15 AM 6/5/2025     8:06 AM   Pancreas   Amylase 28 - 100 U/L 135  144  166    Lipase (Roche) 13 - 60 U/L 207  270  385          Latest Ref Rng & Units 6/12/2025     8:05 AM   Iron studies   Iron 37 - 145 ug/dL 60    Iron Sat Index 15 - 46 % 27    Ferritin 11 - 328 ng/mL 1,102          Latest Ref Rng & Units 5/2/2025     4:52 PM 10/17/2024     2:30 AM 8/2/2023     1:52 PM   [facility] Virology   EBV CAPSID ANTIBODY IGG No detectable antibody. Positive  Positive  Positive      Failed to redirect to the Timeline version of the REVFS SmartLink.  Recent Labs   Lab Test 05/27/25  0813 05/29/25  0806 06/02/25  0820 06/05/25  0806 06/09/25  0815   DOSTAC 5/26/2025  --   --  6/4/2025 6/8/2025   TACROL 11.0   < > 6.4 6.4 8.5    < > = values in this interval not displayed.      Recent Labs   Lab Test 05/11/25  0721 06/02/25  0820 06/09/25  0815   MPACID 2.04 3.17 2.65   MPAG 103.0* 102.8* 158.3*    Prescription drug management  55 minutes spent by me on the date of the encounter doing chart review, history and exam, documentation and further activities per the note    Again, thank you for allowing me to participate in the care of your patient.        Sincerely,        Donna Linder MD    Electronically signed

## 2025-06-13 ENCOUNTER — TELEPHONE (OUTPATIENT)
Dept: TRANSPLANT | Facility: CLINIC | Age: 55
End: 2025-06-13

## 2025-06-16 ENCOUNTER — LAB (OUTPATIENT)
Dept: LAB | Facility: CLINIC | Age: 55
End: 2025-06-16
Payer: COMMERCIAL

## 2025-06-16 DIAGNOSIS — Z94.0 KIDNEY REPLACED BY TRANSPLANT: ICD-10-CM

## 2025-06-16 DIAGNOSIS — Z20.828 CONTACT WITH AND (SUSPECTED) EXPOSURE TO OTHER VIRAL COMMUNICABLE DISEASES: ICD-10-CM

## 2025-06-16 DIAGNOSIS — Z79.899 ENCOUNTER FOR LONG-TERM CURRENT USE OF MEDICATION: ICD-10-CM

## 2025-06-16 DIAGNOSIS — Z98.890 OTHER SPECIFIED POSTPROCEDURAL STATES: ICD-10-CM

## 2025-06-16 DIAGNOSIS — Z48.298 AFTERCARE FOLLOWING ORGAN TRANSPLANT: ICD-10-CM

## 2025-06-16 LAB
AMYLASE SERPL-CCNC: 117 U/L (ref 28–100)
ANION GAP SERPL CALCULATED.3IONS-SCNC: 14 MMOL/L (ref 7–15)
BUN SERPL-MCNC: 28.6 MG/DL (ref 6–20)
CALCIUM SERPL-MCNC: 9.8 MG/DL (ref 8.8–10.4)
CHLORIDE SERPL-SCNC: 97 MMOL/L (ref 98–107)
CREAT SERPL-MCNC: 2.22 MG/DL (ref 0.51–0.95)
EGFRCR SERPLBLD CKD-EPI 2021: 25 ML/MIN/1.73M2
ERYTHROCYTE [DISTWIDTH] IN BLOOD BY AUTOMATED COUNT: 14.5 % (ref 10–15)
GLUCOSE SERPL-MCNC: 105 MG/DL (ref 70–99)
HCO3 SERPL-SCNC: 23 MMOL/L (ref 22–29)
HCT VFR BLD AUTO: 23 % (ref 35–47)
HGB BLD-MCNC: 7.4 G/DL (ref 11.7–15.7)
LIPASE SERPL-CCNC: 224 U/L (ref 13–60)
MAGNESIUM SERPL-MCNC: 1.9 MG/DL (ref 1.7–2.3)
MCH RBC QN AUTO: 29.8 PG (ref 26.5–33)
MCHC RBC AUTO-ENTMCNC: 32.2 G/DL (ref 31.5–36.5)
MCV RBC AUTO: 93 FL (ref 78–100)
MYCOPHENOLATE SERPL LC/MS/MS-MCNC: 5.18 MG/L (ref 1–3.5)
MYCOPHENOLATE-G SERPL LC/MS/MS-MCNC: >200 MG/L (ref 30–95)
PHOSPHATE SERPL-MCNC: 4 MG/DL (ref 2.5–4.5)
PLATELET # BLD AUTO: 184 10E3/UL (ref 150–450)
POTASSIUM SERPL-SCNC: 4.5 MMOL/L (ref 3.4–5.3)
RBC # BLD AUTO: 2.48 10E6/UL (ref 3.8–5.2)
SODIUM SERPL-SCNC: 134 MMOL/L (ref 135–145)
TACROLIMUS BLD-MCNC: 9.7 UG/L (ref 5–15)
TME LAST DOSE: ABNORMAL H
TME LAST DOSE: ABNORMAL H
TME LAST DOSE: NORMAL H
TME LAST DOSE: NORMAL H
WBC # BLD AUTO: 4.6 10E3/UL (ref 4–11)

## 2025-06-16 PROCEDURE — 82150 ASSAY OF AMYLASE: CPT

## 2025-06-16 PROCEDURE — 84100 ASSAY OF PHOSPHORUS: CPT

## 2025-06-16 PROCEDURE — 80180 DRUG SCRN QUAN MYCOPHENOLATE: CPT

## 2025-06-16 PROCEDURE — 80197 ASSAY OF TACROLIMUS: CPT

## 2025-06-16 PROCEDURE — 85027 COMPLETE CBC AUTOMATED: CPT

## 2025-06-16 PROCEDURE — 36415 COLL VENOUS BLD VENIPUNCTURE: CPT

## 2025-06-16 PROCEDURE — 83690 ASSAY OF LIPASE: CPT

## 2025-06-16 PROCEDURE — 80048 BASIC METABOLIC PNL TOTAL CA: CPT

## 2025-06-16 PROCEDURE — 83735 ASSAY OF MAGNESIUM: CPT

## 2025-06-16 PROCEDURE — 86833 HLA CLASS II HIGH DEFIN QUAL: CPT | Performed by: INTERNAL MEDICINE

## 2025-06-17 ENCOUNTER — RESULTS FOLLOW-UP (OUTPATIENT)
Dept: TRANSPLANT | Facility: CLINIC | Age: 55
End: 2025-06-17

## 2025-06-17 ENCOUNTER — TELEPHONE (OUTPATIENT)
Dept: ONCOLOGY | Facility: CLINIC | Age: 55
End: 2025-06-17

## 2025-06-17 LAB
DONOR IDENTIFICATION: NORMAL
DSA COMMENTS: NORMAL
DSA PRESENT: NO
DSA TEST METHOD: NORMAL
INT SUB COMMENTS: NORMAL
INT SUB RESULT: NORMAL
INTERF SUBSTANCE: NORMAL
INTSUB TEST METHOD: NORMAL
ORGAN: NORMAL
SA 1  COMMENTS: NORMAL
SA 1 CELL: NORMAL
SA 1 TEST METHOD: NORMAL
SA 2 CELL: NORMAL
SA 2 COMMENTS: NORMAL
SA 2 TEST METHOD: NORMAL
SA1 HI RISK ABY: NORMAL
SA1 MOD RISK ABY: NORMAL
SA2 HI RISK ABY: NORMAL
SA2 MOD RISK ABY: NORMAL
UNACCEPTABLE ANTIGENS: NORMAL
UNOS CPRA: 98

## 2025-06-17 NOTE — TELEPHONE ENCOUNTER
Prior Authorization Approval    Medication: OCTREOTIDE ACETATE 100 MCG/ML SC SOSY  Authorization Effective Date: 5/18/2025  Authorization Expiration Date: 8/16/2025  Approved Dose/Quantity: 42/14d  Reference #: [id number]   Insurance Company: [employer] - Phone [phone] Fax [phone]  Expected CoPay: $ 0  CoPay Card Available:      Financial Assistance Needed: no  Which Pharm[facility] is filling the prescription: [facility] - [address] [place] [address]  Pharmacy Notified: yes  Patient Notified: not yet per provider

## 2025-06-18 ENCOUNTER — LAB REQUISITION (OUTPATIENT)
Dept: LAB | Facility: CLINIC | Age: 55
End: 2025-06-18
Payer: COMMERCIAL

## 2025-06-19 ENCOUNTER — RESULTS FOLLOW-UP (OUTPATIENT)
Dept: TRANSPLANT | Facility: CLINIC | Age: 55
End: 2025-06-19

## 2025-06-19 ENCOUNTER — LAB (OUTPATIENT)
Dept: LAB | Facility: OTHER | Age: 55
End: 2025-06-19
Payer: COMMERCIAL

## 2025-06-19 DIAGNOSIS — Z98.890 OTHER SPECIFIED POSTPROCEDURAL STATES: ICD-10-CM

## 2025-06-19 DIAGNOSIS — Z94.0 KIDNEY REPLACED BY TRANSPLANT: ICD-10-CM

## 2025-06-19 DIAGNOSIS — Z48.298 AFTERCARE FOLLOWING ORGAN TRANSPLANT: ICD-10-CM

## 2025-06-19 DIAGNOSIS — Z79.899 ENCOUNTER FOR LONG-TERM CURRENT USE OF MEDICATION: ICD-10-CM

## 2025-06-19 DIAGNOSIS — Z20.828 CONTACT WITH AND (SUSPECTED) EXPOSURE TO OTHER VIRAL COMMUNICABLE DISEASES: ICD-10-CM

## 2025-06-19 PROBLEM — K85.90 PANCREATITIS: Status: ACTIVE | Noted: 2025-06-19

## 2025-06-19 LAB
AMYLASE SERPL-CCNC: 126 U/L (ref 28–100)
ANION GAP SERPL CALCULATED.3IONS-SCNC: 13 MMOL/L (ref 7–15)
BUN SERPL-MCNC: 33.7 MG/DL (ref 6–20)
CALCIUM SERPL-MCNC: 9.5 MG/DL (ref 8.8–10.4)
CHLORIDE SERPL-SCNC: 100 MMOL/L (ref 98–107)
CREAT SERPL-MCNC: 2.37 MG/DL (ref 0.51–0.95)
EGFRCR SERPLBLD CKD-EPI 2021: 23 ML/MIN/1.73M2
ERYTHROCYTE [DISTWIDTH] IN BLOOD BY AUTOMATED COUNT: 14.1 % (ref 10–15)
GLUCOSE SERPL-MCNC: 102 MG/DL (ref 70–99)
HCO3 SERPL-SCNC: 24 MMOL/L (ref 22–29)
HCT VFR BLD AUTO: 23 % (ref 35–47)
HGB BLD-MCNC: 7.5 G/DL (ref 11.7–15.7)
LIPASE SERPL-CCNC: 238 U/L (ref 13–60)
MCH RBC QN AUTO: 30.1 PG (ref 26.5–33)
MCHC RBC AUTO-ENTMCNC: 32.6 G/DL (ref 31.5–36.5)
MCV RBC AUTO: 92 FL (ref 78–100)
PLATELET # BLD AUTO: 188 10E3/UL (ref 150–450)
POTASSIUM SERPL-SCNC: 4.7 MMOL/L (ref 3.4–5.3)
RBC # BLD AUTO: 2.49 10E6/UL (ref 3.8–5.2)
SODIUM SERPL-SCNC: 137 MMOL/L (ref 135–145)
TACROLIMUS BLD-MCNC: 10.2 UG/L (ref 5–15)
TME LAST DOSE: NORMAL H
TME LAST DOSE: NORMAL H
WBC # BLD AUTO: 4.6 10E3/UL (ref 4–11)

## 2025-06-20 ENCOUNTER — HOSPITAL ENCOUNTER (OUTPATIENT)
Dept: CT IMAGING | Facility: CLINIC | Age: 55
Discharge: HOME | End: 2025-06-20
Attending: STUDENT IN AN ORGANIZED HEALTH CARE EDUCATION/TRAINING PROGRAM | Admitting: STUDENT IN AN ORGANIZED HEALTH CARE EDUCATION/TRAINING PROGRAM
Payer: COMMERCIAL

## 2025-06-20 ENCOUNTER — RESULTS FOLLOW-UP (OUTPATIENT)
Dept: TRANSPLANT | Facility: CLINIC | Age: 55
End: 2025-06-20

## 2025-06-20 DIAGNOSIS — K85.90 PANCREATITIS: Primary | ICD-10-CM

## 2025-06-20 DIAGNOSIS — K85.90 PANCREATITIS: ICD-10-CM

## 2025-06-20 PROCEDURE — 74176 CT ABD & PELVIS W/O CONTRAST: CPT

## 2025-06-23 ENCOUNTER — TELEPHONE (OUTPATIENT)
Dept: TRANSPLANT | Facility: CLINIC | Age: 55
End: 2025-06-23

## 2025-06-23 ENCOUNTER — OFFICE VISIT (OUTPATIENT)
Dept: TRANSPLANT | Facility: CLINIC | Age: 55
End: 2025-06-23
Attending: INTERNAL MEDICINE
Payer: COMMERCIAL

## 2025-06-23 ENCOUNTER — LAB (OUTPATIENT)
Dept: LAB | Facility: OTHER | Age: 55
End: 2025-06-23
Payer: COMMERCIAL

## 2025-06-23 VITALS
BODY MASS INDEX: 25.58 KG/M2 | OXYGEN SATURATION: 100 % | DIASTOLIC BLOOD PRESSURE: 74 MMHG | WEIGHT: 131 LBS | HEART RATE: 77 BPM | SYSTOLIC BLOOD PRESSURE: 151 MMHG

## 2025-06-23 DIAGNOSIS — M35.00 SJOGREN'S SYNDROME, WITH UNSPECIFIED ORGAN INVOLVEMENT: Primary | ICD-10-CM

## 2025-06-23 DIAGNOSIS — Z48.298 AFTERCARE FOLLOWING ORGAN TRANSPLANT: Primary | ICD-10-CM

## 2025-06-23 DIAGNOSIS — N18.5 CHRONIC KIDNEY DISEASE, STAGE V (H): ICD-10-CM

## 2025-06-23 DIAGNOSIS — N13.30 HYDRONEPHROSIS: ICD-10-CM

## 2025-06-23 DIAGNOSIS — Z94.0 KIDNEY TRANSPLANT RECIPIENT: ICD-10-CM

## 2025-06-23 DIAGNOSIS — Z48.298 AFTERCARE FOLLOWING ORGAN TRANSPLANT: ICD-10-CM

## 2025-06-23 DIAGNOSIS — K85.90 PANCREATITIS: ICD-10-CM

## 2025-06-23 DIAGNOSIS — Z79.899 ENCOUNTER FOR LONG-TERM CURRENT USE OF MEDICATION: ICD-10-CM

## 2025-06-23 DIAGNOSIS — K86.89: ICD-10-CM

## 2025-06-23 DIAGNOSIS — R79.89 ELEVATED SERUM CREATININE: ICD-10-CM

## 2025-06-23 DIAGNOSIS — Z20.828 CONTACT WITH AND (SUSPECTED) EXPOSURE TO OTHER VIRAL COMMUNICABLE DISEASES: ICD-10-CM

## 2025-06-23 DIAGNOSIS — T86.898: ICD-10-CM

## 2025-06-23 DIAGNOSIS — Z94.0 KIDNEY REPLACED BY TRANSPLANT: ICD-10-CM

## 2025-06-23 DIAGNOSIS — Z98.890 OTHER SPECIFIED POSTPROCEDURAL STATES: ICD-10-CM

## 2025-06-23 DIAGNOSIS — Z94.83 PANCREAS TRANSPLANT STATUS (H): ICD-10-CM

## 2025-06-23 LAB
AMYLASE SERPL-CCNC: 113 U/L (ref 28–100)
ANION GAP SERPL CALCULATED.3IONS-SCNC: 11 MMOL/L (ref 7–15)
BUN SERPL-MCNC: 30.8 MG/DL (ref 6–20)
CALCIUM SERPL-MCNC: 10 MG/DL (ref 8.8–10.4)
CHLORIDE SERPL-SCNC: 103 MMOL/L (ref 98–107)
CREAT SERPL-MCNC: 2.33 MG/DL (ref 0.51–0.95)
EGFRCR SERPLBLD CKD-EPI 2021: 24 ML/MIN/1.73M2
ERYTHROCYTE [DISTWIDTH] IN BLOOD BY AUTOMATED COUNT: 14.5 % (ref 10–15)
GLUCOSE SERPL-MCNC: 115 MG/DL (ref 70–99)
HCO3 SERPL-SCNC: 26 MMOL/L (ref 22–29)
HCT VFR BLD AUTO: 23.7 % (ref 35–47)
HGB BLD-MCNC: 7.8 G/DL (ref 11.7–15.7)
LIPASE SERPL-CCNC: 189 U/L (ref 13–60)
MCH RBC QN AUTO: 31.3 PG (ref 26.5–33)
MCHC RBC AUTO-ENTMCNC: 32.9 G/DL (ref 31.5–36.5)
MCV RBC AUTO: 95 FL (ref 78–100)
PLATELET # BLD AUTO: 199 10E3/UL (ref 150–450)
POTASSIUM SERPL-SCNC: 4.7 MMOL/L (ref 3.4–5.3)
RBC # BLD AUTO: 2.49 10E6/UL (ref 3.8–5.2)
SODIUM SERPL-SCNC: 140 MMOL/L (ref 135–145)
TACROLIMUS BLD-MCNC: 7.6 UG/L (ref 5–15)
TME LAST DOSE: NORMAL H
TME LAST DOSE: NORMAL H
WBC # BLD AUTO: 5.8 10E3/UL (ref 4–11)

## 2025-06-23 PROCEDURE — 99213 OFFICE O/P EST LOW 20 MIN: CPT | Mod: 24 | Performed by: SURGERY

## 2025-06-23 PROCEDURE — 99213 OFFICE O/P EST LOW 20 MIN: CPT | Performed by: SURGERY

## 2025-06-23 PROCEDURE — 80048 BASIC METABOLIC PNL TOTAL CA: CPT

## 2025-06-23 PROCEDURE — 82150 ASSAY OF AMYLASE: CPT

## 2025-06-23 PROCEDURE — 86833 HLA CLASS II HIGH DEFIN QUAL: CPT | Performed by: INTERNAL MEDICINE

## 2025-06-23 PROCEDURE — 80197 ASSAY OF TACROLIMUS: CPT

## 2025-06-23 PROCEDURE — 85027 COMPLETE CBC AUTOMATED: CPT

## 2025-06-23 PROCEDURE — 36415 COLL VENOUS BLD VENIPUNCTURE: CPT

## 2025-06-23 PROCEDURE — 83690 ASSAY OF LIPASE: CPT

## 2025-06-23 PROCEDURE — 3078F DIAST BP <80 MM HG: CPT | Performed by: SURGERY

## 2025-06-23 PROCEDURE — 3077F SYST BP >= 140 MM HG: CPT | Performed by: SURGERY

## 2025-06-23 NOTE — TELEPHONE ENCOUNTER
Sergio Sena MD to Me  (Selected Message)      6/20/25  4:41 PM  Result Note  Domenico Hernández, as discussed, depot octreotide if approved (if not, the subcutaneous), please schedule nephrostomy tube for moderate to severe hydronephrosis    Orders placed for PNT. Discussed plan with surgeon regarding subcutaneous Octreotide Injections

## 2025-06-23 NOTE — PROGRESS NOTES
-Transplant Surgery -OUTPATIENT IMMUNOSUPPRESSION PROGRESS NOTE    Date of Visit: 2025    Transplants:  2025 (Kidney / Pancreas); Postoperative day:  52  ASSESMENT AND PLAN:    Wendy Kapoor is a 54 year old female with a history of DM2, HTN, ESRD on PD since 2023, IgG Lambda MGUS, non-obstructive CAD, and anemia of chronic disease. On 2025 she was notified as an acceptable  donor organ became available and presented for further pre-operative work-up and on 2025 she underwent DBD SPK with stent, and open appendectomy which was well tolerated as performed by Dr. Gleason.     1.Graft Function: Cr a bit labile, now up to 2.3. Vivek 1.5. Has hydro on most recent CT that was also present when Cr was lower, but question if worsened. Planning for IR for PNT and interrogation of ureter. Lipase remains elevated, but euglycemic. Working on OP octreotide. Has had bx of kidney without rjxn- if kdieny resolves with PNT but panc remains elevated consider panc bx  2.Immunosuppression Management: tac level pending today    Tac 0.5 bid, MMF 1000 bid  3.Hypertension: well controlled  4.Renal Function:   Creatinine   Date Value Ref Range Status   2025 2.37 (H) 0.51 - 0.95 mg/dL Final       5.Lab frequency:  6.Other: staples removed. Ok for pd catheter removal closer to home if desired vs here at same time as stent.       Transplant:  [x]                             Liver []                              Kidney []                             Pancreas []                              Other:             Chief Complaint:Post-op Visit    History of Present Illness: s/p SPK- some ongoing graft malfunction but reports feeling very well overall. Eating and drinking well, no nausea, normal BM/urine, no insulin needs.   Patient Active Problem List   Diagnosis    Type 2 diabetes mellitus with diabetic nephropathy (H)    Sjogren's syndrome    MGUS (monoclonal gammopathy of unknown significance)    Chronic  kidney disease, stage V (H)    Essential hypertension    Pre-operative cardiovascular examination    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)    Status post coronary angiogram    Pre-transplant evaluation for kidney transplant    Encounter for pre-transplant evaluation for kidney and pancreas transplant    Pre-transplant evaluation for kidney and pancreas transplant    Kidney transplant recipient    Pancreas transplant status (H)    Immunosuppressed status    Anemia, chronic disease    Thrombocytopenia    Low bicarbonate    Hypophosphatemia    Leukocytosis    Aftercare following organ transplant    Hypomagnesemia    HTN, kidney transplant related    Hydronephrosis, unspecified hydronephrosis type    SENDY (acute kidney injury)    Pancreatitis     SOCIAL /FAMILY HISTORY: [x]                  No recent change    Past Medical History:   Diagnosis Date    Chronic kidney disease     Diabetes (H)     DM Type 2    History of blood transfusion     Hypertension     MGUS (monoclonal gammopathy of unknown significance)     Pericardial effusion     Sjogren's syndrome     Type 2 diabetes mellitus with diabetic nephropathy (H)      Past Surgical History:   Procedure Laterality Date    APPENDECTOMY OPEN N/A 2025    Procedure: Appendectomy open, incidental;  Surgeon: Jean-Pierre Gleason MD;  Location: [facility] OR    BENCH KIDNEY  2025    Procedure: Bench kidney;  Surgeon: Jean-Pirere Gleason MD;  Location: [facility] OR    BENCH PANCREAS N/A 2025    Procedure: Bench whole pancreas;  Surgeon: Jean-Pierre Gleason MD;  Location: [facility] OR    BIOPSY       SECTION      CV CORONARY ANGIOGRAM N/A 2024    Procedure: Coronary Angiogram;  Surgeon: Cody Mckenzie MD;  [facility]    CV PCI N/A 2024    Procedure: Percutaneous Coronary Intervention;  Surgeon: Cody Mckenzie MD;  [facility]    IR  RENAL BIOPSY LEFT  2025    TRANSPLANT PANCREAS, KIDNEY  DONOR, COMBINED Right 2025    Procedure: Transplant pancreas, kidney  donor, with ureteral stent placement.;  Surgeon: Jean-Pierre Gleason MD;  Location:  OR     Social History     Socioeconomic History    Marital status:      Spouse name: Not on file    Number of children: Not on file    Years of education: Not on file    Highest education level: Not on file   Occupational History    Not on file   Tobacco Use    Smoking status: Never    Smokeless tobacco: Never   Substance and Sexual Activity    Alcohol use: Never    Drug use: Never    Sexual activity: Not on file   Other Topics Concern    Not on file   Social History Narrative    Not on file     Social Drivers of Health     Financial Resource Strain: Not on file   Food Insecurity: Not on file   Transportation Needs: Not on file   Physical Activity: Not on file   Stress: Not on file   Social Connections: Not on file   Interpersonal Safety: Unknown (2025)    Interpersonal Safety     Do you feel physically and emotionally safe where you currently live?: Patient unable to answer     Within the past 12 months, have you been hit, slapped, kicked or otherwise physically hurt by someone?: Patient unable to answer     Within the past 12 months, have you been humiliated or emotionally abused in other ways by your partner or ex-partner?: Patient unable to answer   Housing Stability: Not on file     Prescription Medications as of 2025         Rx Number Disp Refills Start End Last Dispensed Date Next Fill Date Owning Pharmacy    acetaminophen (TYLENOL) 325 MG tablet  50 tablet 0 2025 --   [facility] - [place], [place] - [address] [facility] [facility]    Sig: Take 2 tablets (650 mg) by mouth every 6 hours as needed for mild pain.    Class: E-Prescribe    Route: Oral    aspirin (ASA) 81 MG chewable tablet  60 tablet 3 2025 --   [facility] - [address]    Sig: Take 4 tablets (324 mg) by mouth or NG Tube daily.    Class: E-Prescribe    Route: Oral or NG Tube    atorvastatin (LIPITOR) 10 MG tablet  30 tablet 11 5/30/2025 --   [facility] [address]    Sig: Take 1 tablet (10 mg) by mouth daily.    Class: E-Prescribe    Route: Oral    calcium carbonate-vitamin D (OSCAL) 500-5 MG-MCG tablet  60 tablet 2 5/9/2025 --   [facility] - [place], [place] - [address] [place] [address] SE    Sig: Take 1 tablet by mouth 2 times daily (with meals).    Class: E-Prescribe    Route: Oral    carvedilol (COREG) 12.5 MG tablet  60 tablet 11 5/30/2025 --   [facility]    Sig: Take 1 tablet (12.5 mg) by mouth 2 times daily.    Class: E-Prescribe    Route: Oral    ezetimibe (ZETIA) 10 MG tablet  -- --  --       Sig: Take 10 mg by mouth at bedtime.    Class: Historical    Route: Oral    magnesium oxide (MAG-OX) 400 MG tablet  60 tablet 3 5/30/2025 --   [facility]    Sig: Take 1 tablet (400 mg) by mouth 2 times daily. 1100 & 1700    Class: E-Prescribe    Route: Oral    mycophenolate (GENERIC EQUIVALENT) 250 MG capsule  240 capsule 11 5/30/2025 --   [facility]    Sig: Take 4 capsules (1,000 mg) by mouth 2 times daily.    Class: E-Prescribe    Notes to Pharmacy: TXP DT 5/2/2025 (Kidney / Pancreas) TXP Dischg DT 5/9/2025 DX Kidney replaced by transplant Z94.0 [facility] ([place], [place])    Route: Oral    nystatin (MYCOSTATIN) 371059 UNIT/ML suspension  600 mL 2 5/10/2025 --   [facility] - [place], [place] - [address]    Sig: Take 5 mLs (500,000 Units) by mouth 4 times daily.    Class: E-Prescribe    Route: Oral    pantoprazole (PROTONIX) 40  MG EC tablet  30 tablet 1 5/10/2025 --   [facility] - [place], [place] - [address] [place] [address] SE    Sig: Take 1 tablet (40 mg) by mouth every morning (before breakfast).    Class: E-Prescribe    Route: Oral    predniSONE (DELTASONE) 5 MG tablet  30 tablet 11 5/30/2025 --   [facility] - [address]    Sig: Take 1 tablet (5 mg) by mouth daily.    Class: E-Prescribe    Notes to Pharmacy: TXP DT 5/2/2025 (Kidney / Pancreas) TXP Dischg DT 5/9/2025 DX Kidney replaced by transplant Z94.0 TX [facility] ([place], [place])    Route: Oral    senna-docusate (SENOKOT-S/PERICOLACE) 8.6-50 MG tablet  60 tablet 1 5/9/2025 --   [facility] - [place], [place] - [address] [place] [address] SE    Sig: Take 2 tablets by mouth 2 times daily.    Class: E-Prescribe    Route: Oral    sodium bicarbonate 650 MG tablet  120 tablet 2 5/30/2025 --   [facility] - [place] [address]    Sig: Take 2 tablets (1,300 mg) by mouth 2 times daily.    Class: E-Prescribe    Route: Oral    sulfamethoxazole-trimethoprim (BACTRIM) 400-80 MG tablet  15 tablet 11 5/30/2025 --   [facility] [place] [address] [facility]    Sig: Take 1 tablet by mouth Every Mon, Wed, Fri Morning.    Class: E-Prescribe    Route: Oral    tacrolimus (GENERIC EQUIVALENT) 0.5 MG capsule  60 capsule 11 6/6/2025 --   [facility] [place] [address] [facility]    Sig: Take 1 capsule (0.5 mg) by mouth 2 times daily. Total dose: 1.5mg twice daily    Class: E-Prescribe    Notes to Pharmacy: TXP DT 5/2/2025 (Kidney / Pancreas) TXP Dischg DT 5/9/2025 DX Kidney replaced by transplant Z94.0 TX [facility] ([place], [place])    Route: Oral    tacrolimus (GENERIC EQUIVALENT) 1 MG capsule  60 capsule 11 6/6/2025 --   [facility] -  [place], [place] - [address]    Sig: Take 1 capsule (1 mg) by mouth 2 times daily. Total dose: 1.5mg twice daily    Class: E-Prescribe    Notes to Pharmacy: TXP DT 5/2/2025 (Kidney / Pancreas) TXP Dischg DT 5/9/2025 DX Kidney replaced by transplant Z94.0 TX [facility] ([place], [place])    Route: Oral    valGANciclovir (VALCYTE) 450 MG tablet  60 tablet 0 6/2/2025 --   [facility] - [place], [place] - [address]    Sig: Take 1 tablet (450 mg) by mouth twice a week. Mondays and Thursdays    Class: E-Prescribe    Route: Oral          Atorvastatin and Extraneal   REVIEW OF SYSTEMS (check box if normal)  [x]               GENERAL  [x]                 PULMONARY [x]                GENITOURINARY  [x]                CNS                 [x]                 CARDIAC  [x]                 ENDOCRINE  [x]                EARS,NOSE,THROAT [x]                 GASTROINTESTINAL [x]                 NEUROLOGIC    [x]                MUSCLOSKELTAL  [x]                  HEMATOLOGY      PHYSICAL EXAM (check box if normal)BP (!) 151/74   Pulse 77   Wt 59.4 kg (131 lb)   SpO2 100%   BMI 25.58 kg/m          [x]            GENERAL:    [x]       EYES:  ICTERIC   []        YES  []                    NO  [x]           EXTREMITIES: Clubbing []       Y     [x]           N    [x]           EARS, NOSE, THROAT: Membranes Moist    YES   [x]                   NO []                  [x]           LUNGS:  CLEAR    YES       [x]                  NO    []                                [x]           SKIN: Jaundice           YES       []                  NO    [x]                   Rash: YES       []                  NO    [x]                                     [x]             HEART: Regular Rate          YES       [x]                  NO    []                   Incision Clean:  YES       [x]                  NO    []                                [x]                     ABDOMEN: Organomegaly YES       []                  NO    [x]              PD catheter in place  Incision c/d/i             [x]                    NEUROLOGICAL:  Nonfocal  YES       [x]                  NO    []                       [x]                    Hernia YES       []                  NO    [x]                   PSYCHIATRIC:  Appropriate  YES       [x]                  NO    []                       OTHER:                                                                                                   PAIN SCALE:: 1

## 2025-06-23 NOTE — CONSULTS
Outpatient IR Referral  06/23/25    Referring Provider: Dr. Sergio Gamino  IR Referral Request: right PNT placement    Recommendations/Plan:   Patient is approved and will be scheduled for placement of a right PNT.  Case and imaging was reviewed with Dr. Steve Dickerson MD.  IR recommendations also relayed Epic messaging. IR referral converted to procedure order.    Medication holds: 5 d hold for ASA 81 mg    Brief History:    Wendy Kapoor is a 54 year old female with history of HTN, MGUS, CAD, T2DM, sjogren's syndrome, ESRD, s/p LLQ transplant kidney and pancreas transplant. IR consulted for placement of a right PNT due to worsening hydronephrosis (see CT from 6/20 and 6/11).    Pertinent Medications:    Current Outpatient Medications   Medication Sig Dispense Refill    acetaminophen (TYLENOL) 325 MG tablet Take 2 tablets (650 mg) by mouth every 6 hours as needed for mild pain. 50 tablet 0    aspirin (ASA) 81 MG chewable tablet Take 4 tablets (324 mg) by mouth or NG Tube daily. 60 tablet 3    atorvastatin (LIPITOR) 10 MG tablet Take 1 tablet (10 mg) by mouth daily. 30 tablet 11    calcium carbonate-vitamin D (OSCAL) 500-5 MG-MCG tablet Take 1 tablet by mouth 2 times daily (with meals). 60 tablet 2    carvedilol (COREG) 12.5 MG tablet Take 1 tablet (12.5 mg) by mouth 2 times daily. 60 tablet 11    ezetimibe (ZETIA) 10 MG tablet Take 10 mg by mouth at bedtime.      magnesium oxide (MAG-OX) 400 MG tablet Take 1 tablet (400 mg) by mouth 2 times daily. 1100 & 1700 60 tablet 3    mycophenolate (GENERIC EQUIVALENT) 250 MG capsule Take 4 capsules (1,000 mg) by mouth 2 times daily. 240 capsule 11    nystatin (MYCOSTATIN) 837697 UNIT/ML suspension Take 5 mLs (500,000 Units) by mouth 4 times daily. 600 mL 2    pantoprazole (PROTONIX) 40 MG EC tablet Take 1 tablet (40 mg) by mouth every morning (before breakfast). 30 tablet 1    predniSONE (DELTASONE) 5 MG tablet Take 1 tablet (5 mg) by mouth daily. 30 tablet 11     senna-docusate (SENOKOT-S/PERICOLACE) 8.6-50 MG tablet Take 2 tablets by mouth 2 times daily. 60 tablet 1    sodium bicarbonate 650 MG tablet Take 2 tablets (1,300 mg) by mouth 2 times daily. 120 tablet 2    sulfamethoxazole-trimethoprim (BACTRIM) 400-80 MG tablet Take 1 tablet by mouth Every Mon, Wed, Fri Morning. 15 tablet 11    tacrolimus (GENERIC EQUIVALENT) 0.5 MG capsule Take 1 capsule (0.5 mg) by mouth 2 times daily. Total dose: 1.5mg twice daily 60 capsule 11    tacrolimus (GENERIC EQUIVALENT) 1 MG capsule Take 1 capsule (1 mg) by mouth 2 times daily. Total dose: 1.5mg twice daily 60 capsule 11    valGANciclovir (VALCYTE) 450 MG tablet Take 1 tablet (450 mg) by mouth twice a week. Mondays and Thursdays 60 tablet 0     No current facility-administered medications for this visit.       Pertinent Imaging Reviewed:        Most Recent Labs:  Lab Results   Component Value Date    WBC 5.8 06/23/2025     Lab Results   Component Value Date    RBC 2.49 06/23/2025     Lab Results   Component Value Date    HGB 7.8 06/23/2025     Lab Results   Component Value Date    HCT 23.7 06/23/2025     Lab Results   Component Value Date     06/23/2025    Last Comprehensive Metabolic Panel:  Lab Results   Component Value Date     06/19/2025    POTASSIUM 4.7 06/19/2025    CHLORIDE 100 06/19/2025    CO2 24 06/19/2025    ANIONGAP 13 06/19/2025     (H) 06/19/2025    BUN 33.7 (H) 06/19/2025    CR 2.37 (H) 06/19/2025    GFRESTIMATED 23 (L) 06/19/2025    VIJAY 9.5 06/19/2025      INR   Date Value Ref Range Status   05/07/2025 0.98 0.85 - 1.15 Final            Authorizing: Sergio Sena MD in  [facility]     Referral: [id number] (Pending Review)       Expires: 9/23/2025 Priority: Urgent: 3-5 Days   Diagnosis: Aftercare following organ transplant [Z48.298]  Hydronephrosis [N13.30]     Comments   324mg ASA daily   Order Specific Questions   What is the reason for the IR order request?   Drains/Tubes            Will this  be a management or placement of a drain/tube?   Placement            Placement of Drains/Tubes?   Nephrostomy            Laterality?   Right            Patients clinical information/history?   worsening hydro on repeat CT scans, recent kidney/pancreas transplant            Is there a specific location the exam needs to be scheduled at?   No specific location required            Call Back #   [phone]            Is the patient on aspirin, Plavix or blood thinners?   Yes - Patient may be asked to stop taking medications          Vero Sultana PA-C  Interventional Radiology   [phone] (IR RN triage)

## 2025-06-23 NOTE — LETTER
2025      Wendy Kapoor  [address]  [place] [address]      Dear Colleague,    Thank you for referring your patient, Wendy Kapoor, to the [facility]PL[facility]. Please see a copy of my visit note below.    -Transplant Surgery -OUTPATIENT IMMUNOSUPPRESSION PROGRESS NOTE    Date of Visit: 2025    Transplants:  2025 (Kidney / Pancreas); Postoperative day:  52  ASSESMENT AND PLAN:    Wendy Kapoor is a 54 year old female with a history of DM2, HTN, ESRD on PD since 2023, IgG Lambda MGUS, non-obstructive CAD, and anemia of chronic disease. On 2025 she was notified as an acceptable  donor organ became available and presented for further pre-operative work-up and on 2025 she underwent DBD SPK with stent, and open appendectomy which was well tolerated as performed by Dr. Gleason.     1.Graft Function: Cr a bit labile, now up to 2.3. Vivek 1.5. Has hydro on most recent CT that was also present when Cr was lower, but question if worsened. Planning for IR for PNT and interrogation of ureter. Lipase remains elevated, but euglycemic. Working on OP octreotide. Has had bx of kidney without rjxn- if kdieny resolves with PNT but panc remains elevated consider panc bx  2.Immunosuppression Management: tac level pending today    Tac 0.5 bid, MMF 1000 bid  3.Hypertension: well controlled  4.Renal Function:   Creatinine   Date Value Ref Range Status   2025 2.37 (H) 0.51 - 0.95 mg/dL Final       5.Lab frequency:  6.Other: staples removed. Ok for pd catheter removal closer to home if desired vs here at same time as stent.       Transplant:  [x]                             Liver []                              Kidney []                             Pancreas []                              Other:             Chief Complaint:Post-op Visit    History of Present Illness: s/p SPK- some ongoing graft malfunction but reports feeling very well overall. Eating and drinking well,  no nausea, normal BM/urine, no insulin needs.   Patient Active Problem List   Diagnosis     Type 2 diabetes mellitus with diabetic nephropathy (H)     Sjogren's syndrome     MGUS (monoclonal gammopathy of unknown significance)     Chronic kidney disease, stage V (H)     Essential hypertension     Pre-operative cardiovascular examination     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)     Status post coronary angiogram     Pre-transplant evaluation for kidney transplant     Encounter for pre-transplant evaluation for kidney and pancreas transplant     Pre-transplant evaluation for kidney and pancreas transplant     Kidney transplant recipient     Pancreas transplant status (H)     Immunosuppressed status     Anemia, chronic disease     Thrombocytopenia     Low bicarbonate     Hypophosphatemia     Leukocytosis     Aftercare following organ transplant     Hypomagnesemia     HTN, kidney transplant related     Hydronephrosis, unspecified hydronephrosis type     SENDY (acute kidney injury)     Pancreatitis     SOCIAL /FAMILY HISTORY: [x]                  No recent change    Past Medical History:   Diagnosis Date     Chronic kidney disease      Diabetes (H)     DM Type 2     History of blood transfusion      Hypertension      MGUS (monoclonal gammopathy of unknown significance)      Pericardial effusion      Sjogren's syndrome      Type 2 diabetes mellitus with diabetic nephropathy (H)      Past Surgical History:   Procedure Laterality Date     APPENDECTOMY OPEN N/A 2025    Procedure: Appendectomy open, incidental;  Surgeon: Jean-Pierre Gleason MD;  Location: [facility] OR     BENCH KIDNEY  2025    Procedure: Bench kidney;  Surgeon: Jean-Pierre Gleason MD;  Location: [facility]     BENCH PANCREAS N/A 2025    Procedure: Bench whole pancreas;  Surgeon: Jean-Pierre Gleason MD;  Location: [facility] OR     BIOPSY        SECTION       CV CORONARY ANGIOGRAM N/A  2024    Procedure: Coronary Angiogram;  Surgeon: Cody Mckenzie MD;  Location:  HEART CARDIAC CATH LAB     CV PCI N/A 2024    Procedure: Percutaneous Coronary Intervention;  Surgeon: Cody Mckenzie MD;  Location: [facility] CARDIAC CATH LAB     IR RENAL BIOPSY LEFT  2025     TRANSPLANT PANCREAS, KIDNEY  DONOR, COMBINED Right 2025    Procedure: Transplant pancreas, kidney  donor, with ureteral stent placement.;  Surgeon: Jean-Pierre Gleason MD;  Location:  OR     Social History     Socioeconomic History     Marital status:      Spouse name: Not on file     Number of children: Not on file     Years of education: Not on file     Highest education level: Not on file   Occupational History     Not on file   Tobacco Use     Smoking status: Never     Smokeless tobacco: Never   Substance and Sexual Activity     Alcohol use: Never     Drug use: Never     Sexual activity: Not on file   Other Topics Concern     Not on file   Social History Narrative     Not on file     Social Drivers of Health     Financial Resource Strain: Not on file   Food Insecurity: Not on file   Transportation Needs: Not on file   Physical Activity: Not on file   Stress: Not on file   Social Connections: Not on file   Interpersonal Safety: Unknown (2025)    Interpersonal Safety      Do you feel physically and emotionally safe where you currently live?: Patient unable to answer      Within the past 12 months, have you been hit, slapped, kicked or otherwise physically hurt by someone?: Patient unable to answer      Within the past 12 months, have you been humiliated or emotionally abused in other ways by your partner or ex-partner?: Patient unable to answer   Housing Stability: Not on file     Prescription Medications as of 2025         Rx Number Disp Refills Start End Last Dispensed Date Next Fill Date Owning Pharmacy    acetaminophen (TYLENOL) 325 MG tablet  50 tablet 0  5/9/2025 --   [facility] Discharge - [address]    Sig: Take 2 tablets (650 mg) by mouth every 6 hours as needed for mild pain.    Class: E-Prescribe    Route: Oral    aspirin (ASA) 81 MG chewable tablet  60 tablet 3 5/30/2025 --   [facility]/[address]    Sig: Take 4 tablets (324 mg) by mouth or NG Tube daily.    Class: E-Prescribe    Route: Oral or NG Tube    atorvastatin (LIPITOR) 10 MG tablet  30 tablet 11 5/30/2025 --   [facility]/[address]    Sig: Take 1 tablet (10 mg) by mouth daily.    Class: E-Prescribe    Route: Oral    calcium carbonate-vitamin D (OSCAL) 500-5 MG-MCG tablet  60 tablet 2 5/9/2025 --   [facility] Discharge - [address]    Sig: Take 1 tablet by mouth 2 times daily (with meals).    Class: E-Prescribe    Route: Oral    carvedilol (COREG) 12.5 MG tablet  60 tablet 11 5/30/2025 --   [facility]/[address]    Sig: Take 1 tablet (12.5 mg) by mouth 2 times daily.    Class: E-Prescribe    Route: Oral    ezetimibe (ZETIA) 10 MG tablet  -- --  --       Sig: Take 10 mg by mouth at bedtime.    Class: Historical    Route: Oral    magnesium oxide (MAG-OX) 400 MG tablet  60 tablet 3 5/30/2025 --   [facility]/[address]    Sig: Take 1 tablet (400 mg) by mouth 2 times daily. 1100 & 1700    Class: E-Prescribe    Route: Oral    mycophenolate (GENERIC EQUIVALENT) 250 MG capsule  240 capsule 11 5/30/2025 --   [facility]/[address]    Sig: Take 4 capsules (1,000 mg) by mouth 2 times daily.    Class: E-Prescribe    Notes to Pharmacy: TXP DT 5/2/2025 (Kidney / Pancreas) TXP Dischg DT 5/9/2025 DX Kidney replaced by transplant Z94.0 TX [facility] [place] ([place],  [place])    Route: Oral    nystatin (MYCOSTATIN) 198588 UNIT/ML suspension  600 mL 2 5/10/2025 --   [facility] Discharge - [place], [place] - [address] [place] [address]    Sig: Take 5 mLs (500,000 Units) by mouth 4 times daily.    Class: E-Prescribe    Route: Oral    pantoprazole (PROTONIX) 40 MG EC tablet  30 tablet 1 5/10/2025 --   [facility] - [place], [place] - [address] [place] [address]    Sig: Take 1 tablet (40 mg) by mouth every morning (before breakfast).    Class: E-Prescribe    Route: Oral    predniSONE (DELTASONE) 5 MG tablet  30 tablet 11 5/30/2025 --   [facility] [address]    Sig: Take 1 tablet (5 mg) by mouth daily.    Class: E-Prescribe    Notes to Pharmacy: TXP DT 5/2/2025 (Kidney / Pancreas) TXP Dischg DT 5/9/2025 DX Kidney replaced by transplant Z94.0 TX Center [facility] ([place], [place])    Route: Oral    senna-docusate (SENOKOT-S/PERICOLACE) 8.6-50 MG tablet  60 tablet 1 5/9/2025 --   [facility] - [place], [place] - [address] [place] [address]    Sig: Take 2 tablets by mouth 2 times daily.    Class: E-Prescribe    Route: Oral    sodium bicarbonate 650 MG tablet  120 tablet 2 5/30/2025 --   [facility], [place] - [address] [place] [address]    Sig: Take 2 tablets (1,300 mg) by mouth 2 times daily.    Class: E-Prescribe    Route: Oral    sulfamethoxazole-trimethoprim (BACTRIM) 400-80 MG tablet  15 tablet 11 5/30/2025 --   [facility]    Sig: Take 1 tablet by mouth Every Mon, Wed, Fri Morning.    Class: E-Prescribe    Route: Oral    tacrolimus (GENERIC EQUIVALENT) 0.5 MG capsule  60 capsule 11 6/6/2025 --   [facility] - [address]    Sig: Take 1 capsule (0.5 mg) by mouth 2 times daily. Total dose: 1.5mg twice daily    Class: E-Prescribe    Notes to Pharmacy: NATALYA HERNANDEZ  5/2/2025 (Kidney / Pancreas) TXP Dischg DT 5/9/2025 DX Kidney replaced by transplant Z94.0 TX Center [facility] ([place], [place])    Route: Oral    tacrolimus (GENERIC EQUIVALENT) 1 MG capsule  60 capsule 11 6/6/2025 --   [facility] [place], [place] - [address]    Sig: Take 1 capsule (1 mg) by mouth 2 times daily. Total dose: 1.5mg twice daily    Class: E-Prescribe    Notes to Pharmacy: TXP DT 5/2/2025 (Kidney / Pancreas) TXP Dischg DT 5/9/2025 DX Kidney replaced by transplant Z94.0 TX [facility] ([place], [place])    Route: Oral    valGANciclovir (VALCYTE) 450 MG tablet  60 tablet 0 6/2/2025 --   [facility] [place], [place] - [address]    Sig: Take 1 tablet (450 mg) by mouth twice a week. Mondays and Thursdays    Class: E-Prescribe    Route: Oral          Atorvastatin and Extraneal   REVIEW OF SYSTEMS (check box if normal)  [x]               GENERAL  [x]                 PULMONARY [x]                GENITOURINARY  [x]                CNS                 [x]                 CARDIAC  [x]                 ENDOCRINE  [x]                EARS,NOSE,THROAT [x]                 GASTROINTESTINAL [x]                 NEUROLOGIC    [x]                MUSCLOSKELTAL  [x]                  HEMATOLOGY      PHYSICAL EXAM (check box if normal)BP (!) 151/74   Pulse 77   Wt 59.4 kg (131 lb)   SpO2 100%   BMI 25.58 kg/m          [x]            GENERAL:    [x]       EYES:  ICTERIC   []        YES  []                    NO  [x]           EXTREMITIES: Clubbing []       Y     [x]           N    [x]           EARS, NOSE, THROAT: Membranes Moist    YES   [x]                   NO []                  [x]           LUNGS:  CLEAR    YES       [x]                  NO    []                                [x]           SKIN: Jaundice           YES       []                  NO    [x]                    Rash: YES       []                  NO    [x]                                     [x]             HEART: Regular Rate          YES       [x]                  NO    []                   Incision Clean:  YES       [x]                  NO    []                                [x]                    ABDOMEN: Organomegaly YES       []                  NO    [x]              PD catheter in place  Incision c/d/i             [x]                    NEUROLOGICAL:  Nonfocal  YES       [x]                  NO    []                       [x]                    Hernia YES       []                  NO    [x]                   PSYCHIATRIC:  Appropriate  YES       [x]                  NO    []                       OTHER:                                                                                                   PAIN SCALE:: 1      Again, thank you for allowing me to participate in the care of your patient.        Sincerely,        Jean-Pierre Gleason MD    Electronically signed

## 2025-06-24 RX ORDER — OCTREOTIDE ACETATE 100 UG/ML
100 INJECTION, SOLUTION INTRAVENOUS; SUBCUTANEOUS 3 TIMES DAILY
Qty: 42 ML | Refills: 0 | Status: SHIPPED | OUTPATIENT
Start: 2025-06-24 | End: 2025-07-08

## 2025-06-24 NOTE — TELEPHONE ENCOUNTER
Jean-Pierre Gleason MD Colianni, Lauren, CHIRAG; Sergio Sena MD  We discussed:    -trying to see if we can get the octreotide set up for her closer to home (or if they can inject themselves if doing the subQ)  -trying to see if we can coordinate with her local surgeon to remove her PD catheter (if they have questions or concerns I'm happy to talk with them)  -agree with IR for PNT. Keep trending labs and if they substantially improve before it is placed we can cancel    Thank you!  Jean-Pierre          Recommendation to remove dialysis access due to improved renal function post-transplant.    Provider: Jean-Pierre Gleason M.D.

## 2025-06-25 ENCOUNTER — RESULTS FOLLOW-UP (OUTPATIENT)
Dept: TRANSPLANT | Facility: CLINIC | Age: 55
End: 2025-06-25

## 2025-06-25 ENCOUNTER — PATIENT OUTREACH (OUTPATIENT)
Dept: NEPHROLOGY | Facility: CLINIC | Age: 55
End: 2025-06-25
Payer: COMMERCIAL

## 2025-06-25 ENCOUNTER — MYC MEDICAL ADVICE (OUTPATIENT)
Dept: INTERVENTIONAL RADIOLOGY/VASCULAR | Facility: CLINIC | Age: 55
End: 2025-06-25
Payer: COMMERCIAL

## 2025-06-25 DIAGNOSIS — K85.90 PANCREATITIS: ICD-10-CM

## 2025-06-25 NOTE — TELEPHONE ENCOUNTER
Patient was offered PD catheter removal at 1:20 pm on 7/8. She became frustrated with this surgery time because she wanted to have the surgery and her other procedure done at the same time, not just on the same day.    Called and spoke with patient. Explained that these are two different procedures that are done in two different areas of the hospital, so they cannot be done at the same time.  Patient asked about having the PD catheter removed in [place] on a different day. Advised that she can absolutely go to the surgeon that placed her PD catheter if she wishes. Ultimately, patient decided to accept the 1:20 pm PD catheter removal on 7/8.    Chanelle Whitlock RN on 6/25/2025 at 2:59 PM

## 2025-06-25 NOTE — TELEPHONE ENCOUNTER
Received request to remove PD catheter on 7/8. Patient had a kidney transplant on 5/2/2025 with Dr. Gleason.    Verified provider documentation to remove PD catheter.    Verified that PD catheter is in place. PD catheter placed 9/7/2023 by Dr. Garry Velarde at [facility] in [place].    Request sent to schedule PD catheter removal on 7/8/25 with Dr. Gleason after IR procedure.    Chanelle Whitlock RN on 6/25/2025 at 11:17 AM

## 2025-06-26 ENCOUNTER — VIRTUAL VISIT (OUTPATIENT)
Dept: PULMONOLOGY | Facility: CLINIC | Age: 55
End: 2025-06-26
Attending: NURSE PRACTITIONER
Payer: COMMERCIAL

## 2025-06-26 ENCOUNTER — TELEPHONE (OUTPATIENT)
Dept: TRANSPLANT | Facility: CLINIC | Age: 55
End: 2025-06-26

## 2025-06-26 ENCOUNTER — LAB (OUTPATIENT)
Dept: LAB | Facility: OTHER | Age: 55
End: 2025-06-26
Payer: COMMERCIAL

## 2025-06-26 ENCOUNTER — TELEPHONE (OUTPATIENT)
Dept: PULMONOLOGY | Facility: CLINIC | Age: 55
End: 2025-06-26

## 2025-06-26 ENCOUNTER — LAB REQUISITION (OUTPATIENT)
Dept: LAB | Facility: CLINIC | Age: 55
End: 2025-06-26
Payer: COMMERCIAL

## 2025-06-26 DIAGNOSIS — Z48.298 AFTERCARE FOLLOWING ORGAN TRANSPLANT: ICD-10-CM

## 2025-06-26 DIAGNOSIS — Z79.899 ENCOUNTER FOR LONG-TERM CURRENT USE OF MEDICATION: ICD-10-CM

## 2025-06-26 DIAGNOSIS — J98.4 LUNG CYST: Primary | ICD-10-CM

## 2025-06-26 DIAGNOSIS — K85.90 PANCREATITIS: ICD-10-CM

## 2025-06-26 DIAGNOSIS — R91.1 LUNG NODULE: ICD-10-CM

## 2025-06-26 DIAGNOSIS — Z94.0 KIDNEY REPLACED BY TRANSPLANT: ICD-10-CM

## 2025-06-26 DIAGNOSIS — Z98.890 OTHER SPECIFIED POSTPROCEDURAL STATES: ICD-10-CM

## 2025-06-26 DIAGNOSIS — Z20.828 CONTACT WITH AND (SUSPECTED) EXPOSURE TO OTHER VIRAL COMMUNICABLE DISEASES: ICD-10-CM

## 2025-06-26 LAB
AMYLASE SERPL-CCNC: 104 U/L (ref 28–100)
ANION GAP SERPL CALCULATED.3IONS-SCNC: 10 MMOL/L (ref 7–15)
BUN SERPL-MCNC: 30.5 MG/DL (ref 6–20)
CALCIUM SERPL-MCNC: 9.7 MG/DL (ref 8.8–10.4)
CHLORIDE SERPL-SCNC: 105 MMOL/L (ref 98–107)
CREAT SERPL-MCNC: 2.42 MG/DL (ref 0.51–0.95)
EGFRCR SERPLBLD CKD-EPI 2021: 23 ML/MIN/1.73M2
ERYTHROCYTE [DISTWIDTH] IN BLOOD BY AUTOMATED COUNT: 14.9 % (ref 10–15)
GLUCOSE SERPL-MCNC: 108 MG/DL (ref 70–99)
HCO3 SERPL-SCNC: 26 MMOL/L (ref 22–29)
HCT VFR BLD AUTO: 24.5 % (ref 35–47)
HGB BLD-MCNC: 7.8 G/DL (ref 11.7–15.7)
LIPASE SERPL-CCNC: 199 U/L (ref 13–60)
MCH RBC QN AUTO: 30.7 PG (ref 26.5–33)
MCHC RBC AUTO-ENTMCNC: 31.8 G/DL (ref 31.5–36.5)
MCV RBC AUTO: 97 FL (ref 78–100)
PLATELET # BLD AUTO: 160 10E3/UL (ref 150–450)
POTASSIUM SERPL-SCNC: 4.9 MMOL/L (ref 3.4–5.3)
RBC # BLD AUTO: 2.54 10E6/UL (ref 3.8–5.2)
SODIUM SERPL-SCNC: 141 MMOL/L (ref 135–145)
TACROLIMUS BLD-MCNC: 7 UG/L (ref 5–15)
TME LAST DOSE: NORMAL H
TME LAST DOSE: NORMAL H
WBC # BLD AUTO: 4.8 10E3/UL (ref 4–11)

## 2025-06-26 PROCEDURE — 86833 HLA CLASS II HIGH DEFIN QUAL: CPT | Performed by: INTERNAL MEDICINE

## 2025-06-26 ASSESSMENT — PAIN SCALES - GENERAL: PAINLEVEL_OUTOF10: NO PAIN (0)

## 2025-06-26 NOTE — NURSING NOTE
Is the patient currently in the state of [place]? YES    Current patient location: [facility] in [place].    Visit mode:Video    If the visit is dropped, the patient can be reconnected by: VIDEO VISIT: Text to cell phone:   Telephone Information:   Mobile [phone]       Will anyone else be joining the visit? No  (If patient encounters technical issues they should call [phone])    Are changes needed to the allergy or medication list? N/A    Are refills needed on medications prescribed by this physician? No    Rooming Documentation: Questionnaire(s) not done per department protocol.    VF advised pt and is aware that they cannot be driving during check-in/visit.    Reason for visit: Consult     LILY Aguilar

## 2025-06-26 NOTE — PROGRESS NOTES
Virtual Visit Details    Type of service:  Video Visit       Originating Location (pt. Location): Home    Distant Location (provider location):  Off-site  Platform used for Video Visit: Well    LUNG NODULE & INTERVENTIONAL PULMONARY CLINIC  [facility]     Wendy Kapoor MRN# [id number]   Age: 54 year old YOB: 1970     Reason for Consultation: lung nodule(s)    Requesting Physician: MIA Veronica CNP  [address] [place] [address]       Assessment and Plan    1. 15mm LLL nodule noted on CT A/P  5/10/2025 is resolved on CT A/P 6/11/2025 and 6/20/2025 on my personal review, no further follow up required.    2. Multiple thin-walled pulmonary cysts. She saw Rheumatology, Dr. Redman 1/2024 for high likelihood of Sjogren's syndrome but not confirmed with biopsy d/t lack of symptoms. Ordered referral to ILD clinic for what I suspect to be CARVAJAL, non urgent. She is asymptomatic from a respiratory standpoint.       Lorraine Sinclair PA-C  Interventional and General Pulmonology  Department of Pulmonary, Allergy, Critical Care and Sleep Medicine   [facility]     45 minutes spent reviewing chart, reviewing test results, talking with and examining patient, formulating plan, and documentation on the day of the encounter.          History:     Wendy Kapoor is a 54 year old female with a history of DM2, HTN, ESRD on PD since 09/2023, IgG Lambda MGUS, non-obstructive CAD, and anemia of chronic disease. S/p DBD SPK with stent, and open appendectomy 5/2/2025 who was referred for a lung nodule.    - No new resp sx or complaints. Denies dyspnea or cough.   - lifelong nonsmoker         Past Medical History:      Past Medical History:   Diagnosis Date    Chronic kidney disease     Diabetes (H)     DM Type 2    History of blood transfusion 2019    Hypertension     MGUS (monoclonal gammopathy of unknown significance)     Pericardial effusion     Sjogren's  syndrome     Type 2 diabetes mellitus with diabetic nephropathy (H)            Past Surgical History:      Past Surgical History:   Procedure Laterality Date    APPENDECTOMY OPEN N/A 2025    Procedure: Appendectomy open, incidental;  Surgeon: Jean-Pierre Gleason MD;  Location: [facility] OR    BENCH KIDNEY  2025    Procedure: Bench kidney;  Surgeon: Jean-Pierre Gleason MD;  [facility]    BENCH PANCREAS N/A 2025    Procedure: Bench whole pancreas;  Surgeon: Jean-Pierre Gleason MD;  Location[facility] OR    BIOPSY       SECTION      CV CORONARY ANGIOGRAM N/A 2024    Procedure: Coronary Angiogram;  Surgeon: Cody Mckenzie MD;  Location:  [facility] CARDIAC CATH LAB    CV PCI N/A 2024    Procedure: Percutaneous Coronary Intervention;  Surgeon: Cody Mckenzie MD;  Location:  [facility] CARDIAC CATH LAB    IR RENAL BIOPSY LEFT  2025    TRANSPLANT PANCREAS, KIDNEY  DONOR, COMBINED Right 2025    Procedure: Transplant pancreas, kidney  donor, with ureteral stent placement.;  Surgeon: Jean-Pierre Gleason MD;  Location: [facility] OR          Social History:     Social History     Tobacco Use    Smoking status: Never    Smokeless tobacco: Never   Substance Use Topics    Alcohol use: Never          Family History:     Family History   Problem Relation Age of Onset    Kidney Disease No family hx of            Allergies:      Allergies   Allergen Reactions    Atorvastatin Itching    Extraneal Rash          Medications:     Current Outpatient Medications   Medication Sig Dispense Refill    acetaminophen (TYLENOL) 325 MG tablet Take 2 tablets (650 mg) by mouth every 6 hours as needed for mild pain. 50 tablet 0    aspirin (ASA) 81 MG chewable tablet Take 4 tablets (324 mg) by mouth or NG Tube daily. 60 tablet 3    atorvastatin (LIPITOR) 10 MG tablet Take 1 tablet (10 mg) by mouth daily. 30 tablet 11    calcium carbonate-vitamin D  (OSCAL) 500-5 MG-MCG tablet Take 1 tablet by mouth 2 times daily (with meals). 60 tablet 2    carvedilol (COREG) 12.5 MG tablet Take 1 tablet (12.5 mg) by mouth 2 times daily. 60 tablet 11    ezetimibe (ZETIA) 10 MG tablet Take 10 mg by mouth at bedtime.      magnesium oxide (MAG-OX) 400 MG tablet Take 1 tablet (400 mg) by mouth 2 times daily. 1100 & 1700 60 tablet 3    mycophenolate (GENERIC EQUIVALENT) 250 MG capsule Take 4 capsules (1,000 mg) by mouth 2 times daily. 240 capsule 11    nystatin (MYCOSTATIN) 691580 UNIT/ML suspension Take 5 mLs (500,000 Units) by mouth 4 times daily. 600 mL 2    Octreotide Acetate 100 MCG/ML SOSY Inject 100 mcg subcutaneously 3 times daily for 14 days. 42 mL 0    pantoprazole (PROTONIX) 40 MG EC tablet Take 1 tablet (40 mg) by mouth every morning (before breakfast). 30 tablet 1    predniSONE (DELTASONE) 5 MG tablet Take 1 tablet (5 mg) by mouth daily. 30 tablet 11    senna-docusate (SENOKOT-S/PERICOLACE) 8.6-50 MG tablet Take 2 tablets by mouth 2 times daily. 60 tablet 1    sodium bicarbonate 650 MG tablet Take 2 tablets (1,300 mg) by mouth 2 times daily. 120 tablet 2    sulfamethoxazole-trimethoprim (BACTRIM) 400-80 MG tablet Take 1 tablet by mouth Every Mon, Wed, Fri Morning. 15 tablet 11    tacrolimus (GENERIC EQUIVALENT) 0.5 MG capsule Take 1 capsule (0.5 mg) by mouth 2 times daily. Total dose: 1.5mg twice daily 60 capsule 11    tacrolimus (GENERIC EQUIVALENT) 1 MG capsule Take 1 capsule (1 mg) by mouth 2 times daily. Total dose: 1.5mg twice daily 60 capsule 11    valGANciclovir (VALCYTE) 450 MG tablet Take 1 tablet (450 mg) by mouth twice a week. Mondays and Thursdays 60 tablet 0     No current facility-administered medications for this visit.            Physical Exam:   There were no vitals taken for this visit.  Wt Readings from Last 4 Encounters:   06/23/25 59.4 kg (131 lb)   06/12/25 58.1 kg (128 lb 3 oz)   06/04/25 58 kg (127 lb 13.9 oz)   05/21/25 59.1 kg (130 lb 5 oz)      General: Well appearing  Lungs: Nonlabored breathing  Neuro: Answering questions appropriately  Psych: Normal affect       Imaging/Lab Data   All laboratory and imaging data reviewed.    Recent Results (from the past 24 hours)   CBC with platelets   Result Value Ref Range    WBC Count 4.8 4.0 - 11.0 10e3/uL    RBC Count 2.54 (L) 3.80 - 5.20 10e6/uL    Hemoglobin 7.8 (LL) 11.7 - 15.7 g/dL    Hematocrit 24.5 (L) 35.0 - 47.0 %    MCV 97 78 - 100 fL    MCH 30.7 26.5 - 33.0 pg    MCHC 31.8 31.5 - 36.5 g/dL    RDW 14.9 10.0 - 15.0 %    Platelet Count 160 150 - 450 10e3/uL   PRA Donor Specific Antibody    Narrative    The following orders were created for panel order PRA Donor Specific Antibody.  Procedure                               Abnormality         Status                     ---------                               -----------         ------                     PRA Donor Specific Anti...[[id number]]                      In process                   Please view results for these tests on the individual orders.

## 2025-06-26 NOTE — TELEPHONE ENCOUNTER
DATE:  6/26/2025     TIME OF RECEIPT FROM LAB:  8:19 am    ORDERING PROVIDER: Zackery    LAB TEST:  Hgb    LAB VALUE:  7.8

## 2025-06-26 NOTE — TELEPHONE ENCOUNTER
ILD New Patient Referral: Pre visit communication    Patient: Wendy Kapoor  Reason for Referral: J98.4 (ICD-10-CM) - Lung cyst   Referring Physician: Lorraine Sinclair PA-C   Referring Clinic/Contact: Phone#: [facility]     Chest CT scan: 6/20/25   Biopsy: NA  PFT's:NA  Additional testing:     Current symptoms: denies shortness of breath with activity or rest, reginaldo  Current related prescriptions: NA, on tacrolimus post transplant     Supplemental oxygen? No    In review of apparent records and conversation with patient; recommend first visit with ILD provider be conducted :  In person visit    Additional notes:

## 2025-06-27 ENCOUNTER — RESULTS FOLLOW-UP (OUTPATIENT)
Dept: TRANSPLANT | Facility: CLINIC | Age: 55
End: 2025-06-27

## 2025-06-27 DIAGNOSIS — K85.90 PANCREATITIS: ICD-10-CM

## 2025-06-30 ENCOUNTER — LAB (OUTPATIENT)
Dept: LAB | Facility: OTHER | Age: 55
End: 2025-06-30
Payer: COMMERCIAL

## 2025-06-30 ENCOUNTER — TELEPHONE (OUTPATIENT)
Dept: TRANSPLANT | Facility: CLINIC | Age: 55
End: 2025-06-30

## 2025-06-30 DIAGNOSIS — Z48.298 AFTERCARE FOLLOWING ORGAN TRANSPLANT: ICD-10-CM

## 2025-06-30 DIAGNOSIS — Z79.899 ENCOUNTER FOR LONG-TERM CURRENT USE OF MEDICATION: ICD-10-CM

## 2025-06-30 DIAGNOSIS — K85.90 PANCREATITIS: ICD-10-CM

## 2025-06-30 DIAGNOSIS — Z98.890 OTHER SPECIFIED POSTPROCEDURAL STATES: ICD-10-CM

## 2025-06-30 DIAGNOSIS — Z20.828 CONTACT WITH AND (SUSPECTED) EXPOSURE TO OTHER VIRAL COMMUNICABLE DISEASES: ICD-10-CM

## 2025-06-30 DIAGNOSIS — Z94.0 KIDNEY REPLACED BY TRANSPLANT: ICD-10-CM

## 2025-06-30 LAB
AMYLASE SERPL-CCNC: 97 U/L (ref 28–100)
ANION GAP SERPL CALCULATED.3IONS-SCNC: 13 MMOL/L (ref 7–15)
BUN SERPL-MCNC: 29 MG/DL (ref 6–20)
CALCIUM SERPL-MCNC: 9.1 MG/DL (ref 8.8–10.4)
CHLORIDE SERPL-SCNC: 106 MMOL/L (ref 98–107)
CREAT SERPL-MCNC: 2.49 MG/DL (ref 0.51–0.95)
EGFRCR SERPLBLD CKD-EPI 2021: 22 ML/MIN/1.73M2
ERYTHROCYTE [DISTWIDTH] IN BLOOD BY AUTOMATED COUNT: 14.7 % (ref 10–15)
GLUCOSE SERPL-MCNC: 104 MG/DL (ref 70–99)
HCO3 SERPL-SCNC: 23 MMOL/L (ref 22–29)
HCT VFR BLD AUTO: 24.3 % (ref 35–47)
HGB BLD-MCNC: 7.9 G/DL (ref 11.7–15.7)
LIPASE SERPL-CCNC: 200 U/L (ref 13–60)
MAGNESIUM SERPL-MCNC: 1.7 MG/DL (ref 1.7–2.3)
MCH RBC QN AUTO: 31.3 PG (ref 26.5–33)
MCHC RBC AUTO-ENTMCNC: 32.5 G/DL (ref 31.5–36.5)
MCV RBC AUTO: 96 FL (ref 78–100)
PHOSPHATE SERPL-MCNC: 3.7 MG/DL (ref 2.5–4.5)
PLATELET # BLD AUTO: 138 10E3/UL (ref 150–450)
POTASSIUM SERPL-SCNC: 4.3 MMOL/L (ref 3.4–5.3)
RBC # BLD AUTO: 2.52 10E6/UL (ref 3.8–5.2)
SODIUM SERPL-SCNC: 142 MMOL/L (ref 135–145)
TACROLIMUS BLD-MCNC: 5.6 UG/L (ref 5–15)
TME LAST DOSE: NORMAL H
TME LAST DOSE: NORMAL H
WBC # BLD AUTO: 7.1 10E3/UL (ref 4–11)

## 2025-06-30 PROCEDURE — 85027 COMPLETE CBC AUTOMATED: CPT

## 2025-06-30 PROCEDURE — 36415 COLL VENOUS BLD VENIPUNCTURE: CPT

## 2025-06-30 PROCEDURE — 80197 ASSAY OF TACROLIMUS: CPT

## 2025-06-30 PROCEDURE — 82150 ASSAY OF AMYLASE: CPT

## 2025-06-30 PROCEDURE — 84100 ASSAY OF PHOSPHORUS: CPT

## 2025-06-30 PROCEDURE — 80180 DRUG SCRN QUAN MYCOPHENOLATE: CPT

## 2025-06-30 PROCEDURE — 87799 DETECT AGENT NOS DNA QUANT: CPT

## 2025-06-30 PROCEDURE — 83690 ASSAY OF LIPASE: CPT

## 2025-06-30 PROCEDURE — 80048 BASIC METABOLIC PNL TOTAL CA: CPT

## 2025-06-30 PROCEDURE — 83735 ASSAY OF MAGNESIUM: CPT

## 2025-06-30 NOTE — TELEPHONE ENCOUNTER
DATE:  6/30/2025     TIME OF RECEIPT FROM LAB:  8:11 am    ORDERING PROVIDER: Zackery    LAB TEST:  Hgb    LAB VALUE:  7.8

## 2025-07-01 ENCOUNTER — TELEPHONE (OUTPATIENT)
Dept: TRANSPLANT | Facility: CLINIC | Age: 55
End: 2025-07-01
Payer: COMMERCIAL

## 2025-07-01 DIAGNOSIS — Z94.0 KIDNEY TRANSPLANT RECIPIENT: ICD-10-CM

## 2025-07-01 DIAGNOSIS — K85.90 PANCREATITIS: ICD-10-CM

## 2025-07-01 DIAGNOSIS — Z48.298 AFTERCARE FOLLOWING ORGAN TRANSPLANT: ICD-10-CM

## 2025-07-01 DIAGNOSIS — Z94.83 PANCREAS TRANSPLANT STATUS (H): ICD-10-CM

## 2025-07-01 LAB
BK VIRUS SPECIMEN TYPE: NORMAL
BKV DNA # SPEC NAA+PROBE: NOT DETECTED IU/ML
MYCOPHENOLATE SERPL LC/MS/MS-MCNC: 4.27 MG/L (ref 1–3.5)
MYCOPHENOLATE-G SERPL LC/MS/MS-MCNC: 180.9 MG/L (ref 30–95)
TME LAST DOSE: ABNORMAL H
TME LAST DOSE: ABNORMAL H

## 2025-07-01 RX ORDER — TACROLIMUS 1 MG/1
2 CAPSULE ORAL 2 TIMES DAILY
Qty: 120 CAPSULE | Refills: 11 | Status: SHIPPED | OUTPATIENT
Start: 2025-07-01

## 2025-07-01 RX ORDER — TACROLIMUS 0.5 MG/1
0.5 CAPSULE ORAL 2 TIMES DAILY
Qty: 60 CAPSULE | Refills: 11 | Status: SHIPPED | OUTPATIENT
Start: 2025-07-01

## 2025-07-01 NOTE — TELEPHONE ENCOUNTER
Called to touch base with patient about plan of care and ensure her Octreotide injections were being delivered. She states that [facility] called her yesterday, and are shipping out her supply of this medication. She understands how to administer and will do so upon its arrival.    Patients Tacrolimus level is low, so increased to 2.5mg twice daily. She states she is taking it as directed and it was a good 12 hour trough.    Patient will have PNT and PD cath removal on Tuesday. She states she is unwilling to do this any sooner, even though her creatinine is increasing as she has a plan to do both on the same day. RNCC explained the risks of this, and she was ok with waiting until her scheduled time and date.

## 2025-07-03 ENCOUNTER — HOSPITAL ENCOUNTER (INPATIENT)
Facility: CLINIC | Age: 55
LOS: 1 days | Discharge: HOME | End: 2025-07-04
Attending: EMERGENCY MEDICINE | Admitting: TRANSPLANT SURGERY
Payer: COMMERCIAL

## 2025-07-03 ENCOUNTER — LAB (OUTPATIENT)
Dept: LAB | Facility: OTHER | Age: 55
End: 2025-07-03
Payer: COMMERCIAL

## 2025-07-03 ENCOUNTER — APPOINTMENT (OUTPATIENT)
Dept: ULTRASOUND IMAGING | Facility: CLINIC | Age: 55
End: 2025-07-03
Attending: EMERGENCY MEDICINE
Payer: COMMERCIAL

## 2025-07-03 ENCOUNTER — LAB REQUISITION (OUTPATIENT)
Dept: LAB | Facility: CLINIC | Age: 55
End: 2025-07-03
Payer: COMMERCIAL

## 2025-07-03 ENCOUNTER — TELEPHONE (OUTPATIENT)
Dept: TRANSPLANT | Facility: CLINIC | Age: 55
End: 2025-07-03

## 2025-07-03 ENCOUNTER — RESULTS FOLLOW-UP (OUTPATIENT)
Dept: TRANSPLANT | Facility: CLINIC | Age: 55
End: 2025-07-03

## 2025-07-03 DIAGNOSIS — K85.90 PANCREATITIS: ICD-10-CM

## 2025-07-03 DIAGNOSIS — Z48.298 AFTERCARE FOLLOWING ORGAN TRANSPLANT: ICD-10-CM

## 2025-07-03 DIAGNOSIS — Z94.0 KIDNEY REPLACED BY TRANSPLANT: ICD-10-CM

## 2025-07-03 DIAGNOSIS — Z20.828 CONTACT WITH AND (SUSPECTED) EXPOSURE TO OTHER VIRAL COMMUNICABLE DISEASES: ICD-10-CM

## 2025-07-03 DIAGNOSIS — N13.30 HYDRONEPHROSIS, UNSPECIFIED HYDRONEPHROSIS TYPE: ICD-10-CM

## 2025-07-03 DIAGNOSIS — Z79.899 ENCOUNTER FOR LONG-TERM CURRENT USE OF MEDICATION: ICD-10-CM

## 2025-07-03 DIAGNOSIS — Z94.0 KIDNEY TRANSPLANT RECIPIENT: ICD-10-CM

## 2025-07-03 DIAGNOSIS — Z98.890 OTHER SPECIFIED POSTPROCEDURAL STATES: ICD-10-CM

## 2025-07-03 LAB
ALBUMIN SERPL BCG-MCNC: 3.9 G/DL (ref 3.5–5.2)
ALBUMIN UR-MCNC: NEGATIVE MG/DL
ALP SERPL-CCNC: 99 U/L (ref 40–150)
ALT SERPL W P-5'-P-CCNC: 19 U/L (ref 0–50)
AMYLASE SERPL-CCNC: 90 U/L (ref 28–100)
ANION GAP SERPL CALCULATED.3IONS-SCNC: 11 MMOL/L (ref 7–15)
ANION GAP SERPL CALCULATED.3IONS-SCNC: 13 MMOL/L (ref 7–15)
APPEARANCE UR: CLEAR
AST SERPL W P-5'-P-CCNC: 20 U/L (ref 0–45)
BASOPHILS # BLD AUTO: 0 10E3/UL (ref 0–0.2)
BASOPHILS NFR BLD AUTO: 0 %
BILIRUB SERPL-MCNC: 0.3 MG/DL
BILIRUB UR QL STRIP: NEGATIVE
BUN SERPL-MCNC: 37 MG/DL (ref 6–20)
BUN SERPL-MCNC: 38 MG/DL (ref 6–20)
CALCIUM SERPL-MCNC: 9.6 MG/DL (ref 8.8–10.4)
CALCIUM SERPL-MCNC: 9.9 MG/DL (ref 8.8–10.4)
CHLORIDE SERPL-SCNC: 100 MMOL/L (ref 98–107)
CHLORIDE SERPL-SCNC: 97 MMOL/L (ref 98–107)
COLOR UR AUTO: ABNORMAL
CREAT SERPL-MCNC: 3.19 MG/DL (ref 0.51–0.95)
CREAT SERPL-MCNC: 3.5 MG/DL (ref 0.51–0.95)
EGFRCR SERPLBLD CKD-EPI 2021: 15 ML/MIN/1.73M2
EGFRCR SERPLBLD CKD-EPI 2021: 16 ML/MIN/1.73M2
EOSINOPHIL # BLD AUTO: 0 10E3/UL (ref 0–0.7)
EOSINOPHIL NFR BLD AUTO: 0 %
ERYTHROCYTE [DISTWIDTH] IN BLOOD BY AUTOMATED COUNT: 14.3 % (ref 10–15)
ERYTHROCYTE [DISTWIDTH] IN BLOOD BY AUTOMATED COUNT: 14.4 % (ref 10–15)
GLUCOSE SERPL-MCNC: 103 MG/DL (ref 70–99)
GLUCOSE SERPL-MCNC: 99 MG/DL (ref 70–99)
GLUCOSE UR STRIP-MCNC: NEGATIVE MG/DL
HCO3 SERPL-SCNC: 22 MMOL/L (ref 22–29)
HCO3 SERPL-SCNC: 25 MMOL/L (ref 22–29)
HCT VFR BLD AUTO: 22.4 % (ref 35–47)
HCT VFR BLD AUTO: 25 % (ref 35–47)
HGB BLD-MCNC: 7.5 G/DL (ref 11.7–15.7)
HGB BLD-MCNC: 8.1 G/DL (ref 11.7–15.7)
HGB UR QL STRIP: ABNORMAL
IMM GRANULOCYTES # BLD: 0.1 10E3/UL
IMM GRANULOCYTES NFR BLD: 2 %
KETONES UR STRIP-MCNC: NEGATIVE MG/DL
LEUKOCYTE ESTERASE UR QL STRIP: NEGATIVE
LIPASE SERPL-CCNC: 135 U/L (ref 13–60)
LIPASE SERPL-CCNC: 159 U/L (ref 13–60)
LYMPHOCYTES # BLD AUTO: 0.1 10E3/UL (ref 0.8–5.3)
LYMPHOCYTES NFR BLD AUTO: 3 %
MAGNESIUM SERPL-MCNC: 1.7 MG/DL (ref 1.7–2.3)
MCH RBC QN AUTO: 30.7 PG (ref 26.5–33)
MCH RBC QN AUTO: 30.8 PG (ref 26.5–33)
MCHC RBC AUTO-ENTMCNC: 32.4 G/DL (ref 31.5–36.5)
MCHC RBC AUTO-ENTMCNC: 33.5 G/DL (ref 31.5–36.5)
MCV RBC AUTO: 92 FL (ref 78–100)
MCV RBC AUTO: 95 FL (ref 78–100)
MONOCYTES # BLD AUTO: 0.2 10E3/UL (ref 0–1.3)
MONOCYTES NFR BLD AUTO: 4 %
NEUTROPHILS # BLD AUTO: 3.7 10E3/UL (ref 1.6–8.3)
NEUTROPHILS NFR BLD AUTO: 90 %
NITRATE UR QL: NEGATIVE
NRBC # BLD AUTO: 0 10E3/UL
NRBC BLD AUTO-RTO: 0 /100
PH UR STRIP: 7 [PH] (ref 5–7)
PHOSPHATE SERPL-MCNC: 4.5 MG/DL (ref 2.5–4.5)
PLATELET # BLD AUTO: 137 10E3/UL (ref 150–450)
PLATELET # BLD AUTO: 138 10E3/UL (ref 150–450)
POTASSIUM SERPL-SCNC: 4.7 MMOL/L (ref 3.4–5.3)
POTASSIUM SERPL-SCNC: 4.9 MMOL/L (ref 3.4–5.3)
PROT SERPL-MCNC: 7.1 G/DL (ref 6.4–8.3)
RBC # BLD AUTO: 2.44 10E6/UL (ref 3.8–5.2)
RBC # BLD AUTO: 2.63 10E6/UL (ref 3.8–5.2)
RBC URINE: <1 /HPF
SODIUM SERPL-SCNC: 132 MMOL/L (ref 135–145)
SODIUM SERPL-SCNC: 136 MMOL/L (ref 135–145)
SP GR UR STRIP: 1 (ref 1–1.03)
SQUAMOUS EPITHELIAL: <1 /HPF
TACROLIMUS BLD-MCNC: 8.6 UG/L (ref 5–15)
TME LAST DOSE: NORMAL H
TME LAST DOSE: NORMAL H
UROBILINOGEN UR STRIP-MCNC: NORMAL MG/DL
WBC # BLD AUTO: 4.1 10E3/UL (ref 4–11)
WBC # BLD AUTO: 4.9 10E3/UL (ref 4–11)
WBC URINE: 2 /HPF

## 2025-07-03 PROCEDURE — 36415 COLL VENOUS BLD VENIPUNCTURE: CPT

## 2025-07-03 PROCEDURE — 120N000002 HC R&B MED SURG/OB UMMC

## 2025-07-03 PROCEDURE — 85014 HEMATOCRIT: CPT

## 2025-07-03 PROCEDURE — 81001 URINALYSIS AUTO W/SCOPE: CPT | Performed by: EMERGENCY MEDICINE

## 2025-07-03 PROCEDURE — 87086 URINE CULTURE/COLONY COUNT: CPT | Performed by: PHYSICIAN ASSISTANT

## 2025-07-03 PROCEDURE — 83735 ASSAY OF MAGNESIUM: CPT | Performed by: EMERGENCY MEDICINE

## 2025-07-03 PROCEDURE — 76776 US EXAM K TRANSPL W/DOPPLER: CPT

## 2025-07-03 PROCEDURE — 83690 ASSAY OF LIPASE: CPT

## 2025-07-03 PROCEDURE — 87635 SARS-COV-2 COVID-19 AMP PRB: CPT

## 2025-07-03 PROCEDURE — 80048 BASIC METABOLIC PNL TOTAL CA: CPT

## 2025-07-03 PROCEDURE — 76776 US EXAM K TRANSPL W/DOPPLER: CPT | Mod: 26 | Performed by: RADIOLOGY

## 2025-07-03 PROCEDURE — 84100 ASSAY OF PHOSPHORUS: CPT | Performed by: EMERGENCY MEDICINE

## 2025-07-03 RX ORDER — ATORVASTATIN CALCIUM 10 MG/1
10 TABLET, FILM COATED ORAL DAILY
Status: DISCONTINUED | OUTPATIENT
Start: 2025-07-04 | End: 2025-07-04 | Stop reason: HOSPADM

## 2025-07-03 RX ORDER — SULFAMETHOXAZOLE AND TRIMETHOPRIM 400; 80 MG/1; MG/1
1 TABLET ORAL
Status: DISCONTINUED | OUTPATIENT
Start: 2025-07-04 | End: 2025-07-04 | Stop reason: HOSPADM

## 2025-07-03 RX ORDER — PROCHLORPERAZINE MALEATE 10 MG
10 TABLET ORAL EVERY 6 HOURS PRN
Status: DISCONTINUED | OUTPATIENT
Start: 2025-07-03 | End: 2025-07-04 | Stop reason: HOSPADM

## 2025-07-03 RX ORDER — EZETIMIBE 10 MG/1
10 TABLET ORAL AT BEDTIME
Status: DISCONTINUED | OUTPATIENT
Start: 2025-07-03 | End: 2025-07-04 | Stop reason: HOSPADM

## 2025-07-03 RX ORDER — PANTOPRAZOLE SODIUM 40 MG/1
40 TABLET, DELAYED RELEASE ORAL
Status: DISCONTINUED | OUTPATIENT
Start: 2025-07-04 | End: 2025-07-04 | Stop reason: HOSPADM

## 2025-07-03 RX ORDER — CARVEDILOL 12.5 MG/1
12.5 TABLET ORAL 2 TIMES DAILY
Status: DISCONTINUED | OUTPATIENT
Start: 2025-07-03 | End: 2025-07-04 | Stop reason: HOSPADM

## 2025-07-03 RX ORDER — TACROLIMUS 1 MG/1
2 CAPSULE ORAL 2 TIMES DAILY
Status: DISCONTINUED | OUTPATIENT
Start: 2025-07-03 | End: 2025-07-03

## 2025-07-03 RX ORDER — AMOXICILLIN 250 MG
2 CAPSULE ORAL 2 TIMES DAILY PRN
Status: DISCONTINUED | OUTPATIENT
Start: 2025-07-03 | End: 2025-07-04 | Stop reason: HOSPADM

## 2025-07-03 RX ORDER — MYCOPHENOLATE MOFETIL 250 MG/1
1000 CAPSULE ORAL 2 TIMES DAILY
Status: DISCONTINUED | OUTPATIENT
Start: 2025-07-03 | End: 2025-07-04 | Stop reason: HOSPADM

## 2025-07-03 RX ORDER — PREDNISONE 5 MG/1
5 TABLET ORAL DAILY
Status: DISCONTINUED | OUTPATIENT
Start: 2025-07-04 | End: 2025-07-04 | Stop reason: HOSPADM

## 2025-07-03 RX ORDER — ASPIRIN 81 MG/1
324 TABLET, CHEWABLE ORAL DAILY
Status: DISCONTINUED | OUTPATIENT
Start: 2025-07-04 | End: 2025-07-04 | Stop reason: HOSPADM

## 2025-07-03 RX ORDER — TACROLIMUS 0.5 MG/1
0.5 CAPSULE ORAL 2 TIMES DAILY
Status: DISCONTINUED | OUTPATIENT
Start: 2025-07-03 | End: 2025-07-03

## 2025-07-03 RX ORDER — ONDANSETRON 4 MG/1
4 TABLET, ORALLY DISINTEGRATING ORAL EVERY 6 HOURS PRN
Status: DISCONTINUED | OUTPATIENT
Start: 2025-07-03 | End: 2025-07-04 | Stop reason: HOSPADM

## 2025-07-03 RX ORDER — NYSTATIN 100000 [USP'U]/ML
500000 SUSPENSION ORAL 4 TIMES DAILY
Status: DISCONTINUED | OUTPATIENT
Start: 2025-07-04 | End: 2025-07-04 | Stop reason: HOSPADM

## 2025-07-03 RX ORDER — AMOXICILLIN 250 MG
1 CAPSULE ORAL 2 TIMES DAILY PRN
Status: DISCONTINUED | OUTPATIENT
Start: 2025-07-03 | End: 2025-07-04 | Stop reason: HOSPADM

## 2025-07-03 RX ORDER — SODIUM BICARBONATE 650 MG/1
1300 TABLET ORAL 2 TIMES DAILY
Status: DISCONTINUED | OUTPATIENT
Start: 2025-07-03 | End: 2025-07-04 | Stop reason: HOSPADM

## 2025-07-03 RX ORDER — LIDOCAINE 40 MG/G
CREAM TOPICAL
Status: DISCONTINUED | OUTPATIENT
Start: 2025-07-03 | End: 2025-07-04 | Stop reason: HOSPADM

## 2025-07-03 RX ORDER — ONDANSETRON 2 MG/ML
4 INJECTION INTRAMUSCULAR; INTRAVENOUS EVERY 6 HOURS PRN
Status: DISCONTINUED | OUTPATIENT
Start: 2025-07-03 | End: 2025-07-04 | Stop reason: HOSPADM

## 2025-07-03 ASSESSMENT — ACTIVITIES OF DAILY LIVING (ADL)
ADLS_ACUITY_SCORE: 58

## 2025-07-03 ASSESSMENT — COLUMBIA-SUICIDE SEVERITY RATING SCALE - C-SSRS
6. HAVE YOU EVER DONE ANYTHING, STARTED TO DO ANYTHING, OR PREPARED TO DO ANYTHING TO END YOUR LIFE?: NO
2. HAVE YOU ACTUALLY HAD ANY THOUGHTS OF KILLING YOURSELF IN THE PAST MONTH?: NO
1. IN THE PAST MONTH, HAVE YOU WISHED YOU WERE DEAD OR WISHED YOU COULD GO TO SLEEP AND NOT WAKE UP?: NO

## 2025-07-03 NOTE — ED TRIAGE NOTES
Pt ambulatory to triage with CC of referral for abnormal creatinine. Here for a workup to ascertain the cause. On PD dialysis. VSS     Triage Assessment (Adult)       Row Name 07/03/25 8144          Triage Assessment    Airway WDL WDL        Respiratory WDL    Respiratory WDL WDL        Skin Circulation/Temperature WDL    Skin Circulation/Temperature WDL WDL        Cardiac WDL    Cardiac WDL WDL        Peripheral/Neurovascular WDL    Peripheral Neurovascular WDL WDL        Cognitive/Neuro/Behavioral WDL    Cognitive/Neuro/Behavioral WDL WDL

## 2025-07-03 NOTE — TELEPHONE ENCOUNTER
ISSUE:  SCr 3.5     Lj Vizcarra MD to Betty Vogel RN  (Selected Message)    7/3/25  3:42 PM  Result Note  Elevated serum creatinine and recommend usual assessment for fever, recent illness, pain over kidney allograft, blood pressure and volume status, and recommend patient be evaluated in the ER.     Spoke with Wendy, is feeling well. Drinking plenty of water, no less than her usual amount. Denies diarrhea, hypotension or dizziness. Denies fevers or abd pain. Agreeable to assessment in ED and will arrive shortly.

## 2025-07-03 NOTE — ED PROVIDER NOTES
ED Provider Note  [facility]      History     Chief Complaint   Patient presents with    Abnormal Labs     HPI  Wendy Kapoor is a 54 year old female with a past medical history of CKD, diabetes type 2, HTN, MGUS, Sjogren syndrome, pericardial effusion who presents for evaluation after receiving a high creatinine of 3.5 elevated from 2.493 days ago.  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  Patient receives peritoneal dialysis since 2023 and is planning to get PD catheter out on the eighth of this month.  She reports some pain with her peritoneal dialysis catheter but this is baseline for her.  Otherwise she is asymptomatic.  She does not have nausea or vomiting, chest pain or shortness of breath.  She is afebrile.  She makes urine and is not having any urinary symptoms or changes.  [facility] which is why she presents here today.  She lives in [place].    Past Medical History:   Diagnosis Date    Chronic kidney disease     Diabetes (H)     DM Type 2    History of blood transfusion 2019    Hypertension     MGUS (monoclonal gammopathy of unknown significance)     Pericardial effusion     Sjogren's syndrome     Type 2 diabetes mellitus with diabetic nephropathy (H)          Physical Exam   BP: (!) 174/76  Pulse: 85  Temp: 98.2  F (36.8  C)  Resp: 20  SpO2: 100 %  Physical Exam  Constitutional:       General: She is not in acute distress.     Appearance: Normal appearance. She is not diaphoretic.   HENT:      Head: Atraumatic.      Mouth/Throat:      Mouth: Mucous membranes are moist.   Eyes:      General: No scleral icterus.     Conjunctiva/sclera: Conjunctivae normal.   Cardiovascular:      Rate and Rhythm: Normal rate.      Heart sounds: Normal heart sounds.   Pulmonary:      Effort: No respiratory distress.      Breath sounds: Normal breath sounds.   Abdominal:      General: Abdomen is flat.        Musculoskeletal:      Cervical back: Neck supple.   Skin:     General: Skin is warm.      Findings: No rash.   Neurological:      Mental Status: She is alert.           ED Course, Procedures, & Data      Procedures                 Medications - No data to display       Critical care was not performed.     Medical Decision Making  The patient's presentation was of moderate complexity (an undiagnosed new problem with uncertain prognosis).    The patient's evaluation involved:  ordering and/or review of 3+ test(s) in this encounter (see separate area of note for details)  discussion of management or test interpretation with another health professional (see separate area of note for details)    The patient's management necessitated high risk (a decision regarding hospitalization).    Assessment & Plan    Wendy Kapoor is a 54 year old female with a past medical history of CKD, diabetes type 2, HTN, MGUS, Sjogren syndrome, pericardial effusion who presents for evaluation after receiving a high creatinine of 3.5 elevated from 2.493 days ago. Vitals in the ED stable. Physical exam unremarkable. Initial differential diagnosis includes but not limited to kidney rejection, pancreas rejection, SENDY, worsening hydronephrosis, UTI, pyleonephritis, other.  Nursing notes reviewed.    Patient presents with uptrending creatinine for the past few weeks and evidence of hydronephrosis on recent imaging. Has consulted with IR for placement of PNT to occur in the near future. Also has plans to remove PD catheter next week. Chart review shows that biopsy of the kidney does not show transplant rejection. Patient is asymptomatic in the ED without tenderness over transplanted kidney. Evaluated kidney with ultrasound which revealed borderline parenchymal resistive indices  and worsening hydronephrosis. Patients UA is clear. Lab work pending at the conclusion of my shift. Plan to discuss patient with transplant team to discuss  disposition and management.     At completion of my shift, patient pending lab work, transplant consult, please see Dr. Kwon's attestation for final ED course and disposition.         Final diagnoses:   None     New Prescriptions    No medications on file     --  Christian Stovall PA-C   Emergency Medicine   [facility] EMERGENCY DEPARTMENT  7/3/2025     I have reviewed the nursing notes. I have reviewed the findings, diagnosis, plan and need for follow up with the patient.    New Prescriptions    No medications on file       Final diagnoses:   None       Collette Kwon  [facility] EMERGENCY DEPARTMENT  7/3/2025     Christian Stovall  07/04/25 0827

## 2025-07-04 VITALS
OXYGEN SATURATION: 99 % | WEIGHT: 131.3 LBS | DIASTOLIC BLOOD PRESSURE: 65 MMHG | RESPIRATION RATE: 18 BRPM | BODY MASS INDEX: 25.64 KG/M2 | HEART RATE: 72 BPM | SYSTOLIC BLOOD PRESSURE: 135 MMHG | TEMPERATURE: 97.8 F

## 2025-07-04 LAB
ANION GAP SERPL CALCULATED.3IONS-SCNC: 12 MMOL/L (ref 7–15)
BUN SERPL-MCNC: 36.8 MG/DL (ref 6–20)
CALCIUM SERPL-MCNC: 9.9 MG/DL (ref 8.8–10.4)
CHLORIDE SERPL-SCNC: 102 MMOL/L (ref 98–107)
CREAT SERPL-MCNC: 3.23 MG/DL (ref 0.51–0.95)
EGFRCR SERPLBLD CKD-EPI 2021: 16 ML/MIN/1.73M2
ERYTHROCYTE [DISTWIDTH] IN BLOOD BY AUTOMATED COUNT: 14.1 % (ref 10–15)
GLUCOSE SERPL-MCNC: 97 MG/DL (ref 70–99)
HCO3 SERPL-SCNC: 23 MMOL/L (ref 22–29)
HCT VFR BLD AUTO: 22.5 % (ref 35–47)
HGB BLD-MCNC: 7.3 G/DL (ref 11.7–15.7)
LIPASE SERPL-CCNC: 117 U/L (ref 13–60)
MAGNESIUM SERPL-MCNC: 1.8 MG/DL (ref 1.7–2.3)
MCH RBC QN AUTO: 30.5 PG (ref 26.5–33)
MCHC RBC AUTO-ENTMCNC: 32.4 G/DL (ref 31.5–36.5)
MCV RBC AUTO: 94 FL (ref 78–100)
PHOSPHATE SERPL-MCNC: 5.1 MG/DL (ref 2.5–4.5)
PLATELET # BLD AUTO: 133 10E3/UL (ref 150–450)
POTASSIUM SERPL-SCNC: 4.6 MMOL/L (ref 3.4–5.3)
RBC # BLD AUTO: 2.39 10E6/UL (ref 3.8–5.2)
SARS-COV-2 RNA RESP QL NAA+PROBE: NEGATIVE
SODIUM SERPL-SCNC: 137 MMOL/L (ref 135–145)
TACROLIMUS BLD-MCNC: 9.3 UG/L (ref 5–15)
TME LAST DOSE: NORMAL H
TME LAST DOSE: NORMAL H
WBC # BLD AUTO: 2.5 10E3/UL (ref 4–11)

## 2025-07-04 PROCEDURE — 250N000012 HC RX MED GY IP 250 OP 636 PS 637

## 2025-07-04 PROCEDURE — 99221 1ST HOSP IP/OBS SF/LOW 40: CPT | Mod: 4UV | Performed by: RADIOLOGY

## 2025-07-04 PROCEDURE — 85018 HEMOGLOBIN: CPT

## 2025-07-04 PROCEDURE — 36415 COLL VENOUS BLD VENIPUNCTURE: CPT | Performed by: PHYSICIAN ASSISTANT

## 2025-07-04 PROCEDURE — 83690 ASSAY OF LIPASE: CPT | Performed by: PHYSICIAN ASSISTANT

## 2025-07-04 PROCEDURE — 999N000248 HC STATISTIC IV INSERT WITH US BY RN

## 2025-07-04 PROCEDURE — 83735 ASSAY OF MAGNESIUM: CPT | Performed by: PHYSICIAN ASSISTANT

## 2025-07-04 PROCEDURE — 250N000012 HC RX MED GY IP 250 OP 636 PS 637: Performed by: TRANSPLANT SURGERY

## 2025-07-04 PROCEDURE — 99223 1ST HOSP IP/OBS HIGH 75: CPT | Mod: 24 | Performed by: STUDENT IN AN ORGANIZED HEALTH CARE EDUCATION/TRAINING PROGRAM

## 2025-07-04 PROCEDURE — 250N000013 HC RX MED GY IP 250 OP 250 PS 637

## 2025-07-04 PROCEDURE — 80048 BASIC METABOLIC PNL TOTAL CA: CPT

## 2025-07-04 PROCEDURE — 80197 ASSAY OF TACROLIMUS: CPT | Performed by: PHYSICIAN ASSISTANT

## 2025-07-04 PROCEDURE — 84100 ASSAY OF PHOSPHORUS: CPT | Performed by: PHYSICIAN ASSISTANT

## 2025-07-04 RX ORDER — AMOXICILLIN 250 MG
2 CAPSULE ORAL 2 TIMES DAILY PRN
Qty: 60 TABLET | Refills: 1 | Status: SHIPPED | OUTPATIENT
Start: 2025-07-04

## 2025-07-04 RX ORDER — VALGANCICLOVIR 450 MG/1
450 TABLET, FILM COATED ORAL
Status: DISCONTINUED | OUTPATIENT
Start: 2025-07-07 | End: 2025-07-04 | Stop reason: HOSPADM

## 2025-07-04 RX ADMIN — PANTOPRAZOLE SODIUM 40 MG: 40 TABLET, DELAYED RELEASE ORAL at 08:16

## 2025-07-04 RX ADMIN — ATORVASTATIN CALCIUM 10 MG: 10 TABLET, FILM COATED ORAL at 08:17

## 2025-07-04 RX ADMIN — MYCOPHENOLATE MOFETIL 1000 MG: 250 CAPSULE ORAL at 08:17

## 2025-07-04 RX ADMIN — PREDNISONE 5 MG: 5 TABLET ORAL at 08:17

## 2025-07-04 RX ADMIN — TACROLIMUS 2.5 MG: 1 CAPSULE ORAL at 08:16

## 2025-07-04 RX ADMIN — SULFAMETHOXAZOLE AND TRIMETHOPRIM 1 TABLET: 400; 80 TABLET ORAL at 08:17

## 2025-07-04 RX ADMIN — SODIUM BICARBONATE 1300 MG: 650 TABLET ORAL at 08:17

## 2025-07-04 ASSESSMENT — ACTIVITIES OF DAILY LIVING (ADL)
ADLS_ACUITY_SCORE: 58
ADLS_ACUITY_SCORE: 58
ADLS_ACUITY_SCORE: 57
ADLS_ACUITY_SCORE: 58
ADLS_ACUITY_SCORE: 57
ADLS_ACUITY_SCORE: 58
ADLS_ACUITY_SCORE: 57
ADLS_ACUITY_SCORE: 58

## 2025-07-04 NOTE — H&P
EGS Surgery Consult  July 3, 2025    Wendy Kapoor  : 1970    Date of Service: 7/3/2025 10:44 PM    Assessment and Plan:  Wendy Kapoor is a 54 year old female transplant patient being admitted for perc neph tube placement .    - Admit to transplant  - NPO at midnight for IR procedure tomorrow    Discussed with Dr. Ziggy Duran MD  PGY-2 General Surgery Resident    History of Present Illness:    Wendy Kapoor is a 54 year old female with a history of DM2, HTN, ESRD on PD since 2023, IgG Lambda MGUS, non-obstructive CAD, and anemia of chronic disease. On 2025 she was notified as an acceptable  donor organ became available and presented for further pre-operative work-up and on 2025 she underwent DBD SPK with stent, and open appendectomy which was well tolerated as performed by Dr. Gleason.     Past Medical History:  Past Medical History:   Diagnosis Date    Chronic kidney disease     Diabetes (H)     DM Type 2    History of blood transfusion 2019    Hypertension     MGUS (monoclonal gammopathy of unknown significance)     Pericardial effusion     Sjogren's syndrome     Type 2 diabetes mellitus with diabetic nephropathy (H)        Past Surgical History  Past Surgical History:   Procedure Laterality Date    APPENDECTOMY OPEN N/A 2025    Procedure: Appendectomy open, incidental;  Surgeon: Jean-Pierre Gleason MD;  Location: [facility] OR    BENCH KIDNEY  2025    Procedure: Bench kidney;  Surgeon: Jean-Pierre Gleason MD;  Location: [facility] OR    BENCH PANCREAS N/A 2025    Procedure: Bench whole pancreas;  Surgeon: Jean-Pierre Gleason MD;  Location: [facility] OR    BIOPSY       SECTION      CV CORONARY ANGIOGRAM N/A 2024    Procedure: Coronary Angiogram;  Surgeon: Cody Mckenzie MD;  Location:  [facility]    CV PCI N/A 2024    Procedure: Percutaneous Coronary Intervention;  Surgeon: Cody Mckenzie  MD Navi;  Location:  [facility]    IR RENAL BIOPSY LEFT  2025    TRANSPLANT PANCREAS, KIDNEY  DONOR, COMBINED Right 2025    Procedure: Transplant pancreas, kidney  donor, with ureteral stent placement.;  Surgeon: Jean-Pierre Gleason MD;  Location:  OR       Family History:  Family History   Problem Relation Age of Onset    Kidney Disease No family hx of        Social History:  Social History     Socioeconomic History    Marital status:      Spouse name: Not on file    Number of children: Not on file    Years of education: Not on file    Highest education level: Not on file   Occupational History    Not on file   Tobacco Use    Smoking status: Never    Smokeless tobacco: Never   Substance and Sexual Activity    Alcohol use: Never    Drug use: Never    Sexual activity: Not on file   Other Topics Concern    Not on file   Social History Narrative    Not on file     Social Drivers of Health     Financial Resource Strain: Not on file   Food Insecurity: Not on file   Transportation Needs: Not on file   Physical Activity: Not on file   Stress: Not on file   Social Connections: Not on file   Interpersonal Safety: Unknown (2025)    Interpersonal Safety     Do you feel physically and emotionally safe where you currently live?: Patient unable to answer     Within the past 12 months, have you been hit, slapped, kicked or otherwise physically hurt by someone?: Patient unable to answer     Within the past 12 months, have you been humiliated or emotionally abused in other ways by your partner or ex-partner?: Patient unable to answer   Housing Stability: Not on file       Medications:  Current Outpatient Medications   Medication Sig Dispense Refill    acetaminophen (TYLENOL) 325 MG tablet Take 2 tablets (650 mg) by mouth every 6 hours as needed for mild pain. 50 tablet 0    aspirin (ASA) 81 MG chewable tablet Take 4 tablets (324 mg) by mouth or NG Tube daily. 60  tablet 3    atorvastatin (LIPITOR) 10 MG tablet Take 1 tablet (10 mg) by mouth daily. 30 tablet 11    calcium carbonate-vitamin D (OSCAL) 500-5 MG-MCG tablet Take 1 tablet by mouth 2 times daily (with meals). 60 tablet 2    carvedilol (COREG) 12.5 MG tablet Take 1 tablet (12.5 mg) by mouth 2 times daily. 60 tablet 11    ezetimibe (ZETIA) 10 MG tablet Take 10 mg by mouth at bedtime.      magnesium oxide (MAG-OX) 400 MG tablet Take 1 tablet (400 mg) by mouth 2 times daily. 1100 & 1700 60 tablet 3    mycophenolate (GENERIC EQUIVALENT) 250 MG capsule Take 4 capsules (1,000 mg) by mouth 2 times daily. 240 capsule 11    nystatin (MYCOSTATIN) 737818 UNIT/ML suspension Take 5 mLs (500,000 Units) by mouth 4 times daily. 600 mL 2    Octreotide Acetate 100 MCG/ML SOSY Inject 100 mcg subcutaneously 3 times daily for 14 days. 42 mL 0    pantoprazole (PROTONIX) 40 MG EC tablet Take 1 tablet (40 mg) by mouth every morning (before breakfast). 30 tablet 1    predniSONE (DELTASONE) 5 MG tablet Take 1 tablet (5 mg) by mouth daily. 30 tablet 11    senna-docusate (SENOKOT-S/PERICOLACE) 8.6-50 MG tablet Take 2 tablets by mouth 2 times daily. 60 tablet 1    sodium bicarbonate 650 MG tablet Take 2 tablets (1,300 mg) by mouth 2 times daily. 120 tablet 2    sulfamethoxazole-trimethoprim (BACTRIM) 400-80 MG tablet Take 1 tablet by mouth Every Mon, Wed, Fri Morning. 15 tablet 11    tacrolimus (GENERIC EQUIVALENT) 0.5 MG capsule Take 1 capsule (0.5 mg) by mouth 2 times daily. Total dose: 2.5mg twice daily 60 capsule 11    tacrolimus (GENERIC EQUIVALENT) 1 MG capsule Take 2 capsules (2 mg) by mouth 2 times daily. Total dose: 2.5mg twice daily 120 capsule 11    valGANciclovir (VALCYTE) 450 MG tablet Take 1 tablet (450 mg) by mouth twice a week. Mondays and Thursdays 60 tablet 0       Allergies:     Allergies   Allergen Reactions    Atorvastatin Itching    Extraneal Rash       Review of Symptoms:  A 10 point review of symptoms has been conducted  and is negative except for that mentioned in the above HPI.    Physical Exam:    Blood pressure (!) 174/76, pulse 85, temperature 98.2  F (36.8  C), temperature source Oral, resp. rate 20, SpO2 100%.  Gen:    Up and walking  HEENT: Normocephalic and atraumatic  CV:  RRR  Pulm:  Non-labored breathing  Abd:  Soft, non-tender, non-distended  Ext:  Warm and well perfused, no obvious deformities    Labs:  CBC RESULTS:   Recent Labs   Lab Test 07/03/25 2123   WBC 4.1   RBC 2.44*   HGB 7.5*   HCT 22.4*   MCV 92   MCH 30.7   MCHC 33.5   RDW 14.3   *     BMP RESULTS:   Recent Labs   Lab 07/03/25 2123 07/03/25 0733 06/30/25  0748   * 136 142   POTASSIUM 4.9 4.7 4.3   CHLORIDE 97* 100 106   CO2 22 25 23   BUN 37.0* 38.0* 29.0*   CR 3.19* 3.50* 2.49*   * 99 104*     LFT RESULTS:   Recent Labs   Lab 07/03/25 2123   AST 20   ALT 19   ALKPHOS 99   BILITOTAL 0.3   ALBUMIN 3.9       Imaging:  US Transplant  MPRESSION:   1. Worsening hydronephrosis involving transplanted kidney.  2. No definite renal artery stenosis. Borderline elevation of parenchymal resistive indices.

## 2025-07-04 NOTE — MEDICATION SCRIBE - ADMISSION MEDICATION HISTORY
Medication Scribe Admission Medication History    Admission medication history is complete. The information provided in this note is only as accurate as the sources available at the time of the update.    Information Source(s): Patient, Facility ([facility]/) medication list/MAR, and [facility] via in-person    Pertinent Information: per pt, [facility]+ML, pt reported taking medications on PTA medication list as directed, stated she not aware Calcium+D and Magnesium ware put on hold like Aspirin due to procedure. Therefore she took both Calcium+D and Magnesium yesterday.     Changes made to PTA medication list:  Added: None  Deleted: None  Changed: None    Allergies reviewed with patient and updates made in EHR: yes    Medication History Completed By: Beatrice Rios 7/4/2025 7:33 AM    PTA Med List   Medication Sig Last Dose/Taking    atorvastatin (LIPITOR) 10 MG tablet Take 1 tablet (10 mg) by mouth daily. 7/3/2025 Morning    carvedilol (COREG) 12.5 MG tablet Take 1 tablet (12.5 mg) by mouth 2 times daily. 7/3/2025 Noon    ezetimibe (ZETIA) 10 MG tablet Take 10 mg by mouth at bedtime. 7/3/2025 Morning    mycophenolate (GENERIC EQUIVALENT) 250 MG capsule Take 4 capsules (1,000 mg) by mouth 2 times daily. 7/3/2025 Evening    nystatin (MYCOSTATIN) 691624 UNIT/ML suspension Take 5 mLs (500,000 Units) by mouth 4 times daily. 7/3/2025 Bedtime    pantoprazole (PROTONIX) 40 MG EC tablet Take 1 tablet (40 mg) by mouth every morning (before breakfast). 7/3/2025 Morning    predniSONE (DELTASONE) 5 MG tablet Take 1 tablet (5 mg) by mouth daily. 7/3/2025 Morning    sulfamethoxazole-trimethoprim (BACTRIM) 400-80 MG tablet Take 1 tablet by mouth Every Mon, Wed, Fri Morning. 7/2/2025 Morning    tacrolimus (GENERIC EQUIVALENT) 0.5 MG capsule Take 1 capsule (0.5 mg) by mouth 2 times daily. Total dose: 2.5mg twice daily 7/3/2025 Evening    tacrolimus (GENERIC EQUIVALENT) 1 MG capsule Take 2 capsules (2 mg) by mouth 2 times daily. Total dose:  2.5mg twice daily 7/3/2025 Evening    valGANciclovir (VALCYTE) 450 MG tablet Take 1 tablet (450 mg) by mouth twice a week. Mondays and Thursdays 7/3/2025 Morning

## 2025-07-04 NOTE — PLAN OF CARE
Goal Outcome Evaluation:      Plan of Care Reviewed With: patient    VS: BP (!) 149/73 (BP Location: Left arm, Patient Position: Semi-Urias's, Cuff Size: Adult Regular)   Pulse 72   Temp 98.8  F (37.1  C) (Oral)   Resp 18   SpO2 100%    O2: SpO2 > 95 and stable on RA. LS clear and equal bilaterally. Denies chest pain and SOB.    Output: Voids spontaneously without difficulty to bathroom    Last BM: Denies abdominal discomfort. BS active    Activity: Up  ad allegra   Skin: WDL except peritoneal dialysis catheter in place   Pain: Denies   CMS: Intact, AOx4. Denies numbness and tingling.   Dressing: PIV dressing CDI   Diet: NPO for Procedure/Surgery per Anesthesia Guidelines Except for: No Exceptions. Denies nausea/vomiting.    LDA: L PIV SL   Equipment: IV pole, personal belongings   Plan: Continue with plan of care. Call light within reach, pt able to make needs known.    Additional Info:  at bedside

## 2025-07-04 NOTE — PLAN OF CARE
Goal Outcome Evaluation:      Plan of Care Reviewed With: patient, spouse    Overall Patient Progress: improvingOverall Patient Progress: improving    Temp: 97.8  F (36.6  C) Temp src: Oral BP: 135/65 Pulse: 72   Resp: 18 SpO2: 99 % O2 Device: None (Room air)      Pt is A&O X4, cooperative with cares, and communicate needs appropriately. VSS, on RA. Denied pain apart from mild tenderness from PD site. AUOP with a residual of 28mL. Denies nausea and tolerated regular diet with good appetite. Pt discharged to home in the afternoon. Plan for PNT placement and PD catheter removal on 7/8. After care summary and education gone over with patient. Pt verbalized understanding of all education and information.

## 2025-07-04 NOTE — CONSULTS
Interventional Radiology Consult Service Note    54 year old female with a history of DM2, HTN, ESRD, IgG Lambda MGUS, non-obstructive CAD, and anemia of chronic disease s/p DBD SPK with stent, and open appendectomy. IR is consulted for transplant kidney PNT placement. Cr is trending up at 3.23. PNT placement is indicated. She is currently on Aspirin (last dose this AM) which needs to be hold for 3-5 days before the procedure. Otherwise labs are appropriate for procedure. Of note, she has an appointment with IR scheduled for 7/8/2025 for right native kidney PNT placement (see consult note 6/23/2025).      Plan:  - Hold Aspirin for 3-5 days  - Pending clarification from referring team if right native PNT is still needed; these two procedures can be performed on the same day  - PNTs can be placed as outpatient if patient is discharged    Consent will be done prior to procedure.     Case discussed with Dr. Kilpatrick.     Vitals:   /65 (Cuff Size: Adult Regular)   Pulse 72   Temp 97.8  F (36.6  C) (Oral)   Resp 18   Wt 59.6 kg (131 lb 4.8 oz)   SpO2 99%   BMI 25.64 kg/m      Pertinent Labs:     Lab Results   Component Value Date    WBC 2.5 (L) 07/04/2025    WBC 4.1 07/03/2025    WBC 4.9 07/03/2025       Lab Results   Component Value Date    HGB 7.3 07/04/2025    HGB 7.5 07/03/2025    HGB 8.1 07/03/2025       Lab Results   Component Value Date     07/04/2025     07/03/2025     07/03/2025       Lab Results   Component Value Date    INR 0.98 05/07/2025    PTT 29 05/07/2025       Lab Results   Component Value Date    POTASSIUM 4.6 07/04/2025    POTASSIUM 3.6 05/03/2025        Dwayne Britton MD  Interventional Radiology  Pager: [phone]

## 2025-07-04 NOTE — DISCHARGE SUMMARY
[facility]    Discharge Summary  Transplant Surgery    Date of Admission:  7/3/2025  Date of Discharge:  2025  Discharging Provider: Viri Shea PA-C  Date of Service (when I saw the patient): 25    Discharge Diagnoses   Active Problems:    Immunosuppressed status    Hydronephrosis, unspecified hydronephrosis type    Kidney replaced by transplant        History of Present Illness   Wendy Kapoor is a 54 year old female with a history of DM2, HTN, ESRD on PD since 2023, IgG Lambda MGUS, non-obstructive CAD, and anemia of chronic disease. On 2025 she was notified as an acceptable  donor organ became available and presented for further pre-operative work-up and on 2025 she underwent DBD SPK with stent, and open appendectomy which was well tolerated as performed by Dr. Gleason. POD 63.     Hospital Course   Wendy Kapoor was admitted on 7/3/2025.  The following problems were addressed during her hospitalization:    SPK 25: cPRA: 98%. Induction with Thymo 6.5mg/kg and steroid taper. Low level DSA (<1000 mfi) to B51 and CW15 ~500 MFI at the time of transplant.  DSA not detected.        Kidney transplant: Cr baseline TBD, kedar 1.5. Cr trending up, US kidney showed hydronephrosis and plan was for PNT placement. Cr increased to 3.5.  Patient admitted for PNT placement. Cr 3.2 today. Patient making normal amount of urine, no change per patient. Tac level at goal. UA did not indicate infection.    - IR consult for PNT placement.  mg needs to be held at least 3 days prior to procedure. Patient's last dose of  mg on . Earliest procedure would be done per IR would be  after 1000. Plan for patient to PNT placed on  as scheduled. Plan for labs on . If Cr worse on  or urine output decreased patient to be admitted for emergent PNT placement.   - Transplant Nephrology consulted.     Pancreas transplant c/b graft  pancreatitis: Lipase up to 385, now trending down. 7/3 135. Euglycemic, not on subcutaneous insulin. 6/20 CT scan stable graft pancreatitis   -Plan for octreotide 100 mcg subcutaneous TID x 14 days for post transplant pancreatitis. Patient had medication delivered and will start medication this evening.  -  mg daily thrombosis ppx     Immunosuppressed due to medications:   -Tacrolimus, goal level 8-10 (12 hour trough).  -Mycophenolate 1000 mg BID.  -Infectious prophylaxis with Bactrim indefinitely and valganciclovir x3 months.     Transplant coordinator Betty Alexandradelmi  [phone]  Donor type:  DBD  DSA at time of transplant: Not resulted  Ureteral stent: Yes  CMV:  Donor + / Recipient +  EBV:  Donor + / Recipient +  Thymoglobulin:  6.5 mg/kg      Hematology: Pancytopenia: likely secondary to medications  Anemia of chronic disease: Hgb ~7-8. Hgb 7.3  Thrombocytopenia: Plt 133     Cardiorespiratory:   Hypertension: PTA carvedilol to 12.5 mg BID     GI/Nutrition:  Diet: Regular     Fluid/Electrolytes: Electrolytes replete     : PVR 24 ml,  negative for urinary retention     Infectious disease: Afebrile.     Viri Shea PA-C    Discharge Disposition   Discharged to home   Condition at discharge: Stable    Primary Care Physician   GERSON BRYANT    Physical Exam   Temp: 97.8  F (36.6  C) Temp src: Oral BP: 135/65 Pulse: 72   Resp: 18 SpO2: 99 % O2 Device: None (Room air)    Vitals:    07/04/25 0851   Weight: 59.6 kg (131 lb 4.8 oz)     Vital Signs with Ranges  Temp:  [97.8  F (36.6  C)-98.8  F (37.1  C)] 97.8  F (36.6  C)  Pulse:  [72-85] 72  Resp:  [18-20] 18  BP: (135-174)/(65-76) 135/65  SpO2:  [99 %-100 %] 99 %  No intake/output data recorded.    General Appearance: in no apparent distress.   Skin: normal, warm  Heart: well perfused  Lungs: Nonlabored resps on RA  Abdomen: The abdomen is soft, nontender, incision healing well  : velázquez is not present.   Extremities: edema: absent.   Neurologic: awake,  alert and oriented. Tremor: mild      Consultations This Hospital Stay   INTERVENTIONAL RADIOLOGY ADULT/PEDS IP CONSULT  CONSULT FOR INPATIENT VASCULAR ACCESS CARE  CARE MANAGEMENT / SOCIAL WORK IP CONSULT    Time Spent on this Encounter   I have spent greater than 30 minutes on this discharge.    Discharge Orders   Discharge Medications   Current Discharge Medication List        CONTINUE these medications which have CHANGED    Details   senna-docusate (SENOKOT-S/PERICOLACE) 8.6-50 MG tablet Take 2 tablets by mouth 2 times daily as needed for constipation.  Qty: 60 tablet, Refills: 1    Associated Diagnoses: Kidney transplant recipient           CONTINUE these medications which have NOT CHANGED    Details   atorvastatin (LIPITOR) 10 MG tablet Take 1 tablet (10 mg) by mouth daily.  Qty: 30 tablet, Refills: 11    Associated Diagnoses: Kidney transplant recipient; Pancreas transplant status (H)      carvedilol (COREG) 12.5 MG tablet Take 1 tablet (12.5 mg) by mouth 2 times daily.  Qty: 60 tablet, Refills: 11    Associated Diagnoses: Kidney transplant recipient      ezetimibe (ZETIA) 10 MG tablet Take 10 mg by mouth at bedtime.      mycophenolate (GENERIC EQUIVALENT) 250 MG capsule Take 4 capsules (1,000 mg) by mouth 2 times daily.  Qty: 240 capsule, Refills: 11    Comments: TXP DT 5/2/2025 (Kidney / Pancreas) TXP Dischg DT 5/9/2025 DX Kidney replaced by transplant Z94.0 [facility] ([place], [place])  Associated Diagnoses: Kidney transplant recipient; Pancreas transplant status (H)      pantoprazole (PROTONIX) 40 MG EC tablet Take 1 tablet (40 mg) by mouth every morning (before breakfast).  Qty: 30 tablet, Refills: 1    Associated Diagnoses: Kidney transplant recipient      predniSONE (DELTASONE) 5 MG tablet Take 1 tablet (5 mg) by mouth daily.  Qty: 30 tablet, Refills: 11    Comments: TXP DT 5/2/2025 (Kidney / Pancreas) TXP Dischg DT 5/9/2025 DX Kidney replaced by  transplant Z94.0 [facility] ([place], [place])  Associated Diagnoses: Kidney transplant recipient; Pancreas transplant status (H)      sulfamethoxazole-trimethoprim (BACTRIM) 400-80 MG tablet Take 1 tablet by mouth Every Mon, Wed, Fri Morning.  Qty: 15 tablet, Refills: 11    Associated Diagnoses: Kidney transplant recipient; Pancreas transplant status (H)      !! tacrolimus (GENERIC EQUIVALENT) 0.5 MG capsule Take 1 capsule (0.5 mg) by mouth 2 times daily. Total dose: 2.5mg twice daily  Qty: 60 capsule, Refills: 11    Comments: TXP DT 5/2/2025 (Kidney / Pancreas) TXP Dischg DT 5/9/2025 DX Kidney replaced by transplant Z94.0 [facility] ([place], [place])  Associated Diagnoses: Aftercare following organ transplant      !! tacrolimus (GENERIC EQUIVALENT) 1 MG capsule Take 2 capsules (2 mg) by mouth 2 times daily. Total dose: 2.5mg twice daily  Qty: 120 capsule, Refills: 11    Comments: TXP DT 5/2/2025 (Kidney / Pancreas) TXP Dischg DT 5/9/2025 DX Kidney replaced by transplant Z94.0 [facility] ([place], [place])  Associated Diagnoses: Kidney transplant recipient; Pancreas transplant status (H); Aftercare following organ transplant      valGANciclovir (VALCYTE) 450 MG tablet Take 1 tablet (450 mg) by mouth twice a week. Mondays and Thursdays  Qty: 60 tablet, Refills: 0    Associated Diagnoses: Kidney transplant recipient; Pancreas transplant status (H)      acetaminophen (TYLENOL) 325 MG tablet Take 2 tablets (650 mg) by mouth every 6 hours as needed for mild pain.  Qty: 50 tablet, Refills: 0    Associated Diagnoses: Kidney transplant recipient      aspirin (ASA) 81 MG chewable tablet Take 4 tablets (324 mg) by mouth or NG Tube daily.  Qty: 60 tablet, Refills: 3    Associated Diagnoses: Kidney transplant recipient; Pancreas transplant status (H)      calcium carbonate-vitamin D  (OSCAL) 500-5 MG-MCG tablet Take 1 tablet by mouth 2 times daily (with meals).  Qty: 60 tablet, Refills: 2    Associated Diagnoses: Kidney transplant recipient      magnesium oxide (MAG-OX) 400 MG tablet Take 1 tablet (400 mg) by mouth 2 times daily. 1100 & 1700  Qty: 60 tablet, Refills: 3    Associated Diagnoses: Kidney transplant recipient; Pancreas transplant status (H)      Octreotide Acetate 100 MCG/ML SOSY Inject 100 mcg subcutaneously 3 times daily for 14 days.  Qty: 42 mL, Refills: 0    Associated Diagnoses: Pancreatic fistula of pancreas transplant      sodium bicarbonate 650 MG tablet Take 2 tablets (1,300 mg) by mouth 2 times daily.  Qty: 120 tablet, Refills: 2    Associated Diagnoses: Kidney transplant recipient; Pancreas transplant status (H)       !! - Potential duplicate medications found. Please discuss with provider.        STOP taking these medications       nystatin (MYCOSTATIN) [id number] UNIT/ML suspension Comments:   Reason for Stopping:                  Reason for your hospital stay    Acute kidney injury  Worsening hydronephrosis kidney transplant   Anti platelet defect due to aspirin therapy, increased risk of bleeding with procedure     Activity    Your activity upon discharge: Resume previous activities, increase as tolerated. Exercise daily  .     ADULT [facility] Specialty Follow-up and recommended labs and tests    [facility] FOLLOW UP:   1. PNT placement by IR on 7/8/25  2. PD catheter removal by transplant surgery on 7/8/25  3. Follow up with Transplant Nephrologist as scheduled.     Remember to always bring an updated medication list to all appointments.      Call your Transplant Coordinator ([phone]) with questions about Transplant Center appointment scheduling.     LABS:   Resume previous labs schedule. Next lab on Monday 7/7/25. Check CBC, BMP, magnesium, phosphorus, tacrolimus level.     When to contact your care team    Please contact transplant coordinator if you  have decreased or no urine output, a temperature > 100.5, abdominal pain, dehydration-increased thirst, decreased urine output, lightheaded, unable to transplant medications for ANY reason.    If no urine output or decreased urine output patient to go to ED for admission for emergent PNT placement.    Your transplant coordinator is Betty Vogel. Phone: [phone], Fax: [phone]     Diet    Follow this diet upon discharge: Diet recommendations post-transplant: Heart healthy dietary habits long term (low saturated/trans fat, low sodium). Practice food safety precautions.         Data   Most Recent 3 CBC's:  Recent Labs   Lab Test 07/04/25  0657 07/03/25 2123 07/03/25  0733   WBC 2.5* 4.1 4.9   HGB 7.3* 7.5* 8.1*   MCV 94 92 95   * 138* 137*      Most Recent 3 BMP's:  Recent Labs   Lab Test 07/04/25 0657 07/03/25 2123 07/03/25  0733    132* 136   POTASSIUM 4.6 4.9 4.7   CHLORIDE 102 97* 100   CO2 23 22 25   BUN 36.8* 37.0* 38.0*   CR 3.23* 3.19* 3.50*   ANIONGAP 12 13 11   VIJAY 9.9 9.9 9.6   GLC 97 103* 99     Most Recent 2 LFT's:  Recent Labs   Lab Test 07/03/25 2123 05/02/25  1652   AST 20 29   ALT 19 18   ALKPHOS 99 84   BILITOTAL 0.3 0.2     Most Recent INR's and Anticoagulation Dosing History:  Anticoagulation Dose History  More data exists         Latest Ref Rng & Units 9/13/2024 10/17/2024 5/2/2025 5/3/2025 5/5/2025 5/6/2025 5/7/2025   Recent Dosing and Labs   INR 0.85 - 1.15 0.98  0.87  0.95  1.26  0.91  0.94  0.98      Most Recent 3 Troponin's:No lab results found.  Most Recent Cholesterol Panel:  Recent Labs   Lab Test 04/12/24  1110   CHOL 115   LDL 25   HDL 23*   TRIG 334*     Most Recent 6 Bacteria Isolates From Any Culture (See EPIC Reports for Culture Details):No lab results found.  Most Recent TSH, T4 and A1c Labs:  Recent Labs   Lab Test 05/03/25  0948   A1C 5.9*       Lab Results   Component Value Date    LIPASE 117 (H) 07/04/2025    LIPASE 135 (H) 07/03/2025    LIPASE 159 (H)  07/03/2025    LIPASE 200 (H) 06/30/2025    LIPASE 199 (H) 06/26/2025     Lab Results   Component Value Date    AMYLASE 90 07/03/2025    AMYLASE 97 06/30/2025    AMYLASE 104 (H) 06/26/2025    AMYLASE 113 (H) 06/23/2025    AMYLASE 126 (H) 06/19/2025

## 2025-07-05 ENCOUNTER — RESULTS FOLLOW-UP (OUTPATIENT)
Dept: NURSING | Facility: CLINIC | Age: 55
End: 2025-07-05

## 2025-07-05 LAB — BACTERIA UR CULT: NO GROWTH

## 2025-07-05 NOTE — CONSULTS
[facility]  Transplant Nephrology Consult Note  Date of Admission:  7/3/2025  Today's Date: 07/05/2025  Requesting physician: No att. providers found    Reason for Consult:  Co-management of immunosuppression and hydronephrosis.    Recommendations:   - Hold ASA.  - Labs Monday.  - IR PCN tube placement Tuesday.     Assessment & Plan   # DDKT (SPK): CKD Stage 3b - Trend up to  3.5 mg/dL 7/03, decrease to 3.2 mg/dL today. As her creatinine is stable and she's making urine, we can discharge her home and get procedure as outpatient. If urine output decrease/stops, she needs to be re-admitted for emergent PCN tube placement.    - Baseline Creatinine: ~ TBD, kedar was 1.5   - Proteinuria: Mild (0.5-1.0 grams)   - DSA Hx: Low level DSA (<1000 mfi) to B51 and CW15 ~500 MFI   - Last cPRA: 98%   - BK Viremia: No   - Kidney Tx Biopsy Hx: No biopsy history.   - Primary Nephrologist: Dr. Giles.    # Transplant hydronephrosis: moderate to severe hydronephrosis noted on CT from 6/11/25 and 6/20/25. No left perinephric collection compressing the ureter. US 7/03/25 with continued severe hydronephrosis that persist after emptying her bladder (low PVR). Suspect ureteral stenosis. Needs PCN placement. She will hold aspirin and get procedure as outpatient. She continued to void well. No leg swelling or shortness of breath.    # Pancreas Tx (SPK):    - Pancreatic Exocrine Drainage: Enteric drained     - Blood glucose: Euglycemia      On insulin: No   - HbA1c: not checked post transplant      Latest HbA1c: 5.9%   - Pancreatic enzymes: Trend down in lipase and amylase which may be due to improve pancreatitis.    - DSA Hx: Low level DSA (<1000 mfi) to B51 and CW15   - Pancreas Tx Biopsy Hx: No biopsy history    Recent up trend in lipase and amylase. Lipase peaked at ~300 and trending down but still high. CT scam showed tuan pancreatic fluid collection. As there is no pain or signs of infection  and down trending enzymes, will follow up closely with out intervention (discussed briefly with her surgeon).    # Immunosuppression: Tacrolimus immediate release (goal 8-10), Mycophenolate mofetil (dose 750 mg every 12 hours), and Prednisone (dose 5 mg daily)   - Induction with Recent Transplant:  High Intensity Protocol   - Continue with intensive monitoring of immunosuppression for efficacy and toxicity.   - Historical Changes in Immunosuppression: Patient came in on prednisone 10 mg daily for possible inflammatory arthritis.  Lowered to prednisone 5 mg daily after transplant.   - Changes: Not at this time    # Infection Prevention:   Last CD4 Level: Not checked  - PJP: Sulfa/TMP (Bactrim)  - CMV: Valganciclovir (Valcyte)  - Thrush: Nystatin (Mycostatin) swish and swallow      - CMV IgG Ab High Risk Discordance (D+/R-) at time of transplant: No  Present CMV Serostatus: Positive  - EBV IgG Ab High Risk Discordance (D+/R-) at time of transplant: No  Present EBV Serostatus: Positive    # Hypertension: Controlled;  Goal BP: < 130/80   - Changes: Not at this time    # Anemia in Chronic Renal Disease: Hgb: Stable, low      BRIANNA: No   - Iron studies: Replete    # Mineral Bone Disorder:    - Secondary renal hyperparathyroidism; PTH level: Normal (15-65 pg/ml)        On treatment: None  - Vitamin D; level: Normal        On supplement: Yes  - Calcium; level: High normal        On supplement: Yes  - Phosphorus; level: Normal        On supplement: Yes,discontinued today.    # Electrolytes:  - Potassium; level: Normal        On supplement: No  - Magnesium; level: Normal        On supplement: Yes  - Bicarbonate; level: Normal        On supplement: Yes  - Sodium; level: Normal  # Other Significant PMH:   -CAD: Patient has mild non obstructive coronary artery disease on last coronary angiogram 4/2024.  - HFpEF: Last cardiac echo (8/2023) showed normal LVEF ~ 60-65% with grade II diastolic dysfunction.  - Sjogren's: Stable with no  recent symptoms.  Has been on prednisone 10 mg daily for possible inflammatory arthritis.  Followed by Rheumatology.  - IgG Lambda MGUS: Stable with monoclonal peak ~  0.3 and K/L ratio ~ 0.64  in July 2024.              - Complex Native Kidney Cyst: Patient with cyst in mid pole right native kidney with thin and mildly thickened internal septations noted on MRI 6/2024.  Repeat imaging with MRI 2/2025 showed slight increased size of the partly exophytic 2.0 cm cyst in the medial cortex of the interpolar region of the right kidney, previously 1.7 cm. Stable 3 mm septation with T2 dark signal in the septation, which may be related to calcifications.  Recommended follow with ultrasound or MRI in 6 months.                          - Recommend bilateral native kidney ultrasound 4 months post transplant (9/2025).  - Positive Lupus Anticoagulant: Patient is positive, but cardiolipins not elevated, neg for Factor V and Factor 2 absent.    # Transplant History:  Etiology of Kidney Failure: Diabetes mellitus type 2  Tx: DDKT (SPK)  Transplant: 5/2/2025 (Kidney / Pancreas)  Significant transplant-related complications: None    Recommendations were communicated to the primary team verbally.    Sergio Gamino MD  Transplant Nephrology  Contact information via Vocera Web Console or via [facility] Paging/Directory      History of Present Illness  The patient is a 54 year old female with type 2 diabetes mellitus that was called in for elevated creatinine to 3.5 mg/dL. She reports good urine output, no dysuria, hematuria or fever. No suprapubic pain. There's no leg swelling or shortness of breath. Attempted inpatient PCN tube placement but she's on  mg every day that my pose her at increase risk of bleeding. She'll hold aspirin and get procedure as outpatient.     Review of Systems   The 10 point Review of Systems is negative other than noted in the HPI or here.      MEDICATIONS:  No current facility-administered  medications for this encounter.     No current facility-administered medications for this encounter.       Physical Exam   Temp  Av.3  F (36.8  C)  Min: 97.8  F (36.6  C)  Max: 98.8  F (37.1  C)      Pulse  Av.5  Min: 72  Max: 85 Resp  Av.7  Min: 18  Max: 20  SpO2  Av.7 %  Min: 99 %  Max: 100 %     /65 (Cuff Size: Adult Regular)   Pulse 72   Temp 97.8  F (36.6  C) (Oral)   Resp 18   Wt 59.6 kg (131 lb 4.8 oz)   SpO2 99%   BMI 25.64 kg/m     Date 25 07 - 25 0659(Discharged)   Shift 0868-1532 4091-8525 2029-5524 24 Hour Total   INTAKE   P.O. 720   720   Shift Total(mL/kg) 720(12.09)   720(12.09)   OUTPUT   Shift Total(mL/kg)       Weight (kg) 59.56 59.56 59.56 59.56      Admit Weight: 59.6 kg (131 lb 4.8 oz)     GENERAL APPEARANCE: alert and no distress  HENT: mouth without ulcers or lesions  RESP: lungs clear to auscultation - no rales, rhonchi or wheezes  CV: regular rhythm, normal rate, no rub, no murmur  EDEMA: no LE edema bilaterally  ABDOMEN: soft, nondistended, nontender, bowel sounds normal  MS: extremities normal - no gross deformities noted, no evidence of inflammation in joints, no muscle tenderness  SKIN: no rash  TX KIDNEY: normal    Data   All labs reviewed by me.  CMP  Recent Labs   Lab 25  0657 25  2123 25  0733 25  0748    132* 136 142   POTASSIUM 4.6 4.9 4.7 4.3   CHLORIDE 102 97* 100 106   CO2 23 22 25 23   ANIONGAP 12 13 11 13   GLC 97 103* 99 104*   BUN 36.8* 37.0* 38.0* 29.0*   CR 3.23* 3.19* 3.50* 2.49*   GFRESTIMATED 16* 16* 15* 22*   VIJAY 9.9 9.9 9.6 9.1   MAG 1.8 1.7  --  1.7   PHOS 5.1* 4.5  --  3.7   PROTTOTAL  --  7.1  --   --    ALBUMIN  --  3.9  --   --    BILITOTAL  --  0.3  --   --    ALKPHOS  --  99  --   --    AST  --  20  --   --    ALT  --  19  --   --      CBC  Recent Labs   Lab 25  0657 25  2123 25  0733 25  0748   HGB 7.3* 7.5* 8.1* 7.9*   WBC 2.5* 4.1 4.9 7.1   RBC 2.39* 2.44*  2.63* 2.52*   HCT 22.5* 22.4* 25.0* 24.3*   MCV 94 92 95 96   MCH 30.5 30.7 30.8 31.3   MCHC 32.4 33.5 32.4 32.5   RDW 14.1 14.3 14.4 14.7   * 138* 137* 138*     INRNo lab results found in last 7 days.  ABGNo lab results found in last 7 days.   Urine Studies  Recent Labs   Lab Test 07/03/25  1822 06/12/25  0806 06/04/25  0907 05/15/25  1034 05/02/25  1635   COLOR Straw Straw Light Yellow Yellow Light Yellow   APPEARANCE Clear Clear Slightly Cloudy* Slightly Cloudy* Clear   URINEGLC Negative Negative Negative Negative 30*   URINEBILI Negative Negative Negative Negative Negative   URINEKETONE Negative Negative Negative Negative Negative   SG 1.005 1.011 1.015 1.016 1.014   UBLD Small* Negative Negative Large* Negative   URINEPH 7.0 6.0 6.5 6.0 6.5   PROTEIN Negative Negative 20* 50* 300*   NITRITE Negative Negative Negative Negative Negative   LEUKEST Negative Small* Moderate* Large* Small*   RBCU <1 <1  --  89* 3*   WBCU 2 26*  --  >182* 16*     No lab results found.  PTH  Recent Labs   Lab Test 06/12/25  0805   PTHI 28     Iron Studies  Recent Labs   Lab Test 06/12/25  0805   IRON 60   *   IRONSAT 27   ARUN 1,102*       IMAGING:  All imaging studies reviewed by me.    Past Medical History    I have reviewed this patient's medical history and updated it with pertinent information if needed.   Past Medical History:   Diagnosis Date    Chronic kidney disease     Diabetes (H)     DM Type 2    History of blood transfusion 2019    Hypertension     MGUS (monoclonal gammopathy of unknown significance)     Pericardial effusion     Sjogren's syndrome     Type 2 diabetes mellitus with diabetic nephropathy (H)        Past Surgical History   I have reviewed this patient's surgical history and updated it with pertinent information if needed.  Past Surgical History:   Procedure Laterality Date    APPENDECTOMY OPEN N/A 5/2/2025    Procedure: Appendectomy open, incidental;  Surgeon: Jean-Pierre Gleason MD;   Location: [facility] OR    BENCH KIDNEY  2025    Procedure: Bench kidney;  Surgeon: Jean-Pierre Gleason MD;  Location: [facility] OR    BENCH PANCREAS N/A 2025    Procedure: Bench whole pancreas;  Surgeon: Jean-Pierre Gleason MD;  Location: [facility] OR    BIOPSY       SECTION      CV CORONARY ANGIOGRAM N/A 2024    Procedure: Coronary Angiogram;  Surgeon: Cody Mckenzie MD;  Location: [facility]    CV PCI N/A 2024    Procedure: Percutaneous Coronary Intervention;  Surgeon: Cody Mckenzie MD;  Location:  HEART CARDIAC CATH LAB    IR RENAL BIOPSY LEFT  2025    TRANSPLANT PANCREAS, KIDNEY  DONOR, COMBINED Right 2025    Procedure: Transplant pancreas, kidney  donor, with ureteral stent placement.;  Surgeon: Jean-Pierre Gleason MD;  Location: [facility] OR       Family History   I have reviewed this patient's family history and updated it with pertinent information if needed.   Family History   Problem Relation Age of Onset    Kidney Disease No family hx of       Social History   I have reviewed this patient's social history and updated it with pertinent information if needed. Wendy Kapoor  reports that she has never smoked. She has never used smokeless tobacco. She reports that she does not drink alcohol and does not use drugs.    Prior to Admission Medications   (Not in a hospital admission)

## 2025-07-07 ENCOUNTER — ANESTHESIA EVENT (OUTPATIENT)
Dept: SURGERY | Facility: CLINIC | Age: 55
End: 2025-07-07
Payer: COMMERCIAL

## 2025-07-07 ENCOUNTER — LAB (OUTPATIENT)
Dept: LAB | Facility: OTHER | Age: 55
End: 2025-07-07
Payer: COMMERCIAL

## 2025-07-07 DIAGNOSIS — Z79.899 ENCOUNTER FOR LONG-TERM CURRENT USE OF MEDICATION: ICD-10-CM

## 2025-07-07 DIAGNOSIS — Z48.298 AFTERCARE FOLLOWING ORGAN TRANSPLANT: ICD-10-CM

## 2025-07-07 DIAGNOSIS — Z20.828 CONTACT WITH AND (SUSPECTED) EXPOSURE TO OTHER VIRAL COMMUNICABLE DISEASES: ICD-10-CM

## 2025-07-07 DIAGNOSIS — Z98.890 OTHER SPECIFIED POSTPROCEDURAL STATES: ICD-10-CM

## 2025-07-07 DIAGNOSIS — Z94.0 KIDNEY REPLACED BY TRANSPLANT: ICD-10-CM

## 2025-07-07 LAB
MYCOPHENOLATE SERPL LC/MS/MS-MCNC: 2.92 MG/L (ref 1–3.5)
MYCOPHENOLATE-G SERPL LC/MS/MS-MCNC: 174.2 MG/L (ref 30–95)
TACROLIMUS BLD-MCNC: 8.3 UG/L (ref 5–15)
TME LAST DOSE: ABNORMAL H
TME LAST DOSE: ABNORMAL H
TME LAST DOSE: NORMAL H
TME LAST DOSE: NORMAL H

## 2025-07-07 PROCEDURE — 80180 DRUG SCRN QUAN MYCOPHENOLATE: CPT

## 2025-07-07 PROCEDURE — 86833 HLA CLASS II HIGH DEFIN QUAL: CPT | Performed by: INTERNAL MEDICINE

## 2025-07-07 PROCEDURE — 87799 DETECT AGENT NOS DNA QUANT: CPT

## 2025-07-07 PROCEDURE — 80197 ASSAY OF TACROLIMUS: CPT

## 2025-07-07 PROCEDURE — 36415 COLL VENOUS BLD VENIPUNCTURE: CPT

## 2025-07-08 ENCOUNTER — APPOINTMENT (OUTPATIENT)
Dept: MEDSURG UNIT | Facility: CLINIC | Age: 55
End: 2025-07-08
Attending: SURGERY
Payer: COMMERCIAL

## 2025-07-08 ENCOUNTER — APPOINTMENT (OUTPATIENT)
Dept: INTERVENTIONAL RADIOLOGY/VASCULAR | Facility: CLINIC | Age: 55
End: 2025-07-08
Payer: COMMERCIAL

## 2025-07-08 ENCOUNTER — HOSPITAL ENCOUNTER (OUTPATIENT)
Facility: CLINIC | Age: 55
Discharge: HOME | End: 2025-07-08
Attending: SURGERY | Admitting: SURGERY
Payer: COMMERCIAL

## 2025-07-08 ENCOUNTER — ANESTHESIA (OUTPATIENT)
Dept: SURGERY | Facility: CLINIC | Age: 55
End: 2025-07-08
Payer: COMMERCIAL

## 2025-07-08 VITALS
HEIGHT: 60 IN | RESPIRATION RATE: 12 BRPM | TEMPERATURE: 98.6 F | WEIGHT: 128.4 LBS | DIASTOLIC BLOOD PRESSURE: 68 MMHG | SYSTOLIC BLOOD PRESSURE: 158 MMHG | OXYGEN SATURATION: 100 % | HEART RATE: 70 BPM | BODY MASS INDEX: 25.21 KG/M2

## 2025-07-08 DIAGNOSIS — Z94.83 PANCREAS TRANSPLANT STATUS (H): ICD-10-CM

## 2025-07-08 DIAGNOSIS — N18.5 CHRONIC KIDNEY DISEASE, STAGE V (H): ICD-10-CM

## 2025-07-08 DIAGNOSIS — Z94.0 KIDNEY REPLACED BY TRANSPLANT: Primary | ICD-10-CM

## 2025-07-08 DIAGNOSIS — N13.30 HYDRONEPHROSIS: ICD-10-CM

## 2025-07-08 DIAGNOSIS — Z94.0 KIDNEY TRANSPLANT RECIPIENT: ICD-10-CM

## 2025-07-08 DIAGNOSIS — M35.00 SJOGREN'S SYNDROME, WITH UNSPECIFIED ORGAN INVOLVEMENT: ICD-10-CM

## 2025-07-08 LAB
ABO + RH BLD: NORMAL
ANION GAP SERPL CALCULATED.3IONS-SCNC: 15 MMOL/L (ref 7–15)
BASOPHILS # BLD MANUAL: 0 10E3/UL (ref 0–0.2)
BASOPHILS NFR BLD MANUAL: 0 %
BK VIRUS SPECIMEN TYPE: ABNORMAL
BKV DNA # SPEC NAA+PROBE: <22 IU/ML
BKV DNA SPEC NAA+PROBE-LOG#: <1.3 {LOG_COPIES}/ML
BLD GP AB SCN SERPL QL: NEGATIVE
BUN SERPL-MCNC: 43.4 MG/DL (ref 6–20)
CALCIUM SERPL-MCNC: 9.7 MG/DL (ref 8.8–10.4)
CHLORIDE SERPL-SCNC: 105 MMOL/L (ref 98–107)
CREAT SERPL-MCNC: 3.36 MG/DL (ref 0.51–0.95)
DACRYOCYTES BLD QL SMEAR: ABNORMAL
DONOR IDENTIFICATION: NORMAL
DSA COMMENTS: NORMAL
DSA PRESENT: NO
DSA TEST METHOD: NORMAL
EGFRCR SERPLBLD CKD-EPI 2021: 15 ML/MIN/1.73M2
EOSINOPHIL # BLD MANUAL: 0 10E3/UL (ref 0–0.7)
EOSINOPHIL NFR BLD MANUAL: 0 %
ERYTHROCYTE [DISTWIDTH] IN BLOOD BY AUTOMATED COUNT: 14 % (ref 10–15)
GLUCOSE BLDC GLUCOMTR-MCNC: 107 MG/DL (ref 70–99)
GLUCOSE SERPL-MCNC: 118 MG/DL (ref 70–99)
HCG UR QL: NEGATIVE
HCO3 SERPL-SCNC: 21 MMOL/L (ref 22–29)
HCT VFR BLD AUTO: 25.3 % (ref 35–47)
HGB BLD-MCNC: 8.3 G/DL (ref 11.7–15.7)
INR PPP: 1.04 (ref 0.85–1.15)
INT SUB COMMENTS: NORMAL
INT SUB RESULT: NORMAL
INTERF SUBSTANCE: NORMAL
INTSUB TEST METHOD: NORMAL
LYMPHOCYTES # BLD MANUAL: 0.2 10E3/UL (ref 0.8–5.3)
LYMPHOCYTES NFR BLD MANUAL: 7 %
MCH RBC QN AUTO: 30.3 PG (ref 26.5–33)
MCHC RBC AUTO-ENTMCNC: 32.8 G/DL (ref 31.5–36.5)
MCV RBC AUTO: 92 FL (ref 78–100)
MONOCYTES # BLD MANUAL: 0.1 10E3/UL (ref 0–1.3)
MONOCYTES NFR BLD MANUAL: 4 %
NEUTROPHILS # BLD MANUAL: 2.3 10E3/UL (ref 1.6–8.3)
NEUTROPHILS NFR BLD MANUAL: 89 %
ORGAN: NORMAL
PLAT MORPH BLD: ABNORMAL
PLATELET # BLD AUTO: 167 10E3/UL (ref 150–450)
POTASSIUM SERPL-SCNC: 4.8 MMOL/L (ref 3.4–5.3)
PROTHROMBIN TIME: 13.9 SECONDS (ref 11.8–14.8)
RBC # BLD AUTO: 2.74 10E6/UL (ref 3.8–5.2)
RBC MORPH BLD: ABNORMAL
SA 1  COMMENTS: NORMAL
SA 1 CELL: NORMAL
SA 1 TEST METHOD: NORMAL
SA 2 CELL: NORMAL
SA 2 COMMENTS: NORMAL
SA 2 TEST METHOD: NORMAL
SA1 HI RISK ABY: NORMAL
SA1 MOD RISK ABY: NORMAL
SA2 HI RISK ABY: NORMAL
SA2 MOD RISK ABY: NORMAL
SCANNED LAB RESULT: NORMAL
SCANNED LAB RESULT: NORMAL
SODIUM SERPL-SCNC: 141 MMOL/L (ref 135–145)
SPECIMEN EXP DATE BLD: NORMAL
UNACCEPTABLE ANTIGENS: NORMAL
UNOS CPRA: 98
WBC # BLD AUTO: 2.6 10E3/UL (ref 4–11)

## 2025-07-08 PROCEDURE — 710N000012 HC RECOVERY PHASE 2, PER MINUTE: Performed by: SURGERY

## 2025-07-08 PROCEDURE — 272N000508 HC NEEDLE CR8

## 2025-07-08 PROCEDURE — 80048 BASIC METABOLIC PNL TOTAL CA: CPT | Performed by: STUDENT IN AN ORGANIZED HEALTH CARE EDUCATION/TRAINING PROGRAM

## 2025-07-08 PROCEDURE — 250N000011 HC RX IP 250 OP 636: Performed by: STUDENT IN AN ORGANIZED HEALTH CARE EDUCATION/TRAINING PROGRAM

## 2025-07-08 PROCEDURE — 49422 REMOVE TUNNELED IP CATH: CPT | Mod: 58 | Performed by: SURGERY

## 2025-07-08 PROCEDURE — 36415 COLL VENOUS BLD VENIPUNCTURE: CPT | Performed by: PHYSICIAN ASSISTANT

## 2025-07-08 PROCEDURE — C1729 CATH, DRAINAGE: HCPCS

## 2025-07-08 PROCEDURE — 272N000001 HC OR GENERAL SUPPLY STERILE: Performed by: SURGERY

## 2025-07-08 PROCEDURE — 250N000011 HC RX IP 250 OP 636: Performed by: PHYSICIAN ASSISTANT

## 2025-07-08 PROCEDURE — 360N000075 HC SURGERY LEVEL 2, PER MIN: Performed by: SURGERY

## 2025-07-08 PROCEDURE — 86900 BLOOD TYPING SEROLOGIC ABO: CPT | Performed by: STUDENT IN AN ORGANIZED HEALTH CARE EDUCATION/TRAINING PROGRAM

## 2025-07-08 PROCEDURE — 250N000009 HC RX 250: Performed by: STUDENT IN AN ORGANIZED HEALTH CARE EDUCATION/TRAINING PROGRAM

## 2025-07-08 PROCEDURE — 999N000142 HC STATISTIC PROCEDURE PREP ONLY

## 2025-07-08 PROCEDURE — 50432 PLMT NEPHROSTOMY CATHETER: CPT

## 2025-07-08 PROCEDURE — 36415 COLL VENOUS BLD VENIPUNCTURE: CPT | Performed by: STUDENT IN AN ORGANIZED HEALTH CARE EDUCATION/TRAINING PROGRAM

## 2025-07-08 PROCEDURE — 85007 BL SMEAR W/DIFF WBC COUNT: CPT | Performed by: PHYSICIAN ASSISTANT

## 2025-07-08 PROCEDURE — 50432 PLMT NEPHROSTOMY CATHETER: CPT | Mod: XS

## 2025-07-08 PROCEDURE — 255N000002 HC RX 255 OP 636

## 2025-07-08 PROCEDURE — 258N000003 HC RX IP 258 OP 636: Performed by: STUDENT IN AN ORGANIZED HEALTH CARE EDUCATION/TRAINING PROGRAM

## 2025-07-08 PROCEDURE — 999N000132 HC STATISTIC PP CARE STAGE 1

## 2025-07-08 PROCEDURE — 370N000017 HC ANESTHESIA TECHNICAL FEE, PER MIN: Performed by: SURGERY

## 2025-07-08 PROCEDURE — 85027 COMPLETE CBC AUTOMATED: CPT | Performed by: PHYSICIAN ASSISTANT

## 2025-07-08 PROCEDURE — 250N000009 HC RX 250

## 2025-07-08 PROCEDURE — 85610 PROTHROMBIN TIME: CPT | Performed by: PHYSICIAN ASSISTANT

## 2025-07-08 PROCEDURE — 250N000013 HC RX MED GY IP 250 OP 250 PS 637: Performed by: STUDENT IN AN ORGANIZED HEALTH CARE EDUCATION/TRAINING PROGRAM

## 2025-07-08 PROCEDURE — C1769 GUIDE WIRE: HCPCS

## 2025-07-08 PROCEDURE — 250N000009 HC RX 250: Performed by: SURGERY

## 2025-07-08 PROCEDURE — 82962 GLUCOSE BLOOD TEST: CPT

## 2025-07-08 PROCEDURE — 99152 MOD SED SAME PHYS/QHP 5/>YRS: CPT | Mod: GC | Performed by: RADIOLOGY

## 2025-07-08 PROCEDURE — 999N000141 HC STATISTIC PRE-PROCEDURE NURSING ASSESSMENT: Performed by: SURGERY

## 2025-07-08 PROCEDURE — 81025 URINE PREGNANCY TEST: CPT

## 2025-07-08 PROCEDURE — 96372 THER/PROPH/DIAG INJ SC/IM: CPT

## 2025-07-08 PROCEDURE — 52310 CYSTOSCOPY AND TREATMENT: CPT | Mod: 58 | Performed by: SURGERY

## 2025-07-08 PROCEDURE — 50432 PLMT NEPHROSTOMY CATHETER: CPT | Mod: 50 | Performed by: RADIOLOGY

## 2025-07-08 PROCEDURE — 250N000011 HC RX IP 250 OP 636

## 2025-07-08 PROCEDURE — 250N000011 HC RX IP 250 OP 636: Performed by: SURGERY

## 2025-07-08 RX ORDER — FLUMAZENIL 0.1 MG/ML
0.2 INJECTION, SOLUTION INTRAVENOUS
Status: DISCONTINUED | OUTPATIENT
Start: 2025-07-08 | End: 2025-07-08 | Stop reason: HOSPADM

## 2025-07-08 RX ORDER — OXYCODONE HYDROCHLORIDE 5 MG/1
5 TABLET ORAL EVERY 6 HOURS PRN
Qty: 12 TABLET | Refills: 0 | Status: SHIPPED | OUTPATIENT
Start: 2025-07-08 | End: 2025-07-11

## 2025-07-08 RX ORDER — CEFAZOLIN SODIUM/WATER 2 G/20 ML
SYRINGE (ML) INTRAVENOUS PRN
Status: DISCONTINUED | OUTPATIENT
Start: 2025-07-08 | End: 2025-07-08

## 2025-07-08 RX ORDER — OXYCODONE HYDROCHLORIDE 5 MG/1
5 TABLET ORAL
Status: COMPLETED | OUTPATIENT
Start: 2025-07-08 | End: 2025-07-08

## 2025-07-08 RX ORDER — POLYETHYLENE GLYCOL 3350 17 G/17G
1 POWDER, FOR SOLUTION ORAL DAILY
Qty: 510 G | Refills: 2 | Status: SHIPPED | OUTPATIENT
Start: 2025-07-08

## 2025-07-08 RX ORDER — LIDOCAINE 40 MG/G
CREAM TOPICAL
Status: DISCONTINUED | OUTPATIENT
Start: 2025-07-08 | End: 2025-07-08 | Stop reason: HOSPADM

## 2025-07-08 RX ORDER — HYDROMORPHONE HCL IN WATER/PF 6 MG/30 ML
0.4 PATIENT CONTROLLED ANALGESIA SYRINGE INTRAVENOUS EVERY 5 MIN PRN
Refills: 0 | Status: CANCELLED | OUTPATIENT
Start: 2025-07-08

## 2025-07-08 RX ORDER — ACETAMINOPHEN 325 MG/1
975 TABLET ORAL ONCE
Status: COMPLETED | OUTPATIENT
Start: 2025-07-08 | End: 2025-07-08

## 2025-07-08 RX ORDER — SODIUM CHLORIDE, SODIUM LACTATE, POTASSIUM CHLORIDE, CALCIUM CHLORIDE 600; 310; 30; 20 MG/100ML; MG/100ML; MG/100ML; MG/100ML
INJECTION, SOLUTION INTRAVENOUS CONTINUOUS PRN
Status: DISCONTINUED | OUTPATIENT
Start: 2025-07-08 | End: 2025-07-08

## 2025-07-08 RX ORDER — IODIXANOL 320 MG/ML
50 INJECTION, SOLUTION INTRAVASCULAR ONCE
Status: COMPLETED | OUTPATIENT
Start: 2025-07-08 | End: 2025-07-08

## 2025-07-08 RX ORDER — HYDROMORPHONE HCL IN WATER/PF 6 MG/30 ML
0.2 PATIENT CONTROLLED ANALGESIA SYRINGE INTRAVENOUS EVERY 5 MIN PRN
Refills: 0 | Status: CANCELLED | OUTPATIENT
Start: 2025-07-08

## 2025-07-08 RX ORDER — NALOXONE HYDROCHLORIDE 0.4 MG/ML
0.1 INJECTION, SOLUTION INTRAMUSCULAR; INTRAVENOUS; SUBCUTANEOUS
Status: CANCELLED | OUTPATIENT
Start: 2025-07-08

## 2025-07-08 RX ORDER — SODIUM CHLORIDE, SODIUM LACTATE, POTASSIUM CHLORIDE, CALCIUM CHLORIDE 600; 310; 30; 20 MG/100ML; MG/100ML; MG/100ML; MG/100ML
INJECTION, SOLUTION INTRAVENOUS CONTINUOUS
Status: CANCELLED | OUTPATIENT
Start: 2025-07-08

## 2025-07-08 RX ORDER — FENTANYL CITRATE 50 UG/ML
50 INJECTION, SOLUTION INTRAMUSCULAR; INTRAVENOUS EVERY 5 MIN PRN
Refills: 0 | Status: CANCELLED | OUTPATIENT
Start: 2025-07-08

## 2025-07-08 RX ORDER — ONDANSETRON 2 MG/ML
4 INJECTION INTRAMUSCULAR; INTRAVENOUS EVERY 30 MIN PRN
Status: CANCELLED | OUTPATIENT
Start: 2025-07-08

## 2025-07-08 RX ORDER — DEXAMETHASONE SODIUM PHOSPHATE 4 MG/ML
4 INJECTION, SOLUTION INTRA-ARTICULAR; INTRALESIONAL; INTRAMUSCULAR; INTRAVENOUS; SOFT TISSUE
Status: DISCONTINUED | OUTPATIENT
Start: 2025-07-08 | End: 2025-07-08 | Stop reason: HOSPADM

## 2025-07-08 RX ORDER — NALOXONE HYDROCHLORIDE 0.4 MG/ML
0.4 INJECTION, SOLUTION INTRAMUSCULAR; INTRAVENOUS; SUBCUTANEOUS
Status: DISCONTINUED | OUTPATIENT
Start: 2025-07-08 | End: 2025-07-08 | Stop reason: HOSPADM

## 2025-07-08 RX ORDER — LIDOCAINE HYDROCHLORIDE 20 MG/ML
INJECTION, SOLUTION INFILTRATION; PERINEURAL PRN
Status: DISCONTINUED | OUTPATIENT
Start: 2025-07-08 | End: 2025-07-08

## 2025-07-08 RX ORDER — ONDANSETRON 4 MG/1
4 TABLET, ORALLY DISINTEGRATING ORAL EVERY 30 MIN PRN
Status: CANCELLED | OUTPATIENT
Start: 2025-07-08

## 2025-07-08 RX ORDER — NALOXONE HYDROCHLORIDE 0.4 MG/ML
0.2 INJECTION, SOLUTION INTRAMUSCULAR; INTRAVENOUS; SUBCUTANEOUS
Status: DISCONTINUED | OUTPATIENT
Start: 2025-07-08 | End: 2025-07-08 | Stop reason: HOSPADM

## 2025-07-08 RX ORDER — LIDOCAINE HYDROCHLORIDE 10 MG/ML
1-30 INJECTION, SOLUTION EPIDURAL; INFILTRATION; INTRACAUDAL; PERINEURAL
Status: COMPLETED | OUTPATIENT
Start: 2025-07-08 | End: 2025-07-08

## 2025-07-08 RX ORDER — ONDANSETRON 2 MG/ML
4 INJECTION INTRAMUSCULAR; INTRAVENOUS EVERY 30 MIN PRN
Status: DISCONTINUED | OUTPATIENT
Start: 2025-07-08 | End: 2025-07-08 | Stop reason: HOSPADM

## 2025-07-08 RX ORDER — ONDANSETRON 4 MG/1
4 TABLET, ORALLY DISINTEGRATING ORAL EVERY 30 MIN PRN
Status: DISCONTINUED | OUTPATIENT
Start: 2025-07-08 | End: 2025-07-08 | Stop reason: HOSPADM

## 2025-07-08 RX ORDER — SODIUM CHLORIDE, SODIUM LACTATE, POTASSIUM CHLORIDE, CALCIUM CHLORIDE 600; 310; 30; 20 MG/100ML; MG/100ML; MG/100ML; MG/100ML
INJECTION, SOLUTION INTRAVENOUS CONTINUOUS
Status: DISCONTINUED | OUTPATIENT
Start: 2025-07-08 | End: 2025-07-08 | Stop reason: HOSPADM

## 2025-07-08 RX ORDER — PROPOFOL 10 MG/ML
INJECTION, EMULSION INTRAVENOUS CONTINUOUS PRN
Status: DISCONTINUED | OUTPATIENT
Start: 2025-07-08 | End: 2025-07-08

## 2025-07-08 RX ORDER — FENTANYL CITRATE 50 UG/ML
25 INJECTION, SOLUTION INTRAMUSCULAR; INTRAVENOUS EVERY 5 MIN PRN
Refills: 0 | Status: CANCELLED | OUTPATIENT
Start: 2025-07-08

## 2025-07-08 RX ORDER — PROPOFOL 10 MG/ML
INJECTION, EMULSION INTRAVENOUS PRN
Status: DISCONTINUED | OUTPATIENT
Start: 2025-07-08 | End: 2025-07-08

## 2025-07-08 RX ORDER — FENTANYL CITRATE 50 UG/ML
25-50 INJECTION, SOLUTION INTRAMUSCULAR; INTRAVENOUS EVERY 5 MIN PRN
Refills: 0 | Status: DISCONTINUED | OUTPATIENT
Start: 2025-07-08 | End: 2025-07-08 | Stop reason: HOSPADM

## 2025-07-08 RX ORDER — NALOXONE HYDROCHLORIDE 0.4 MG/ML
0.1 INJECTION, SOLUTION INTRAMUSCULAR; INTRAVENOUS; SUBCUTANEOUS
Status: DISCONTINUED | OUTPATIENT
Start: 2025-07-08 | End: 2025-07-08 | Stop reason: HOSPADM

## 2025-07-08 RX ORDER — DEXAMETHASONE SODIUM PHOSPHATE 4 MG/ML
4 INJECTION, SOLUTION INTRA-ARTICULAR; INTRALESIONAL; INTRAMUSCULAR; INTRAVENOUS; SOFT TISSUE
Status: CANCELLED | OUTPATIENT
Start: 2025-07-08

## 2025-07-08 RX ORDER — FENTANYL CITRATE 50 UG/ML
INJECTION, SOLUTION INTRAMUSCULAR; INTRAVENOUS PRN
Status: DISCONTINUED | OUTPATIENT
Start: 2025-07-08 | End: 2025-07-08

## 2025-07-08 RX ORDER — OXYCODONE HYDROCHLORIDE 10 MG/1
10 TABLET ORAL
Status: DISCONTINUED | OUTPATIENT
Start: 2025-07-08 | End: 2025-07-08 | Stop reason: HOSPADM

## 2025-07-08 RX ORDER — CEFAZOLIN SODIUM 2 G/50ML
2 SOLUTION INTRAVENOUS
Status: COMPLETED | OUTPATIENT
Start: 2025-07-08 | End: 2025-07-08

## 2025-07-08 RX ADMIN — Medication 2 G: at 12:50

## 2025-07-08 RX ADMIN — FENTANYL CITRATE 100 MCG: 50 INJECTION, SOLUTION INTRAMUSCULAR; INTRAVENOUS at 10:05

## 2025-07-08 RX ADMIN — ACETAMINOPHEN 975 MG: 325 TABLET ORAL at 11:57

## 2025-07-08 RX ADMIN — FENTANYL CITRATE 50 MCG: 50 INJECTION INTRAMUSCULAR; INTRAVENOUS at 12:56

## 2025-07-08 RX ADMIN — LIDOCAINE HYDROCHLORIDE 100 MG: 20 INJECTION, SOLUTION INFILTRATION; PERINEURAL at 12:41

## 2025-07-08 RX ADMIN — PROPOFOL 20 MG: 10 INJECTION, EMULSION INTRAVENOUS at 12:56

## 2025-07-08 RX ADMIN — CEFAZOLIN SODIUM 2 G: 2 SOLUTION INTRAVENOUS at 08:25

## 2025-07-08 RX ADMIN — FENTANYL CITRATE 50 MCG: 50 INJECTION INTRAMUSCULAR; INTRAVENOUS at 12:44

## 2025-07-08 RX ADMIN — PROPOFOL 150 MCG/KG/MIN: 10 INJECTION, EMULSION INTRAVENOUS at 12:42

## 2025-07-08 RX ADMIN — PROPOFOL 20 MG: 10 INJECTION, EMULSION INTRAVENOUS at 12:42

## 2025-07-08 RX ADMIN — MIDAZOLAM 2 MG: 1 INJECTION INTRAMUSCULAR; INTRAVENOUS at 10:05

## 2025-07-08 RX ADMIN — LIDOCAINE HYDROCHLORIDE 8 ML: 10 INJECTION, SOLUTION EPIDURAL; INFILTRATION; INTRACAUDAL; PERINEURAL at 10:21

## 2025-07-08 RX ADMIN — SODIUM CHLORIDE, SODIUM LACTATE, POTASSIUM CHLORIDE, AND CALCIUM CHLORIDE: .6; .31; .03; .02 INJECTION, SOLUTION INTRAVENOUS at 12:35

## 2025-07-08 RX ADMIN — OXYCODONE HYDROCHLORIDE 5 MG: 5 TABLET ORAL at 14:03

## 2025-07-08 RX ADMIN — IODIXANOL 20 ML: 320 INJECTION, SOLUTION INTRAVASCULAR at 10:39

## 2025-07-08 ASSESSMENT — ACTIVITIES OF DAILY LIVING (ADL)
ADLS_ACUITY_SCORE: 48

## 2025-07-08 ASSESSMENT — LIFESTYLE VARIABLES: TOBACCO_USE: 0

## 2025-07-08 NOTE — PROCEDURES
[facility]    Procedure: Left transplant and right native PNT placement    Date/Time: 7/8/2025 10:39 AM    Performed by: Dwayne Britton MD  Authorized by: Brad Sorenson MD  IR Fellow Physician:    Pre Procedure Diagnosis: Hydronephrosis  Post Procedure Diagnosis: Hydronephrosis    UNIVERSAL PROTOCOL   Site Marked: NA  Prior Images Obtained and Reviewed:  Yes  Required items: Required blood products, implants, devices and special equipment available    Patient identity confirmed:  Arm band, provided demographic data, hospital-assigned identification number and verbally with patient  Patient was reevaluated immediately before administering moderate or deep sedation or anesthesia  Confirmation Checklist:  Correct equipment/implants were available, procedure was appropriate and matched the consent or emergent situation, relevant allergies and patient's identity using two indicators  Time out: Immediately prior to the procedure a time out was called    Universal Protocol: the Joint Commission Universal Protocol was followed    Preparation: Patient was prepped and draped in usual sterile fashion       ANESTHESIA    Anesthesia:  Local infiltration  Local Anesthetic:  Lidocaine 1% without epinephrine      SEDATION  Patient Sedated: Yes    Sedation Type:  Moderate (conscious) sedation  Sedation:  See MAR for details  Vital signs: Vital signs monitored during sedation    See dictated procedure note for full details.  Findings: 10Fr locking to LLQ transplant kidney  8Fr locking to right native kidney    Specimens: none    Procedural Complications: None    Condition: Stable    Plan: - Drains to bags  - Don't flush  - Bring patient back for transplant ureter plasty in six weeks      PROCEDURE    Patient Tolerance:  Patient tolerated the procedure well with no immediate complications  Length of time physician/provider present for 1:1 monitoring during sedation:  53-67 min   67 m

## 2025-07-08 NOTE — PROGRESS NOTES
Handoff given to 3C RN Riley at 1110 to prep patient for scheduled PD catheter removed    Discharge instructions for PNT placement reviewed with family, booklet also given and dressing change supplies given

## 2025-07-08 NOTE — IR NOTE
Patient Name: Wendy Kapoor  Medical Record Number: [id number]  Today's Date: 7/8/2025    Procedure: Left transplant kidney nephrostomy tube placement, right native percutaneous nephrostomy tube placement  Proceduralist: Dr. Sorenson, Dr. Britton, Dr. Winston    Procedure Start: 0939, 1010  Procedure end: 0957, 1033  Sedation medications administered: 100 mcg fentanyl, 2 mg versed     Report given to: Collette BEARD 2A  : ARTIE    Other Notes: Pt arrived to IR room 5 from 2A. Consent reviewed. Pt denies any questions or concerns regarding procedure. Pt positioned supine, then prone and monitored per protocol. Pt tolerated procedure without any noted complications. Pt transferred to  to recover and be prepped for later procedure.

## 2025-07-08 NOTE — ANESTHESIA CARE TRANSFER NOTE
Patient: Wendy Kapoor    Procedure: Procedure(s):  REMOVAL, CATHETER, DIALYSIS, PERITONEAL       Diagnosis: Kidney transplanted [Z94.0]  Diagnosis Additional Information: No value filed.    Anesthesia Type:   MAC     Note:    Oropharynx: oropharynx clear of all foreign objects  Level of Consciousness: awake  Oxygen Supplementation: room air    Independent Airway: airway patency satisfactory and stable  Dentition: dentition unchanged  Vital Signs Stable: post-procedure vital signs reviewed and stable  Report to RN Given: handoff report given  Patient transferred to: Phase II    Handoff Report: Identifed the Patient, Identified the Reponsible Provider, Reviewed the pertinent medical history, Discussed the surgical course, Reviewed Intra-OP anesthesia mangement and issues during anesthesia, Set expectations for post-procedure period and Allowed opportunity for questions and acknowledgement of understanding      Vitals:  Vitals Value Taken Time   /68 07/08/25 13:45   Temp 36.2    Pulse 65    Resp 15    SpO2 100 % 07/08/25 13:47   Vitals shown include unfiled device data.    Electronically Signed By: Tamera Kang  July 8, 2025  1:48 PM

## 2025-07-08 NOTE — PROGRESS NOTES
Wendy arrived on 3C s/p bilateral PNT placement  Bilateral sites soft and dry, no hematoma  Left PNT with light pink drainage, Right PNT with bloody drainage  Discomfort on left abdomen, ice applied  VSS

## 2025-07-08 NOTE — ANESTHESIA PREPROCEDURE EVALUATION
Anesthesia Pre-Procedure Evaluation    Patient: Wendy Kapoor   MRN: [id number] : 1970          Procedure : Procedure(s):  REMOVAL, CATHETER, DIALYSIS, PERITONEAL         Past Medical History:   Diagnosis Date    Chronic kidney disease     Diabetes (H)     DM Type 2    History of blood transfusion 2019    Hypertension     MGUS (monoclonal gammopathy of unknown significance)     Pericardial effusion     Sjogren's syndrome     Type 2 diabetes mellitus with diabetic nephropathy (H)       Past Surgical History:   Procedure Laterality Date    APPENDECTOMY OPEN N/A 2025    Procedure: Appendectomy open, incidental;  Surgeon: Jean-Pierre Gleason MD;  Location: [facility] OR    BENCH KIDNEY  2025    Procedure: Bench kidney;  Surgeon: Jean-Pierre Gleason MD;  Location: [facility] OR    BENCH PANCREAS N/A 2025    Procedure: Bench whole pancreas;  Surgeon: Jean-Pierre Gleason MD;  Location: [facility] OR    BIOPSY       SECTION      CV CORONARY ANGIOGRAM N/A 2024    Procedure: Coronary Angiogram;  Surgeon: Cody Mckenzie MD;  Location:  [facility]    CV PCI N/A 2024    Procedure: Percutaneous Coronary Intervention;  Surgeon: Cody Mckenzie MD;  Location:  [facility]    IR RENAL BIOPSY LEFT  2025    TRANSPLANT PANCREAS, KIDNEY  DONOR, COMBINED Right 2025    Procedure: Transplant pancreas, kidney  donor, with ureteral stent placement.;  Surgeon: Jean-Pierre Gleason MD;  Location: [facility] OR      Allergies   Allergen Reactions    Atorvastatin Itching    Extraneal Rash      Social History     Tobacco Use    Smoking status: Never    Smokeless tobacco: Never   Substance Use Topics    Alcohol use: Never      Wt Readings from Last 1 Encounters:   25 59.6 kg (131 lb 4.8 oz)        Anesthesia Evaluation   Pt has had prior anesthetic. Type: General and MAC.    No history of anesthetic complications        ROS/MED HX  ENT/Pulmonary:    (-) tobacco use and sleep apnea   Neurologic:  - neg neurologic ROS     Cardiovascular:     (+)  hypertension- -  CAD -  - -                                 Previous cardiac testing   Echo: Date: 5/8/25 Results:  Interpretation Summary  Left ventricular function is normal.The ejection fraction is 60-65%.  Grade II or moderate diastolic dysfunction.  Global right ventricular function is normal.  Mild tricuspid insufficiency is present.  Mild aortic insufficiency is present.  The inferior vena cava was normal in size with preserved respiratory  variability.     This study was compared with the study from 8/2/2023. No significant changes  noted.  _____      Stress Test:  Date: 2023 Results:     The nuclear stress test is negative for inducible myocardial ischemia or infarction.     Hyperdynamic LV function with LV EF over 75%.     There is no prior study for comparison.    ECG Reviewed:  Date: 5/2025 Results:  Sinus rhythm  Cath:  Date: 4/2024 Results:     Mid LAD lesion is 30% stenosed.     Dist LAD lesion is 30% stenosed.     1. No significant obstructive coronary artery disease.   2. Uncomplicated right radial access.          METS/Exercise Tolerance:     Hematologic:     (+)      anemia, history of blood transfusion, no previous transfusion reaction,        Musculoskeletal:  - neg musculoskeletal ROS     GI/Hepatic:  - neg GI/hepatic ROS     Renal/Genitourinary: Comment: S/p Kidney & Pancrease transplant 5/25  Perc Nephrostomy tube placed 7/4 for hydronephrosis in transplanted kidney     (+) renal disease, type: CRI, Pt requires dialysis, type: Peritoneal dialysis, Pt has history of transplant,         Endo:     (+)  type II DM, Last HgA1c: 6.0, date: 5/2025,     Diabetic complications: nephropathy.             Psychiatric/Substance Use:  - neg psychiatric ROS     Infectious Disease:  - neg infectious disease ROS     Malignancy:  - neg malignancy ROS     Other: Comment: Sjogren's  "syndrome             Physical Exam  Airway  Mallampati: III  TM distance: >3 FB  Neck ROM: full  Upper bite lip test: II  Mouth opening: < 4 cm    Cardiovascular   Rhythm: regular  Rate: normal rate     Dental   (+) Edentulous      Pulmonary - normal examBreath sounds clear to auscultation        Neurological - normal exam  She appears awake, alert and oriented x3.    Other Findings       OUTSIDE LABS:  CBC:   Lab Results   Component Value Date    WBC 2.5 (L) 07/04/2025    WBC 4.1 07/03/2025    HGB 7.3 (L) 07/04/2025    HGB 7.5 (L) 07/03/2025    HCT 22.5 (L) 07/04/2025    HCT 22.4 (L) 07/03/2025     (L) 07/04/2025     (L) 07/03/2025     BMP:   Lab Results   Component Value Date     07/04/2025     (L) 07/03/2025    POTASSIUM 4.6 07/04/2025    POTASSIUM 4.9 07/03/2025    CHLORIDE 102 07/04/2025    CHLORIDE 97 (L) 07/03/2025    CO2 23 07/04/2025    CO2 22 07/03/2025    BUN 36.8 (H) 07/04/2025    BUN 37.0 (H) 07/03/2025    CR 3.23 (H) 07/04/2025    CR 3.19 (H) 07/03/2025    GLC 97 07/04/2025     (H) 07/03/2025     COAGS:   Lab Results   Component Value Date    PTT 29 05/07/2025    INR 0.98 05/07/2025    FIBR 406 05/08/2025     POC: No results found for: \"BGM\", \"HCG\", \"HCGS\"  HEPATIC:   Lab Results   Component Value Date    ALBUMIN 3.9 07/03/2025    PROTTOTAL 7.1 07/03/2025    ALT 19 07/03/2025    AST 20 07/03/2025    ALKPHOS 99 07/03/2025    BILITOTAL 0.3 07/03/2025     OTHER:   Lab Results   Component Value Date    PH 7.29 (L) 05/03/2025    LACT 1.5 05/03/2025    A1C 5.9 (H) 05/03/2025    VIJAY 9.9 07/04/2025    PHOS 5.1 (H) 07/04/2025    MAG 1.8 07/04/2025    LIPASE 117 (H) 07/04/2025    AMYLASE 90 07/03/2025       Anesthesia Plan    ASA Status:  3      NPO Status: NPO Appropriate   Anesthesia Type: MAC.  Airway: natural airway.  Induction: intravenous.  Maintenance: TIVA.   Techniques and Equipment:       - Monitoring Plan: standard ASA monitoring     Consents    Anesthesia Plan(s) and " associated risks, benefits, and realistic alternatives discussed. Questions answered and patient/representative(s) expressed understanding.     - Discussed: anesthesiologist     - Discussed with:  Patient        - Pt is DNR/DNI Status: no DNR     Blood Consent:      - Discussed with: patient.     - Consented: consented to blood products     Postoperative Care    Pain management: non-narcotic analgesics, plan for postoperative opioid use, multimodal analgesia.     Comments:                   Magaly Rosado MD    I have reviewed the pertinent notes and labs in the chart from the past 30 days and (re)examined the patient.  Any updates or changes from those notes are reflected in this note.    Clinically Significant Risk Factors Present on Admission                   # Hypertension: Noted on problem list               # Financial/Environmental Concerns:

## 2025-07-08 NOTE — ANESTHESIA POSTPROCEDURE EVALUATION
Patient: Wendy Kapoor    Procedure: Procedure(s):  REMOVAL, CATHETER, DIALYSIS, PERITONEAL       Anesthesia Type:  MAC    Note:  Disposition: Outpatient   Postop Pain Control: Uneventful            Sign Out: Well controlled pain   PONV: No   Neuro/Psych: Uneventful            Sign Out: Acceptable/Baseline neuro status   Airway/Respiratory: Uneventful            Sign Out: Acceptable/Baseline resp. status   CV/Hemodynamics: Uneventful            Sign Out: Acceptable CV status; No obvious hypovolemia; No obvious fluid overload   Other NRE: NONE   DID A NON-ROUTINE EVENT OCCUR? No           Last vitals:  Vitals Value Taken Time   /66 07/08/25 14:00   Temp     Pulse     Resp 16 07/08/25 13:45   SpO2 100 % 07/08/25 14:11   Vitals shown include unfiled device data.    Electronically Signed By: Yaima Pimentel MD  July 8, 2025  2:16 PM

## 2025-07-08 NOTE — OP NOTE
Transplant Surgery  Operative Note    Preop dx: Status post  Donor kidney and pancreas transplant.  Post op dx: same   Procedure: Flexible cystoscopy with stent removal, open peritoneal dialysis catheter removal  Surgeon: Jean-Pierre Gleason M.D.  Assistant: Cameron Victoria MD- Fellow. Dr Victoria was the primary and only assistant for the procedure and participated I all aspects of the case under my supervision. His presence was necessary due to lack of a suitable level surgical resident to assist.   Anesthesia: MAC w/ sedation  EBL: 0   Specimens: stent.  Findings: none abnormal. Stent inspected and noted to be intact.   Indication: The patient is status post kidney transplant and the ureteral stent is no longer needed.    Procedure: The patient was brought to the operating room and placed supine on the table.  Sedation was administered and monitored by anesthesia.  Groin was sterilely prepped and draped in the usual fashion. Time out was done. Lido Jet was applied to urethra and a catheterized urine sample was obtained. Antibiotics were administered. A flexible cystoscope was inserted and advanced thru the urethra into the bladder. The stent was visualized and grasped. The cystoscope, grasper and stent were removed en-mass. The stent was visualized to be intact.  The bladder mucosa was normal in appearance. A catheter was reinserted and the bladder drained.    We then transitioned to peritoneal dialysis catheter removal. The abdomen was prepped and draped in the usual sterile fashion. A timeout was performed for this portion of the procedure as well. The incision overlying the fascial cuff was reopened. Using sharp dissection and cautery the tube and fascial cuff were grasped and elevated. The fascial cuff was dissected free from the rectus muscle fibers and fascia. The internal portion of the catheter was removed and the fascial opening closed. The catheter was traced into the subcutaneous space and the  subcutaneous cuff also dissected free. The internal piece of the catheter with the two cuffs was divided and removed. The wound was irrigated and closed in layers.  The external piece of the catheter was removed through the skin opening. The exit site was packed open.   The patient tolerated the procedure well and  was transferred in stable and satisfactory condition to the PACU.

## 2025-07-08 NOTE — DISCHARGE INSTRUCTIONS
[facility]  Department of Vascular and Interventional Radiology (IR)   Drain Placement Discharge Instructions    You had a percutaneous nephrostomy tube (PNT) /Percutaneous Nephroureteral Catheter, Ureterostomy, tube placed or exchanged.      If you received sedation:     For 24 hours:   Have an adult stay with you for 24 hours.   Drink plenty of fluids and resume your regular diet.  Do not drive or operate machinery at home or at work  No alcoholic beverages  Do not make any important legal decisions  No heavy lifting greater than 10 lb unless otherwise instructed by your physician.    Slowly advance toward your normal activities after two days    Bathing  We recommend sponge baths.  If you must shower, please secure the catheter, connecting tubes and collection device into plastic bag around the body with adhesive taping.   Be sure to keep the catheter site as dry as possible.        Components of Drainage Catheter System          How to  Care for Your Catheter  Inspect the catheter dressing and change the dressing if it is soiled, loose or wet.  Inspect the catheter for any signs of kinking.    Do not flush the drain unless otherwise instructed.  If catheter is not to be flushed, the 3 way stopcock is removed.      The collection device  If you have a collection bag:  Twist the blue valve at the bottom of the bag and empty contents into measuring cup.  Wipe the bottom of blue valve.    Contact Information  Please contact IR for the following:    -Fever >=101F and/or shaking chills  -worsening redness, warmth and pus drainage at/or around the site of catheter placement  -Inability to flush the catheter  -Significant leakage around the catheter site  -Catheter pulled out or falls out  -New or increasing pain that does not respond to over the counter pain medication    The Interventional Radiology Nursing line is available at [phone] Monday to Friday (7:30AM-4:00PM).   Any  voicemails left will be returned within 24 hours.      For emergencies (that cannot wait until normal business hours) during night time hours and weekends,  please call  [facility]  at [phone] (option#4) and ask to page [facility] on call Interventional Radiology team.      Follow-Up  Your next appointment has been scheduled.  If it is not, please call Interventional Radiology Scheduling Line at [phone] to schedule follow-up.      What to bring at your next IR appointment  List of current medications    [facility] (Dressings and other medical supplies):  [email or web address] (for various locations)  [phone]    Contacting your Doctor -   To contact a doctor, call: Dr [facility] at [phone] (Monday to Friday 8:00 AM to 4:30 PM) or:  [phone] and ask for the resident on call for Transplant (answered 24 hours a day)   Emergency Department:  [facility]: [phone]  [facility]: [phone] 911 if you are in need of immediate or emergent help         Has dermabond on the incision. No bathing. Can shower. The dressing over the pd catheter exit site can be removed in 2 days. The dermabond will stay and flake off in 7-10 days.

## 2025-07-08 NOTE — BRIEF OP NOTE
[facility]    Brief Operative Note    Pre-operative diagnosis: Kidney transplanted [Z94.0]  Post-operative diagnosis Same as pre-operative diagnosis    Procedure: REMOVAL, CATHETER, DIALYSIS, PERITONEAL, N/A - Abdomen    Surgeon: Surgeons and Role:     * Jean-Pierre Gleason MD - Primary     * Cameron Victoria MD - Fellow - Assisting  Anesthesia: MAC with Local   Estimated Blood Loss: None    Drains: None  Specimens: * No specimens in log *  Findings:   None.  Complications: None.  Implants: * No implants in log *    PD cath removal

## 2025-07-08 NOTE — PRE-PROCEDURE
GENERAL PRE-PROCEDURE:   Procedure:  Bilateral nephrostomy tube placement  Date/Time:  7/8/2025 9:59 AM    Verbal consent obtained?: Yes    Written consent obtained?: Yes    Risks and benefits: Risks, benefits and alternatives were discussed    DC Plan: Appropriate discharge home plan in place for patients who are going home after procedure   Consent given by:  Patient  Patient states understanding of procedure being performed: Yes    Patient's understanding of procedure matches consent: Yes    Procedure consent matches procedure scheduled: Yes    Expected level of sedation:  Moderate  Appropriately NPO:  Yes  ASA Class:  2  Mallampati  :  Grade 1- soft palate, uvula, tonsillar pillars, and posterior pharyngeal wall visible  Heart:  Normal heart sounds and rate  History & Physical reviewed:  History and physical reviewed and no updates needed  Statement of review:  I have reviewed the lab findings, diagnostic data, medications, and the plan for sedation

## 2025-07-08 NOTE — PROGRESS NOTES
Pt prepped for bilateral nephrostomy tube placement. Pt alert and oriented. VSS. Sats >92% on RA. Pt denies any pain. PIV infusing, intact. Labs in process. Consent signed. Continue to monitor pt status and notify MD with any changes or concerns.

## 2025-07-09 ENCOUNTER — LAB REQUISITION (OUTPATIENT)
Dept: LAB | Facility: CLINIC | Age: 55
End: 2025-07-09
Payer: COMMERCIAL

## 2025-07-09 ENCOUNTER — TELEPHONE (OUTPATIENT)
Dept: PHARMACY | Facility: CLINIC | Age: 55
End: 2025-07-09
Payer: COMMERCIAL

## 2025-07-09 DIAGNOSIS — Z94.0 KIDNEY REPLACED BY TRANSPLANT: Primary | ICD-10-CM

## 2025-07-09 NOTE — TELEPHONE ENCOUNTER
Clinical Pharmacy Consult:                                                      Transplant Specific: 2 Month Post Transplant Medication Review  Date of Transplant: 5/2/25  Type of Transplant: kidney and pancreas  First Transplant: yes  History of rejection: no    Immunosuppression Regimen   TAC 2.5mg qAM & 2.5mg qPM and MMF 1000mg qAM & 1000mg qPM, prednisone 5mg qAM  Pt adherent to lab draws: yes  CrCl: 14.9 mL/min, date: 07/08/2025  Scr: 3.36 mg/dL  Lab Results   Component Value Date    CR 4.86 05/06/2025     Side effects: no side effects    Prophylactic Medications  PJP Prophylactic: Bactrim SS three times per week    Antifungal: Nystatin suspension  Scheduled Discontinue Date: 3 months Discontinued 7/4/25    CMV Prophylactic: CrCl 10 to 24 mL/minute: Valcyte 450 mg twice weekly   Scheduled Discontinue Date: 3 months Anticipated date 8/2/25    Acid Reducer: Protonix (pantoprazole)     Blood Pressure Management  Frequency of home Blood Pressure checks: once daily  Most recent home BP: 129/68  Patient Blood pressure goal: <140/90  Patient blood pressure at goal:  yes  Hospitalizations/ER visits since last assessment: 1, fluid around the kidney      Med rec/DUR performed: yes  Med Rec Discrepancies: no    Spoke with Wendy via phone today. She reports she is doing very well. Yesterday she had surgery to put in a drain. Wendy manages her own medications with the help of a medication card, phone alarms, and a medication box. She denies any difficulty getting her refills from  specialty pharmacy.    Wendy checks her blood pressure at least once daily. It was 129/68 this morning which is at goal. Recently stopped amlodipine but is still taking carvedilol.    No other questions or concerns. Next follow up in 1 month.    Vitaly Senior, PharmD  [facility]  Ph: [phone]  Tx: [phone]

## 2025-07-10 ENCOUNTER — LAB (OUTPATIENT)
Dept: LAB | Facility: OTHER | Age: 55
End: 2025-07-10
Payer: COMMERCIAL

## 2025-07-10 DIAGNOSIS — Z20.828 CONTACT WITH AND (SUSPECTED) EXPOSURE TO OTHER VIRAL COMMUNICABLE DISEASES: ICD-10-CM

## 2025-07-10 DIAGNOSIS — Z98.890 OTHER SPECIFIED POSTPROCEDURAL STATES: ICD-10-CM

## 2025-07-10 DIAGNOSIS — Z79.899 ENCOUNTER FOR LONG-TERM CURRENT USE OF MEDICATION: ICD-10-CM

## 2025-07-10 DIAGNOSIS — Z94.0 KIDNEY REPLACED BY TRANSPLANT: ICD-10-CM

## 2025-07-10 DIAGNOSIS — Z48.298 AFTERCARE FOLLOWING ORGAN TRANSPLANT: ICD-10-CM

## 2025-07-10 LAB
AMYLASE SERPL-CCNC: 75 U/L (ref 28–100)
ANION GAP SERPL CALCULATED.3IONS-SCNC: 17 MMOL/L (ref 7–15)
BUN SERPL-MCNC: 38.3 MG/DL (ref 6–20)
CALCIUM SERPL-MCNC: 9.4 MG/DL (ref 8.8–10.4)
CHLORIDE SERPL-SCNC: 99 MMOL/L (ref 98–107)
CREAT SERPL-MCNC: 2.66 MG/DL (ref 0.51–0.95)
DONOR IDENTIFICATION: NORMAL
DONOR IDENTIFICATION: NORMAL
DSA COMMENTS: NORMAL
DSA COMMENTS: NORMAL
DSA PRESENT: NO
DSA PRESENT: NO
DSA TEST METHOD: NORMAL
DSA TEST METHOD: NORMAL
EGFRCR SERPLBLD CKD-EPI 2021: 20 ML/MIN/1.73M2
ERYTHROCYTE [DISTWIDTH] IN BLOOD BY AUTOMATED COUNT: 13.3 % (ref 10–15)
GLUCOSE SERPL-MCNC: 118 MG/DL (ref 70–99)
HCO3 SERPL-SCNC: 22 MMOL/L (ref 22–29)
HCT VFR BLD AUTO: 25.6 % (ref 35–47)
HGB BLD-MCNC: 8.5 G/DL (ref 11.7–15.7)
INT SUB COMMENTS: NORMAL
INT SUB COMMENTS: NORMAL
INT SUB RESULT: NORMAL
INT SUB RESULT: NORMAL
INTERF SUBSTANCE: NORMAL
INTERF SUBSTANCE: NORMAL
INTSUB TEST METHOD: NORMAL
INTSUB TEST METHOD: NORMAL
LIPASE SERPL-CCNC: 87 U/L (ref 13–60)
MCH RBC QN AUTO: 31.3 PG (ref 26.5–33)
MCHC RBC AUTO-ENTMCNC: 33.2 G/DL (ref 31.5–36.5)
MCV RBC AUTO: 94 FL (ref 78–100)
ORGAN: NORMAL
ORGAN: NORMAL
PLATELET # BLD AUTO: 178 10E3/UL (ref 150–450)
POTASSIUM SERPL-SCNC: 4.4 MMOL/L (ref 3.4–5.3)
RBC # BLD AUTO: 2.72 10E6/UL (ref 3.8–5.2)
SA 1  COMMENTS: NORMAL
SA 1  COMMENTS: NORMAL
SA 1 CELL: NORMAL
SA 1 CELL: NORMAL
SA 1 TEST METHOD: NORMAL
SA 1 TEST METHOD: NORMAL
SA 2 CELL: NORMAL
SA 2 CELL: NORMAL
SA 2 COMMENTS: NORMAL
SA 2 COMMENTS: NORMAL
SA 2 TEST METHOD: NORMAL
SA 2 TEST METHOD: NORMAL
SA1 HI RISK ABY: NORMAL
SA1 HI RISK ABY: NORMAL
SA1 MOD RISK ABY: NORMAL
SA1 MOD RISK ABY: NORMAL
SA2 HI RISK ABY: NORMAL
SA2 HI RISK ABY: NORMAL
SA2 MOD RISK ABY: NORMAL
SA2 MOD RISK ABY: NORMAL
SODIUM SERPL-SCNC: 138 MMOL/L (ref 135–145)
TACROLIMUS BLD-MCNC: 11.6 UG/L (ref 5–15)
TME LAST DOSE: NORMAL H
TME LAST DOSE: NORMAL H
UNACCEPTABLE ANTIGENS: NORMAL
UNACCEPTABLE ANTIGENS: NORMAL
UNOS CPRA: 98
UNOS CPRA: 98
WBC # BLD AUTO: 3 10E3/UL (ref 4–11)

## 2025-07-10 PROCEDURE — 86833 HLA CLASS II HIGH DEFIN QUAL: CPT | Performed by: INTERNAL MEDICINE

## 2025-07-12 LAB
DONOR IDENTIFICATION: NORMAL
DSA COMMENTS: NORMAL
DSA PRESENT: NO
DSA TEST METHOD: NORMAL
ORGAN: NORMAL
SA 1  COMMENTS: NORMAL
SA 1 CELL: NORMAL
SA 1 TEST METHOD: NORMAL
SA 2 CELL: NORMAL
SA 2 COMMENTS: NORMAL
SA 2 TEST METHOD: NORMAL
SA1 HI RISK ABY: NORMAL
SA1 MOD RISK ABY: NORMAL
SA2 HI RISK ABY: NORMAL
SA2 MOD RISK ABY: NORMAL
UNACCEPTABLE ANTIGENS: NORMAL
UNOS CPRA: 98

## 2025-07-14 ENCOUNTER — LAB (OUTPATIENT)
Dept: LAB | Facility: OTHER | Age: 55
End: 2025-07-14
Payer: COMMERCIAL

## 2025-07-14 ENCOUNTER — VIRTUAL VISIT (OUTPATIENT)
Dept: TRANSPLANT | Facility: CLINIC | Age: 55
End: 2025-07-14
Attending: INTERNAL MEDICINE
Payer: COMMERCIAL

## 2025-07-14 DIAGNOSIS — Z48.298 AFTERCARE FOLLOWING ORGAN TRANSPLANT: ICD-10-CM

## 2025-07-14 DIAGNOSIS — Z94.0 KIDNEY REPLACED BY TRANSPLANT: ICD-10-CM

## 2025-07-14 DIAGNOSIS — Z98.890 OTHER SPECIFIED POSTPROCEDURAL STATES: ICD-10-CM

## 2025-07-14 DIAGNOSIS — Z20.828 CONTACT WITH AND (SUSPECTED) EXPOSURE TO OTHER VIRAL COMMUNICABLE DISEASES: ICD-10-CM

## 2025-07-14 DIAGNOSIS — Z79.899 ENCOUNTER FOR LONG-TERM CURRENT USE OF MEDICATION: ICD-10-CM

## 2025-07-14 LAB
AMYLASE SERPL-CCNC: 63 U/L (ref 28–100)
ANION GAP SERPL CALCULATED.3IONS-SCNC: 11 MMOL/L (ref 7–15)
BUN SERPL-MCNC: 32.1 MG/DL (ref 6–20)
CALCIUM SERPL-MCNC: 9.8 MG/DL (ref 8.8–10.4)
CHLORIDE SERPL-SCNC: 102 MMOL/L (ref 98–107)
CREAT SERPL-MCNC: 1.82 MG/DL (ref 0.51–0.95)
EGFRCR SERPLBLD CKD-EPI 2021: 32 ML/MIN/1.73M2
ERYTHROCYTE [DISTWIDTH] IN BLOOD BY AUTOMATED COUNT: 13.2 % (ref 10–15)
GLUCOSE SERPL-MCNC: 117 MG/DL (ref 70–99)
HCO3 SERPL-SCNC: 28 MMOL/L (ref 22–29)
HCT VFR BLD AUTO: 27.3 % (ref 35–47)
HGB BLD-MCNC: 8.9 G/DL (ref 11.7–15.7)
LIPASE SERPL-CCNC: 79 U/L (ref 13–60)
MAGNESIUM SERPL-MCNC: 1.9 MG/DL (ref 1.7–2.3)
MCH RBC QN AUTO: 31 PG (ref 26.5–33)
MCHC RBC AUTO-ENTMCNC: 32.6 G/DL (ref 31.5–36.5)
MCV RBC AUTO: 95 FL (ref 78–100)
PHOSPHATE SERPL-MCNC: 2.1 MG/DL (ref 2.5–4.5)
PLATELET # BLD AUTO: 180 10E3/UL (ref 150–450)
POTASSIUM SERPL-SCNC: 4.3 MMOL/L (ref 3.4–5.3)
RBC # BLD AUTO: 2.87 10E6/UL (ref 3.8–5.2)
SODIUM SERPL-SCNC: 141 MMOL/L (ref 135–145)
TACROLIMUS BLD-MCNC: 13.4 UG/L (ref 5–15)
TME LAST DOSE: NORMAL H
TME LAST DOSE: NORMAL H
WBC # BLD AUTO: 2.8 10E3/UL (ref 4–11)

## 2025-07-14 PROCEDURE — 80197 ASSAY OF TACROLIMUS: CPT

## 2025-07-14 PROCEDURE — 80048 BASIC METABOLIC PNL TOTAL CA: CPT

## 2025-07-14 PROCEDURE — 83735 ASSAY OF MAGNESIUM: CPT

## 2025-07-14 PROCEDURE — 36415 COLL VENOUS BLD VENIPUNCTURE: CPT

## 2025-07-14 PROCEDURE — 84100 ASSAY OF PHOSPHORUS: CPT

## 2025-07-14 PROCEDURE — 85027 COMPLETE CBC AUTOMATED: CPT

## 2025-07-14 PROCEDURE — 82150 ASSAY OF AMYLASE: CPT

## 2025-07-14 PROCEDURE — 83690 ASSAY OF LIPASE: CPT

## 2025-07-14 NOTE — PROGRESS NOTES
Post-Transplant Patient Social Work Assessment:    Patient Name: Wendy Kapoor  : 1970  Age: 54 year old  MRN: [id number]  Date of Transplant: 25    Patient is known to this writer from follow-up in the kidney/pancreas transplant program. Spoke with patient and her ex-spouse, Benny, via phone today to update her psychosocial assessment.      Presenting Information:  Current Living Situation: Patient reported that she lives in a home in [place], [place] with her spouse, Killian, her ex-spouse, Benny, and her two children: Rios (20 yrs old) and Mike (16 yrs old).   Functional Status: Patient reported that she is typically independent with her ADL's and IADL's and drives independently, however, shared that she had a procedure on 25 and has some temporary restrictions due to this.   Cultural/Language/Spiritual Considerations: None reported or indicated at this time.     Post-Transplant Support System:  Primary Support Person(s): Patient identified her spouse, Killian, and her ex-spouse, Benny as her primary supports. Her children, Rios (20 yrs old) and Mike (16 yrs old) are also supports.   Other Supports: Patient has three other children: Frida (40 yrs old, lives out of state), Kota (30 yrs old), and Celia (29 yrs old).     Health Care Directive Information:  Primary Decision Maker: Self.   Alternate Decision Maker: Spouse, Killian and Adult Children.   Health Care Directive: None on file. Provided education.     Mental & AODA Concerns/History:  History/Changes to Mental Health: Denied mental health concerns or changes since transplant. Denied SI today or within past two weeks. Denied prior hospitalizations, self-injurious behaviors, and previous suicide attempts. Patient reported that they are not taking any medications for mental health/symptom management or seeing a therapist at this time.   History/Changes to Chemical Health: Denied current or history of chemical health concerns.  Current  "Status: Appropriate.   Coping: Talking with family.  Services Needed/Recommended: None reported or indicated at this time.   Compliance with Medications, Appointments, Etc: Denied compliance concerns.     Work/Financial Concerns:  Current Work Status: Patient is on disability. Her spouse, Killian, works in [employer] (PM/evening shift). Benny does not currently work.    Primary Source of Income: SSDI and Spouse's Income.   Impact of Transplant on Income: N/A  Insurance and Medication Coverage: BCBS through her spouse's employer and Medicare.   Financial Concerns: None reported or indicated at this time.   Resources Needed: Patient had questions about insurance coverage and any outstanding bills post-transplant. No immediate financial concerns right now. SW consulted with patient's transplant financial , Chen Lucas, and received the following message: \"BCBS as primary is correct, her Medicare will become primary on 3/1/26 due to the 30 month Coordination of Benefits (COB) period due to dialysis on ESRD. I have/had auth on file for her transplant and her IP admission so should be no billing issues.\"     Education Provided by : Social Work role in the outpatient setting, post-transplant expectations, and information on ESRD Medicare.     Assessment, Recommendations, and Plan: Kidney/Pancreas/Auto-Islet SOT SW Team to continue to follow indefinitely for outpatient concerns/questions and for one year post-transplant for hospital readmissions.  provided patient with SW's contact information for any questions/concerns.     SURI Aranda, [facility]  Kidney, Pancreas, & Auto-Islet Transplant  ([id number])   [facility] Acute Care Management  Phone: [phone]  Available on Vocera: Kidney, Pancreas, Auto-Islet   "

## 2025-07-14 NOTE — LETTER
2025      Wendy Kapoor  [address]  [place] [address]      Dear Colleague,    Thank you for referring your patient, Wendy Kapoor, to the [facility]PL[facility]. Please see a copy of my visit note below.    Post-Transplant Patient Social Work Assessment:    Patient Name: Wendy Kapoor  : 1970  Age: 54 year old  MRN: [id number]  Date of Transplant: 25    Patient is known to this writer from follow-up in the kidney/pancreas transplant program. Spoke with patient and her ex-spouse, Benny, via phone today to update her psychosocial assessment.      Presenting Information:  Current Living Situation: Patient reported that she lives in a home in [place], [place] with her spouse, Killian, her ex-spouse, Benny, and her two children: Rios (20 yrs old) and Mike (16 yrs old).   Functional Status: Patient reported that she is typically independent with her ADL's and IADL's and drives independently, however, shared that she had a procedure on 25 and has some temporary restrictions due to this.   Cultural/Language/Spiritual Considerations: None reported or indicated at this time.     Post-Transplant Support System:  Primary Support Person(s): Patient identified her spouse, Killian, and her ex-spouse, Benny as her primary supports. Her children, Rios (20 yrs old) and Mike (16 yrs old) are also supports.   Other Supports: Patient has three other children: Frida (40 yrs old, lives out of state), Kota (30 yrs old), and Celia (29 yrs old).     Health Care Directive Information:  Primary Decision Maker: Self.   Alternate Decision Maker: Spouse, Killian and Adult Children.   Health Care Directive: None on file. Provided education.     Mental & AODA Concerns/History:  History/Changes to Mental Health: Denied mental health concerns or changes since transplant. Denied SI today or within past two weeks. Denied prior hospitalizations, self-injurious behaviors, and previous suicide  "attempts. Patient reported that they are not taking any medications for mental health/symptom management or seeing a therapist at this time.   History/Changes to Chemical Health: Denied current or history of chemical health concerns.  Current Status: Appropriate.   Coping: Talking with family.  Services Needed/Recommended: None reported or indicated at this time.   Compliance with Medications, Appointments, Etc: Denied compliance concerns.     Work/Financial Concerns:  Current Work Status: Patient is on disability. Her spouse, Killian, works in [employer] (PM/evening shift). Benny does not currently work.    Primary Source of Income: SSDI and Spouse's Income.   Impact of Transplant on Income: N/A  Insurance and Medication Coverage: BCBS through her spouse's employer and Medicare.   Financial Concerns: None reported or indicated at this time.   Resources Needed: Patient had questions about insurance coverage and any outstanding bills post-transplant. No immediate financial concerns right now. SW consulted with patient's transplant financial , Chen Lucas, and received the following message: \"BCBS as primary is correct, her Medicare will become primary on 3/1/26 due to the 30 month Coordination of Benefits (COB) period due to dialysis on ESRD. I have/had auth on file for her transplant and her IP admission so should be no billing issues.\"     Education Provided by : Social Work role in the outpatient setting, post-transplant expectations, and information on ESRD Medicare.     Assessment, Recommendations, and Plan: Kidney/Pancreas/Auto-Islet SOT SW Team to continue to follow indefinitely for outpatient concerns/questions and for one year post-transplant for hospital readmissions.  provided patient with SW's contact information for any questions/concerns.     SURI Aranda, [facility]  Kidney, Pancreas, & Auto-Islet Transplant  (A-J)   [facility] Acute Care Management  Phone: " [phone]  Available on Dianwoba: Kidney, Pancreas, Auto-Islet     Again, thank you for allowing me to participate in the care of your patient.        Sincerely,        ERWIN Callahan    Electronically signed

## 2025-07-15 ENCOUNTER — TELEPHONE (OUTPATIENT)
Dept: TRANSPLANT | Facility: CLINIC | Age: 55
End: 2025-07-15

## 2025-07-15 DIAGNOSIS — K85.90 PANCREATITIS: ICD-10-CM

## 2025-07-15 DIAGNOSIS — Z94.83 PANCREAS TRANSPLANT STATUS (H): ICD-10-CM

## 2025-07-15 DIAGNOSIS — Z48.298 AFTERCARE FOLLOWING ORGAN TRANSPLANT: ICD-10-CM

## 2025-07-15 DIAGNOSIS — Z94.0 KIDNEY TRANSPLANT RECIPIENT: ICD-10-CM

## 2025-07-15 RX ORDER — TACROLIMUS 1 MG/1
2 CAPSULE ORAL 2 TIMES DAILY
Qty: 120 CAPSULE | Refills: 11 | Status: SHIPPED | OUTPATIENT
Start: 2025-07-15

## 2025-07-15 RX ORDER — TACROLIMUS 0.5 MG/1
CAPSULE ORAL
Qty: 60 CAPSULE | Refills: 11 | Status: SHIPPED | OUTPATIENT
Start: 2025-07-15

## 2025-07-15 NOTE — TELEPHONE ENCOUNTER
ISSUE:   Tacrolimus IR level 13.6 on 07/15/25, goal 10, dose 2.5 mg BID.    PLAN:   Call Patient and confirm this was an accurate 12-hour trough.   Verify Tacrolimus IR dose 2.5 mg BID.   Confirm no new medications or illness.   Confirm no missed doses.     If accurate trough and accurate dose, decrease Tacrolimus IR dose to 2 mg BID  *Recommended dose rounded from calculated dose 1.84 mg  BID.      Repeat labs in 1 weeks.   For any dose change <50%, recheck labs per guideline or within 1 month.  For any dose change of more than 50%, recheck drug level based on timing to reach steady state:   Immediate release tacrolimus: 2-3 days  Extended-release tacrolimus: 7 days  Cyclosporine: 2 days  Sirolimus: 12 days  Everolimus: 8 days      OUTCOME:   LVM for patient to return call, also sent [email or web address] message

## 2025-07-16 ENCOUNTER — LAB REQUISITION (OUTPATIENT)
Dept: LAB | Facility: CLINIC | Age: 55
End: 2025-07-16
Payer: COMMERCIAL

## 2025-07-17 ENCOUNTER — LAB (OUTPATIENT)
Dept: LAB | Facility: OTHER | Age: 55
End: 2025-07-17
Payer: COMMERCIAL

## 2025-07-17 DIAGNOSIS — Z98.890 OTHER SPECIFIED POSTPROCEDURAL STATES: ICD-10-CM

## 2025-07-17 DIAGNOSIS — Z94.0 KIDNEY REPLACED BY TRANSPLANT: ICD-10-CM

## 2025-07-17 DIAGNOSIS — Z20.828 CONTACT WITH AND (SUSPECTED) EXPOSURE TO OTHER VIRAL COMMUNICABLE DISEASES: ICD-10-CM

## 2025-07-17 DIAGNOSIS — Z79.899 ENCOUNTER FOR LONG-TERM CURRENT USE OF MEDICATION: ICD-10-CM

## 2025-07-17 DIAGNOSIS — Z48.298 AFTERCARE FOLLOWING ORGAN TRANSPLANT: ICD-10-CM

## 2025-07-17 LAB
AMYLASE SERPL-CCNC: 77 U/L (ref 28–100)
ANION GAP SERPL CALCULATED.3IONS-SCNC: 11 MMOL/L (ref 7–15)
BUN SERPL-MCNC: 30.2 MG/DL (ref 6–20)
CALCIUM SERPL-MCNC: 10 MG/DL (ref 8.8–10.4)
CHLORIDE SERPL-SCNC: 101 MMOL/L (ref 98–107)
CREAT SERPL-MCNC: 1.78 MG/DL (ref 0.51–0.95)
EGFRCR SERPLBLD CKD-EPI 2021: 33 ML/MIN/1.73M2
ERYTHROCYTE [DISTWIDTH] IN BLOOD BY AUTOMATED COUNT: 13 % (ref 10–15)
GLUCOSE SERPL-MCNC: 132 MG/DL (ref 70–99)
HCO3 SERPL-SCNC: 26 MMOL/L (ref 22–29)
HCT VFR BLD AUTO: 28.3 % (ref 35–47)
HGB BLD-MCNC: 9.4 G/DL (ref 11.7–15.7)
LIPASE SERPL-CCNC: 117 U/L (ref 13–60)
MCH RBC QN AUTO: 31.1 PG (ref 26.5–33)
MCHC RBC AUTO-ENTMCNC: 33.2 G/DL (ref 31.5–36.5)
MCV RBC AUTO: 94 FL (ref 78–100)
PLATELET # BLD AUTO: 210 10E3/UL (ref 150–450)
POTASSIUM SERPL-SCNC: 5 MMOL/L (ref 3.4–5.3)
RBC # BLD AUTO: 3.02 10E6/UL (ref 3.8–5.2)
SODIUM SERPL-SCNC: 138 MMOL/L (ref 135–145)
TACROLIMUS BLD-MCNC: 11.5 UG/L (ref 5–15)
TME LAST DOSE: NORMAL H
TME LAST DOSE: NORMAL H
WBC # BLD AUTO: 2.1 10E3/UL (ref 4–11)

## 2025-07-18 ENCOUNTER — OFFICE VISIT (OUTPATIENT)
Dept: NEPHROLOGY | Facility: CLINIC | Age: 55
End: 2025-07-18
Attending: INTERNAL MEDICINE
Payer: COMMERCIAL

## 2025-07-18 VITALS
SYSTOLIC BLOOD PRESSURE: 128 MMHG | DIASTOLIC BLOOD PRESSURE: 62 MMHG | HEART RATE: 72 BPM | WEIGHT: 130.4 LBS | BODY MASS INDEX: 25.47 KG/M2

## 2025-07-18 DIAGNOSIS — N13.30 HYDRONEPHROSIS, UNSPECIFIED HYDRONEPHROSIS TYPE: ICD-10-CM

## 2025-07-18 DIAGNOSIS — N18.32 ANEMIA IN STAGE 3B CHRONIC KIDNEY DISEASE (H): ICD-10-CM

## 2025-07-18 DIAGNOSIS — E55.9 VITAMIN D DEFICIENCY: ICD-10-CM

## 2025-07-18 DIAGNOSIS — B34.8 BK VIREMIA: ICD-10-CM

## 2025-07-18 DIAGNOSIS — Z29.89 NEED FOR PNEUMOCYSTIS PROPHYLAXIS: ICD-10-CM

## 2025-07-18 DIAGNOSIS — Z94.0 KIDNEY REPLACED BY TRANSPLANT: Primary | ICD-10-CM

## 2025-07-18 DIAGNOSIS — D84.9 IMMUNOSUPPRESSED STATUS: ICD-10-CM

## 2025-07-18 DIAGNOSIS — Z94.83 PANCREAS REPLACED BY TRANSPLANT (H): ICD-10-CM

## 2025-07-18 DIAGNOSIS — E83.42 HYPOMAGNESEMIA: ICD-10-CM

## 2025-07-18 DIAGNOSIS — Z94.83 PANCREAS TRANSPLANT STATUS (H): ICD-10-CM

## 2025-07-18 DIAGNOSIS — D63.1 ANEMIA IN STAGE 3B CHRONIC KIDNEY DISEASE (H): ICD-10-CM

## 2025-07-18 DIAGNOSIS — Z48.298 AFTERCARE FOLLOWING ORGAN TRANSPLANT: ICD-10-CM

## 2025-07-18 DIAGNOSIS — N18.32 STAGE 3B CHRONIC KIDNEY DISEASE (H): ICD-10-CM

## 2025-07-18 DIAGNOSIS — E87.20 METABOLIC ACIDOSIS: ICD-10-CM

## 2025-07-18 PROCEDURE — 3078F DIAST BP <80 MM HG: CPT | Performed by: INTERNAL MEDICINE

## 2025-07-18 PROCEDURE — G2211 COMPLEX E/M VISIT ADD ON: HCPCS | Performed by: INTERNAL MEDICINE

## 2025-07-18 PROCEDURE — 99215 OFFICE O/P EST HI 40 MIN: CPT | Mod: 24 | Performed by: INTERNAL MEDICINE

## 2025-07-18 PROCEDURE — 3074F SYST BP LT 130 MM HG: CPT | Performed by: INTERNAL MEDICINE

## 2025-07-18 RX ORDER — SULFAMETHOXAZOLE AND TRIMETHOPRIM 400; 80 MG/1; MG/1
1 TABLET ORAL DAILY
Qty: 90 TABLET | Refills: 3 | Status: SHIPPED | OUTPATIENT
Start: 2025-07-18 | End: 2025-07-23

## 2025-07-18 RX ORDER — MYCOPHENOLATE MOFETIL 250 MG/1
750 CAPSULE ORAL 2 TIMES DAILY
Qty: 180 CAPSULE | Refills: 11 | Status: SHIPPED | OUTPATIENT
Start: 2025-07-18

## 2025-07-18 RX ORDER — VALGANCICLOVIR 450 MG/1
450 TABLET, FILM COATED ORAL EVERY OTHER DAY
Qty: 60 TABLET | Refills: 0 | Status: SHIPPED | OUTPATIENT
Start: 2025-07-18 | End: 2025-07-19

## 2025-07-18 RX ORDER — PEN NEEDLE, DIABETIC 32GX 5/32"
NEEDLE, DISPOSABLE MISCELLANEOUS
COMMUNITY
Start: 2025-06-09

## 2025-07-18 NOTE — LETTER
7/18/2025      RE: Wendy Kapoor  [address] [place] [address]       TRANSPLANT NEPHROLOGY CLINIC VISIT     Assessment & Plan  # DDKT (SPK): CKD Stage 3b - Trend down in creatinine since PNT placement.  Patient's kedar creatinine ~ 1.5 on 6/9/25, but recent SENDY and moderate to severe hydronephrosis, now s/p PNT.  Kidney transplant biopsy 6/4/25 showed no evidence of rejection with mild IFTA.   - Baseline Creatinine: ~ TBD   - Proteinuria: Mild (0.5-1.0 grams)   - DSA Hx: No DSA   - Last cPRA: 98%   - BK Viremia: Yes, detectable, but less than quantifiable   - Kidney Tx Biopsy Hx: No history of acute rejection and Mild interstitial fibrosis and tubular atrophy.   - Primary Nephrologist: Dr. Giles.    # Transplant Kidney Hydronephrosis: Patient with moderate to severe hydronephrosis seen on imaging, as well as SENDY.  Now s/p PNT 7/8/25 with improvement in kidney function.   - Recommend follow up with IR for interrogation of the PNT to determine the underlying cause for hydronephrosis.    # Pancreas Tx (SPK): Patient continues on octreotide injections.   - Pancreatic Exocrine Drainage: Enteric drained     - Blood glucose: Elevated blood glucose      On insulin: No   - HbA1c: Last checked at time of transplant      Latest HbA1c: 5.9%   - Pancreatic enzymes: Variable and slightly elevated   - DSA Hx: No DSA   - Pancreas Tx Biopsy Hx: No biopsy history    # Immunosuppression: Tacrolimus immediate release (goal 8-10), Mycophenolate mofetil (dose 1000 mg every 12 hours), and Prednisone (dose 5 mg daily)   - Induction with Recent Transplant:  High Intensity Protocol   - Continue with intensive monitoring of immunosuppression for efficacy and toxicity.   - Historical Changes in Immunosuppression: Patient came into transplant on prednisone 10 mg daily for inflammatory arthritis and now on 5 mg daily.   - Changes: Yes - With recent detectable BK, as well as leukopenia, will slightly decrease mycophenolate mofetil to  750 mg bid.    # Infection Prevention:   Last CD4 Level: Not checked  - PJP: Sulfa/TMP (Bactrim); Will increase dose to 400/80 mg daily with improved kidney function.  - CMV: Valganciclovir (Valcyte); Will increase dose to 450 mg every other day with improved kidney function.      - CMV IgG Ab High Risk Discordance (D+/R-) at time of transplant: No  Present CMV Serostatus: Positive  - EBV IgG Ab High Risk Discordance (D+/R-) at time of transplant: No  Present EBV Serostatus: Positive    # Hypertension: Controlled;  Goal BP: < 140/90   - Changes: No    # Anemia in Chronic Renal Disease: Hgb: Stable      BRIANNA: No   - Iron studies: Low iron saturation, but high ferritin    # Leukopenia: Trend down, moderately low WBC.  Likely secondary to medications, but cannot rule out viral cause.   - Will check CMV PCR.    # Mineral Bone Disorder:    - Secondary renal hyperparathyroidism; PTH level: Normal (15-65 pg/ml)        On treatment: None  - Vitamin D; level: Normal        On supplement: Yes  - Calcium; level: High normal        On supplement: Yes; Will decrease calcium carbonate supplement to 500 mg daily.    # Electrolytes:  - Potassium; level: High normal        On supplement: No  - Magnesium; level: Normal        On supplement: Yes  - Bicarbonate; level: Normal        On supplement: Yes    # BK Viremia: New, minimal BK PCR at ~ detectable, but less than quantifiable with last check 7/2025.  IgG level has not been checked.  Immunosuppression has been slightly decreased.   - Will continue to follow BK PCR every 2 weeks.   - Will check IgG level.    # Other Significant PMH:   - CAD: Patient has mild non obstructive coronary artery disease on last coronary angiogram 4/2024.  - HFpEF: Last cardiac echo (5/2025) showed normal LVEF ~ 60-65% with grade II diastolic dysfunction.   - MGUS: Patient with IgG nara gammopathy, but normal kappa/lambda light chain ratio with last check 3/2025.    - Recommend rechecking serum  kappa/lambda light chains in September.   - GERD: Asymptomatic on PPI.   - Right Native Kidney Hydronephrosis: Patient with severe right native kidney hydronephrosis on imaging, now s/p right native kidney PNT 7/8/25.   - Sjogren's Disease: Stable on immunosuppression with prednisone.     # Skin Cancer Risk:    - Discussed sun protection and recommend regular follow up with Dermatology.    # Transplant History:  Etiology of Kidney Failure: Diabetes mellitus type 2  Tx: SPK  Transplant: 5/2/2025 (Kidney / Pancreas)  Significant transplant-related complications: BK Viremia and Transplant ureteral stenosis    Transplant Office Phone Number: [phone]    Assessment and plan was discussed with the patient and she voiced her understanding and agreement.    Return visit: Return for previously scheduled visit.    Lj Vizcarra MD    The longitudinal plan of care for the diagnosis(es)/condition(s) as documented were addressed during this visit. Due to the added complexity in care, I will continue to support Wendy in the subsequent management and with ongoing continuity of care.      Chief Complaint  Ms. Kapoor is a 54 year old here for kidney transplant, pancreas transplant, and immunosuppression management.     History of Present Illness   Ms. Kapoor reports feeling good overall.  Since last clinic visit:   Hospitalizations: Yes;   New Medical Issues: Yes; Patient was found to have SENDY with moderate to severe transplant kidney hydronephrosis..  In addition, she also was noted to have stable, but severe hydronephrosis of right native kidney on imaging.  She is now s/p PNT of both transplanted kidney, as well as s/p PNT of her right native kidney.  Following the PNT placement, her kidney function has been improving.  Patient reports some soreness at the PNT sites, but otherwise doing okay with them.  Active/Exercise: Yes; She has been active, although only gets minimal exercise at this time.  Chest pain or  General Sunscreen Counseling: I recommended a broad spectrum sunscreen with a SPF of 30 or higher.  I explained that SPF 30 sunscreens block approximately 97 percent of the sun's harmful rays.  Sunscreens should be applied at least 15 minutes prior to expected sun exposure and then every 2 hours after that as long as sun exposure continues. If swimming or exercising sunscreen should be reapplied every 45 minutes to an hour after getting wet or sweating.  One ounce, or the equivalent of a shot glass full of sunscreen, is adequate to protect the skin not covered by a bathing suit. I also recommended a lip balm with a sunscreen as well. Sun protective clothing can be used in lieu of sunscreen but must be worn the entire time you are exposed to the sun's rays. shortness of breath: No  Lower extremity swelling: No  Weight change: No   Appetite: Good   Nausea and vomiting: No  Diarrhea: No; Normal soft stools ~ 1x per day.  Heartburn symptoms: No; Controlled on pantoprazole.  Fever, sweats or chills: No  Night sweats: No  Urinary complaints: Yes; Soreness around PNT sites.    Home BP: 100-120/60-70s with no lightheadedness.    Problem List  Patient Active Problem List   Diagnosis     Type 2 diabetes mellitus with diabetic nephropathy (H)     Sjogren's syndrome     MGUS (monoclonal gammopathy of unknown significance)     Stage 3b chronic kidney disease (H)     Pancreas replaced by transplant (H)     Immunosuppressed status     Anemia in chronic renal disease     Metabolic acidosis     Aftercare following organ transplant     Hypomagnesemia     HTN, kidney transplant related     Hydronephrosis, unspecified hydronephrosis type     Pancreatitis     Kidney replaced by transplant     Vitamin D deficiency     CAD (coronary artery disease)     (HFpEF) heart failure with preserved ejection fraction (H)     GERD (gastroesophageal reflux disease)     BK viremia     Need for pneumocystis prophylaxis       Allergies  Allergies   Allergen Reactions     Atorvastatin Itching     Extraneal Rash       Medications  Current Outpatient Medications   Medication Sig Dispense Refill     acetaminophen (TYLENOL) 325 MG tablet Take 2 tablets (650 mg) by mouth every 6 hours as needed for mild pain. 50 tablet 0     aspirin (ASA) 81 MG chewable tablet Take 4 tablets (324 mg) by mouth or NG Tube daily. 60 tablet 3     atorvastatin (LIPITOR) 10 MG tablet Take 1 tablet (10 mg) by mouth daily. 30 tablet 11     BD PEN NEEDLE HUBER 2ND GEN 32G X 4 MM miscellaneous USE 1 NEEDLE ONCE DAILY.       calcium carbonate-vitamin D (OSCAL) 500-5 MG-MCG tablet Take 1 tablet by mouth daily. 90 tablet 3     carvedilol (COREG) 12.5 MG tablet Take 1 tablet (12.5 mg) by mouth 2 times daily. 60 tablet 11     ezetimibe (ZETIA)  10 MG tablet Take 10 mg by mouth at bedtime.       magnesium oxide (MAG-OX) 400 MG tablet Take 1 tablet (400 mg) by mouth 2 times daily. 1100 & 1700 60 tablet 3     mycophenolate (GENERIC EQUIVALENT) 250 MG capsule Take 3 capsules (750 mg) by mouth 2 times daily. 180 capsule 11     pantoprazole (PROTONIX) 40 MG EC tablet Take 1 tablet (40 mg) by mouth every morning (before breakfast). 30 tablet 1     polyethylene glycol (MIRALAX) 17 GM/Dose powder Take 17 g (1 Capful) by mouth daily. 510 g 2     predniSONE (DELTASONE) 5 MG tablet Take 1 tablet (5 mg) by mouth daily. 30 tablet 11     senna-docusate (SENOKOT-S/PERICOLACE) 8.6-50 MG tablet Take 2 tablets by mouth 2 times daily as needed for constipation. 60 tablet 1     sodium bicarbonate 650 MG tablet Take 2 tablets (1,300 mg) by mouth 2 times daily. 120 tablet 2     sulfamethoxazole-trimethoprim (BACTRIM) 400-80 MG tablet Take 1 tablet by mouth daily. 90 tablet 3     tacrolimus (GENERIC EQUIVALENT) 1 MG capsule Take 2 capsules (2 mg) by mouth 2 times daily. 120 capsule 11     valGANciclovir (VALCYTE) 450 MG tablet Take 1 tablet (450 mg) by mouth every other day. 60 tablet 0     Octreotide Acetate 100 MCG/ML SOSY Inject 100 mcg subcutaneously 3 times daily for 14 days. (Patient not taking: Reported on 7/18/2025) 42 mL 0     tacrolimus (GENERIC EQUIVALENT) 0.5 MG capsule Hold for dose changes (Patient not taking: Reported on 7/18/2025) 60 capsule 11     No current facility-administered medications for this visit.     Medications Discontinued During This Encounter   Medication Reason     mycophenolate (GENERIC EQUIVALENT) 250 MG capsule      sulfamethoxazole-trimethoprim (BACTRIM) 400-80 MG tablet      valGANciclovir (VALCYTE) 450 MG tablet      calcium carbonate-vitamin D (OSCAL) 500-5 MG-MCG tablet      valGANciclovir (VALCYTE) 450 MG tablet        Physical Exam  Vital Signs: /62 (BP Location: Left arm, Patient Position: Sitting, Cuff Size: Adult Regular)    Detail Level: Detailed Pulse 72   Wt 59.1 kg (130 lb 6.4 oz)   BMI 25.47 kg/m      GENERAL APPEARANCE: alert and no distress  HENT: mouth without ulcers or lesions  RESP: lungs clear to auscultation - no rales, rhonchi or wheezes  CV: regular rhythm, normal rate, no rub, no murmur  EDEMA: no LE edema bilaterally  ABDOMEN: soft, nondistended, nontender, bowel sounds normal  MS: extremities normal - no gross deformities noted, no evidence of inflammation in joints, no muscle tenderness  SKIN: no rash  TX KIDNEY: normal  DIALYSIS ACCESS: none    Data        Latest Ref Rng & Units 7/17/2025     7:14 AM 7/14/2025     7:28 AM 7/10/2025     7:49 AM   Renal   Sodium 135 - 145 mmol/L 138  141  138    K 3.4 - 5.3 mmol/L 5.0  4.3  4.4    Cl 98 - 107 mmol/L 101  102  99    Cl (external) 98 - 107 mmol/L 101  102  99    CO2 22 - 29 mmol/L 26  28  22    Urea Nitrogen 6.0 - 20.0 mg/dL 30.2  32.1  38.3    Creatinine 0.51 - 0.95 mg/dL 1.78  1.82  2.66    Glucose 70 - 99 mg/dL 132  117  118    Calcium 8.8 - 10.4 mg/dL 10.0  9.8  9.4    Magnesium 1.7 - 2.3 mg/dL  1.9           Latest Ref Rng & Units 7/14/2025     7:28 AM 7/4/2025     6:57 AM 7/3/2025     9:23 PM   Bone Health   Phosphorus 2.5 - 4.5 mg/dL 2.1  5.1  4.5          Latest Ref Rng & Units 7/17/2025     7:25 AM 7/14/2025     7:28 AM 7/10/2025     7:49 AM   Heme   WBC 4.0 - 11.0 10e3/uL 2.1  2.8  3.0    Hgb 11.7 - 15.7 g/dL 9.4  8.9  8.5    Plt 150 - 450 10e3/uL 210  180  178          Latest Ref Rng & Units 7/3/2025     9:23 PM 5/2/2025     4:52 PM 3/13/2025     3:54 PM   Liver   AP 40 - 150 U/L 99  84  80    TBili <=1.2 mg/dL 0.3  0.2  0.2    ALT 0 - 50 U/L 19  18  14    AST 0 - 45 U/L 20  29  22    Tot Protein 6.4 - 8.3 g/dL 7.1  7.9  7.4    Albumin 3.5 - 5.2 g/dL 3.9  3.7  3.4          Latest Ref Rng & Units 7/17/2025     7:14 AM 7/14/2025     7:28 AM 7/10/2025     7:49 AM   Pancreas   Amylase 28 - 100 U/L 77  63  75    Lipase (Roche) 13 - 60 U/L 117  79  87          Latest Ref Rng & Units  6/12/2025     8:05 AM   Iron studies   Iron 37 - 145 ug/dL 60    Iron Sat Index 15 - 46 % 27    Ferritin 11 - 328 ng/mL 1,102          Latest Ref Rng & Units 5/2/2025     4:52 PM 10/17/2024     2:30 AM 8/2/2023     1:52 PM   [facility] Txp Virology   EBV CAPSID ANTIBODY IGG No detectable antibody. Positive  Positive  Positive      Failed to redirect to the Timeline version of the REVFS SmartLink.  Recent Labs   Lab Test 07/10/25  0749 07/14/25  0728 07/17/25  0714   DOSTAC 7/9/2025 7/13/2025 7/16/2025   TACROL 11.6 13.4 11.5     Recent Labs   Lab Test 06/16/25  0829 06/30/25  0748 07/07/25  0726   DOSMPA 6/15/2025   8:15 PM 6/29/2025   8:05 PM 7/6/2025   8:00 PM   MPACID 5.18* 4.27* 2.92   MPAG >200.0* 180.9* 174.2*        Lj Vizcarra MD

## 2025-07-18 NOTE — LETTER
7/18/2025       RE: Wendy Kapoor  [address]  [place] [address]     Dear Colleague,    Thank you for referring your patient, Wendy Kapoor, to the [facility] at [facility]. Please see a copy of my visit note below.    TRANSPLANT NEPHROLOGY CLINIC VISIT     Assessment & Plan  # DDKT (SPK): CKD Stage 3b - Trend down in creatinine since PNT placement.  Patient's kedar creatinine ~ 1.5 on 6/9/25, but recent SENDY and moderate to severe hydronephrosis, now s/p PNT.  Kidney transplant biopsy 6/4/25 showed no evidence of rejection with mild IFTA.   - Baseline Creatinine: ~ TBD   - Proteinuria: Mild (0.5-1.0 grams)   - DSA Hx: No DSA   - Last cPRA: 98%   - BK Viremia: Yes, detectable, but less than quantifiable   - Kidney Tx Biopsy Hx: No history of acute rejection and Mild interstitial fibrosis and tubular atrophy.   - Primary Nephrologist: Dr. Giles.    # Transplant Kidney Hydronephrosis: Patient with moderate to severe hydronephrosis seen on imaging, as well as SENDY.  Now s/p PNT 7/8/25 with improvement in kidney function.   - Recommend follow up with IR for interrogation of the PNT to determine the underlying cause for hydronephrosis.    # Pancreas Tx (SPK): Patient continues on octreotide injections.   - Pancreatic Exocrine Drainage: Enteric drained     - Blood glucose: Elevated blood glucose      On insulin: No   - HbA1c: Last checked at time of transplant      Latest HbA1c: 5.9%   - Pancreatic enzymes: Variable and slightly elevated   - DSA Hx: No DSA   - Pancreas Tx Biopsy Hx: No biopsy history    # Immunosuppression: Tacrolimus immediate release (goal 8-10), Mycophenolate mofetil (dose 1000 mg every 12 hours), and Prednisone (dose 5 mg daily)   - Induction with Recent Transplant:  High Intensity Protocol   - Continue with intensive monitoring of immunosuppression for efficacy and toxicity.   - Historical Changes in  Immunosuppression: Patient came into transplant on prednisone 10 mg daily for inflammatory arthritis and now on 5 mg daily.   - Changes: Yes - With recent detectable BK, as well as leukopenia, will slightly decrease mycophenolate mofetil to 750 mg bid.    # Infection Prevention:   Last CD4 Level: Not checked  - PJP: Sulfa/TMP (Bactrim); Will increase dose to 400/80 mg daily with improved kidney function.  - CMV: Valganciclovir (Valcyte); Will increase dose to 450 mg every other day with improved kidney function.      - CMV IgG Ab High Risk Discordance (D+/R-) at time of transplant: No  Present CMV Serostatus: Positive  - EBV IgG Ab High Risk Discordance (D+/R-) at time of transplant: No  Present EBV Serostatus: Positive    # Hypertension: Controlled;  Goal BP: < 140/90   - Changes: No    # Anemia in Chronic Renal Disease: Hgb: Stable      BRIANNA: No   - Iron studies: Low iron saturation, but high ferritin    # Leukopenia: Trend down, moderately low WBC.  Likely secondary to medications, but cannot rule out viral cause.   - Will check CMV PCR.    # Mineral Bone Disorder:    - Secondary renal hyperparathyroidism; PTH level: Normal (15-65 pg/ml)        On treatment: None  - Vitamin D; level: Normal        On supplement: Yes  - Calcium; level: High normal        On supplement: Yes; Will decrease calcium carbonate supplement to 500 mg daily.    # Electrolytes:  - Potassium; level: High normal        On supplement: No  - Magnesium; level: Normal        On supplement: Yes  - Bicarbonate; level: Normal        On supplement: Yes    # BK Viremia: New, minimal BK PCR at ~ detectable, but less than quantifiable with last check 7/2025.  IgG level has not been checked.  Immunosuppression has been slightly decreased.   - Will continue to follow BK PCR every 2 weeks.   - Will check IgG level.    # Other Significant PMH:   - CAD: Patient has mild non obstructive coronary artery disease on last coronary angiogram 4/2024.  - HFpEF: Last  cardiac echo (5/2025) showed normal LVEF ~ 60-65% with grade II diastolic dysfunction.   - MGUS: Patient with IgG nara gammopathy, but normal kappa/lambda light chain ratio with last check 3/2025.    - Recommend rechecking serum kappa/lambda light chains in September.   - GERD: Asymptomatic on PPI.   - Right Native Kidney Hydronephrosis: Patient with severe right native kidney hydronephrosis on imaging, now s/p right native kidney PNT 7/8/25.   - Sjogren's Disease: Stable on immunosuppression with prednisone.     # Skin Cancer Risk:    - Discussed sun protection and recommend regular follow up with Dermatology.    # Transplant History:  Etiology of Kidney Failure: Diabetes mellitus type 2  Tx: SPK  Transplant: 5/2/2025 (Kidney / Pancreas)  Significant transplant-related complications: BK Viremia and Transplant ureteral stenosis    Transplant Office Phone Number: [phone]    Assessment and plan was discussed with the patient and she voiced her understanding and agreement.    Return visit: Return for previously scheduled visit.    Lj Vizcarra MD    The longitudinal plan of care for the diagnosis(es)/condition(s) as documented were addressed during this visit. Due to the added complexity in care, I will continue to support Wendy in the subsequent management and with ongoing continuity of care.      Chief Complaint  Ms. Kapoor is a 54 year old here for kidney transplant, pancreas transplant, and immunosuppression management.     History of Present Illness   Ms. Kapoor reports feeling good overall.  Since last clinic visit:   Hospitalizations: Yes;   New Medical Issues: Yes; Patient was found to have SENDY with moderate to severe transplant kidney hydronephrosis..  In addition, she also was noted to have stable, but severe hydronephrosis of right native kidney on imaging.  She is now s/p PNT of both transplanted kidney, as well as s/p PNT of her right native kidney.  Following the PNT placement, her kidney  function has been improving.  Patient reports some soreness at the PNT sites, but otherwise doing okay with them.  Active/Exercise: Yes; She has been active, although only gets minimal exercise at this time.  Chest pain or shortness of breath: No  Lower extremity swelling: No  Weight change: No   Appetite: Good   Nausea and vomiting: No  Diarrhea: No; Normal soft stools ~ 1x per day.  Heartburn symptoms: No; Controlled on pantoprazole.  Fever, sweats or chills: No  Night sweats: No  Urinary complaints: Yes; Soreness around PNT sites.    Home BP: 100-120/60-70s with no lightheadedness.    Problem List  Patient Active Problem List   Diagnosis     Type 2 diabetes mellitus with diabetic nephropathy (H)     Sjogren's syndrome     MGUS (monoclonal gammopathy of unknown significance)     Stage 3b chronic kidney disease (H)     Pancreas replaced by transplant (H)     Immunosuppressed status     Anemia in chronic renal disease     Metabolic acidosis     Aftercare following organ transplant     Hypomagnesemia     HTN, kidney transplant related     Hydronephrosis, unspecified hydronephrosis type     Pancreatitis     Kidney replaced by transplant     Vitamin D deficiency     CAD (coronary artery disease)     (HFpEF) heart failure with preserved ejection fraction (H)     GERD (gastroesophageal reflux disease)     BK viremia     Need for pneumocystis prophylaxis       Allergies  Allergies   Allergen Reactions     Atorvastatin Itching     Extraneal Rash       Medications  Current Outpatient Medications   Medication Sig Dispense Refill     acetaminophen (TYLENOL) 325 MG tablet Take 2 tablets (650 mg) by mouth every 6 hours as needed for mild pain. 50 tablet 0     aspirin (ASA) 81 MG chewable tablet Take 4 tablets (324 mg) by mouth or NG Tube daily. 60 tablet 3     atorvastatin (LIPITOR) 10 MG tablet Take 1 tablet (10 mg) by mouth daily. 30 tablet 11     BD PEN NEEDLE HUBER 2ND GEN 32G X 4 MM miscellaneous USE 1 NEEDLE ONCE DAILY.        calcium carbonate-vitamin D (OSCAL) 500-5 MG-MCG tablet Take 1 tablet by mouth daily. 90 tablet 3     carvedilol (COREG) 12.5 MG tablet Take 1 tablet (12.5 mg) by mouth 2 times daily. 60 tablet 11     ezetimibe (ZETIA) 10 MG tablet Take 10 mg by mouth at bedtime.       magnesium oxide (MAG-OX) 400 MG tablet Take 1 tablet (400 mg) by mouth 2 times daily. 1100 & 1700 60 tablet 3     mycophenolate (GENERIC EQUIVALENT) 250 MG capsule Take 3 capsules (750 mg) by mouth 2 times daily. 180 capsule 11     pantoprazole (PROTONIX) 40 MG EC tablet Take 1 tablet (40 mg) by mouth every morning (before breakfast). 30 tablet 1     polyethylene glycol (MIRALAX) 17 GM/Dose powder Take 17 g (1 Capful) by mouth daily. 510 g 2     predniSONE (DELTASONE) 5 MG tablet Take 1 tablet (5 mg) by mouth daily. 30 tablet 11     senna-docusate (SENOKOT-S/PERICOLACE) 8.6-50 MG tablet Take 2 tablets by mouth 2 times daily as needed for constipation. 60 tablet 1     sodium bicarbonate 650 MG tablet Take 2 tablets (1,300 mg) by mouth 2 times daily. 120 tablet 2     sulfamethoxazole-trimethoprim (BACTRIM) 400-80 MG tablet Take 1 tablet by mouth daily. 90 tablet 3     tacrolimus (GENERIC EQUIVALENT) 1 MG capsule Take 2 capsules (2 mg) by mouth 2 times daily. 120 capsule 11     valGANciclovir (VALCYTE) 450 MG tablet Take 1 tablet (450 mg) by mouth every other day. 60 tablet 0     Octreotide Acetate 100 MCG/ML SOSY Inject 100 mcg subcutaneously 3 times daily for 14 days. (Patient not taking: Reported on 7/18/2025) 42 mL 0     tacrolimus (GENERIC EQUIVALENT) 0.5 MG capsule Hold for dose changes (Patient not taking: Reported on 7/18/2025) 60 capsule 11     No current facility-administered medications for this visit.     Medications Discontinued During This Encounter   Medication Reason     mycophenolate (GENERIC EQUIVALENT) 250 MG capsule      sulfamethoxazole-trimethoprim (BACTRIM) 400-80 MG tablet      valGANciclovir (VALCYTE) 450 MG tablet       calcium carbonate-vitamin D (OSCAL) 500-5 MG-MCG tablet      valGANciclovir (VALCYTE) 450 MG tablet        Physical Exam  Vital Signs: /62 (BP Location: Left arm, Patient Position: Sitting, Cuff Size: Adult Regular)   Pulse 72   Wt 59.1 kg (130 lb 6.4 oz)   BMI 25.47 kg/m      GENERAL APPEARANCE: alert and no distress  HENT: mouth without ulcers or lesions  RESP: lungs clear to auscultation - no rales, rhonchi or wheezes  CV: regular rhythm, normal rate, no rub, no murmur  EDEMA: no LE edema bilaterally  ABDOMEN: soft, nondistended, nontender, bowel sounds normal  MS: extremities normal - no gross deformities noted, no evidence of inflammation in joints, no muscle tenderness  SKIN: no rash  TX KIDNEY: normal  DIALYSIS ACCESS: none    Data        Latest Ref Rng & Units 7/17/2025     7:14 AM 7/14/2025     7:28 AM 7/10/2025     7:49 AM   Renal   Sodium 135 - 145 mmol/L 138  141  138    K 3.4 - 5.3 mmol/L 5.0  4.3  4.4    Cl 98 - 107 mmol/L 101  102  99    Cl (external) 98 - 107 mmol/L 101  102  99    CO2 22 - 29 mmol/L 26  28  22    Urea Nitrogen 6.0 - 20.0 mg/dL 30.2  32.1  38.3    Creatinine 0.51 - 0.95 mg/dL 1.78  1.82  2.66    Glucose 70 - 99 mg/dL 132  117  118    Calcium 8.8 - 10.4 mg/dL 10.0  9.8  9.4    Magnesium 1.7 - 2.3 mg/dL  1.9           Latest Ref Rng & Units 7/14/2025     7:28 AM 7/4/2025     6:57 AM 7/3/2025     9:23 PM   Bone Health   Phosphorus 2.5 - 4.5 mg/dL 2.1  5.1  4.5          Latest Ref Rng & Units 7/17/2025     7:25 AM 7/14/2025     7:28 AM 7/10/2025     7:49 AM   Heme   WBC 4.0 - 11.0 10e3/uL 2.1  2.8  3.0    Hgb 11.7 - 15.7 g/dL 9.4  8.9  8.5    Plt 150 - 450 10e3/uL 210  180  178          Latest Ref Rng & Units 7/3/2025     9:23 PM 5/2/2025     4:52 PM 3/13/2025     3:54 PM   Liver   AP 40 - 150 U/L 99  84  80    TBili <=1.2 mg/dL 0.3  0.2  0.2    ALT 0 - 50 U/L 19  18  14    AST 0 - 45 U/L 20  29  22    Tot Protein 6.4 - 8.3 g/dL 7.1  7.9  7.4    Albumin 3.5 - 5.2 g/dL 3.9  3.7   3.4          Latest Ref Rng & Units 7/17/2025     7:14 AM 7/14/2025     7:28 AM 7/10/2025     7:49 AM   Pancreas   Amylase 28 - 100 U/L 77  63  75    Lipase (Roche) 13 - 60 U/L 117  79  87          Latest Ref Rng & Units 6/12/2025     8:05 AM   Iron studies   Iron 37 - 145 ug/dL 60    Iron Sat Index 15 - 46 % 27    Ferritin 11 - 328 ng/mL 1,102          Latest Ref Rng & Units 5/2/2025     4:52 PM 10/17/2024     2:30 AM 8/2/2023     1:52 PM   [facility] Virology   EBV CAPSID ANTIBODY IGG No detectable antibody. Positive  Positive  Positive      Failed to redirect to the Timeline version of the REVFS SmartLink.  Recent Labs   Lab Test 07/10/25  0749 07/14/25  0728 07/17/25  0714   DOSTAC 7/9/2025 7/13/2025 7/16/2025   TACROL 11.6 13.4 11.5     Recent Labs   Lab Test 06/16/25  0829 06/30/25  0748 07/07/25  0726   DOSMPA 6/15/2025   8:15 PM 6/29/2025   8:05 PM 7/6/2025   8:00 PM   MPACID 5.18* 4.27* 2.92   MPAG >200.0* 180.9* 174.2*        Again, thank you for allowing me to participate in the care of your patient.      Sincerely,    Lj Vizcarra MD

## 2025-07-19 PROBLEM — Z29.89 NEED FOR PNEUMOCYSTIS PROPHYLAXIS: Status: ACTIVE | Noted: 2025-07-19

## 2025-07-19 PROBLEM — N18.9 ANEMIA IN CHRONIC RENAL DISEASE: Status: ACTIVE | Noted: 2025-05-09

## 2025-07-19 PROBLEM — Z01.818 ENCOUNTER FOR PRE-TRANSPLANT EVALUATION FOR KIDNEY AND PANCREAS TRANSPLANT: Status: RESOLVED | Noted: 2024-10-16 | Resolved: 2025-07-19

## 2025-07-19 PROBLEM — Z01.810 PRE-OPERATIVE CARDIOVASCULAR EXAMINATION: Status: RESOLVED | Noted: 2024-04-12 | Resolved: 2025-07-19

## 2025-07-19 PROBLEM — Z01.818 PRE-TRANSPLANT EVALUATION FOR KIDNEY TRANSPLANT: Status: RESOLVED | Noted: 2024-04-12 | Resolved: 2025-07-19

## 2025-07-19 PROBLEM — I50.30 (HFPEF) HEART FAILURE WITH PRESERVED EJECTION FRACTION (H): Status: ACTIVE | Noted: 2025-07-19

## 2025-07-19 PROBLEM — Z98.890 STATUS POST CORONARY ANGIOGRAM: Status: RESOLVED | Noted: 2024-04-12 | Resolved: 2025-07-19

## 2025-07-19 PROBLEM — Z01.818 PRE-TRANSPLANT EVALUATION FOR KIDNEY AND PANCREAS TRANSPLANT: Status: RESOLVED | Noted: 2025-05-02 | Resolved: 2025-07-19

## 2025-07-19 PROBLEM — K21.9 GERD (GASTROESOPHAGEAL REFLUX DISEASE): Status: ACTIVE | Noted: 2025-07-19

## 2025-07-19 PROBLEM — I10 ESSENTIAL HYPERTENSION: Status: RESOLVED | Noted: 2023-08-26 | Resolved: 2025-07-19

## 2025-07-19 PROBLEM — Z94.83 PANCREAS TRANSPLANT STATUS (H): Status: RESOLVED | Noted: 2025-05-03 | Resolved: 2025-07-19

## 2025-07-19 PROBLEM — E83.39 HYPOPHOSPHATEMIA: Status: RESOLVED | Noted: 2025-05-09 | Resolved: 2025-07-19

## 2025-07-19 PROBLEM — D72.829 LEUKOCYTOSIS: Status: RESOLVED | Noted: 2025-05-09 | Resolved: 2025-07-19

## 2025-07-19 PROBLEM — B34.8 BK VIREMIA: Status: ACTIVE | Noted: 2025-07-19

## 2025-07-19 PROBLEM — E87.20 METABOLIC ACIDOSIS: Status: ACTIVE | Noted: 2025-05-09

## 2025-07-19 PROBLEM — N18.32 STAGE 3B CHRONIC KIDNEY DISEASE (H): Status: ACTIVE | Noted: 2023-08-26

## 2025-07-19 PROBLEM — E55.9 VITAMIN D DEFICIENCY: Status: ACTIVE | Noted: 2025-07-19

## 2025-07-19 PROBLEM — Z94.0 KIDNEY TRANSPLANT RECIPIENT: Status: RESOLVED | Noted: 2025-05-03 | Resolved: 2025-07-19

## 2025-07-19 PROBLEM — I25.10 CAD (CORONARY ARTERY DISEASE): Status: ACTIVE | Noted: 2025-07-19

## 2025-07-19 PROBLEM — D69.6 THROMBOCYTOPENIA: Status: RESOLVED | Noted: 2025-05-09 | Resolved: 2025-07-19

## 2025-07-19 PROBLEM — D63.1 ANEMIA IN CHRONIC RENAL DISEASE: Status: ACTIVE | Noted: 2025-05-09

## 2025-07-19 PROBLEM — I13.0: Status: RESOLVED | Noted: 2024-04-12 | Resolved: 2025-07-19

## 2025-07-19 PROBLEM — N17.9 AKI (ACUTE KIDNEY INJURY): Status: RESOLVED | Noted: 2025-06-12 | Resolved: 2025-07-19

## 2025-07-19 RX ORDER — VALGANCICLOVIR 450 MG/1
450 TABLET, FILM COATED ORAL EVERY OTHER DAY
Qty: 60 TABLET | Refills: 0 | Status: SHIPPED | OUTPATIENT
Start: 2025-07-19

## 2025-07-19 NOTE — PROGRESS NOTES
TRANSPLANT NEPHROLOGY CLINIC VISIT     Assessment & Plan   # DDKT (SPK): CKD Stage 3b - Trend down in creatinine since PNT placement.  Patient's kedar creatinine ~ 1.5 on 6/9/25, but recent SENDY and moderate to severe hydronephrosis, now s/p PNT.  Kidney transplant biopsy 6/4/25 showed no evidence of rejection with mild IFTA.   - Baseline Creatinine: ~ TBD   - Proteinuria: Mild (0.5-1.0 grams)   - DSA Hx: No DSA   - Last cPRA: 98%   - BK Viremia: Yes, detectable, but less than quantifiable   - Kidney Tx Biopsy Hx: No history of acute rejection and Mild interstitial fibrosis and tubular atrophy.   - Primary Nephrologist: Dr. Giles.    # Transplant Kidney Hydronephrosis: Patient with moderate to severe hydronephrosis seen on imaging, as well as SENDY.  Now s/p PNT 7/8/25 with improvement in kidney function.   - Recommend follow up with IR for interrogation of the PNT to determine the underlying cause for hydronephrosis.    # Pancreas Tx (SPK): Patient continues on octreotide injections.   - Pancreatic Exocrine Drainage: Enteric drained     - Blood glucose: Elevated blood glucose      On insulin: No   - HbA1c: Last checked at time of transplant      Latest HbA1c: 5.9%   - Pancreatic enzymes: Variable and slightly elevated   - DSA Hx: No DSA   - Pancreas Tx Biopsy Hx: No biopsy history    # Immunosuppression: Tacrolimus immediate release (goal 8-10), Mycophenolate mofetil (dose 1000 mg every 12 hours), and Prednisone (dose 5 mg daily)   - Induction with Recent Transplant:  High Intensity Protocol   - Continue with intensive monitoring of immunosuppression for efficacy and toxicity.   - Historical Changes in Immunosuppression: Patient came into transplant on prednisone 10 mg daily for inflammatory arthritis and now on 5 mg daily.   - Changes: Yes - With recent detectable BK, as well as leukopenia, will slightly decrease mycophenolate mofetil to 750 mg bid.    # Infection Prevention:   Last CD4 Level: Not checked  - PJP:  Sulfa/TMP (Bactrim); Will increase dose to 400/80 mg daily with improved kidney function.  - CMV: Valganciclovir (Valcyte); Will increase dose to 450 mg every other day with improved kidney function.      - CMV IgG Ab High Risk Discordance (D+/R-) at time of transplant: No  Present CMV Serostatus: Positive  - EBV IgG Ab High Risk Discordance (D+/R-) at time of transplant: No  Present EBV Serostatus: Positive    # Hypertension: Controlled;  Goal BP: < 140/90   - Changes: No    # Anemia in Chronic Renal Disease: Hgb: Stable      BRIANNA: No   - Iron studies: Low iron saturation, but high ferritin    # Leukopenia: Trend down, moderately low WBC.  Likely secondary to medications, but cannot rule out viral cause.   - Will check CMV PCR.    # Mineral Bone Disorder:    - Secondary renal hyperparathyroidism; PTH level: Normal (15-65 pg/ml)        On treatment: None  - Vitamin D; level: Normal        On supplement: Yes  - Calcium; level: High normal        On supplement: Yes; Will decrease calcium carbonate supplement to 500 mg daily.    # Electrolytes:  - Potassium; level: High normal        On supplement: No  - Magnesium; level: Normal        On supplement: Yes  - Bicarbonate; level: Normal        On supplement: Yes    # BK Viremia: New, minimal BK PCR at ~ detectable, but less than quantifiable with last check 7/2025.  IgG level has not been checked.  Immunosuppression has been slightly decreased.   - Will continue to follow BK PCR every 2 weeks.   - Will check IgG level.    # Other Significant PMH:   - CAD: Patient has mild non obstructive coronary artery disease on last coronary angiogram 4/2024.  - HFpEF: Last cardiac echo (5/2025) showed normal LVEF ~ 60-65% with grade II diastolic dysfunction.   - MGUS: Patient with IgG nara gammopathy, but normal kappa/lambda light chain ratio with last check 3/2025.    - Recommend rechecking serum kappa/lambda light chains in September.   - GERD: Asymptomatic on PPI.   - Right Native  Kidney Hydronephrosis: Patient with severe right native kidney hydronephrosis on imaging, now s/p right native kidney PNT 7/8/25.   - Sjogren's Disease: Stable on immunosuppression with prednisone.     # Skin Cancer Risk:    - Discussed sun protection and recommend regular follow up with Dermatology.    # Transplant History:  Etiology of Kidney Failure: Diabetes mellitus type 2  Tx: SPK  Transplant: 5/2/2025 (Kidney / Pancreas)  Significant transplant-related complications: BK Viremia and Transplant ureteral stenosis    Transplant Office Phone Number: [phone]    Assessment and plan was discussed with the patient and she voiced her understanding and agreement.    Return visit: Return for previously scheduled visit.    Lj Vizcarra MD    The longitudinal plan of care for the diagnosis(es)/condition(s) as documented were addressed during this visit. Due to the added complexity in care, I will continue to support Wendy in the subsequent management and with ongoing continuity of care.      Chief Complaint   Ms. Kapoor is a 54 year old here for kidney transplant, pancreas transplant, and immunosuppression management.     History of Present Illness    Ms. Kapoor reports feeling good overall.  Since last clinic visit:   Hospitalizations: Yes;   New Medical Issues: Yes; Patient was found to have SENDY with moderate to severe transplant kidney hydronephrosis..  In addition, she also was noted to have stable, but severe hydronephrosis of right native kidney on imaging.  She is now s/p PNT of both transplanted kidney, as well as s/p PNT of her right native kidney.  Following the PNT placement, her kidney function has been improving.  Patient reports some soreness at the PNT sites, but otherwise doing okay with them.  Active/Exercise: Yes; She has been active, although only gets minimal exercise at this time.  Chest pain or shortness of breath: No  Lower extremity swelling: No  Weight change: No   Appetite: Good    Nausea and vomiting: No  Diarrhea: No; Normal soft stools ~ 1x per day.  Heartburn symptoms: No; Controlled on pantoprazole.  Fever, sweats or chills: No  Night sweats: No  Urinary complaints: Yes; Soreness around PNT sites.    Home BP: 100-120/60-70s with no lightheadedness.    Problem List   Patient Active Problem List   Diagnosis    Type 2 diabetes mellitus with diabetic nephropathy (H)    Sjogren's syndrome    MGUS (monoclonal gammopathy of unknown significance)    Stage 3b chronic kidney disease (H)    Pancreas replaced by transplant (H)    Immunosuppressed status    Anemia in chronic renal disease    Metabolic acidosis    Aftercare following organ transplant    Hypomagnesemia    HTN, kidney transplant related    Hydronephrosis, unspecified hydronephrosis type    Pancreatitis    Kidney replaced by transplant    Vitamin D deficiency    CAD (coronary artery disease)    (HFpEF) heart failure with preserved ejection fraction (H)    GERD (gastroesophageal reflux disease)    BK viremia    Need for pneumocystis prophylaxis       Allergies   Allergies   Allergen Reactions    Atorvastatin Itching    Extraneal Rash       Medications   Current Outpatient Medications   Medication Sig Dispense Refill    acetaminophen (TYLENOL) 325 MG tablet Take 2 tablets (650 mg) by mouth every 6 hours as needed for mild pain. 50 tablet 0    aspirin (ASA) 81 MG chewable tablet Take 4 tablets (324 mg) by mouth or NG Tube daily. 60 tablet 3    atorvastatin (LIPITOR) 10 MG tablet Take 1 tablet (10 mg) by mouth daily. 30 tablet 11    BD PEN NEEDLE HUBER 2ND GEN 32G X 4 MM miscellaneous USE 1 NEEDLE ONCE DAILY.      calcium carbonate-vitamin D (OSCAL) 500-5 MG-MCG tablet Take 1 tablet by mouth daily. 90 tablet 3    carvedilol (COREG) 12.5 MG tablet Take 1 tablet (12.5 mg) by mouth 2 times daily. 60 tablet 11    ezetimibe (ZETIA) 10 MG tablet Take 10 mg by mouth at bedtime.      magnesium oxide (MAG-OX) 400 MG tablet Take 1 tablet (400 mg) by  mouth 2 times daily. 1100 & 1700 60 tablet 3    mycophenolate (GENERIC EQUIVALENT) 250 MG capsule Take 3 capsules (750 mg) by mouth 2 times daily. 180 capsule 11    pantoprazole (PROTONIX) 40 MG EC tablet Take 1 tablet (40 mg) by mouth every morning (before breakfast). 30 tablet 1    polyethylene glycol (MIRALAX) 17 GM/Dose powder Take 17 g (1 Capful) by mouth daily. 510 g 2    predniSONE (DELTASONE) 5 MG tablet Take 1 tablet (5 mg) by mouth daily. 30 tablet 11    senna-docusate (SENOKOT-S/PERICOLACE) 8.6-50 MG tablet Take 2 tablets by mouth 2 times daily as needed for constipation. 60 tablet 1    sodium bicarbonate 650 MG tablet Take 2 tablets (1,300 mg) by mouth 2 times daily. 120 tablet 2    sulfamethoxazole-trimethoprim (BACTRIM) 400-80 MG tablet Take 1 tablet by mouth daily. 90 tablet 3    tacrolimus (GENERIC EQUIVALENT) 1 MG capsule Take 2 capsules (2 mg) by mouth 2 times daily. 120 capsule 11    valGANciclovir (VALCYTE) 450 MG tablet Take 1 tablet (450 mg) by mouth every other day. 60 tablet 0    Octreotide Acetate 100 MCG/ML SOSY Inject 100 mcg subcutaneously 3 times daily for 14 days. (Patient not taking: Reported on 7/18/2025) 42 mL 0    tacrolimus (GENERIC EQUIVALENT) 0.5 MG capsule Hold for dose changes (Patient not taking: Reported on 7/18/2025) 60 capsule 11     No current facility-administered medications for this visit.     Medications Discontinued During This Encounter   Medication Reason    mycophenolate (GENERIC EQUIVALENT) 250 MG capsule     sulfamethoxazole-trimethoprim (BACTRIM) 400-80 MG tablet     valGANciclovir (VALCYTE) 450 MG tablet     calcium carbonate-vitamin D (OSCAL) 500-5 MG-MCG tablet     valGANciclovir (VALCYTE) 450 MG tablet        Physical Exam   Vital Signs: /62 (BP Location: Left arm, Patient Position: Sitting, Cuff Size: Adult Regular)   Pulse 72   Wt 59.1 kg (130 lb 6.4 oz)   BMI 25.47 kg/m      GENERAL APPEARANCE: alert and no distress  HENT: mouth without ulcers or  lesions  RESP: lungs clear to auscultation - no rales, rhonchi or wheezes  CV: regular rhythm, normal rate, no rub, no murmur  EDEMA: no LE edema bilaterally  ABDOMEN: soft, nondistended, nontender, bowel sounds normal  MS: extremities normal - no gross deformities noted, no evidence of inflammation in joints, no muscle tenderness  SKIN: no rash  TX KIDNEY: normal  DIALYSIS ACCESS: none    Data         Latest Ref Rng & Units 7/17/2025     7:14 AM 7/14/2025     7:28 AM 7/10/2025     7:49 AM   Renal   Sodium 135 - 145 mmol/L 138  141  138    K 3.4 - 5.3 mmol/L 5.0  4.3  4.4    Cl 98 - 107 mmol/L 101  102  99    Cl (external) 98 - 107 mmol/L 101  102  99    CO2 22 - 29 mmol/L 26  28  22    Urea Nitrogen 6.0 - 20.0 mg/dL 30.2  32.1  38.3    Creatinine 0.51 - 0.95 mg/dL 1.78  1.82  2.66    Glucose 70 - 99 mg/dL 132  117  118    Calcium 8.8 - 10.4 mg/dL 10.0  9.8  9.4    Magnesium 1.7 - 2.3 mg/dL  1.9           Latest Ref Rng & Units 7/14/2025     7:28 AM 7/4/2025     6:57 AM 7/3/2025     9:23 PM   Bone Health   Phosphorus 2.5 - 4.5 mg/dL 2.1  5.1  4.5          Latest Ref Rng & Units 7/17/2025     7:25 AM 7/14/2025     7:28 AM 7/10/2025     7:49 AM   Heme   WBC 4.0 - 11.0 10e3/uL 2.1  2.8  3.0    Hgb 11.7 - 15.7 g/dL 9.4  8.9  8.5    Plt 150 - 450 10e3/uL 210  180  178          Latest Ref Rng & Units 7/3/2025     9:23 PM 5/2/2025     4:52 PM 3/13/2025     3:54 PM   Liver   AP 40 - 150 U/L 99  84  80    TBili <=1.2 mg/dL 0.3  0.2  0.2    ALT 0 - 50 U/L 19  18  14    AST 0 - 45 U/L 20  29  22    Tot Protein 6.4 - 8.3 g/dL 7.1  7.9  7.4    Albumin 3.5 - 5.2 g/dL 3.9  3.7  3.4          Latest Ref Rng & Units 7/17/2025     7:14 AM 7/14/2025     7:28 AM 7/10/2025     7:49 AM   Pancreas   Amylase 28 - 100 U/L 77  63  75    Lipase (Roche) 13 - 60 U/L 117  79  87          Latest Ref Rng & Units 6/12/2025     8:05 AM   Iron studies   Iron 37 - 145 ug/dL 60    Iron Sat Index 15 - 46 % 27    Ferritin 11 - 328 ng/mL 1,102           Latest Ref Rng & Units 5/2/2025     4:52 PM 10/17/2024     2:30 AM 8/2/2023     1:52 PM   [facility] Virology   EBV CAPSID ANTIBODY IGG No detectable antibody. Positive  Positive  Positive      Failed to redirect to the Timeline version of the REVFS SmartLink.  Recent Labs   Lab Test 07/10/25  0749 07/14/25  0728 07/17/25  0714   DOSTAC 7/9/2025 7/13/2025 7/16/2025   TACROL 11.6 13.4 11.5     Recent Labs   Lab Test 06/16/25  0829 06/30/25  0748 07/07/25  0726   DOSMPA 6/15/2025   8:15 PM 6/29/2025   8:05 PM 7/6/2025   8:00 PM   MPACID 5.18* 4.27* 2.92   MPAG >200.0* 180.9* 174.2*

## 2025-07-19 NOTE — PATIENT INSTRUCTIONS
Patient Recommendations:  - Decrease mycophenolate mofetil to 750 mg every 12 hours.  - Increase Bactrim single strength (400/80 mg) to once daily.  - Increase Valcyte to 450 mg every other day.  - Decrease calcium carbonate supplement to 500 mg daily.    Transplant Patient Information  Your Post Transplant Coordinator is: Betty Vogel  For non urgent items, we encourage you to contact your coordinator/care team online via [employer]  You and your care team can also contact your transplant coordinator Monday - Friday, 8am - 5pm at [phone] (Option 2 to reach the coordinator or Option 4 to schedule an appointment).  After hours for urgent matters, please call [facility] at [phone].

## 2025-07-21 ENCOUNTER — LAB (OUTPATIENT)
Dept: LAB | Facility: OTHER | Age: 55
End: 2025-07-21
Payer: COMMERCIAL

## 2025-07-21 DIAGNOSIS — Z20.828 CONTACT WITH AND (SUSPECTED) EXPOSURE TO OTHER VIRAL COMMUNICABLE DISEASES: ICD-10-CM

## 2025-07-21 DIAGNOSIS — Z98.890 OTHER SPECIFIED POSTPROCEDURAL STATES: ICD-10-CM

## 2025-07-21 DIAGNOSIS — Z48.298 AFTERCARE FOLLOWING ORGAN TRANSPLANT: ICD-10-CM

## 2025-07-21 DIAGNOSIS — Z79.899 ENCOUNTER FOR LONG-TERM CURRENT USE OF MEDICATION: ICD-10-CM

## 2025-07-21 DIAGNOSIS — Z94.0 KIDNEY REPLACED BY TRANSPLANT: ICD-10-CM

## 2025-07-21 LAB
AMYLASE SERPL-CCNC: 102 U/L (ref 28–100)
ANION GAP SERPL CALCULATED.3IONS-SCNC: 13 MMOL/L (ref 7–15)
BUN SERPL-MCNC: 31.7 MG/DL (ref 6–20)
CALCIUM SERPL-MCNC: 9.4 MG/DL (ref 8.8–10.4)
CHLORIDE SERPL-SCNC: 100 MMOL/L (ref 98–107)
CREAT SERPL-MCNC: 2.03 MG/DL (ref 0.51–0.95)
EGFRCR SERPLBLD CKD-EPI 2021: 28 ML/MIN/1.73M2
ERYTHROCYTE [DISTWIDTH] IN BLOOD BY AUTOMATED COUNT: 13.1 % (ref 10–15)
GLUCOSE SERPL-MCNC: 96 MG/DL (ref 70–99)
HCO3 SERPL-SCNC: 25 MMOL/L (ref 22–29)
HCT VFR BLD AUTO: 27.4 % (ref 35–47)
HGB BLD-MCNC: 8.9 G/DL (ref 11.7–15.7)
LIPASE SERPL-CCNC: 176 U/L (ref 13–60)
MAGNESIUM SERPL-MCNC: 1.6 MG/DL (ref 1.7–2.3)
MCH RBC QN AUTO: 31.1 PG (ref 26.5–33)
MCHC RBC AUTO-ENTMCNC: 32.5 G/DL (ref 31.5–36.5)
MCV RBC AUTO: 96 FL (ref 78–100)
PHOSPHATE SERPL-MCNC: 3.2 MG/DL (ref 2.5–4.5)
PLATELET # BLD AUTO: 186 10E3/UL (ref 150–450)
POTASSIUM SERPL-SCNC: 4.2 MMOL/L (ref 3.4–5.3)
RBC # BLD AUTO: 2.86 10E6/UL (ref 3.8–5.2)
SODIUM SERPL-SCNC: 138 MMOL/L (ref 135–145)
TACROLIMUS BLD-MCNC: 19.2 UG/L (ref 5–15)
TME LAST DOSE: ABNORMAL H
TME LAST DOSE: ABNORMAL H
WBC # BLD AUTO: 2.4 10E3/UL (ref 4–11)

## 2025-07-21 PROCEDURE — 36415 COLL VENOUS BLD VENIPUNCTURE: CPT

## 2025-07-21 PROCEDURE — 82150 ASSAY OF AMYLASE: CPT

## 2025-07-21 PROCEDURE — 80048 BASIC METABOLIC PNL TOTAL CA: CPT

## 2025-07-21 PROCEDURE — 80197 ASSAY OF TACROLIMUS: CPT

## 2025-07-21 PROCEDURE — 84100 ASSAY OF PHOSPHORUS: CPT

## 2025-07-21 PROCEDURE — 85027 COMPLETE CBC AUTOMATED: CPT

## 2025-07-21 PROCEDURE — 83690 ASSAY OF LIPASE: CPT

## 2025-07-21 PROCEDURE — 83735 ASSAY OF MAGNESIUM: CPT

## 2025-07-22 ENCOUNTER — TELEPHONE (OUTPATIENT)
Dept: TRANSPLANT | Facility: CLINIC | Age: 55
End: 2025-07-22
Payer: COMMERCIAL

## 2025-07-22 DIAGNOSIS — Z94.83 PANCREAS TRANSPLANT STATUS (H): ICD-10-CM

## 2025-07-22 DIAGNOSIS — Z94.0 KIDNEY TRANSPLANT RECIPIENT: ICD-10-CM

## 2025-07-22 DIAGNOSIS — K85.90 PANCREATITIS: ICD-10-CM

## 2025-07-22 DIAGNOSIS — Z48.298 AFTERCARE FOLLOWING ORGAN TRANSPLANT: ICD-10-CM

## 2025-07-22 RX ORDER — TACROLIMUS 0.5 MG/1
0.5 CAPSULE ORAL 2 TIMES DAILY
Qty: 60 CAPSULE | Refills: 11 | Status: SHIPPED | OUTPATIENT
Start: 2025-07-22

## 2025-07-22 RX ORDER — TACROLIMUS 1 MG/1
1 CAPSULE ORAL 2 TIMES DAILY
Qty: 60 CAPSULE | Refills: 11 | Status: SHIPPED | OUTPATIENT
Start: 2025-07-22

## 2025-07-22 NOTE — TELEPHONE ENCOUNTER
Lj Vizcarra MD to Me  (Selected Message)        7/22/25  8:19 AM  Let's discuss her on Wednesday.  I am wondering about a pancreas biopsy.     We could at least get a CT abd/pelvis done as IR would likely require it.     Roe

## 2025-07-22 NOTE — TELEPHONE ENCOUNTER
Spoke to patient about reducing tacrolimus. She had questions regarding when she is suppose to follow up with IR, or if they were going to contact her. RNCC gave patient number to schedule/to ask these questions. Will discuss her elevated creatinine and lipase levels at committee tomorrow AM

## 2025-07-23 DIAGNOSIS — K85.90 PANCREATITIS: ICD-10-CM

## 2025-07-23 DIAGNOSIS — Z29.89 NEED FOR PNEUMOCYSTIS PROPHYLAXIS: ICD-10-CM

## 2025-07-23 DIAGNOSIS — Z94.0 KIDNEY TRANSPLANT RECIPIENT: ICD-10-CM

## 2025-07-23 RX ORDER — PANTOPRAZOLE SODIUM 40 MG/1
40 TABLET, DELAYED RELEASE ORAL
Qty: 30 TABLET | Refills: 1 | Status: SHIPPED | OUTPATIENT
Start: 2025-07-23

## 2025-07-23 RX ORDER — SULFAMETHOXAZOLE AND TRIMETHOPRIM 400; 80 MG/1; MG/1
1 TABLET ORAL DAILY
Qty: 90 TABLET | Refills: 3 | Status: SHIPPED | OUTPATIENT
Start: 2025-07-23

## 2025-07-24 ENCOUNTER — TELEPHONE (OUTPATIENT)
Dept: TRANSPLANT | Facility: CLINIC | Age: 55
End: 2025-07-24

## 2025-07-24 ENCOUNTER — LAB (OUTPATIENT)
Dept: LAB | Facility: OTHER | Age: 55
End: 2025-07-24
Payer: COMMERCIAL

## 2025-07-24 DIAGNOSIS — Z20.828 CONTACT WITH AND (SUSPECTED) EXPOSURE TO OTHER VIRAL COMMUNICABLE DISEASES: ICD-10-CM

## 2025-07-24 DIAGNOSIS — Z94.0 KIDNEY REPLACED BY TRANSPLANT: ICD-10-CM

## 2025-07-24 DIAGNOSIS — D64.9 LOW HEMOGLOBIN: ICD-10-CM

## 2025-07-24 DIAGNOSIS — K85.90 PANCREATITIS: ICD-10-CM

## 2025-07-24 DIAGNOSIS — Z48.298 AFTERCARE FOLLOWING ORGAN TRANSPLANT: Primary | ICD-10-CM

## 2025-07-24 DIAGNOSIS — Z79.899 ENCOUNTER FOR LONG-TERM CURRENT USE OF MEDICATION: ICD-10-CM

## 2025-07-24 DIAGNOSIS — Z48.298 AFTERCARE FOLLOWING ORGAN TRANSPLANT: ICD-10-CM

## 2025-07-24 DIAGNOSIS — Z98.890 OTHER SPECIFIED POSTPROCEDURAL STATES: ICD-10-CM

## 2025-07-24 LAB
AMYLASE SERPL-CCNC: 51 U/L (ref 28–100)
ANION GAP SERPL CALCULATED.3IONS-SCNC: 12 MMOL/L (ref 7–15)
BUN SERPL-MCNC: 42.7 MG/DL (ref 6–20)
CALCIUM SERPL-MCNC: 8.8 MG/DL (ref 8.8–10.4)
CHLORIDE SERPL-SCNC: 93 MMOL/L (ref 98–107)
CREAT SERPL-MCNC: 3.26 MG/DL (ref 0.51–0.95)
EGFRCR SERPLBLD CKD-EPI 2021: 16 ML/MIN/1.73M2
ERYTHROCYTE [DISTWIDTH] IN BLOOD BY AUTOMATED COUNT: 12.6 % (ref 10–15)
GLUCOSE SERPL-MCNC: 125 MG/DL (ref 70–99)
HCO3 SERPL-SCNC: 22 MMOL/L (ref 22–29)
HCT VFR BLD AUTO: 22.6 % (ref 35–47)
HGB BLD-MCNC: 7.4 G/DL (ref 11.7–15.7)
LIPASE SERPL-CCNC: 55 U/L (ref 13–60)
MCH RBC QN AUTO: 30.8 PG (ref 26.5–33)
MCHC RBC AUTO-ENTMCNC: 32.7 G/DL (ref 31.5–36.5)
MCV RBC AUTO: 94 FL (ref 78–100)
PLATELET # BLD AUTO: 97 10E3/UL (ref 150–450)
POTASSIUM SERPL-SCNC: 4.5 MMOL/L (ref 3.4–5.3)
RBC # BLD AUTO: 2.4 10E6/UL (ref 3.8–5.2)
SODIUM SERPL-SCNC: 127 MMOL/L (ref 135–145)
TACROLIMUS BLD-MCNC: 14.8 UG/L (ref 5–15)
TME LAST DOSE: NORMAL H
TME LAST DOSE: NORMAL H
WBC # BLD AUTO: 2.3 10E3/UL (ref 4–11)

## 2025-07-24 NOTE — TELEPHONE ENCOUNTER
DATE:  7/24/2025     TIME OF RECEIPT FROM LAB:  8:09 am    ORDERING PROVIDER: Zackery    LAB TEST:  Hgb    LAB VALUE:  7.4

## 2025-07-24 NOTE — TELEPHONE ENCOUNTER
Lj Vizcarra MD to Me  (Selected Message)      7/24/25 10:05 AM  Result Note  Decreased hemoglobin and would recommend assessment for symptoms of gastrointestinal bleeding, as well as review for any new medications and assess drug dosing.  Would refer patient to Anemia Services and repeat labs with also checking LDH, haptoglobin and peripheral smear.   CBC with platelets

## 2025-07-28 ENCOUNTER — TELEPHONE (OUTPATIENT)
Dept: TRANSPLANT | Facility: CLINIC | Age: 55
End: 2025-07-28

## 2025-07-28 ENCOUNTER — LAB (OUTPATIENT)
Dept: LAB | Facility: OTHER | Age: 55
End: 2025-07-28
Payer: COMMERCIAL

## 2025-07-28 ENCOUNTER — APPOINTMENT (OUTPATIENT)
Dept: ULTRASOUND IMAGING | Facility: CLINIC | Age: 55
End: 2025-07-28
Attending: EMERGENCY MEDICINE
Payer: COMMERCIAL

## 2025-07-28 ENCOUNTER — HOSPITAL ENCOUNTER (INPATIENT)
Facility: CLINIC | Age: 55
LOS: 7 days | Discharge: HOME | End: 2025-08-05
Attending: EMERGENCY MEDICINE
Payer: COMMERCIAL

## 2025-07-28 ENCOUNTER — TELEPHONE (OUTPATIENT)
Dept: FAMILY MEDICINE | Facility: CLINIC | Age: 55
End: 2025-07-28

## 2025-07-28 DIAGNOSIS — Z48.298 AFTERCARE FOLLOWING ORGAN TRANSPLANT: ICD-10-CM

## 2025-07-28 DIAGNOSIS — D63.8 ANEMIA IN OTHER CHRONIC DISEASES CLASSIFIED ELSEWHERE: ICD-10-CM

## 2025-07-28 DIAGNOSIS — Z98.890 OTHER SPECIFIED POSTPROCEDURAL STATES: ICD-10-CM

## 2025-07-28 DIAGNOSIS — Z79.899 ENCOUNTER FOR LONG-TERM CURRENT USE OF MEDICATION: ICD-10-CM

## 2025-07-28 DIAGNOSIS — Z94.0 KIDNEY REPLACED BY TRANSPLANT: ICD-10-CM

## 2025-07-28 DIAGNOSIS — K85.90 PANCREATITIS: ICD-10-CM

## 2025-07-28 DIAGNOSIS — D64.9 LOW HEMOGLOBIN: ICD-10-CM

## 2025-07-28 DIAGNOSIS — Z20.828 CONTACT WITH AND (SUSPECTED) EXPOSURE TO OTHER VIRAL COMMUNICABLE DISEASES: ICD-10-CM

## 2025-07-28 DIAGNOSIS — Z94.0 KIDNEY REPLACED BY TRANSPLANT: Primary | ICD-10-CM

## 2025-07-28 LAB
AMYLASE SERPL-CCNC: 47 U/L (ref 28–100)
ANION GAP SERPL CALCULATED.3IONS-SCNC: 16 MMOL/L (ref 7–15)
BUN SERPL-MCNC: 38.9 MG/DL (ref 6–20)
CALCIUM SERPL-MCNC: 8.9 MG/DL (ref 8.8–10.4)
CHLORIDE SERPL-SCNC: 83 MMOL/L (ref 98–107)
CREAT SERPL-MCNC: 3.74 MG/DL (ref 0.51–0.95)
EGFRCR SERPLBLD CKD-EPI 2021: 14 ML/MIN/1.73M2
ERYTHROCYTE [DISTWIDTH] IN BLOOD BY AUTOMATED COUNT: 11.9 % (ref 10–15)
FERRITIN SERPL-MCNC: 2809 NG/ML (ref 11–328)
GLUCOSE SERPL-MCNC: 124 MG/DL (ref 70–99)
HAPTOGLOB SERPL-MCNC: 371 MG/DL (ref 30–200)
HCO3 SERPL-SCNC: 19 MMOL/L (ref 22–29)
HCT VFR BLD AUTO: 21.5 % (ref 35–47)
HGB BLD-MCNC: 7.6 G/DL (ref 11.7–15.7)
IRON BINDING CAPACITY (ROCHE): 147 UG/DL (ref 240–430)
IRON SATN MFR SERPL: 15 % (ref 15–46)
IRON SERPL-MCNC: 22 UG/DL (ref 37–145)
LDH SERPL L TO P-CCNC: 558 U/L (ref 0–250)
LIPASE SERPL-CCNC: 60 U/L (ref 13–60)
MAGNESIUM SERPL-MCNC: 1.6 MG/DL (ref 1.7–2.3)
MCH RBC QN AUTO: 30.6 PG (ref 26.5–33)
MCHC RBC AUTO-ENTMCNC: 35.3 G/DL (ref 31.5–36.5)
MCV RBC AUTO: 87 FL (ref 78–100)
PHOSPHATE SERPL-MCNC: 3.2 MG/DL (ref 2.5–4.5)
PLATELET # BLD AUTO: 108 10E3/UL (ref 150–450)
POTASSIUM SERPL-SCNC: 4.8 MMOL/L (ref 3.4–5.3)
RBC # BLD AUTO: 2.48 10E6/UL (ref 3.8–5.2)
SODIUM SERPL-SCNC: 118 MMOL/L (ref 135–145)
TACROLIMUS BLD-MCNC: 15 UG/L (ref 5–15)
TME LAST DOSE: NORMAL H
TME LAST DOSE: NORMAL H
WBC # BLD AUTO: 2.2 10E3/UL (ref 4–11)

## 2025-07-28 PROCEDURE — 85027 COMPLETE CBC AUTOMATED: CPT

## 2025-07-28 PROCEDURE — 83615 LACTATE (LD) (LDH) ENZYME: CPT

## 2025-07-28 PROCEDURE — 76776 US EXAM K TRANSPL W/DOPPLER: CPT

## 2025-07-28 PROCEDURE — 83550 IRON BINDING TEST: CPT

## 2025-07-28 PROCEDURE — 76776 US EXAM K TRANSPL W/DOPPLER: CPT | Mod: 26 | Performed by: RADIOLOGY

## 2025-07-28 PROCEDURE — 83690 ASSAY OF LIPASE: CPT

## 2025-07-28 PROCEDURE — 83735 ASSAY OF MAGNESIUM: CPT

## 2025-07-28 PROCEDURE — 80197 ASSAY OF TACROLIMUS: CPT

## 2025-07-28 PROCEDURE — 82150 ASSAY OF AMYLASE: CPT

## 2025-07-28 PROCEDURE — 82728 ASSAY OF FERRITIN: CPT

## 2025-07-28 PROCEDURE — 84100 ASSAY OF PHOSPHORUS: CPT

## 2025-07-28 PROCEDURE — 83540 ASSAY OF IRON: CPT

## 2025-07-28 PROCEDURE — 80048 BASIC METABOLIC PNL TOTAL CA: CPT

## 2025-07-28 PROCEDURE — 36415 COLL VENOUS BLD VENIPUNCTURE: CPT

## 2025-07-28 PROCEDURE — 83010 ASSAY OF HAPTOGLOBIN QUANT: CPT

## 2025-07-28 ASSESSMENT — COLUMBIA-SUICIDE SEVERITY RATING SCALE - C-SSRS
2. HAVE YOU ACTUALLY HAD ANY THOUGHTS OF KILLING YOURSELF IN THE PAST MONTH?: NO
1. IN THE PAST MONTH, HAVE YOU WISHED YOU WERE DEAD OR WISHED YOU COULD GO TO SLEEP AND NOT WAKE UP?: NO
6. HAVE YOU EVER DONE ANYTHING, STARTED TO DO ANYTHING, OR PREPARED TO DO ANYTHING TO END YOUR LIFE?: NO

## 2025-07-28 ASSESSMENT — ACTIVITIES OF DAILY LIVING (ADL): ADLS_ACUITY_SCORE: 57

## 2025-07-28 NOTE — TELEPHONE ENCOUNTER
DATE:  7/28/2025     TIME OF RECEIPT FROM LAB:  8:11    ORDERING PROVIDER: Zackery    LAB TEST:  Hgb    LAB VALUE:  7.6

## 2025-07-28 NOTE — TELEPHONE ENCOUNTER
Date/time of call received from lab: 07/28/25 at 4:10 PM.    Lab test:  Sodium    Lab value:  119    Ordering provider name: Dr. Lj Vizcarra    Ordering provider department: Nephrology    Primary Care Provider: Hafsa Harrington     Result managed by: Clinic Hours: Primary Care clinic RN staff. Was test ordered in Primary Care?: Yes, ordered in Primary Care Primary Care Transplant Clinic Nephrology: route telephone encounter high priority to ordering provider and ordering provider's clinic Nurse allie Huizar RN  [facility] - Registered Nurse  Clinic Triage Omar   July 28, 2025

## 2025-07-28 NOTE — TELEPHONE ENCOUNTER
Patient denies symptoms of bleeding. Denies lightheadedness, pain over kidney or other symptoms. BP stable, no weight gain. Will ensure she schedules renal US due to elevated creatinine. Has PNT in place and is contact with IR with next steps.

## 2025-07-28 NOTE — TELEPHONE ENCOUNTER
Per Dr. Vizcarra, patient needs renal US with doppler due to decrease in hgb and increase in creatinine

## 2025-07-29 ENCOUNTER — APPOINTMENT (OUTPATIENT)
Dept: CT IMAGING | Facility: CLINIC | Age: 55
End: 2025-07-29
Attending: EMERGENCY MEDICINE
Payer: COMMERCIAL

## 2025-07-29 ENCOUNTER — APPOINTMENT (OUTPATIENT)
Dept: GENERAL RADIOLOGY | Facility: CLINIC | Age: 55
End: 2025-07-29
Attending: EMERGENCY MEDICINE
Payer: COMMERCIAL

## 2025-07-29 ENCOUNTER — PATIENT OUTREACH (OUTPATIENT)
Dept: CARE COORDINATION | Facility: CLINIC | Age: 55
End: 2025-07-29

## 2025-07-29 PROBLEM — N12 PYELONEPHRITIS OF TRANSPLANTED KIDNEY: Status: ACTIVE | Noted: 2025-07-29

## 2025-07-29 PROBLEM — R78.81 BACTEREMIA DUE TO ESCHERICHIA COLI: Status: ACTIVE | Noted: 2025-07-29

## 2025-07-29 PROBLEM — Z94.0 KIDNEY TRANSPLANTED: Status: ACTIVE | Noted: 2025-07-29

## 2025-07-29 PROBLEM — D61.818 OTHER PANCYTOPENIA (H): Status: ACTIVE | Noted: 2025-05-09

## 2025-07-29 PROBLEM — B96.20 BACTEREMIA DUE TO ESCHERICHIA COLI: Status: ACTIVE | Noted: 2025-07-29

## 2025-07-29 PROBLEM — T86.19 PYELONEPHRITIS OF TRANSPLANTED KIDNEY: Status: ACTIVE | Noted: 2025-07-29

## 2025-07-29 PROCEDURE — 71046 X-RAY EXAM CHEST 2 VIEWS: CPT

## 2025-07-29 PROCEDURE — 71046 X-RAY EXAM CHEST 2 VIEWS: CPT | Mod: 26 | Performed by: RADIOLOGY

## 2025-07-29 PROCEDURE — 86833 HLA CLASS II HIGH DEFIN QUAL: CPT

## 2025-07-29 PROCEDURE — 74176 CT ABD & PELVIS W/O CONTRAST: CPT

## 2025-07-29 PROCEDURE — 74176 CT ABD & PELVIS W/O CONTRAST: CPT | Mod: 26 | Performed by: RADIOLOGY

## 2025-07-29 ASSESSMENT — ACTIVITIES OF DAILY LIVING (ADL)
ADLS_ACUITY_SCORE: 58
ADLS_ACUITY_SCORE: 58
ADLS_ACUITY_SCORE: 57
ADLS_ACUITY_SCORE: 58
ADLS_ACUITY_SCORE: 38
ADLS_ACUITY_SCORE: 58
ADLS_ACUITY_SCORE: 38
ADLS_ACUITY_SCORE: 57
ADLS_ACUITY_SCORE: 57
ADLS_ACUITY_SCORE: 38
ADLS_ACUITY_SCORE: 57
ADLS_ACUITY_SCORE: 38
ADLS_ACUITY_SCORE: 38
ADLS_ACUITY_SCORE: 58

## 2025-07-29 NOTE — PROGRESS NOTES
Anemia Management Note     Referral received on 072825 for anemia management service. Upon chart review, pt is currently inpatient on ICU unit. She received a unit of blood early this morning.   Added myself to care team and will follow up with patient for anemia services after hospital discharge.    Follow up date: 080125        Latest Ref Rng & Units 7/10/2025 7/14/2025 7/17/2025 7/21/2025 7/24/2025 7/28/2025 7/29/2025   Anemia   HGB Goal        9 - 10   Hemoglobin 11.7 - 15.7 g/dL 8.5  8.9  9.4  8.9  7.4  6.8     7.6  8.6    TSAT 15 - 46 %      15     Ferritin 11 - 328 ng/mL      2,809     PRBCs        1 unit       Multiple values from one day are sorted in reverse-chronological order     Carrie Leopold, RN BSN  Anemia Services  [facility]  [email or web address]  Office: [phone]  Fax [phone]

## 2025-07-29 NOTE — TELEPHONE ENCOUNTER
ISSUE:  Critical Na 118    OUTCOME:  RNCC left VM for pt to report to [facility].   Called back a second time with no answer.

## 2025-07-30 ASSESSMENT — ACTIVITIES OF DAILY LIVING (ADL)
ADLS_ACUITY_SCORE: 38

## 2025-07-31 LAB
DONOR IDENTIFICATION: NORMAL
DSA COMMENTS: NORMAL
DSA PRESENT: NORMAL
DSA TEST METHOD: NORMAL
ORGAN: NORMAL
SA 1  COMMENTS: NORMAL
SA 1 CELL: NORMAL
SA 1 TEST METHOD: NORMAL
SA 2 CELL: NORMAL
SA 2 COMMENTS: NORMAL
SA 2 TEST METHOD: NORMAL
SA1 HI RISK ABY: NORMAL
SA1 MOD RISK ABY: NORMAL
SA2 HI RISK ABY: NORMAL
SA2 MOD RISK ABY: NORMAL
UNACCEPTABLE ANTIGENS: NORMAL
UNOS CPRA: 98

## 2025-07-31 ASSESSMENT — ACTIVITIES OF DAILY LIVING (ADL)
ADLS_ACUITY_SCORE: 38

## 2025-08-01 ENCOUNTER — APPOINTMENT (OUTPATIENT)
Dept: INTERVENTIONAL RADIOLOGY/VASCULAR | Facility: CLINIC | Age: 55
End: 2025-08-01
Payer: COMMERCIAL

## 2025-08-01 PROBLEM — B95.7 COAGULASE-NEGATIVE STAPHYLOCOCCAL INFECTION: Status: ACTIVE | Noted: 2025-08-01

## 2025-08-01 PROCEDURE — 50435 EXCHANGE NEPHROSTOMY CATH: CPT | Mod: 50

## 2025-08-01 PROCEDURE — 50200 RENAL BIOPSY PERQ: CPT | Mod: LT | Performed by: STUDENT IN AN ORGANIZED HEALTH CARE EDUCATION/TRAINING PROGRAM

## 2025-08-01 PROCEDURE — 50435 EXCHANGE NEPHROSTOMY CATH: CPT | Mod: 50 | Performed by: STUDENT IN AN ORGANIZED HEALTH CARE EDUCATION/TRAINING PROGRAM

## 2025-08-01 PROCEDURE — 76942 ECHO GUIDE FOR BIOPSY: CPT | Mod: 26 | Performed by: STUDENT IN AN ORGANIZED HEALTH CARE EDUCATION/TRAINING PROGRAM

## 2025-08-01 PROCEDURE — 50200 RENAL BIOPSY PERQ: CPT

## 2025-08-01 ASSESSMENT — ACTIVITIES OF DAILY LIVING (ADL)
ADLS_ACUITY_SCORE: 38
ADLS_ACUITY_SCORE: 36
ADLS_ACUITY_SCORE: 36
ADLS_ACUITY_SCORE: 38
ADLS_ACUITY_SCORE: 36
ADLS_ACUITY_SCORE: 38
ADLS_ACUITY_SCORE: 38
DEPENDENT_IADLS:: CLEANING;COOKING;LAUNDRY;SHOPPING;MEAL PREPARATION;MEDICATION MANAGEMENT;MONEY MANAGEMENT;TRANSPORTATION
ADLS_ACUITY_SCORE: 38
ADLS_ACUITY_SCORE: 38
ADLS_ACUITY_SCORE: 36
ADLS_ACUITY_SCORE: 38
ADLS_ACUITY_SCORE: 36
ADLS_ACUITY_SCORE: 36
ADLS_ACUITY_SCORE: 38
ADLS_ACUITY_SCORE: 36
ADLS_ACUITY_SCORE: 36
ADLS_ACUITY_SCORE: 38

## 2025-08-02 ASSESSMENT — ACTIVITIES OF DAILY LIVING (ADL)
ADLS_ACUITY_SCORE: 37
ADLS_ACUITY_SCORE: 36
ADLS_ACUITY_SCORE: 37
ADLS_ACUITY_SCORE: 36
ADLS_ACUITY_SCORE: 36
ADLS_ACUITY_SCORE: 37
ADLS_ACUITY_SCORE: 36
ADLS_ACUITY_SCORE: 37
ADLS_ACUITY_SCORE: 36

## 2025-08-03 ASSESSMENT — ACTIVITIES OF DAILY LIVING (ADL)
ADLS_ACUITY_SCORE: 38
ADLS_ACUITY_SCORE: 36
ADLS_ACUITY_SCORE: 38
ADLS_ACUITY_SCORE: 38
ADLS_ACUITY_SCORE: 36
ADLS_ACUITY_SCORE: 38
ADLS_ACUITY_SCORE: 36
ADLS_ACUITY_SCORE: 38
ADLS_ACUITY_SCORE: 36
ADLS_ACUITY_SCORE: 36

## 2025-08-04 ENCOUNTER — APPOINTMENT (OUTPATIENT)
Dept: CARDIOLOGY | Facility: CLINIC | Age: 55
End: 2025-08-04
Attending: NURSE PRACTITIONER
Payer: COMMERCIAL

## 2025-08-04 ENCOUNTER — ENROLLMENT (OUTPATIENT)
Dept: HOME HEALTH SERVICES | Facility: HOME HEALTH | Age: 55
End: 2025-08-04
Payer: COMMERCIAL

## 2025-08-04 PROCEDURE — 93306 TTE W/DOPPLER COMPLETE: CPT

## 2025-08-04 PROCEDURE — 93306 TTE W/DOPPLER COMPLETE: CPT | Mod: 26 | Performed by: INTERNAL MEDICINE

## 2025-08-04 ASSESSMENT — ACTIVITIES OF DAILY LIVING (ADL)
ADLS_ACUITY_SCORE: 38

## 2025-08-05 ASSESSMENT — ACTIVITIES OF DAILY LIVING (ADL)
ADLS_ACUITY_SCORE: 38

## 2025-08-06 DIAGNOSIS — Z09 HOSPITAL DISCHARGE FOLLOW-UP: ICD-10-CM

## 2025-08-07 ENCOUNTER — LAB REQUISITION (OUTPATIENT)
Dept: LAB | Facility: CLINIC | Age: 55
End: 2025-08-07
Payer: COMMERCIAL

## 2025-08-07 ENCOUNTER — LAB (OUTPATIENT)
Dept: LAB | Facility: OTHER | Age: 55
End: 2025-08-07
Payer: COMMERCIAL

## 2025-08-07 DIAGNOSIS — Z79.899 ENCOUNTER FOR LONG-TERM CURRENT USE OF MEDICATION: ICD-10-CM

## 2025-08-07 DIAGNOSIS — Z20.828 CONTACT WITH AND (SUSPECTED) EXPOSURE TO OTHER VIRAL COMMUNICABLE DISEASES: ICD-10-CM

## 2025-08-07 DIAGNOSIS — Z48.298 AFTERCARE FOLLOWING ORGAN TRANSPLANT: ICD-10-CM

## 2025-08-07 DIAGNOSIS — Z98.890 OTHER SPECIFIED POSTPROCEDURAL STATES: ICD-10-CM

## 2025-08-07 DIAGNOSIS — Z94.0 KIDNEY REPLACED BY TRANSPLANT: ICD-10-CM

## 2025-08-07 LAB
AMYLASE SERPL-CCNC: 89 U/L (ref 28–100)
ANION GAP SERPL CALCULATED.3IONS-SCNC: 11 MMOL/L (ref 7–15)
BUN SERPL-MCNC: 25.9 MG/DL (ref 6–20)
CALCIUM SERPL-MCNC: 9.3 MG/DL (ref 8.8–10.4)
CHLORIDE SERPL-SCNC: 104 MMOL/L (ref 98–107)
CREAT SERPL-MCNC: 2.03 MG/DL (ref 0.51–0.95)
EGFRCR SERPLBLD CKD-EPI 2021: 28 ML/MIN/1.73M2
ERYTHROCYTE [DISTWIDTH] IN BLOOD BY AUTOMATED COUNT: 12.5 % (ref 10–15)
GLUCOSE SERPL-MCNC: 99 MG/DL (ref 70–99)
HCO3 SERPL-SCNC: 25 MMOL/L (ref 22–29)
HCT VFR BLD AUTO: 24.8 % (ref 35–47)
HGB BLD-MCNC: 8 G/DL (ref 11.7–15.7)
LIPASE SERPL-CCNC: 135 U/L (ref 13–60)
MAGNESIUM SERPL-MCNC: 1.6 MG/DL (ref 1.7–2.3)
MCH RBC QN AUTO: 30.8 PG (ref 26.5–33)
MCHC RBC AUTO-ENTMCNC: 32.3 G/DL (ref 31.5–36.5)
MCV RBC AUTO: 95 FL (ref 78–100)
PHOSPHATE SERPL-MCNC: 2.3 MG/DL (ref 2.5–4.5)
PLATELET # BLD AUTO: 170 10E3/UL (ref 150–450)
POTASSIUM SERPL-SCNC: 4.8 MMOL/L (ref 3.4–5.3)
RBC # BLD AUTO: 2.6 10E6/UL (ref 3.8–5.2)
SODIUM SERPL-SCNC: 140 MMOL/L (ref 135–145)
TACROLIMUS BLD-MCNC: 7.3 UG/L (ref 5–15)
TME LAST DOSE: NORMAL H
TME LAST DOSE: NORMAL H
WBC # BLD AUTO: 2.1 10E3/UL (ref 4–11)

## 2025-08-08 PROBLEM — D64.9 LOW HEMOGLOBIN: Status: ACTIVE | Noted: 2025-08-08

## 2025-08-11 ENCOUNTER — LAB (OUTPATIENT)
Dept: LAB | Facility: OTHER | Age: 55
End: 2025-08-11
Payer: COMMERCIAL

## 2025-08-11 DIAGNOSIS — Z98.890 OTHER SPECIFIED POSTPROCEDURAL STATES: ICD-10-CM

## 2025-08-11 DIAGNOSIS — Z79.899 ENCOUNTER FOR LONG-TERM CURRENT USE OF MEDICATION: ICD-10-CM

## 2025-08-11 DIAGNOSIS — Z20.828 CONTACT WITH AND (SUSPECTED) EXPOSURE TO OTHER VIRAL COMMUNICABLE DISEASES: ICD-10-CM

## 2025-08-11 DIAGNOSIS — Z94.0 KIDNEY REPLACED BY TRANSPLANT: ICD-10-CM

## 2025-08-11 DIAGNOSIS — Z48.298 AFTERCARE FOLLOWING ORGAN TRANSPLANT: ICD-10-CM

## 2025-08-11 LAB
AMYLASE SERPL-CCNC: 88 U/L (ref 28–100)
ANION GAP SERPL CALCULATED.3IONS-SCNC: 12 MMOL/L (ref 7–15)
BUN SERPL-MCNC: 28.9 MG/DL (ref 6–20)
CALCIUM SERPL-MCNC: 9.7 MG/DL (ref 8.8–10.4)
CHLORIDE SERPL-SCNC: 99 MMOL/L (ref 98–107)
CREAT SERPL-MCNC: 2.46 MG/DL (ref 0.51–0.95)
EGFRCR SERPLBLD CKD-EPI 2021: 22 ML/MIN/1.73M2
ERYTHROCYTE [DISTWIDTH] IN BLOOD BY AUTOMATED COUNT: 12.3 % (ref 10–15)
GLUCOSE SERPL-MCNC: 97 MG/DL (ref 70–99)
HCO3 SERPL-SCNC: 25 MMOL/L (ref 22–29)
HCT VFR BLD AUTO: 28.1 % (ref 35–47)
HGB BLD-MCNC: 8.9 G/DL (ref 11.7–15.7)
LIPASE SERPL-CCNC: 131 U/L (ref 13–60)
MAGNESIUM SERPL-MCNC: 1.6 MG/DL (ref 1.7–2.3)
MCH RBC QN AUTO: 30.6 PG (ref 26.5–33)
MCHC RBC AUTO-ENTMCNC: 31.7 G/DL (ref 31.5–36.5)
MCV RBC AUTO: 97 FL (ref 78–100)
PHOSPHATE SERPL-MCNC: 4.2 MG/DL (ref 2.5–4.5)
PLATELET # BLD AUTO: 199 10E3/UL (ref 150–450)
POTASSIUM SERPL-SCNC: 5.1 MMOL/L (ref 3.4–5.3)
RBC # BLD AUTO: 2.91 10E6/UL (ref 3.8–5.2)
SODIUM SERPL-SCNC: 136 MMOL/L (ref 135–145)
TACROLIMUS BLD-MCNC: 9.5 UG/L (ref 5–15)
TME LAST DOSE: NORMAL H
TME LAST DOSE: NORMAL H
WBC # BLD AUTO: 3.1 10E3/UL (ref 4–11)

## 2025-08-11 PROCEDURE — 85027 COMPLETE CBC AUTOMATED: CPT

## 2025-08-11 PROCEDURE — 83735 ASSAY OF MAGNESIUM: CPT

## 2025-08-11 PROCEDURE — 36415 COLL VENOUS BLD VENIPUNCTURE: CPT

## 2025-08-11 PROCEDURE — 84100 ASSAY OF PHOSPHORUS: CPT

## 2025-08-11 PROCEDURE — 80197 ASSAY OF TACROLIMUS: CPT

## 2025-08-11 PROCEDURE — 80048 BASIC METABOLIC PNL TOTAL CA: CPT

## 2025-08-11 PROCEDURE — 83690 ASSAY OF LIPASE: CPT

## 2025-08-11 PROCEDURE — 82150 ASSAY OF AMYLASE: CPT

## 2025-08-13 ENCOUNTER — PATIENT OUTREACH (OUTPATIENT)
Dept: CARE COORDINATION | Facility: CLINIC | Age: 55
End: 2025-08-13
Payer: COMMERCIAL

## 2025-08-14 ENCOUNTER — LAB (OUTPATIENT)
Dept: LAB | Facility: OTHER | Age: 55
End: 2025-08-14
Payer: COMMERCIAL

## 2025-08-14 ENCOUNTER — TELEPHONE (OUTPATIENT)
Dept: INTERVENTIONAL RADIOLOGY/VASCULAR | Facility: CLINIC | Age: 55
End: 2025-08-14

## 2025-08-14 ENCOUNTER — TELEPHONE (OUTPATIENT)
Dept: TRANSPLANT | Facility: CLINIC | Age: 55
End: 2025-08-14

## 2025-08-14 DIAGNOSIS — Z20.828 CONTACT WITH AND (SUSPECTED) EXPOSURE TO OTHER VIRAL COMMUNICABLE DISEASES: ICD-10-CM

## 2025-08-14 DIAGNOSIS — Z98.890 OTHER SPECIFIED POSTPROCEDURAL STATES: ICD-10-CM

## 2025-08-14 DIAGNOSIS — Z48.298 AFTERCARE FOLLOWING ORGAN TRANSPLANT: ICD-10-CM

## 2025-08-14 DIAGNOSIS — Z94.0 KIDNEY REPLACED BY TRANSPLANT: ICD-10-CM

## 2025-08-14 DIAGNOSIS — Z48.298 AFTERCARE FOLLOWING ORGAN TRANSPLANT: Primary | ICD-10-CM

## 2025-08-14 DIAGNOSIS — Z79.899 ENCOUNTER FOR LONG-TERM CURRENT USE OF MEDICATION: ICD-10-CM

## 2025-08-14 DIAGNOSIS — K85.90 PANCREATITIS: ICD-10-CM

## 2025-08-14 LAB
AMYLASE SERPL-CCNC: 80 U/L (ref 28–100)
ANION GAP SERPL CALCULATED.3IONS-SCNC: 16 MMOL/L (ref 7–15)
BUN SERPL-MCNC: 25.6 MG/DL (ref 6–20)
CALCIUM SERPL-MCNC: 9.5 MG/DL (ref 8.8–10.4)
CHLORIDE SERPL-SCNC: 100 MMOL/L (ref 98–107)
CREAT SERPL-MCNC: 2.29 MG/DL (ref 0.51–0.95)
EGFRCR SERPLBLD CKD-EPI 2021: 24 ML/MIN/1.73M2
ERYTHROCYTE [DISTWIDTH] IN BLOOD BY AUTOMATED COUNT: 12.4 % (ref 10–15)
GLUCOSE SERPL-MCNC: 97 MG/DL (ref 70–99)
HCO3 SERPL-SCNC: 24 MMOL/L (ref 22–29)
HCT VFR BLD AUTO: 27.9 % (ref 35–47)
HGB BLD-MCNC: 8.9 G/DL (ref 11.7–15.7)
LIPASE SERPL-CCNC: 123 U/L (ref 13–60)
MCH RBC QN AUTO: 29.9 PG (ref 26.5–33)
MCHC RBC AUTO-ENTMCNC: 31.9 G/DL (ref 31.5–36.5)
MCV RBC AUTO: 93.6 FL (ref 78–100)
PLATELET # BLD AUTO: 228 10E3/UL (ref 150–450)
POTASSIUM SERPL-SCNC: 4.5 MMOL/L (ref 3.4–5.3)
RBC # BLD AUTO: 2.98 10E6/UL (ref 3.8–5.2)
SODIUM SERPL-SCNC: 140 MMOL/L (ref 135–145)
TACROLIMUS BLD-MCNC: 9.1 UG/L (ref 5–15)
TME LAST DOSE: NORMAL H
TME LAST DOSE: NORMAL H
WBC # BLD AUTO: 3.85 10E3/UL (ref 4–11)

## 2025-08-15 ENCOUNTER — APPOINTMENT (OUTPATIENT)
Dept: INTERVENTIONAL RADIOLOGY/VASCULAR | Facility: CLINIC | Age: 55
End: 2025-08-15
Attending: RADIOLOGY
Payer: COMMERCIAL

## 2025-08-15 ENCOUNTER — APPOINTMENT (OUTPATIENT)
Dept: MEDSURG UNIT | Facility: CLINIC | Age: 55
End: 2025-08-15
Attending: RADIOLOGY
Payer: COMMERCIAL

## 2025-08-15 ENCOUNTER — HOSPITAL ENCOUNTER (OUTPATIENT)
Facility: CLINIC | Age: 55
Discharge: HOME | End: 2025-08-15
Attending: RADIOLOGY | Admitting: RADIOLOGY
Payer: COMMERCIAL

## 2025-08-15 VITALS
WEIGHT: 120 LBS | OXYGEN SATURATION: 100 % | BODY MASS INDEX: 23.56 KG/M2 | TEMPERATURE: 98.3 F | DIASTOLIC BLOOD PRESSURE: 65 MMHG | HEART RATE: 72 BPM | RESPIRATION RATE: 16 BRPM | SYSTOLIC BLOOD PRESSURE: 114 MMHG | HEIGHT: 60 IN

## 2025-08-15 DIAGNOSIS — Z94.0 KIDNEY REPLACED BY TRANSPLANT: ICD-10-CM

## 2025-08-15 LAB
INR PPP: 1.04 (ref 0.85–1.15)
PROTHROMBIN TIME: 13.6 SECONDS (ref 11.8–14.8)

## 2025-08-15 PROCEDURE — 99152 MOD SED SAME PHYS/QHP 5/>YRS: CPT | Performed by: RADIOLOGY

## 2025-08-15 PROCEDURE — C1887 CATHETER, GUIDING: HCPCS

## 2025-08-15 PROCEDURE — 85610 PROTHROMBIN TIME: CPT | Performed by: PHYSICIAN ASSISTANT

## 2025-08-15 PROCEDURE — 36415 COLL VENOUS BLD VENIPUNCTURE: CPT | Performed by: PHYSICIAN ASSISTANT

## 2025-08-15 PROCEDURE — 999N000128 HC STATISTIC PERIPHERAL IV START W/O US GUIDANCE

## 2025-08-15 PROCEDURE — 272N000567 HC SHEATH CR4

## 2025-08-15 PROCEDURE — 99152 MOD SED SAME PHYS/QHP 5/>YRS: CPT

## 2025-08-15 PROCEDURE — 50706 BALLOON DILATE URTRL STRIX: CPT | Performed by: RADIOLOGY

## 2025-08-15 PROCEDURE — 272N000302 HC DEVICE INFLATION CR5

## 2025-08-15 PROCEDURE — 999N000132 HC STATISTIC PP CARE STAGE 1

## 2025-08-15 PROCEDURE — 250N000009 HC RX 250

## 2025-08-15 PROCEDURE — 999N000142 HC STATISTIC PROCEDURE PREP ONLY

## 2025-08-15 PROCEDURE — 50706 BALLOON DILATE URTRL STRIX: CPT

## 2025-08-15 PROCEDURE — C1769 GUIDE WIRE: HCPCS

## 2025-08-15 PROCEDURE — 50435 EXCHANGE NEPHROSTOMY CATH: CPT | Mod: LT | Performed by: RADIOLOGY

## 2025-08-15 PROCEDURE — C1729 CATH, DRAINAGE: HCPCS

## 2025-08-15 PROCEDURE — 250N000011 HC RX IP 250 OP 636: Performed by: PHYSICIAN ASSISTANT

## 2025-08-15 PROCEDURE — 250N000011 HC RX IP 250 OP 636

## 2025-08-15 PROCEDURE — 272N000566 HC SHEATH CR3

## 2025-08-15 PROCEDURE — 50434 CONVERT NEPHROSTOMY CATHETER: CPT

## 2025-08-15 PROCEDURE — 255N000002 HC RX 255 OP 636

## 2025-08-15 RX ORDER — LIDOCAINE 40 MG/G
CREAM TOPICAL
Status: DISCONTINUED | OUTPATIENT
Start: 2025-08-15 | End: 2025-08-15 | Stop reason: HOSPADM

## 2025-08-15 RX ORDER — NALOXONE HYDROCHLORIDE 0.4 MG/ML
0.2 INJECTION, SOLUTION INTRAMUSCULAR; INTRAVENOUS; SUBCUTANEOUS
Status: DISCONTINUED | OUTPATIENT
Start: 2025-08-15 | End: 2025-08-15 | Stop reason: HOSPADM

## 2025-08-15 RX ORDER — FLUMAZENIL 0.1 MG/ML
0.2 INJECTION, SOLUTION INTRAVENOUS
Status: DISCONTINUED | OUTPATIENT
Start: 2025-08-15 | End: 2025-08-15 | Stop reason: HOSPADM

## 2025-08-15 RX ORDER — IODIXANOL 320 MG/ML
50 INJECTION, SOLUTION INTRAVASCULAR ONCE
Status: COMPLETED | OUTPATIENT
Start: 2025-08-15 | End: 2025-08-15

## 2025-08-15 RX ORDER — NALOXONE HYDROCHLORIDE 0.4 MG/ML
0.4 INJECTION, SOLUTION INTRAMUSCULAR; INTRAVENOUS; SUBCUTANEOUS
Status: DISCONTINUED | OUTPATIENT
Start: 2025-08-15 | End: 2025-08-15 | Stop reason: HOSPADM

## 2025-08-15 RX ORDER — FENTANYL CITRATE 50 UG/ML
25-50 INJECTION, SOLUTION INTRAMUSCULAR; INTRAVENOUS EVERY 5 MIN PRN
Refills: 0 | Status: DISCONTINUED | OUTPATIENT
Start: 2025-08-15 | End: 2025-08-15 | Stop reason: HOSPADM

## 2025-08-15 RX ORDER — ONDANSETRON 2 MG/ML
4 INJECTION INTRAMUSCULAR; INTRAVENOUS
Status: DISCONTINUED | OUTPATIENT
Start: 2025-08-15 | End: 2025-08-15 | Stop reason: HOSPADM

## 2025-08-15 RX ORDER — CEFAZOLIN SODIUM 2 G/50ML
2 SOLUTION INTRAVENOUS
Status: COMPLETED | OUTPATIENT
Start: 2025-08-15 | End: 2025-08-15

## 2025-08-15 RX ADMIN — MIDAZOLAM 0.5 MG: 1 INJECTION INTRAMUSCULAR; INTRAVENOUS at 12:18

## 2025-08-15 RX ADMIN — LIDOCAINE HYDROCHLORIDE 7 ML: 10 INJECTION, SOLUTION EPIDURAL; INFILTRATION; INTRACAUDAL; PERINEURAL at 12:03

## 2025-08-15 RX ADMIN — MIDAZOLAM 0.5 MG: 1 INJECTION INTRAMUSCULAR; INTRAVENOUS at 12:01

## 2025-08-15 RX ADMIN — MIDAZOLAM 1 MG: 1 INJECTION INTRAMUSCULAR; INTRAVENOUS at 11:49

## 2025-08-15 RX ADMIN — FENTANYL CITRATE 25 MCG: 50 INJECTION INTRAMUSCULAR; INTRAVENOUS at 12:01

## 2025-08-15 RX ADMIN — FENTANYL CITRATE 25 MCG: 50 INJECTION INTRAMUSCULAR; INTRAVENOUS at 12:06

## 2025-08-15 RX ADMIN — CEFAZOLIN SODIUM 2 G: 2 SOLUTION INTRAVENOUS at 11:06

## 2025-08-15 RX ADMIN — FENTANYL CITRATE 25 MCG: 50 INJECTION INTRAMUSCULAR; INTRAVENOUS at 11:43

## 2025-08-15 RX ADMIN — IODIXANOL 20 ML: 320 INJECTION, SOLUTION INTRAVASCULAR at 12:43

## 2025-08-15 RX ADMIN — FENTANYL CITRATE 25 MCG: 50 INJECTION INTRAMUSCULAR; INTRAVENOUS at 12:19

## 2025-08-15 RX ADMIN — MIDAZOLAM 0.5 MG: 1 INJECTION INTRAMUSCULAR; INTRAVENOUS at 12:29

## 2025-08-15 RX ADMIN — MIDAZOLAM 0.5 MG: 1 INJECTION INTRAMUSCULAR; INTRAVENOUS at 11:44

## 2025-08-15 RX ADMIN — FENTANYL CITRATE 25 MCG: 50 INJECTION INTRAMUSCULAR; INTRAVENOUS at 12:29

## 2025-08-15 RX ADMIN — FENTANYL CITRATE 25 MCG: 50 INJECTION INTRAMUSCULAR; INTRAVENOUS at 11:56

## 2025-08-15 RX ADMIN — MIDAZOLAM 0.5 MG: 1 INJECTION INTRAMUSCULAR; INTRAVENOUS at 11:56

## 2025-08-15 RX ADMIN — MIDAZOLAM 0.5 MG: 1 INJECTION INTRAMUSCULAR; INTRAVENOUS at 12:06

## 2025-08-15 ASSESSMENT — ACTIVITIES OF DAILY LIVING (ADL)
ADLS_ACUITY_SCORE: 58

## 2025-08-16 ENCOUNTER — HEALTH MAINTENANCE LETTER (OUTPATIENT)
Age: 55
End: 2025-08-16

## 2025-08-18 ENCOUNTER — LAB (OUTPATIENT)
Dept: LAB | Facility: OTHER | Age: 55
End: 2025-08-18
Payer: COMMERCIAL

## 2025-08-18 DIAGNOSIS — Z94.0 KIDNEY REPLACED BY TRANSPLANT: ICD-10-CM

## 2025-08-18 DIAGNOSIS — Z48.298 AFTERCARE FOLLOWING ORGAN TRANSPLANT: ICD-10-CM

## 2025-08-18 DIAGNOSIS — Z79.899 ENCOUNTER FOR LONG-TERM CURRENT USE OF MEDICATION: ICD-10-CM

## 2025-08-18 DIAGNOSIS — Z98.890 OTHER SPECIFIED POSTPROCEDURAL STATES: ICD-10-CM

## 2025-08-18 DIAGNOSIS — Z20.828 CONTACT WITH AND (SUSPECTED) EXPOSURE TO OTHER VIRAL COMMUNICABLE DISEASES: ICD-10-CM

## 2025-08-18 LAB
AMYLASE SERPL-CCNC: 78 U/L (ref 28–100)
ANION GAP SERPL CALCULATED.3IONS-SCNC: 13 MMOL/L (ref 7–15)
BUN SERPL-MCNC: 37.2 MG/DL (ref 6–20)
CALCIUM SERPL-MCNC: 9.3 MG/DL (ref 8.8–10.4)
CHLORIDE SERPL-SCNC: 102 MMOL/L (ref 98–107)
CREAT SERPL-MCNC: 2.02 MG/DL (ref 0.51–0.95)
EGFRCR SERPLBLD CKD-EPI 2021: 28 ML/MIN/1.73M2
ERYTHROCYTE [DISTWIDTH] IN BLOOD BY AUTOMATED COUNT: 12.2 % (ref 10–15)
GLUCOSE SERPL-MCNC: 106 MG/DL (ref 70–99)
HCO3 SERPL-SCNC: 24 MMOL/L (ref 22–29)
HCT VFR BLD AUTO: 26.2 % (ref 35–47)
HGB BLD-MCNC: 8.4 G/DL (ref 11.7–15.7)
LIPASE SERPL-CCNC: 139 U/L (ref 13–60)
MCH RBC QN AUTO: 30 PG (ref 26.5–33)
MCHC RBC AUTO-ENTMCNC: 32.1 G/DL (ref 31.5–36.5)
MCV RBC AUTO: 93.6 FL (ref 78–100)
PLATELET # BLD AUTO: 217 10E3/UL (ref 150–450)
POTASSIUM SERPL-SCNC: 4.5 MMOL/L (ref 3.4–5.3)
RBC # BLD AUTO: 2.8 10E6/UL (ref 3.8–5.2)
SODIUM SERPL-SCNC: 139 MMOL/L (ref 135–145)
TACROLIMUS BLD-MCNC: 12.1 UG/L (ref 5–15)
TME LAST DOSE: NORMAL H
TME LAST DOSE: NORMAL H
WBC # BLD AUTO: 4.31 10E3/UL (ref 4–11)

## 2025-08-18 PROCEDURE — 80048 BASIC METABOLIC PNL TOTAL CA: CPT

## 2025-08-18 PROCEDURE — 36415 COLL VENOUS BLD VENIPUNCTURE: CPT

## 2025-08-18 PROCEDURE — 80197 ASSAY OF TACROLIMUS: CPT

## 2025-08-18 PROCEDURE — 83690 ASSAY OF LIPASE: CPT

## 2025-08-18 PROCEDURE — 82150 ASSAY OF AMYLASE: CPT

## 2025-08-18 PROCEDURE — 85027 COMPLETE CBC AUTOMATED: CPT

## 2025-08-20 ENCOUNTER — TELEPHONE (OUTPATIENT)
Dept: TRANSPLANT | Facility: CLINIC | Age: 55
End: 2025-08-20
Payer: COMMERCIAL

## 2025-08-20 DIAGNOSIS — K85.90 PANCREATITIS: ICD-10-CM

## 2025-08-20 LAB
SCANNED LAB RESULT: NORMAL

## 2025-08-21 ENCOUNTER — LAB (OUTPATIENT)
Dept: LAB | Facility: OTHER | Age: 55
End: 2025-08-21
Payer: COMMERCIAL

## 2025-08-21 ENCOUNTER — PATIENT OUTREACH (OUTPATIENT)
Dept: CARE COORDINATION | Facility: CLINIC | Age: 55
End: 2025-08-21

## 2025-08-21 DIAGNOSIS — Z48.298 AFTERCARE FOLLOWING ORGAN TRANSPLANT: ICD-10-CM

## 2025-08-21 DIAGNOSIS — Z98.890 OTHER SPECIFIED POSTPROCEDURAL STATES: ICD-10-CM

## 2025-08-21 DIAGNOSIS — Z20.828 CONTACT WITH AND (SUSPECTED) EXPOSURE TO OTHER VIRAL COMMUNICABLE DISEASES: ICD-10-CM

## 2025-08-21 DIAGNOSIS — Z94.0 KIDNEY REPLACED BY TRANSPLANT: ICD-10-CM

## 2025-08-21 DIAGNOSIS — Z79.899 ENCOUNTER FOR LONG-TERM CURRENT USE OF MEDICATION: ICD-10-CM

## 2025-08-21 LAB
AMYLASE SERPL-CCNC: 80 U/L (ref 28–100)
ANION GAP SERPL CALCULATED.3IONS-SCNC: 13 MMOL/L (ref 7–15)
BUN SERPL-MCNC: 32.6 MG/DL (ref 6–20)
CALCIUM SERPL-MCNC: 9.8 MG/DL (ref 8.8–10.4)
CHLORIDE SERPL-SCNC: 102 MMOL/L (ref 98–107)
CREAT SERPL-MCNC: 1.89 MG/DL (ref 0.51–0.95)
EGFRCR SERPLBLD CKD-EPI 2021: 31 ML/MIN/1.73M2
ERYTHROCYTE [DISTWIDTH] IN BLOOD BY AUTOMATED COUNT: 12.2 % (ref 10–15)
GLUCOSE SERPL-MCNC: 99 MG/DL (ref 70–99)
HCO3 SERPL-SCNC: 24 MMOL/L (ref 22–29)
HCT VFR BLD AUTO: 30.7 % (ref 35–47)
HGB BLD-MCNC: 10 G/DL (ref 11.7–15.7)
LIPASE SERPL-CCNC: 115 U/L (ref 13–60)
MCH RBC QN AUTO: 30.1 PG (ref 26.5–33)
MCHC RBC AUTO-ENTMCNC: 32.6 G/DL (ref 31.5–36.5)
MCV RBC AUTO: 92.5 FL (ref 78–100)
PLATELET # BLD AUTO: 278 10E3/UL (ref 150–450)
POTASSIUM SERPL-SCNC: 4.6 MMOL/L (ref 3.4–5.3)
RBC # BLD AUTO: 3.32 10E6/UL (ref 3.8–5.2)
SODIUM SERPL-SCNC: 139 MMOL/L (ref 135–145)
TACROLIMUS BLD-MCNC: 8.6 UG/L (ref 5–15)
TME LAST DOSE: NORMAL H
TME LAST DOSE: NORMAL H
WBC # BLD AUTO: 6.53 10E3/UL (ref 4–11)

## 2025-08-25 ENCOUNTER — LAB (OUTPATIENT)
Dept: LAB | Facility: OTHER | Age: 55
End: 2025-08-25
Payer: COMMERCIAL

## 2025-08-25 DIAGNOSIS — Z79.899 ENCOUNTER FOR LONG-TERM CURRENT USE OF MEDICATION: ICD-10-CM

## 2025-08-25 DIAGNOSIS — Z20.828 CONTACT WITH AND (SUSPECTED) EXPOSURE TO OTHER VIRAL COMMUNICABLE DISEASES: ICD-10-CM

## 2025-08-25 DIAGNOSIS — Z98.890 OTHER SPECIFIED POSTPROCEDURAL STATES: ICD-10-CM

## 2025-08-25 DIAGNOSIS — Z94.0 KIDNEY REPLACED BY TRANSPLANT: ICD-10-CM

## 2025-08-25 DIAGNOSIS — Z48.298 AFTERCARE FOLLOWING ORGAN TRANSPLANT: ICD-10-CM

## 2025-08-25 LAB
AMYLASE SERPL-CCNC: 80 U/L (ref 28–100)
ANION GAP SERPL CALCULATED.3IONS-SCNC: 12 MMOL/L (ref 7–15)
BUN SERPL-MCNC: 33.7 MG/DL (ref 6–20)
CALCIUM SERPL-MCNC: 9.1 MG/DL (ref 8.8–10.4)
CHLORIDE SERPL-SCNC: 104 MMOL/L (ref 98–107)
CREAT SERPL-MCNC: 1.76 MG/DL (ref 0.51–0.95)
EGFRCR SERPLBLD CKD-EPI 2021: 33 ML/MIN/1.73M2
ERYTHROCYTE [DISTWIDTH] IN BLOOD BY AUTOMATED COUNT: 12.3 % (ref 10–15)
GLUCOSE SERPL-MCNC: 103 MG/DL (ref 70–99)
HCO3 SERPL-SCNC: 25 MMOL/L (ref 22–29)
HCT VFR BLD AUTO: 26.4 % (ref 35–47)
HGB BLD-MCNC: 8.5 G/DL (ref 11.7–15.7)
LIPASE SERPL-CCNC: 143 U/L (ref 13–60)
MCH RBC QN AUTO: 30.2 PG (ref 26.5–33)
MCHC RBC AUTO-ENTMCNC: 32.2 G/DL (ref 31.5–36.5)
MCV RBC AUTO: 94 FL (ref 78–100)
PLATELET # BLD AUTO: 181 10E3/UL (ref 150–450)
POTASSIUM SERPL-SCNC: 4.4 MMOL/L (ref 3.4–5.3)
RBC # BLD AUTO: 2.81 10E6/UL (ref 3.8–5.2)
SODIUM SERPL-SCNC: 141 MMOL/L (ref 135–145)
TACROLIMUS BLD-MCNC: 9.8 UG/L (ref 5–15)
TME LAST DOSE: NORMAL H
TME LAST DOSE: NORMAL H
WBC # BLD AUTO: 5.4 10E3/UL (ref 4–11)

## 2025-08-25 PROCEDURE — 83690 ASSAY OF LIPASE: CPT

## 2025-08-25 PROCEDURE — 85027 COMPLETE CBC AUTOMATED: CPT

## 2025-08-25 PROCEDURE — 80048 BASIC METABOLIC PNL TOTAL CA: CPT

## 2025-08-25 PROCEDURE — 82150 ASSAY OF AMYLASE: CPT

## 2025-08-25 PROCEDURE — 80197 ASSAY OF TACROLIMUS: CPT

## 2025-08-25 PROCEDURE — 80180 DRUG SCRN QUAN MYCOPHENOLATE: CPT

## 2025-08-25 PROCEDURE — 36415 COLL VENOUS BLD VENIPUNCTURE: CPT

## 2025-08-26 ENCOUNTER — PATIENT OUTREACH (OUTPATIENT)
Dept: CARE COORDINATION | Facility: CLINIC | Age: 55
End: 2025-08-26

## 2025-08-26 ENCOUNTER — MYC MEDICAL ADVICE (OUTPATIENT)
Dept: INTERVENTIONAL RADIOLOGY/VASCULAR | Facility: CLINIC | Age: 55
End: 2025-08-26

## 2025-08-26 LAB
MYCOPHENOLATE SERPL LC/MS/MS-MCNC: 3.27 MG/L (ref 1–3.5)
MYCOPHENOLATE-G SERPL LC/MS/MS-MCNC: 121.4 MG/L (ref 30–95)
TME LAST DOSE: ABNORMAL H
TME LAST DOSE: ABNORMAL H

## 2025-08-27 ENCOUNTER — APPOINTMENT (OUTPATIENT)
Dept: INTERVENTIONAL RADIOLOGY/VASCULAR | Facility: CLINIC | Age: 55
End: 2025-08-27
Attending: SURGERY
Payer: COMMERCIAL

## 2025-08-27 ENCOUNTER — HOSPITAL ENCOUNTER (OUTPATIENT)
Facility: CLINIC | Age: 55
Discharge: HOME | End: 2025-08-27
Attending: STUDENT IN AN ORGANIZED HEALTH CARE EDUCATION/TRAINING PROGRAM | Admitting: STUDENT IN AN ORGANIZED HEALTH CARE EDUCATION/TRAINING PROGRAM
Payer: COMMERCIAL

## 2025-08-27 ENCOUNTER — APPOINTMENT (OUTPATIENT)
Dept: MEDSURG UNIT | Facility: CLINIC | Age: 55
End: 2025-08-27
Attending: STUDENT IN AN ORGANIZED HEALTH CARE EDUCATION/TRAINING PROGRAM
Payer: COMMERCIAL

## 2025-08-27 PROCEDURE — 50387 CHANGE NEPHROURETERAL CATH: CPT

## 2025-08-27 PROCEDURE — C1725 CATH, TRANSLUMIN NON-LASER: HCPCS

## 2025-08-27 PROCEDURE — 99152 MOD SED SAME PHYS/QHP 5/>YRS: CPT | Performed by: STUDENT IN AN ORGANIZED HEALTH CARE EDUCATION/TRAINING PROGRAM

## 2025-08-27 PROCEDURE — 50706 BALLOON DILATE URTRL STRIX: CPT

## 2025-08-27 PROCEDURE — 50387 CHANGE NEPHROURETERAL CATH: CPT | Mod: LT | Performed by: STUDENT IN AN ORGANIZED HEALTH CARE EDUCATION/TRAINING PROGRAM

## 2025-08-27 PROCEDURE — 50706 BALLOON DILATE URTRL STRIX: CPT | Mod: LT | Performed by: STUDENT IN AN ORGANIZED HEALTH CARE EDUCATION/TRAINING PROGRAM

## 2025-08-27 PROCEDURE — 999N000132 HC STATISTIC PP CARE STAGE 1

## 2025-08-27 PROCEDURE — 50431 NJX PX NFROSGRM &/URTRGRM: CPT

## 2025-08-27 PROCEDURE — 999N000142 HC STATISTIC PROCEDURE PREP ONLY

## 2025-08-27 ASSESSMENT — ACTIVITIES OF DAILY LIVING (ADL)
ADLS_ACUITY_SCORE: 58

## 2025-08-28 ENCOUNTER — LAB (OUTPATIENT)
Dept: LAB | Facility: OTHER | Age: 55
End: 2025-08-28
Payer: COMMERCIAL

## 2025-08-28 DIAGNOSIS — Z79.899 ENCOUNTER FOR LONG-TERM CURRENT USE OF MEDICATION: ICD-10-CM

## 2025-08-28 DIAGNOSIS — Z20.828 CONTACT WITH AND (SUSPECTED) EXPOSURE TO OTHER VIRAL COMMUNICABLE DISEASES: ICD-10-CM

## 2025-08-28 DIAGNOSIS — Z98.890 OTHER SPECIFIED POSTPROCEDURAL STATES: ICD-10-CM

## 2025-08-28 DIAGNOSIS — Z94.0 KIDNEY REPLACED BY TRANSPLANT: ICD-10-CM

## 2025-08-28 DIAGNOSIS — Z48.298 AFTERCARE FOLLOWING ORGAN TRANSPLANT: ICD-10-CM

## 2025-08-28 LAB
AMYLASE SERPL-CCNC: 67 U/L (ref 28–100)
ANION GAP SERPL CALCULATED.3IONS-SCNC: 12 MMOL/L (ref 7–15)
BUN SERPL-MCNC: 27.7 MG/DL (ref 6–20)
CALCIUM SERPL-MCNC: 8.8 MG/DL (ref 8.8–10.4)
CHLORIDE SERPL-SCNC: 103 MMOL/L (ref 98–107)
CREAT SERPL-MCNC: 1.99 MG/DL (ref 0.51–0.95)
EGFRCR SERPLBLD CKD-EPI 2021: 29 ML/MIN/1.73M2
ERYTHROCYTE [DISTWIDTH] IN BLOOD BY AUTOMATED COUNT: 12.8 % (ref 10–15)
GLUCOSE SERPL-MCNC: 113 MG/DL (ref 70–99)
HCO3 SERPL-SCNC: 25 MMOL/L (ref 22–29)
HCT VFR BLD AUTO: 25.9 % (ref 35–47)
HGB BLD-MCNC: 8.2 G/DL (ref 11.7–15.7)
LIPASE SERPL-CCNC: 111 U/L (ref 13–60)
MCH RBC QN AUTO: 30.7 PG (ref 26.5–33)
MCHC RBC AUTO-ENTMCNC: 31.7 G/DL (ref 31.5–36.5)
MCV RBC AUTO: 97 FL (ref 78–100)
PLATELET # BLD AUTO: 149 10E3/UL (ref 150–450)
POTASSIUM SERPL-SCNC: 4.4 MMOL/L (ref 3.4–5.3)
RBC # BLD AUTO: 2.67 10E6/UL (ref 3.8–5.2)
SODIUM SERPL-SCNC: 140 MMOL/L (ref 135–145)
TACROLIMUS BLD-MCNC: 10.8 UG/L (ref 5–15)
TME LAST DOSE: NORMAL H
TME LAST DOSE: NORMAL H
WBC # BLD AUTO: 4.73 10E3/UL (ref 4–11)

## 2025-09-02 ENCOUNTER — LAB (OUTPATIENT)
Dept: LAB | Facility: OTHER | Age: 55
End: 2025-09-02
Payer: COMMERCIAL

## 2025-09-02 DIAGNOSIS — Z94.0 KIDNEY REPLACED BY TRANSPLANT: ICD-10-CM

## 2025-09-02 DIAGNOSIS — Z48.298 AFTERCARE FOLLOWING ORGAN TRANSPLANT: ICD-10-CM

## 2025-09-02 DIAGNOSIS — N13.30 HYDRONEPHROSIS, UNSPECIFIED HYDRONEPHROSIS TYPE: Primary | ICD-10-CM

## 2025-09-02 DIAGNOSIS — Z98.890 OTHER SPECIFIED POSTPROCEDURAL STATES: ICD-10-CM

## 2025-09-02 DIAGNOSIS — Z79.899 ENCOUNTER FOR LONG-TERM CURRENT USE OF MEDICATION: ICD-10-CM

## 2025-09-02 DIAGNOSIS — Z20.828 CONTACT WITH AND (SUSPECTED) EXPOSURE TO OTHER VIRAL COMMUNICABLE DISEASES: ICD-10-CM

## 2025-09-02 DIAGNOSIS — Z94.0 KIDNEY TRANSPLANT RECIPIENT: ICD-10-CM

## 2025-09-02 LAB
AMYLASE SERPL-CCNC: 67 U/L (ref 28–100)
ANION GAP SERPL CALCULATED.3IONS-SCNC: 14 MMOL/L (ref 7–15)
BUN SERPL-MCNC: 32.5 MG/DL (ref 6–20)
CALCIUM SERPL-MCNC: 9.8 MG/DL (ref 8.8–10.4)
CHLORIDE SERPL-SCNC: 101 MMOL/L (ref 98–107)
CREAT SERPL-MCNC: 1.65 MG/DL (ref 0.51–0.95)
EGFRCR SERPLBLD CKD-EPI 2021: 36 ML/MIN/1.73M2
ERYTHROCYTE [DISTWIDTH] IN BLOOD BY AUTOMATED COUNT: 13.6 % (ref 10–15)
GLUCOSE SERPL-MCNC: 103 MG/DL (ref 70–99)
HCO3 SERPL-SCNC: 22 MMOL/L (ref 22–29)
HCT VFR BLD AUTO: 28.2 % (ref 35–47)
HGB BLD-MCNC: 9.1 G/DL (ref 11.7–15.7)
LIPASE SERPL-CCNC: 103 U/L (ref 13–60)
MCH RBC QN AUTO: 31.2 PG (ref 26.5–33)
MCHC RBC AUTO-ENTMCNC: 32.3 G/DL (ref 31.5–36.5)
MCV RBC AUTO: 96.6 FL (ref 78–100)
PLATELET # BLD AUTO: 139 10E3/UL (ref 150–450)
POTASSIUM SERPL-SCNC: 5.1 MMOL/L (ref 3.4–5.3)
RBC # BLD AUTO: 2.92 10E6/UL (ref 3.8–5.2)
SODIUM SERPL-SCNC: 137 MMOL/L (ref 135–145)
TACROLIMUS BLD-MCNC: 8.8 UG/L (ref 5–15)
TME LAST DOSE: NORMAL H
TME LAST DOSE: NORMAL H
WBC # BLD AUTO: 4.35 10E3/UL (ref 4–11)

## 2025-09-02 PROCEDURE — 85027 COMPLETE CBC AUTOMATED: CPT

## 2025-09-02 PROCEDURE — 82150 ASSAY OF AMYLASE: CPT

## 2025-09-02 PROCEDURE — 80197 ASSAY OF TACROLIMUS: CPT

## 2025-09-02 PROCEDURE — 36415 COLL VENOUS BLD VENIPUNCTURE: CPT

## 2025-09-02 PROCEDURE — 83690 ASSAY OF LIPASE: CPT

## 2025-09-02 PROCEDURE — 80048 BASIC METABOLIC PNL TOTAL CA: CPT

## 2025-09-04 ENCOUNTER — LAB (OUTPATIENT)
Dept: LAB | Facility: OTHER | Age: 55
End: 2025-09-04
Payer: COMMERCIAL

## 2025-09-04 DIAGNOSIS — Z48.298 AFTERCARE FOLLOWING ORGAN TRANSPLANT: ICD-10-CM

## 2025-09-04 DIAGNOSIS — Z20.828 CONTACT WITH AND (SUSPECTED) EXPOSURE TO OTHER VIRAL COMMUNICABLE DISEASES: ICD-10-CM

## 2025-09-04 DIAGNOSIS — Z94.0 KIDNEY REPLACED BY TRANSPLANT: ICD-10-CM

## 2025-09-04 DIAGNOSIS — Z79.899 ENCOUNTER FOR LONG-TERM CURRENT USE OF MEDICATION: ICD-10-CM

## 2025-09-04 DIAGNOSIS — Z98.890 OTHER SPECIFIED POSTPROCEDURAL STATES: ICD-10-CM

## 2025-09-04 LAB
AMYLASE SERPL-CCNC: 72 U/L (ref 28–100)
ANION GAP SERPL CALCULATED.3IONS-SCNC: 14 MMOL/L (ref 7–15)
BUN SERPL-MCNC: 37 MG/DL (ref 6–20)
CALCIUM SERPL-MCNC: 9.4 MG/DL (ref 8.8–10.4)
CHLORIDE SERPL-SCNC: 104 MMOL/L (ref 98–107)
CREAT SERPL-MCNC: 1.93 MG/DL (ref 0.51–0.95)
EGFRCR SERPLBLD CKD-EPI 2021: 30 ML/MIN/1.73M2
ERYTHROCYTE [DISTWIDTH] IN BLOOD BY AUTOMATED COUNT: 13.6 % (ref 10–15)
GLUCOSE SERPL-MCNC: 102 MG/DL (ref 70–99)
HCO3 SERPL-SCNC: 21 MMOL/L (ref 22–29)
HCT VFR BLD AUTO: 26.8 % (ref 35–47)
HGB BLD-MCNC: 8.5 G/DL (ref 11.7–15.7)
LIPASE SERPL-CCNC: 117 U/L (ref 13–60)
MAGNESIUM SERPL-MCNC: 1.8 MG/DL (ref 1.7–2.3)
MCH RBC QN AUTO: 30.6 PG (ref 26.5–33)
MCHC RBC AUTO-ENTMCNC: 31.7 G/DL (ref 31.5–36.5)
MCV RBC AUTO: 96.4 FL (ref 78–100)
PHOSPHATE SERPL-MCNC: 4 MG/DL (ref 2.5–4.5)
PLATELET # BLD AUTO: 123 10E3/UL (ref 150–450)
POTASSIUM SERPL-SCNC: 4.7 MMOL/L (ref 3.4–5.3)
RBC # BLD AUTO: 2.78 10E6/UL (ref 3.8–5.2)
SODIUM SERPL-SCNC: 139 MMOL/L (ref 135–145)
TACROLIMUS BLD-MCNC: 7.6 UG/L (ref 5–15)
TME LAST DOSE: NORMAL H
TME LAST DOSE: NORMAL H
WBC # BLD AUTO: 3.25 10E3/UL (ref 4–11)

## (undated) DEVICE — RX SURGIFLO HEMOSTATIC MATRIX W/THROMBIN 8ML 2994

## (undated) DEVICE — Device

## (undated) DEVICE — SU SILK 4-0 TIE 12X30" A303H

## (undated) DEVICE — SUCTION TIP YANKAUER W/O VENT K86

## (undated) DEVICE — TUBING SUCTION 10'X3/16" N510

## (undated) DEVICE — LINEN TOWEL PACK X5 5464

## (undated) DEVICE — SU MONOCRYL 4-0 PS-2 27" UND Y426H

## (undated) DEVICE — SU SILK 2-0 TIE 12X30" A305H

## (undated) DEVICE — PREP CHLORAPREP 26ML TINTED HI-LITE ORANGE 930815

## (undated) DEVICE — CLIP HORIZON MED BLUE 002200

## (undated) DEVICE — TUBING IRRIG CYSTO/BLADDER SET 81" LF 2C4040

## (undated) DEVICE — SHTH INTRO 0.021IN ID 6FR DIA

## (undated) DEVICE — PAD CHUX UNDERPAD 23X36" 676105

## (undated) DEVICE — ESU PENCIL SMOKE EVAC W/ROCKER SWITCH 0703-047-000

## (undated) DEVICE — TUBE CULTURE ANEROBIC MEDIA TRANPORT 6ML AS-991

## (undated) DEVICE — ESU GROUND PAD ADULT W/CORD E7507

## (undated) DEVICE — SUCTION MANIFOLD NEPTUNE 2 SYS 4 PORT 0702-020-000

## (undated) DEVICE — LINEN TOWEL PACK X30 5481

## (undated) DEVICE — SU PROLENE 7-0 BV-1DA 4X24" M8702

## (undated) DEVICE — DRSG MEDIPORE 3 1/2X13 3/4" 3573

## (undated) DEVICE — SU PDS II 6-0 RB-2DA 30" Z149H

## (undated) DEVICE — GLOVE BIOGEL PI MICRO INDICATOR UNDERGLOVE SZ 7.5 48975

## (undated) DEVICE — KIT HAND CONTROL ACIST 014644 AR-P54

## (undated) DEVICE — LINEN TOWEL PACK X6 WHITE 5487

## (undated) DEVICE — TUBING PRESSURE 30"

## (undated) DEVICE — STPL SKIN 35W ROTATING HEAD PRW35

## (undated) DEVICE — SLEEVE TR BAND RADIAL COMPRESSION DEVICE 24CM TRB24-REG

## (undated) DEVICE — SUCTION TIP YANKAUER STR K87

## (undated) DEVICE — MINOR SINGLE BASIN KIT CSR WRAPX2 7QT SSK3002

## (undated) DEVICE — SOL NACL 0.9% IRRIG 1000ML BOTTLE 2F7124

## (undated) DEVICE — SU PDS II 0 TP-1 60" Z991G

## (undated) DEVICE — DRAPE IOBAN INCISE 23X17" 6650EZ

## (undated) DEVICE — DRSG PRIMAPORE 02X3" 7133

## (undated) DEVICE — SOL WATER IRRIG 1000ML BOTTLE 2F7114

## (undated) DEVICE — STPL LINEAR 30X2.5MM VASC TX30V

## (undated) DEVICE — SU SILK 1 TIE 6X30" A307H

## (undated) DEVICE — SURGICEL ABSORBABLE HEMOSTAT SNOW 4"X4" 2083

## (undated) DEVICE — DRAPE NEW SLUSH/WARMER HUSHSLUSH 2.0 ESD340 ESD340

## (undated) DEVICE — PUNCH AORTIC 5MM SINGLE USE 1001-626

## (undated) DEVICE — SU PROLENE 6-0 RB-2DA 30" 8711H

## (undated) DEVICE — SU VICRYL+ 0 27 UR6 VLT VCP603H

## (undated) DEVICE — SPONGE LAP 18X18" X8435

## (undated) DEVICE — PACK HEART RIGHT CUSTOM SAN32RHF18

## (undated) DEVICE — GW VASC .035IN DIA 260CML 7CML 3 MM RADIUS J CURVE 502455

## (undated) DEVICE — NDL 21GA 1.5"

## (undated) DEVICE — SU VICRYL+ 3-0 27IN SH UND VCP416H

## (undated) DEVICE — CLIP HORIZON LG ORANGE 004200

## (undated) DEVICE — SU PROLENE 4-0 SHDA 36" 8521H

## (undated) DEVICE — SU SILK 3-0 SH CR 8X18" C013D

## (undated) DEVICE — STPL RELOAD LINEAR CUT 55MM TCR55

## (undated) DEVICE — GLOVE PROTEXIS BLUE W/NEU-THERA 8.0  2D73EB80

## (undated) DEVICE — CLIP APPLIER 11" MED LIGACLIP MCM30

## (undated) DEVICE — SU PDS II 5-0 RB-1 27" Z303H

## (undated) DEVICE — DRAPE ISOLATION BAG 1003

## (undated) DEVICE — GLOVE BIOGEL PI MICRO INDICATOR UNDERGLOVE SZ 8.0 48980

## (undated) DEVICE — ESU ELEC BLADE 6" COATED/INSULATED E1455-6

## (undated) DEVICE — WIRE GUIDE 0.035"X150CM EMERALD J TIP 502521

## (undated) DEVICE — DEVICE CATH STABILIZATION STATLOCK FOLEY 3-WAY FOL0105

## (undated) DEVICE — PLUG CATH AND DRAIN TUBE PROTECTOR DYND12200

## (undated) DEVICE — SOL NACL 0.9% INJ 1000ML BAG 2B1324X

## (undated) DEVICE — DRAPE SHEET REV FOLD 3/4 9349

## (undated) DEVICE — DRAPE STERI FLUOROSCOPE 35X43"1012 LATEX FREE

## (undated) DEVICE — SU PROLENE 7-0 BV-1DA 30" 8703H

## (undated) DEVICE — CLIP ENDO HEMO-LOC PURPLE LG 544240

## (undated) DEVICE — BLADE KNIFE SURG 11 371111

## (undated) DEVICE — STPL RELOAD LINEAR CUT 55MM VASC TVR55

## (undated) DEVICE — SU SILK 0 TIE 6X30" A306H

## (undated) DEVICE — SU PDS II 4-0 SH 27" Z315H

## (undated) DEVICE — SU PROLENE 5-0 RB-1DA 36"  8556H

## (undated) DEVICE — SU VICRYL 0 UR-6 27" J603H

## (undated) DEVICE — CATH ANGIO SUPERTORQUE PLUS JR4 6FRX100CM 533621

## (undated) DEVICE — SYR 01ML 27GA 0.5" NDL TBC 309623

## (undated) DEVICE — CATH ANGIO 6FR JL3.5 100CM ST+

## (undated) DEVICE — PACK GOWN 3/PK DISP XL SBA32GPFCB

## (undated) DEVICE — DRAIN JACKSON PRATT CHANNEL 19FR ROUND HUBLESS SIL JP-2230

## (undated) DEVICE — SU PROLENE 7-0 BV-1DA 4X30" M8703

## (undated) DEVICE — MANIFOLD KIT ANGIO AUTOMATED 014613

## (undated) DEVICE — SYR 10ML LL W/O NDL 302995

## (undated) DEVICE — CLIP APPLIER 13" LG LIGACLIP MCL20

## (undated) DEVICE — STPL LINEAR CUT 55MM TLC55

## (undated) DEVICE — WIPES FOLEY CARE SURESTEP PROVON DFC100

## (undated) DEVICE — SURGICEL HEMOSTAT 4X8" 1952

## (undated) DEVICE — SU SILK 3-0 TIE 12X30" A304H

## (undated) DEVICE — SU ETHILON 3-0 PS-1 18" 1663H

## (undated) DEVICE — GLOVE PROTEXIS BLUE W/NEU-THERA 7.5  2D73EB75

## (undated) DEVICE — NDL COUNTER 20CT 31142493

## (undated) RX ORDER — LIDOCAINE HYDROCHLORIDE 10 MG/ML
INJECTION, SOLUTION EPIDURAL; INFILTRATION; INTRACAUDAL; PERINEURAL
Status: DISPENSED
Start: 2025-07-08

## (undated) RX ORDER — LIDOCAINE HYDROCHLORIDE 10 MG/ML
INJECTION, SOLUTION EPIDURAL; INFILTRATION; INTRACAUDAL; PERINEURAL
Status: DISPENSED
Start: 2025-08-15

## (undated) RX ORDER — FUROSEMIDE 10 MG/ML
INJECTION INTRAMUSCULAR; INTRAVENOUS
Status: DISPENSED
Start: 2025-05-02

## (undated) RX ORDER — FENTANYL CITRATE 50 UG/ML
INJECTION, SOLUTION INTRAMUSCULAR; INTRAVENOUS
Status: DISPENSED
Start: 2025-08-15

## (undated) RX ORDER — EPHEDRINE SULFATE 50 MG/ML
INJECTION, SOLUTION INTRAMUSCULAR; INTRAVENOUS; SUBCUTANEOUS
Status: DISPENSED
Start: 2025-05-02

## (undated) RX ORDER — FENTANYL CITRATE 50 UG/ML
INJECTION, SOLUTION INTRAMUSCULAR; INTRAVENOUS
Status: DISPENSED
Start: 2025-05-03

## (undated) RX ORDER — BUPIVACAINE HYDROCHLORIDE 5 MG/ML
INJECTION, SOLUTION EPIDURAL; INTRACAUDAL; PERINEURAL
Status: DISPENSED
Start: 2025-07-08

## (undated) RX ORDER — FENTANYL CITRATE-0.9 % NACL/PF 10 MCG/ML
PLASTIC BAG, INJECTION (ML) INTRAVENOUS
Status: DISPENSED
Start: 2025-05-02

## (undated) RX ORDER — OXYCODONE HYDROCHLORIDE 5 MG/1
TABLET ORAL
Status: DISPENSED
Start: 2025-07-08

## (undated) RX ORDER — ONDANSETRON 2 MG/ML
INJECTION INTRAMUSCULAR; INTRAVENOUS
Status: DISPENSED
Start: 2025-05-02

## (undated) RX ORDER — SODIUM CHLORIDE 450 MG/100ML
INJECTION, SOLUTION INTRAVENOUS
Status: DISPENSED
Start: 2025-05-03

## (undated) RX ORDER — SODIUM CHLORIDE 9 MG/ML
INJECTION, SOLUTION INTRAVENOUS
Status: DISPENSED
Start: 2025-05-03

## (undated) RX ORDER — CEFAZOLIN SODIUM 1 G/3ML
INJECTION, POWDER, FOR SOLUTION INTRAMUSCULAR; INTRAVENOUS
Status: DISPENSED
Start: 2025-07-08

## (undated) RX ORDER — FENTANYL CITRATE 50 UG/ML
INJECTION, SOLUTION INTRAMUSCULAR; INTRAVENOUS
Status: DISPENSED
Start: 2025-05-02

## (undated) RX ORDER — HEPARIN SODIUM 1000 [USP'U]/ML
INJECTION, SOLUTION INTRAVENOUS; SUBCUTANEOUS
Status: DISPENSED
Start: 2025-05-02

## (undated) RX ORDER — PROPOFOL 10 MG/ML
INJECTION, EMULSION INTRAVENOUS
Status: DISPENSED
Start: 2025-05-02

## (undated) RX ORDER — VERAPAMIL HYDROCHLORIDE 2.5 MG/ML
INJECTION INTRAVENOUS
Status: DISPENSED
Start: 2025-05-02

## (undated) RX ORDER — NITROGLYCERIN 5 MG/ML
VIAL (ML) INTRAVENOUS
Status: DISPENSED
Start: 2024-04-12

## (undated) RX ORDER — SODIUM CHLORIDE 9 MG/ML
INJECTION, SOLUTION INTRAVENOUS
Status: DISPENSED
Start: 2024-04-12

## (undated) RX ORDER — HEPARIN SODIUM 1000 [USP'U]/ML
INJECTION, SOLUTION INTRAVENOUS; SUBCUTANEOUS
Status: DISPENSED
Start: 2024-04-12

## (undated) RX ORDER — SODIUM CHLORIDE 9 MG/ML
INJECTION, SOLUTION INTRAVENOUS
Status: DISPENSED
Start: 2025-07-08

## (undated) RX ORDER — POTASSIUM CHLORIDE 750 MG/1
TABLET, EXTENDED RELEASE ORAL
Status: DISPENSED
Start: 2024-04-12

## (undated) RX ORDER — NICARDIPINE HCL-0.9% SOD CHLOR 1 MG/10 ML
SYRINGE (ML) INTRAVENOUS
Status: DISPENSED
Start: 2024-04-12

## (undated) RX ORDER — FENTANYL CITRATE 50 UG/ML
INJECTION, SOLUTION INTRAMUSCULAR; INTRAVENOUS
Status: DISPENSED
Start: 2025-06-04

## (undated) RX ORDER — FENTANYL CITRATE 50 UG/ML
INJECTION, SOLUTION INTRAMUSCULAR; INTRAVENOUS
Status: DISPENSED
Start: 2025-07-08

## (undated) RX ORDER — FENTANYL CITRATE-0.9 % NACL/PF 10 MCG/ML
PLASTIC BAG, INJECTION (ML) INTRAVENOUS
Status: DISPENSED
Start: 2025-05-03

## (undated) RX ORDER — ACETAMINOPHEN 325 MG/1
TABLET ORAL
Status: DISPENSED
Start: 2025-07-08

## (undated) RX ORDER — PAPAVERINE HYDROCHLORIDE 30 MG/ML
INJECTION INTRAMUSCULAR; INTRAVENOUS
Status: DISPENSED
Start: 2025-05-02

## (undated) RX ORDER — ASPIRIN 325 MG
TABLET ORAL
Status: DISPENSED
Start: 2024-04-12

## (undated) RX ORDER — ONDANSETRON 2 MG/ML
INJECTION INTRAMUSCULAR; INTRAVENOUS
Status: DISPENSED
Start: 2025-08-15

## (undated) RX ORDER — CEFAZOLIN SODIUM 2 G/50ML
SOLUTION INTRAVENOUS
Status: DISPENSED
Start: 2025-08-15

## (undated) RX ORDER — DEXTROSE, SODIUM CHLORIDE, SODIUM LACTATE, POTASSIUM CHLORIDE, AND CALCIUM CHLORIDE 5; .6; .31; .03; .02 G/100ML; G/100ML; G/100ML; G/100ML; G/100ML
INJECTION, SOLUTION INTRAVENOUS
Status: DISPENSED
Start: 2025-05-03

## (undated) RX ORDER — CEFAZOLIN SODIUM 2 G/50ML
SOLUTION INTRAVENOUS
Status: DISPENSED
Start: 2025-07-08

## (undated) RX ORDER — FENTANYL CITRATE 50 UG/ML
INJECTION, SOLUTION INTRAMUSCULAR; INTRAVENOUS
Status: DISPENSED
Start: 2024-04-12

## (undated) RX ORDER — REGADENOSON 0.08 MG/ML
INJECTION, SOLUTION INTRAVENOUS
Status: DISPENSED
Start: 2023-11-01

## (undated) RX ORDER — LIDOCAINE HYDROCHLORIDE 10 MG/ML
INJECTION, SOLUTION EPIDURAL; INFILTRATION; INTRACAUDAL; PERINEURAL
Status: DISPENSED
Start: 2025-06-04

## (undated) RX ORDER — HYDROMORPHONE HYDROCHLORIDE 1 MG/ML
INJECTION, SOLUTION INTRAMUSCULAR; INTRAVENOUS; SUBCUTANEOUS
Status: DISPENSED
Start: 2025-05-03